# Patient Record
Sex: MALE | Race: WHITE | NOT HISPANIC OR LATINO | Employment: OTHER | URBAN - METROPOLITAN AREA
[De-identification: names, ages, dates, MRNs, and addresses within clinical notes are randomized per-mention and may not be internally consistent; named-entity substitution may affect disease eponyms.]

---

## 2021-07-23 ENCOUNTER — LAB REQUISITION (OUTPATIENT)
Dept: LAB | Facility: CLINIC | Age: 81
End: 2021-07-23

## 2021-07-23 DIAGNOSIS — I50.9 HEART FAILURE, UNSPECIFIED (H): ICD-10-CM

## 2021-07-23 LAB
ANION GAP SERPL CALCULATED.3IONS-SCNC: 5 MMOL/L (ref 3–14)
BUN SERPL-MCNC: 48 MG/DL (ref 7–30)
CALCIUM SERPL-MCNC: 8.3 MG/DL (ref 8.5–10.1)
CHLORIDE BLD-SCNC: 117 MMOL/L (ref 94–109)
CO2 SERPL-SCNC: 21 MMOL/L (ref 20–32)
CREAT SERPL-MCNC: 2 MG/DL (ref 0.66–1.25)
ERYTHROCYTE [DISTWIDTH] IN BLOOD BY AUTOMATED COUNT: 14.2 % (ref 10–15)
GFR SERPL CREATININE-BSD FRML MDRD: 30 ML/MIN/1.73M2
GLUCOSE BLD-MCNC: 88 MG/DL (ref 70–99)
HCT VFR BLD AUTO: 35.9 % (ref 40–53)
HGB BLD-MCNC: 11.5 G/DL (ref 13.3–17.7)
MCH RBC QN AUTO: 32.4 PG (ref 26.5–33)
MCHC RBC AUTO-ENTMCNC: 32 G/DL (ref 31.5–36.5)
MCV RBC AUTO: 101 FL (ref 78–100)
PLATELET # BLD AUTO: 150 10E3/UL (ref 150–450)
POTASSIUM BLD-SCNC: 5.2 MMOL/L (ref 3.4–5.3)
RBC # BLD AUTO: 3.55 10E6/UL (ref 4.4–5.9)
SODIUM SERPL-SCNC: 143 MMOL/L (ref 133–144)
WBC # BLD AUTO: 5.8 10E3/UL (ref 4–11)

## 2021-07-23 PROCEDURE — 36415 COLL VENOUS BLD VENIPUNCTURE: CPT | Performed by: INTERNAL MEDICINE

## 2021-07-23 PROCEDURE — 80048 BASIC METABOLIC PNL TOTAL CA: CPT | Performed by: INTERNAL MEDICINE

## 2021-07-23 PROCEDURE — 85027 COMPLETE CBC AUTOMATED: CPT | Performed by: INTERNAL MEDICINE

## 2021-07-23 PROCEDURE — P9603 ONE-WAY ALLOW PRORATED MILES: HCPCS | Performed by: INTERNAL MEDICINE

## 2022-05-11 ENCOUNTER — LAB REQUISITION (OUTPATIENT)
Dept: LAB | Facility: CLINIC | Age: 82
End: 2022-05-11
Payer: COMMERCIAL

## 2022-05-11 DIAGNOSIS — D64.9 ANEMIA, UNSPECIFIED: ICD-10-CM

## 2022-05-11 DIAGNOSIS — F32.9 MAJOR DEPRESSIVE DISORDER, SINGLE EPISODE, UNSPECIFIED: ICD-10-CM

## 2022-05-11 DIAGNOSIS — E11.9 TYPE 2 DIABETES MELLITUS WITHOUT COMPLICATIONS (H): ICD-10-CM

## 2022-05-11 DIAGNOSIS — N18.32 CHRONIC KIDNEY DISEASE, STAGE 3B (H): ICD-10-CM

## 2022-05-12 LAB
ANION GAP SERPL CALCULATED.3IONS-SCNC: 7 MMOL/L (ref 5–18)
BASOPHILS # BLD AUTO: 0 10E3/UL (ref 0–0.2)
BASOPHILS NFR BLD AUTO: 0 %
BUN SERPL-MCNC: 50 MG/DL (ref 8–28)
CALCIUM SERPL-MCNC: 8.2 MG/DL (ref 8.5–10.5)
CHLORIDE BLD-SCNC: 116 MMOL/L (ref 98–107)
CO2 SERPL-SCNC: 17 MMOL/L (ref 22–31)
CREAT SERPL-MCNC: 2.12 MG/DL (ref 0.7–1.3)
EOSINOPHIL # BLD AUTO: 0.2 10E3/UL (ref 0–0.7)
EOSINOPHIL NFR BLD AUTO: 4 %
ERYTHROCYTE [DISTWIDTH] IN BLOOD BY AUTOMATED COUNT: 15.5 % (ref 10–15)
GFR SERPL CREATININE-BSD FRML MDRD: 30 ML/MIN/1.73M2
GLUCOSE BLD-MCNC: 108 MG/DL (ref 70–125)
HBA1C MFR BLD: 4.7 %
HCT VFR BLD AUTO: 24.1 % (ref 40–53)
HGB BLD-MCNC: 7 G/DL (ref 13.3–17.7)
IMM GRANULOCYTES # BLD: 0 10E3/UL
IMM GRANULOCYTES NFR BLD: 0 %
LYMPHOCYTES # BLD AUTO: 1.5 10E3/UL (ref 0.8–5.3)
LYMPHOCYTES NFR BLD AUTO: 30 %
MCH RBC QN AUTO: 28.1 PG (ref 26.5–33)
MCHC RBC AUTO-ENTMCNC: 29 G/DL (ref 31.5–36.5)
MCV RBC AUTO: 97 FL (ref 78–100)
MONOCYTES # BLD AUTO: 0.3 10E3/UL (ref 0–1.3)
MONOCYTES NFR BLD AUTO: 6 %
NEUTROPHILS # BLD AUTO: 2.9 10E3/UL (ref 1.6–8.3)
NEUTROPHILS NFR BLD AUTO: 60 %
NRBC # BLD AUTO: 0 10E3/UL
NRBC BLD AUTO-RTO: 0 /100
PLATELET # BLD AUTO: 186 10E3/UL (ref 150–450)
POTASSIUM BLD-SCNC: 5.6 MMOL/L (ref 3.5–5)
RBC # BLD AUTO: 2.49 10E6/UL (ref 4.4–5.9)
SODIUM SERPL-SCNC: 140 MMOL/L (ref 136–145)
T4 FREE SERPL-MCNC: 0.85 NG/DL (ref 0.7–1.8)
TSH SERPL DL<=0.005 MIU/L-ACNC: 1.9 UIU/ML (ref 0.3–5)
VIT B12 SERPL-MCNC: 282 PG/ML (ref 213–816)
WBC # BLD AUTO: 5 10E3/UL (ref 4–11)

## 2022-05-12 PROCEDURE — 85025 COMPLETE CBC W/AUTO DIFF WBC: CPT | Mod: ORL | Performed by: FAMILY MEDICINE

## 2022-05-12 PROCEDURE — 82607 VITAMIN B-12: CPT | Mod: ORL | Performed by: FAMILY MEDICINE

## 2022-05-12 PROCEDURE — 83036 HEMOGLOBIN GLYCOSYLATED A1C: CPT | Mod: ORL | Performed by: FAMILY MEDICINE

## 2022-05-12 PROCEDURE — 80048 BASIC METABOLIC PNL TOTAL CA: CPT | Mod: ORL | Performed by: FAMILY MEDICINE

## 2022-05-12 PROCEDURE — 84443 ASSAY THYROID STIM HORMONE: CPT | Mod: ORL | Performed by: FAMILY MEDICINE

## 2022-05-12 PROCEDURE — 84439 ASSAY OF FREE THYROXINE: CPT | Mod: ORL | Performed by: FAMILY MEDICINE

## 2022-05-12 PROCEDURE — 36415 COLL VENOUS BLD VENIPUNCTURE: CPT | Mod: ORL | Performed by: FAMILY MEDICINE

## 2022-05-12 PROCEDURE — P9604 ONE-WAY ALLOW PRORATED TRIP: HCPCS | Mod: ORL | Performed by: FAMILY MEDICINE

## 2022-06-01 ENCOUNTER — LAB REQUISITION (OUTPATIENT)
Dept: LAB | Facility: CLINIC | Age: 82
End: 2022-06-01
Payer: COMMERCIAL

## 2022-06-01 DIAGNOSIS — D64.9 ANEMIA, UNSPECIFIED: ICD-10-CM

## 2022-06-01 DIAGNOSIS — E87.5 HYPERKALEMIA: ICD-10-CM

## 2022-06-02 LAB
ALBUMIN SERPL-MCNC: 3.4 G/DL (ref 3.5–5)
ANION GAP SERPL CALCULATED.3IONS-SCNC: 6 MMOL/L (ref 5–18)
BASOPHILS # BLD AUTO: 0 10E3/UL (ref 0–0.2)
BASOPHILS NFR BLD AUTO: 1 %
BUN SERPL-MCNC: 57 MG/DL (ref 8–28)
CALCIUM SERPL-MCNC: 8.5 MG/DL (ref 8.5–10.5)
CHLORIDE BLD-SCNC: 117 MMOL/L (ref 98–107)
CO2 SERPL-SCNC: 15 MMOL/L (ref 22–31)
CREAT SERPL-MCNC: 1.94 MG/DL (ref 0.7–1.3)
EOSINOPHIL # BLD AUTO: 0.3 10E3/UL (ref 0–0.7)
EOSINOPHIL NFR BLD AUTO: 5 %
ERYTHROCYTE [DISTWIDTH] IN BLOOD BY AUTOMATED COUNT: 16.1 % (ref 10–15)
FERRITIN SERPL-MCNC: 14 NG/ML (ref 27–300)
GFR SERPL CREATININE-BSD FRML MDRD: 34 ML/MIN/1.73M2
GLUCOSE BLD-MCNC: 99 MG/DL (ref 70–125)
HCT VFR BLD AUTO: 27.2 % (ref 40–53)
HGB BLD-MCNC: 7.5 G/DL (ref 13.3–17.7)
IMM GRANULOCYTES # BLD: 0 10E3/UL
IMM GRANULOCYTES NFR BLD: 0 %
IRON SATN MFR SERPL: 6 % (ref 20–50)
IRON SERPL-MCNC: 21 UG/DL (ref 42–175)
LYMPHOCYTES # BLD AUTO: 1.6 10E3/UL (ref 0.8–5.3)
LYMPHOCYTES NFR BLD AUTO: 29 %
MAGNESIUM SERPL-MCNC: 2.1 MG/DL (ref 1.8–2.6)
MCH RBC QN AUTO: 26.6 PG (ref 26.5–33)
MCHC RBC AUTO-ENTMCNC: 27.6 G/DL (ref 31.5–36.5)
MCV RBC AUTO: 97 FL (ref 78–100)
MONOCYTES # BLD AUTO: 0.5 10E3/UL (ref 0–1.3)
MONOCYTES NFR BLD AUTO: 9 %
NEUTROPHILS # BLD AUTO: 3 10E3/UL (ref 1.6–8.3)
NEUTROPHILS NFR BLD AUTO: 56 %
NRBC # BLD AUTO: 0 10E3/UL
NRBC BLD AUTO-RTO: 0 /100
PHOSPHATE SERPL-MCNC: 4.4 MG/DL (ref 2.5–4.5)
PLATELET # BLD AUTO: 179 10E3/UL (ref 150–450)
POTASSIUM BLD-SCNC: 5.5 MMOL/L (ref 3.5–5)
RBC # BLD AUTO: 2.82 10E6/UL (ref 4.4–5.9)
SODIUM SERPL-SCNC: 138 MMOL/L (ref 136–145)
TIBC SERPL-MCNC: 344 UG/DL (ref 313–563)
TRANSFERRIN SERPL-MCNC: 275 MG/DL (ref 212–360)
WBC # BLD AUTO: 5.4 10E3/UL (ref 4–11)

## 2022-06-02 PROCEDURE — 82728 ASSAY OF FERRITIN: CPT | Mod: ORL | Performed by: FAMILY MEDICINE

## 2022-06-02 PROCEDURE — 80069 RENAL FUNCTION PANEL: CPT | Mod: ORL | Performed by: FAMILY MEDICINE

## 2022-06-02 PROCEDURE — 84466 ASSAY OF TRANSFERRIN: CPT | Mod: ORL | Performed by: FAMILY MEDICINE

## 2022-06-02 PROCEDURE — P9603 ONE-WAY ALLOW PRORATED MILES: HCPCS | Mod: ORL | Performed by: FAMILY MEDICINE

## 2022-06-02 PROCEDURE — 85025 COMPLETE CBC W/AUTO DIFF WBC: CPT | Mod: ORL | Performed by: FAMILY MEDICINE

## 2022-06-02 PROCEDURE — 83735 ASSAY OF MAGNESIUM: CPT | Mod: ORL | Performed by: FAMILY MEDICINE

## 2022-06-02 PROCEDURE — 36415 COLL VENOUS BLD VENIPUNCTURE: CPT | Mod: ORL | Performed by: FAMILY MEDICINE

## 2022-06-08 ENCOUNTER — LAB REQUISITION (OUTPATIENT)
Dept: LAB | Facility: CLINIC | Age: 82
End: 2022-06-08
Payer: COMMERCIAL

## 2022-06-08 DIAGNOSIS — G72.41 INCLUSION BODY MYOSITIS (IBM) (H): ICD-10-CM

## 2022-06-09 LAB — CK SERPL-CCNC: 169 U/L (ref 30–190)

## 2022-06-09 PROCEDURE — 82550 ASSAY OF CK (CPK): CPT | Mod: ORL | Performed by: FAMILY MEDICINE

## 2022-06-09 PROCEDURE — 36415 COLL VENOUS BLD VENIPUNCTURE: CPT | Mod: ORL | Performed by: FAMILY MEDICINE

## 2022-06-09 PROCEDURE — P9603 ONE-WAY ALLOW PRORATED MILES: HCPCS | Mod: ORL | Performed by: FAMILY MEDICINE

## 2022-07-06 ENCOUNTER — TRANSFERRED RECORDS (OUTPATIENT)
Dept: HEALTH INFORMATION MANAGEMENT | Facility: CLINIC | Age: 82
End: 2022-07-06

## 2022-07-13 ENCOUNTER — LAB REQUISITION (OUTPATIENT)
Dept: LAB | Facility: CLINIC | Age: 82
End: 2022-07-13
Payer: COMMERCIAL

## 2022-07-13 DIAGNOSIS — D64.9 ANEMIA, UNSPECIFIED: ICD-10-CM

## 2022-07-13 DIAGNOSIS — E87.5 HYPERKALEMIA: ICD-10-CM

## 2022-07-14 LAB
ANION GAP SERPL CALCULATED.3IONS-SCNC: 12 MMOL/L (ref 7–15)
BASOPHILS # BLD AUTO: 0 10E3/UL (ref 0–0.2)
BASOPHILS NFR BLD AUTO: 1 %
BUN SERPL-MCNC: 48.5 MG/DL (ref 8–23)
CALCIUM SERPL-MCNC: 8.2 MG/DL (ref 8.8–10.2)
CHLORIDE SERPL-SCNC: 114 MMOL/L (ref 98–107)
CREAT SERPL-MCNC: 2.34 MG/DL (ref 0.67–1.17)
DEPRECATED HCO3 PLAS-SCNC: 15 MMOL/L (ref 22–29)
EOSINOPHIL # BLD AUTO: 0.3 10E3/UL (ref 0–0.7)
EOSINOPHIL NFR BLD AUTO: 6 %
ERYTHROCYTE [DISTWIDTH] IN BLOOD BY AUTOMATED COUNT: 15.9 % (ref 10–15)
GFR SERPL CREATININE-BSD FRML MDRD: 27 ML/MIN/1.73M2
GLUCOSE SERPL-MCNC: 74 MG/DL (ref 70–99)
HCT VFR BLD AUTO: 24.8 % (ref 40–53)
HGB BLD-MCNC: 6.8 G/DL (ref 13.3–17.7)
IMM GRANULOCYTES # BLD: 0 10E3/UL
IMM GRANULOCYTES NFR BLD: 0 %
LYMPHOCYTES # BLD AUTO: 1.5 10E3/UL (ref 0.8–5.3)
LYMPHOCYTES NFR BLD AUTO: 32 %
MCH RBC QN AUTO: 24.1 PG (ref 26.5–33)
MCHC RBC AUTO-ENTMCNC: 27.4 G/DL (ref 31.5–36.5)
MCV RBC AUTO: 88 FL (ref 78–100)
MONOCYTES # BLD AUTO: 0.5 10E3/UL (ref 0–1.3)
MONOCYTES NFR BLD AUTO: 11 %
NEUTROPHILS # BLD AUTO: 2.4 10E3/UL (ref 1.6–8.3)
NEUTROPHILS NFR BLD AUTO: 50 %
NRBC # BLD AUTO: 0 10E3/UL
NRBC BLD AUTO-RTO: 0 /100
PLAT MORPH BLD: NORMAL
PLATELET # BLD AUTO: 177 10E3/UL (ref 150–450)
POTASSIUM SERPL-SCNC: 5.3 MMOL/L (ref 3.4–5.3)
RBC # BLD AUTO: 2.82 10E6/UL (ref 4.4–5.9)
RBC MORPH BLD: NORMAL
RETICS # AUTO: 0.05 10E6/UL (ref 0.03–0.1)
RETICS/RBC NFR AUTO: 1.7 % (ref 0.5–2)
SODIUM SERPL-SCNC: 141 MMOL/L (ref 136–145)
TOTAL PROTEIN SERUM FOR ELP: 6.1 G/DL (ref 6.4–8.3)
WBC # BLD AUTO: 4.7 10E3/UL (ref 4–11)

## 2022-07-14 PROCEDURE — 85045 AUTOMATED RETICULOCYTE COUNT: CPT | Mod: ORL | Performed by: FAMILY MEDICINE

## 2022-07-14 PROCEDURE — 84165 PROTEIN E-PHORESIS SERUM: CPT | Mod: TC,ORL | Performed by: PATHOLOGY

## 2022-07-14 PROCEDURE — 36415 COLL VENOUS BLD VENIPUNCTURE: CPT | Mod: ORL | Performed by: FAMILY MEDICINE

## 2022-07-14 PROCEDURE — P9604 ONE-WAY ALLOW PRORATED TRIP: HCPCS | Mod: ORL | Performed by: FAMILY MEDICINE

## 2022-07-14 PROCEDURE — 80048 BASIC METABOLIC PNL TOTAL CA: CPT | Mod: ORL | Performed by: FAMILY MEDICINE

## 2022-07-14 PROCEDURE — 84155 ASSAY OF PROTEIN SERUM: CPT | Mod: ORL | Performed by: FAMILY MEDICINE

## 2022-07-14 PROCEDURE — 85025 COMPLETE CBC W/AUTO DIFF WBC: CPT | Mod: ORL | Performed by: FAMILY MEDICINE

## 2022-07-15 LAB
PATH REPORT.COMMENTS IMP SPEC: NORMAL
PATH REPORT.COMMENTS IMP SPEC: NORMAL
PATH REPORT.FINAL DX SPEC: NORMAL
PATH REPORT.MICROSCOPIC SPEC OTHER STN: NORMAL
PATH REPORT.RELEVANT HX SPEC: NORMAL

## 2022-07-15 PROCEDURE — 85060 BLOOD SMEAR INTERPRETATION: CPT | Performed by: PATHOLOGY

## 2022-07-17 LAB
ALBUMIN SERPL ELPH-MCNC: 3.6 G/DL (ref 3.7–5.1)
ALPHA1 GLOB SERPL ELPH-MCNC: 0.3 G/DL (ref 0.2–0.4)
ALPHA2 GLOB SERPL ELPH-MCNC: 0.6 G/DL (ref 0.5–0.9)
B-GLOBULIN SERPL ELPH-MCNC: 0.6 G/DL (ref 0.6–1)
GAMMA GLOB SERPL ELPH-MCNC: 0.9 G/DL (ref 0.7–1.6)
M PROTEIN SERPL ELPH-MCNC: 0.6 G/DL
PROT PATTERN SERPL ELPH-IMP: ABNORMAL

## 2022-07-17 PROCEDURE — 84165 PROTEIN E-PHORESIS SERUM: CPT | Mod: 26 | Performed by: PATHOLOGY

## 2022-07-20 ENCOUNTER — LAB REQUISITION (OUTPATIENT)
Dept: LAB | Facility: CLINIC | Age: 82
End: 2022-07-20
Payer: COMMERCIAL

## 2022-07-20 DIAGNOSIS — D64.9 ANEMIA, UNSPECIFIED: ICD-10-CM

## 2022-07-20 PROCEDURE — 86335 IMMUNFIX E-PHORSIS/URINE/CSF: CPT | Mod: ORL | Performed by: PATHOLOGY

## 2022-07-21 ENCOUNTER — LAB REQUISITION (OUTPATIENT)
Dept: LAB | Facility: CLINIC | Age: 82
End: 2022-07-21
Payer: COMMERCIAL

## 2022-07-21 DIAGNOSIS — D64.9 ANEMIA, UNSPECIFIED: ICD-10-CM

## 2022-07-21 LAB
ERYTHROCYTE [DISTWIDTH] IN BLOOD BY AUTOMATED COUNT: 17.2 % (ref 10–15)
HCT VFR BLD AUTO: 26.6 % (ref 40–53)
HEMOCCULT STL QL: NEGATIVE
HGB BLD-MCNC: 7.1 G/DL (ref 13.3–17.7)
MCH RBC QN AUTO: 24 PG (ref 26.5–33)
MCHC RBC AUTO-ENTMCNC: 26.7 G/DL (ref 31.5–36.5)
MCV RBC AUTO: 90 FL (ref 78–100)
PLATELET # BLD AUTO: 197 10E3/UL (ref 150–450)
RBC # BLD AUTO: 2.96 10E6/UL (ref 4.4–5.9)
WBC # BLD AUTO: 7.4 10E3/UL (ref 4–11)

## 2022-07-21 PROCEDURE — 86334 IMMUNOFIX E-PHORESIS SERUM: CPT | Mod: ORL | Performed by: PATHOLOGY

## 2022-07-21 PROCEDURE — P9604 ONE-WAY ALLOW PRORATED TRIP: HCPCS | Mod: ORL | Performed by: FAMILY MEDICINE

## 2022-07-21 PROCEDURE — 82272 OCCULT BLD FECES 1-3 TESTS: CPT | Mod: ORL | Performed by: FAMILY MEDICINE

## 2022-07-21 PROCEDURE — 85027 COMPLETE CBC AUTOMATED: CPT | Mod: ORL | Performed by: FAMILY MEDICINE

## 2022-07-21 PROCEDURE — 36415 COLL VENOUS BLD VENIPUNCTURE: CPT | Mod: ORL | Performed by: FAMILY MEDICINE

## 2022-07-22 LAB
PROT ELPH PNL UR ELPH: NORMAL
PROT PATTERN SERPL IFE-IMP: NORMAL

## 2022-07-22 PROCEDURE — 86335 IMMUNFIX E-PHORSIS/URINE/CSF: CPT | Mod: 26 | Performed by: PATHOLOGY

## 2022-08-01 ENCOUNTER — APPOINTMENT (OUTPATIENT)
Dept: GENERAL RADIOLOGY | Facility: CLINIC | Age: 82
DRG: 378 | End: 2022-08-01
Attending: EMERGENCY MEDICINE
Payer: COMMERCIAL

## 2022-08-01 ENCOUNTER — HOSPITAL ENCOUNTER (INPATIENT)
Facility: CLINIC | Age: 82
LOS: 4 days | Discharge: HOME HEALTH | DRG: 378 | End: 2022-08-05
Attending: EMERGENCY MEDICINE | Admitting: HOSPITALIST
Payer: COMMERCIAL

## 2022-08-01 ENCOUNTER — APPOINTMENT (OUTPATIENT)
Dept: ULTRASOUND IMAGING | Facility: CLINIC | Age: 82
DRG: 378 | End: 2022-08-01
Attending: HOSPITALIST
Payer: COMMERCIAL

## 2022-08-01 DIAGNOSIS — L03.115 CELLULITIS OF RIGHT LOWER EXTREMITY: Primary | ICD-10-CM

## 2022-08-01 DIAGNOSIS — D64.9 SYMPTOMATIC ANEMIA: ICD-10-CM

## 2022-08-01 DIAGNOSIS — R06.09 DYSPNEA ON EXERTION: ICD-10-CM

## 2022-08-01 DIAGNOSIS — R04.0 EPISTAXIS: ICD-10-CM

## 2022-08-01 DIAGNOSIS — K92.2 GASTROINTESTINAL HEMORRHAGE, UNSPECIFIED GASTROINTESTINAL HEMORRHAGE TYPE: ICD-10-CM

## 2022-08-01 DIAGNOSIS — E55.9 VITAMIN D DEFICIENCY: ICD-10-CM

## 2022-08-01 DIAGNOSIS — E87.5 HYPERKALEMIA: ICD-10-CM

## 2022-08-01 LAB
ABO/RH(D): NORMAL
ALBUMIN SERPL-MCNC: 2.8 G/DL (ref 3.4–5)
ALBUMIN UR-MCNC: 20 MG/DL
ALP SERPL-CCNC: 69 U/L (ref 40–150)
ALT SERPL W P-5'-P-CCNC: 13 U/L (ref 0–70)
ANION GAP SERPL CALCULATED.3IONS-SCNC: 7 MMOL/L (ref 3–14)
ANTIBODY SCREEN: NEGATIVE
APPEARANCE UR: CLEAR
AST SERPL W P-5'-P-CCNC: 17 U/L (ref 0–45)
BILIRUB DIRECT SERPL-MCNC: <0.1 MG/DL (ref 0–0.2)
BILIRUB SERPL-MCNC: 0.2 MG/DL (ref 0.2–1.3)
BILIRUB UR QL STRIP: NEGATIVE
BLD PROD TYP BPU: NORMAL
BLD PROD TYP BPU: NORMAL
BLOOD COMPONENT TYPE: NORMAL
BLOOD COMPONENT TYPE: NORMAL
BUN SERPL-MCNC: 56 MG/DL (ref 7–30)
CALCIUM SERPL-MCNC: 8.4 MG/DL (ref 8.5–10.1)
CHLORIDE BLD-SCNC: 116 MMOL/L (ref 94–109)
CK SERPL-CCNC: 141 U/L (ref 30–300)
CO2 SERPL-SCNC: 16 MMOL/L (ref 20–32)
CODING SYSTEM: NORMAL
CODING SYSTEM: NORMAL
COLOR UR AUTO: ABNORMAL
CREAT SERPL-MCNC: 2.51 MG/DL (ref 0.66–1.25)
CROSSMATCH: NORMAL
CROSSMATCH: NORMAL
ERYTHROCYTE [DISTWIDTH] IN BLOOD BY AUTOMATED COUNT: 26 % (ref 10–15)
GFR SERPL CREATININE-BSD FRML MDRD: 25 ML/MIN/1.73M2
GLUCOSE BLD-MCNC: 94 MG/DL (ref 70–99)
GLUCOSE UR STRIP-MCNC: NEGATIVE MG/DL
HCT VFR BLD AUTO: 18.9 % (ref 40–53)
HEMOCCULT STL QL: POSITIVE
HGB BLD-MCNC: 5.2 G/DL (ref 13.3–17.7)
HGB UR QL STRIP: ABNORMAL
HOLD SPECIMEN: NORMAL
HOLD SPECIMEN: NORMAL
INR PPP: 1.13 (ref 0.85–1.15)
IRON SATN MFR SERPL: 7 % (ref 15–46)
IRON SERPL-MCNC: 20 UG/DL (ref 35–180)
ISSUE DATE AND TIME: NORMAL
ISSUE DATE AND TIME: NORMAL
KETONES UR STRIP-MCNC: NEGATIVE MG/DL
LEUKOCYTE ESTERASE UR QL STRIP: NEGATIVE
MCH RBC QN AUTO: 26.9 PG (ref 26.5–33)
MCHC RBC AUTO-ENTMCNC: 27.5 G/DL (ref 31.5–36.5)
MCV RBC AUTO: 98 FL (ref 78–100)
MUCOUS THREADS #/AREA URNS LPF: PRESENT /LPF
NITRATE UR QL: NEGATIVE
NT-PROBNP SERPL-MCNC: 822 PG/ML (ref 0–1800)
PH UR STRIP: 5.5 [PH] (ref 5–7)
PLATELET # BLD AUTO: 195 10E3/UL (ref 150–450)
POTASSIUM BLD-SCNC: 5.4 MMOL/L (ref 3.4–5.3)
POTASSIUM BLD-SCNC: 5.7 MMOL/L (ref 3.4–5.3)
POTASSIUM BLD-SCNC: 5.8 MMOL/L (ref 3.4–5.3)
PROT SERPL-MCNC: 5.7 G/DL (ref 6.8–8.8)
RBC # BLD AUTO: 1.93 10E6/UL (ref 4.4–5.9)
RBC URINE: 13 /HPF
SARS-COV-2 RNA RESP QL NAA+PROBE: NEGATIVE
SODIUM SERPL-SCNC: 139 MMOL/L (ref 133–144)
SP GR UR STRIP: 1.01 (ref 1–1.03)
SPECIMEN EXPIRATION DATE: NORMAL
TIBC SERPL-MCNC: 306 UG/DL (ref 240–430)
TROPONIN I SERPL HS-MCNC: 8 NG/L
UNIT ABO/RH: NORMAL
UNIT ABO/RH: NORMAL
UNIT NUMBER: NORMAL
UNIT NUMBER: NORMAL
UNIT STATUS: NORMAL
UNIT STATUS: NORMAL
UNIT TYPE ISBT: 6200
UNIT TYPE ISBT: 6200
UROBILINOGEN UR STRIP-MCNC: NORMAL MG/DL
WBC # BLD AUTO: 5.2 10E3/UL (ref 4–11)
WBC URINE: 1 /HPF

## 2022-08-01 PROCEDURE — C9113 INJ PANTOPRAZOLE SODIUM, VIA: HCPCS | Performed by: EMERGENCY MEDICINE

## 2022-08-01 PROCEDURE — 84132 ASSAY OF SERUM POTASSIUM: CPT | Performed by: HOSPITALIST

## 2022-08-01 PROCEDURE — 86901 BLOOD TYPING SEROLOGIC RH(D): CPT | Performed by: EMERGENCY MEDICINE

## 2022-08-01 PROCEDURE — 250N000011 HC RX IP 250 OP 636: Performed by: EMERGENCY MEDICINE

## 2022-08-01 PROCEDURE — 36415 COLL VENOUS BLD VENIPUNCTURE: CPT | Performed by: EMERGENCY MEDICINE

## 2022-08-01 PROCEDURE — 120N000001 HC R&B MED SURG/OB

## 2022-08-01 PROCEDURE — 71046 X-RAY EXAM CHEST 2 VIEWS: CPT

## 2022-08-01 PROCEDURE — 36415 COLL VENOUS BLD VENIPUNCTURE: CPT | Performed by: HOSPITALIST

## 2022-08-01 PROCEDURE — P9016 RBC LEUKOCYTES REDUCED: HCPCS | Performed by: EMERGENCY MEDICINE

## 2022-08-01 PROCEDURE — 250N000011 HC RX IP 250 OP 636: Performed by: HOSPITALIST

## 2022-08-01 PROCEDURE — 76770 US EXAM ABDO BACK WALL COMP: CPT

## 2022-08-01 PROCEDURE — C9803 HOPD COVID-19 SPEC COLLECT: HCPCS

## 2022-08-01 PROCEDURE — C9113 INJ PANTOPRAZOLE SODIUM, VIA: HCPCS | Performed by: HOSPITALIST

## 2022-08-01 PROCEDURE — 96361 HYDRATE IV INFUSION ADD-ON: CPT

## 2022-08-01 PROCEDURE — 86850 RBC ANTIBODY SCREEN: CPT | Performed by: EMERGENCY MEDICINE

## 2022-08-01 PROCEDURE — 86923 COMPATIBILITY TEST ELECTRIC: CPT | Performed by: EMERGENCY MEDICINE

## 2022-08-01 PROCEDURE — 36430 TRANSFUSION BLD/BLD COMPNT: CPT

## 2022-08-01 PROCEDURE — U0003 INFECTIOUS AGENT DETECTION BY NUCLEIC ACID (DNA OR RNA); SEVERE ACUTE RESPIRATORY SYNDROME CORONAVIRUS 2 (SARS-COV-2) (CORONAVIRUS DISEASE [COVID-19]), AMPLIFIED PROBE TECHNIQUE, MAKING USE OF HIGH THROUGHPUT TECHNOLOGIES AS DESCRIBED BY CMS-2020-01-R: HCPCS | Performed by: EMERGENCY MEDICINE

## 2022-08-01 PROCEDURE — 93005 ELECTROCARDIOGRAM TRACING: CPT

## 2022-08-01 PROCEDURE — 96374 THER/PROPH/DIAG INJ IV PUSH: CPT

## 2022-08-01 PROCEDURE — 84484 ASSAY OF TROPONIN QUANT: CPT | Performed by: EMERGENCY MEDICINE

## 2022-08-01 PROCEDURE — 258N000003 HC RX IP 258 OP 636: Performed by: EMERGENCY MEDICINE

## 2022-08-01 PROCEDURE — 82550 ASSAY OF CK (CPK): CPT | Performed by: HOSPITALIST

## 2022-08-01 PROCEDURE — 99285 EMERGENCY DEPT VISIT HI MDM: CPT | Mod: 25

## 2022-08-01 PROCEDURE — 83550 IRON BINDING TEST: CPT | Performed by: HOSPITALIST

## 2022-08-01 PROCEDURE — 84132 ASSAY OF SERUM POTASSIUM: CPT | Performed by: EMERGENCY MEDICINE

## 2022-08-01 PROCEDURE — 83880 ASSAY OF NATRIURETIC PEPTIDE: CPT | Performed by: EMERGENCY MEDICINE

## 2022-08-01 PROCEDURE — 81001 URINALYSIS AUTO W/SCOPE: CPT | Performed by: HOSPITALIST

## 2022-08-01 PROCEDURE — 250N000013 HC RX MED GY IP 250 OP 250 PS 637: Performed by: HOSPITALIST

## 2022-08-01 PROCEDURE — 82248 BILIRUBIN DIRECT: CPT | Performed by: HOSPITALIST

## 2022-08-01 PROCEDURE — 99223 1ST HOSP IP/OBS HIGH 75: CPT | Mod: AI | Performed by: HOSPITALIST

## 2022-08-01 PROCEDURE — 82272 OCCULT BLD FECES 1-3 TESTS: CPT | Performed by: EMERGENCY MEDICINE

## 2022-08-01 PROCEDURE — 85610 PROTHROMBIN TIME: CPT | Performed by: HOSPITALIST

## 2022-08-01 PROCEDURE — 85027 COMPLETE CBC AUTOMATED: CPT | Performed by: EMERGENCY MEDICINE

## 2022-08-01 PROCEDURE — 80053 COMPREHEN METABOLIC PANEL: CPT | Performed by: EMERGENCY MEDICINE

## 2022-08-01 RX ORDER — FUROSEMIDE 10 MG/ML
20 INJECTION INTRAMUSCULAR; INTRAVENOUS ONCE
Status: COMPLETED | OUTPATIENT
Start: 2022-08-01 | End: 2022-08-01

## 2022-08-01 RX ORDER — ACETAMINOPHEN 650 MG/1
650 SUPPOSITORY RECTAL EVERY 6 HOURS PRN
Status: DISCONTINUED | OUTPATIENT
Start: 2022-08-01 | End: 2022-08-05 | Stop reason: HOSPADM

## 2022-08-01 RX ORDER — METOPROLOL TARTRATE 1 MG/ML
2.5 INJECTION, SOLUTION INTRAVENOUS EVERY 4 HOURS PRN
Status: DISCONTINUED | OUTPATIENT
Start: 2022-08-01 | End: 2022-08-05 | Stop reason: HOSPADM

## 2022-08-01 RX ORDER — HYDRALAZINE HYDROCHLORIDE 10 MG/1
20 TABLET, FILM COATED ORAL 2 TIMES DAILY
COMMUNITY
Start: 2022-04-20 | End: 2022-08-05

## 2022-08-01 RX ORDER — ONDANSETRON 2 MG/ML
4 INJECTION INTRAMUSCULAR; INTRAVENOUS EVERY 6 HOURS PRN
Status: DISCONTINUED | OUTPATIENT
Start: 2022-08-01 | End: 2022-08-05 | Stop reason: HOSPADM

## 2022-08-01 RX ORDER — ALLOPURINOL 100 MG/1
100 TABLET ORAL DAILY
Status: DISCONTINUED | OUTPATIENT
Start: 2022-08-01 | End: 2022-08-05 | Stop reason: HOSPADM

## 2022-08-01 RX ORDER — ACETAMINOPHEN 325 MG/1
650 TABLET ORAL EVERY 6 HOURS PRN
Status: DISCONTINUED | OUTPATIENT
Start: 2022-08-01 | End: 2022-08-05 | Stop reason: HOSPADM

## 2022-08-01 RX ORDER — MULTIVIT WITH MINERALS/LUTEIN
1 TABLET ORAL DAILY
COMMUNITY

## 2022-08-01 RX ORDER — CHLORAL HYDRATE 500 MG
1 CAPSULE ORAL DAILY
Status: ON HOLD | COMMUNITY
End: 2023-09-08

## 2022-08-01 RX ORDER — BISACODYL 10 MG
10 SUPPOSITORY, RECTAL RECTAL DAILY PRN
Status: DISCONTINUED | OUTPATIENT
Start: 2022-08-01 | End: 2022-08-05 | Stop reason: HOSPADM

## 2022-08-01 RX ORDER — ONDANSETRON 4 MG/1
4 TABLET, ORALLY DISINTEGRATING ORAL EVERY 6 HOURS PRN
Status: DISCONTINUED | OUTPATIENT
Start: 2022-08-01 | End: 2022-08-05 | Stop reason: HOSPADM

## 2022-08-01 RX ORDER — CALCIUM CARBONATE 500 MG/1
1000 TABLET, CHEWABLE ORAL 4 TIMES DAILY PRN
Status: DISCONTINUED | OUTPATIENT
Start: 2022-08-01 | End: 2022-08-05 | Stop reason: HOSPADM

## 2022-08-01 RX ORDER — CITALOPRAM HYDROBROMIDE 10 MG/1
5 TABLET ORAL DAILY
COMMUNITY

## 2022-08-01 RX ORDER — LIDOCAINE 40 MG/G
CREAM TOPICAL
Status: DISCONTINUED | OUTPATIENT
Start: 2022-08-01 | End: 2022-08-05 | Stop reason: HOSPADM

## 2022-08-01 RX ORDER — HYDRALAZINE HYDROCHLORIDE 20 MG/ML
10 INJECTION INTRAMUSCULAR; INTRAVENOUS EVERY 4 HOURS PRN
Status: DISCONTINUED | OUTPATIENT
Start: 2022-08-01 | End: 2022-08-05 | Stop reason: HOSPADM

## 2022-08-01 RX ADMIN — ALLOPURINOL 100 MG: 100 TABLET ORAL at 18:32

## 2022-08-01 RX ADMIN — SODIUM CHLORIDE 1000 ML: 9 INJECTION, SOLUTION INTRAVENOUS at 11:41

## 2022-08-01 RX ADMIN — PANTOPRAZOLE SODIUM 40 MG: 40 INJECTION, POWDER, FOR SOLUTION INTRAVENOUS at 16:14

## 2022-08-01 RX ADMIN — PANTOPRAZOLE SODIUM 40 MG: 40 INJECTION, POWDER, FOR SOLUTION INTRAVENOUS at 21:56

## 2022-08-01 RX ADMIN — CITALOPRAM HYDROBROMIDE 5 MG: 10 TABLET ORAL at 20:49

## 2022-08-01 RX ADMIN — FUROSEMIDE 20 MG: 10 INJECTION, SOLUTION INTRAVENOUS at 18:32

## 2022-08-01 ASSESSMENT — ACTIVITIES OF DAILY LIVING (ADL)
TRANSFERRING: 1-->ASSISTANCE (EQUIPMENT/PERSON) NEEDED (NOT DEVELOPMENTALLY APPROPRIATE)
TOILETING: 1-->ASSISTANCE (EQUIPMENT/PERSON) NEEDED (NOT DEVELOPMENTALLY APPROPRIATE)
WALKING_OR_CLIMBING_STAIRS_DIFFICULTY: YES
DRESS: 1-->ASSISTANCE (EQUIPMENT/PERSON) NEEDED
EQUIPMENT_CURRENTLY_USED_AT_HOME: WALKER, ROLLING
TOILETING_ISSUES: YES
DRESS: 1-->ASSISTANCE (EQUIPMENT/PERSON) NEEDED (NOT DEVELOPMENTALLY APPROPRIATE)
ADLS_ACUITY_SCORE: 35
BATHING: 1-->ASSISTANCE NEEDED
TOILETING: 1-->ASSISTANCE (EQUIPMENT/PERSON) NEEDED
ADLS_ACUITY_SCORE: 40
WEAR_GLASSES_OR_BLIND: YES
DIFFICULTY_EATING/SWALLOWING: NO
DRESSING/BATHING: BATHING DIFFICULTY, ASSISTANCE 1 PERSON
CHANGE_IN_FUNCTIONAL_STATUS_SINCE_ONSET_OF_CURRENT_ILLNESS/INJURY: YES
DOING_ERRANDS_INDEPENDENTLY_DIFFICULTY: YES
WALKING_OR_CLIMBING_STAIRS: STAIR CLIMBING DIFFICULTY, DEPENDENT
ADLS_ACUITY_SCORE: 35
CONCENTRATING,_REMEMBERING_OR_MAKING_DECISIONS_DIFFICULTY: NO
TOILETING_ASSISTANCE: TOILETING DIFFICULTY, REQUIRES EQUIPMENT
NUMBER_OF_TIMES_PATIENT_HAS_FALLEN_WITHIN_LAST_SIX_MONTHS: 2
VISION_MANAGEMENT: GLASSES
TRANSFERRING: 2-->COMPLETELY DEPENDENT
FALL_HISTORY_WITHIN_LAST_SIX_MONTHS: YES
DRESSING/BATHING_DIFFICULTY: YES

## 2022-08-01 ASSESSMENT — ENCOUNTER SYMPTOMS
WEAKNESS: 1
COUGH: 0
FEVER: 0
SHORTNESS OF BREATH: 1

## 2022-08-01 NOTE — H&P
[facility]    History and Physical  Hospitalist    Mando Freedman MRN# [id number]   Age: 82 year old YOB: 1940     Date of Admission:  8/1/2022    Primary care provider: No primary care provider on file.          Assessment and Plan:     Mando Freedman is a 82 year old  male with CKD stage III, chronic anemia, hypertension, MGUS, von Willebrand's disease presented to the ED with generalized weakness.    Severe symptomatic anemia.  Acute on chronic anemia.  History of MGUS.  Patient reports progressively worsening weakness, shortness of breath, lower extremity edema. Lost to follow-up with hematology oncology  On chart review baseline hemoglobin around 7.  Now presenting with hemoglobin of 5.2.  Admit to inpatient unit.  Plan to transfuse 2 units PRBC.  Monitor hemoglobin level every 4 hours overnight.  Conditional order to transfuse for hemoglobin less than 7.  Follow iron studies, CK levels, LFTs.  [facility] hematology oncology consult requested.    GI bleed.  Patient reports dark-colored stools ongoing for few days now.  Occult blood positive.  IV Protonix ordered.  Clear liquid diet.  N.p.o. from midnight.  [facility] consult requested.   Monitor hemoglobin levels, follow INR.    Acute on chronic kidney injury.  CKD stage III-IV.  On chart review baseline creatinine between 1.9-2.1.  Now presenting with creatinine of 2.51.  Received 1 L fluid bolus in ED.  Transfusing blood as above.  Urine analysis ordered.  Renal ultrasound.  Nephrology consult requested.  Avoid nephrotoxic drugs, monitor renal function in AM.    Hyperkalemia from kidney injury.  Potassium 5.8.  Received 1 L fluid bolus in ED  Recheck potassium tonight.  Telemetry monitoring.     Dyspnea on exertion likely from deconditioning, anemia, atelectasis.    Bilateral lower extremity edema  Reporting shortness of breath with minimal exertion.  In ED afebrile.  Oxygen saturation 95% on room air.   2+ pitting lower extremity edema.  EKG sinus rhythm with PVCs.  Left anterior fascicular block, RBBB.  Chest x-ray mild basilar pulmonary opacities likely atelectasis.    Troponin I 8.  proBNP 822.  COVID-19 PCR negative.  Telemetry monitoring.  20 mg IV Lasix x1.  LFTs.  Strict input output monitoring, daily weights.  Aggressive incentive spirometry, treat anemia as above.    Nosebleeds-intermittent.  History of von Willebrand's   Reports on and off nosebleeds.  Likely from MGUS.  Monitor.     Hypertension.  Hold PTA amlodipine, hydralazine in the setting of blood loss anemia.  As needed IV hydralazine, IV metoprolol ordered.    Gout.  Continue PTA allopurinol.    Physical deconditioning from medical illness, senile frailty.  Resident of assisted living facility.  PT, OT, social work assistance with transition    Goals of care discussion.  At length discussion with patient regarding goals of care.  Reports if he is found to have multiple myeloma or medical condition that will need long-term treatment patient would like to be on comfort care.  At this time he is in agreement with preliminary work-up.    DVT Prophylaxis: SCDs, ambulate  Code Status: DNR, DNI    Disposition: Expected discharge in greater than 2 days pending clinical improvement.    More than 70% of time spent in direct patient care, care coordination, patient counseling, and formalizing plan of care.  Discussed with patient and ED team.     Adalberto Lovelace MD          Chief Complaint:     History is obtained from patient, chart review    Mando Freedman is a 82 year old  male with CKD stage III, chronic anemia, hypertension, MGUS, von Willebrand's disease presented to the ED with generalized weakness.    Patient reports had a fall in February where he went to rehab for 8 weeks, transition to assisted living facility and receives [facility] [employer] assistance.  Reports uses walker at baseline to ambulate.  Reports over the last week having generalized weakness  with minimal ambulation.  Reports shortness of breath with minimal ambulation.  Also reporting worsening swelling over his lower extremity.  Denies any fever.  Denies any cough.  Denies any chest pain or palpitations.    Reports on and off nosebleeds.  Reports chronic back and leg pains.  Denies any urinary symptoms.    Reports history of MGUS, lost to follow-up with hematologist over the last 4 to 5 years as his primary hematologist had retired and patient had moved.      In ED afebrile.  Oxygen saturation 95% on room air.  2+ pitting lower extremity edema.  EKG sinus rhythm with PVCs.  Left anterior fascicular block, RBBB.  Chest x-ray mild basilar pulmonary opacities likely atelectasis.      Hemoglobin 5.2.  Platelets 195.  Troponin I 8.  proBNP 822.  COVID-19 PCR negative.  Sodium 139, potassium 5.8, creatinine 2.51.  Occult blood positive.           Review of Systems:     GENERAL: no fever or chills  EENT: No new vision changes, no sinus problems  PULMONARY:  no wheezing  CARDIAC: no chest pain, no fast heart beats   GI: No abdominal pain, vomiting, diarrhea, constipation  : No burning/pain with urination  NEURO: No significant headaches, no loss of consciousness  ENDOCRINE: No excessive thirst  MUSCULOSKELETAL: Chronic back and leg pain.  SKIN: No skin rashes  PSYCHIATRY no anxiety     Medical History:     History of hypertension.  Gout.  MGUS.  Von Willebrand's disease.  CKD.    Surgical History:   Surgical history reviewed, no pertinent history to HPI       Social History:   Quit smoking many years ago.  Reports occasional alcohol use.  Resident of Senior living facility, lives independently.       Family History:   Family history reviewed, no pertinent family history to HPI         Allergies:   No Known Allergies          Medications:     Medications Prior to Admission   Medication Sig Dispense Refill Last Dose     allopurinol (ZYLOPRIM) 100 MG tablet Take 100 mg by mouth daily   7/31/2022 at am      amLODIPine (NORVASC) 5 MG tablet Take 5 mg by mouth daily   7/31/2022 at am     citalopram (CELEXA) 10 MG tablet Take 5 mg by mouth daily   Past Week at Unknown time     fish oil-omega-3 fatty acids 1000 MG capsule Take 1 g by mouth daily   7/31/2022 at am     hydrALAZINE (APRESOLINE) 10 MG tablet Take 20 mg by mouth 2 times daily 10mg x2 = 20mg   7/31/2022 at Unknown time     multivitamin (CENTRUM SILVER) tablet Take 1 tablet by mouth daily   7/31/2022 at am             Physical Exam      Admission Weight: 81.6 kg (180 lb)    Vital Signs with Ranges  Temp:  [98  F (36.7  C)-98.6  F (37  C)] 98.5  F (36.9  C)  Pulse:  [60-74] 74  Resp:  [14-18] 18  BP: (132-148)/(74-78) 145/75  SpO2:  [90 %-100 %] 100 %    PHYSICAL EXAM  GENERAL: Patient is in no distress. Alert and oriented.  Frail-appearing.  HEENT: Oropharynx pink, moist.  Extraocular muscle movements intact  HEART: Regular rate and rhythm. S1S2. No murmurs  LUNGS: Bilateral slightly decreased breath sounds, no wheezing or crackles  Respirations unlabored  ABDOMEN: Soft, no abdominal tenderness, bowel sounds heard   NEURO: Moving all extremities  EXTREMITIES: 2+ pedal edema. 2+ peripheral pulses.  SKIN: Warm, dry. No rash or bruising.  PSYCHIATRY Cooperative         Data:   All new lab and imaging data was reviewed.

## 2022-08-01 NOTE — ED NOTES
"[facility]  ED Nurse Handoff Report    ED Chief complaint: Generalized Weakness      ED Diagnosis:   Final diagnoses:   Gastrointestinal hemorrhage, unspecified gastrointestinal hemorrhage type       Code Status: DNR / DNI    Allergies: No Known Allergies    Patient Story: Patient lives at an assisted living. Patient has weekly labs. This last week his hgb was 6.7 and was then prescribed iron supplements. Iron made him sick with constipation and diarrhea. Patient is increasingly weak and today was too weak to ambulate.   Focused Assessment:  Patient complains of weakness and lack of desire to participate in life activities. Patient endorsed feeling hopeless about life. RN asked patient to reevaluate feelings once hgb returns to a normal level and he is able to be active in life. Patient also states that he hasn't been eating or drinking as well in the past week.     Treatments and/or interventions provided: Fluids, preparing blood.    Patient's response to treatments and/or interventions: N/A    To be done/followed up on inpatient unit:  See orders.     Does this patient have any cognitive concerns?: None.     Activity level - Baseline/Home:  Independent  Activity Level - Current:   Stand with Assist    Patient's Preferred language: English   Needed?: No    Isolation: None  Infection: Not Applicable  Patient tested for COVID 19 prior to admission: YES  Bariatric?: No    Vital Signs:   Vitals:    08/01/22 1146   BP: (!) 144/78   Pulse: 72   Resp: 14   Temp: 98.6  F (37  C)   TempSrc: Oral   SpO2: 100%   Weight: 81.6 kg (180 lb)   Height: 1.753 m (5' 9\")       Cardiac Rhythm:     Was the PSS-3 completed:   Yes  What interventions are required if any?               Family Comments: Patients family is bedside.   OBS brochure/video discussed/provided to patient/family: N/A              Name of person given brochure if not patient: N/A              Relationship to patient: N/A    For the " majority of the shift this patient's behavior was Green.   Behavioral interventions performed were None needed.    ED NURSE PHONE NUMBER: [phone]

## 2022-08-01 NOTE — PHARMACY-ADMISSION MEDICATION HISTORY
Pharmacy Medication History  Admission medication history interview status for the 8/1/2022  admission is complete. See EPIC admission navigator for prior to admission medications     Location of Interview: Patient room  Medication history sources: Patient and Surescripts    Significant changes made to the medication list:  Ran out of Celexa last Friday per pt    In the past week, patient estimated taking medication this percent of the time: greater than 90%    Additional medication history information:       Medication reconciliation completed by provider prior to medication history? No    Time spent in this activity: 15 min    Prior to Admission medications    Medication Sig Last Dose Taking? Auth Provider Long Term End Date   allopurinol (ZYLOPRIM) 100 MG tablet Take 100 mg by mouth daily 7/31/2022 at am Yes Reported, Patient     amLODIPine (NORVASC) 5 MG tablet Take 5 mg by mouth daily 7/31/2022 at am Yes Reported, Patient Yes    citalopram (CELEXA) 10 MG tablet Take 5 mg by mouth daily Past Week at Unknown time Yes Unknown, Entered By History Yes    fish oil-omega-3 fatty acids 1000 MG capsule Take 1 g by mouth daily 7/31/2022 at am Yes Unknown, Entered By History     hydrALAZINE (APRESOLINE) 10 MG tablet Take 20 mg by mouth 2 times daily 10mg x2 = 20mg 7/31/2022 at Unknown time Yes Unknown, Entered By History Yes    multivitamin (CENTRUM SILVER) tablet Take 1 tablet by mouth daily 7/31/2022 at am Yes Unknown, Entered By History         The information provided in this note is only as accurate as the sources available at the time of update(s)

## 2022-08-01 NOTE — ED NOTES
Bed: ED07  Expected date:   Expected time:   Means of arrival:   [place]  [facility] [phone]  M weakness eta 1129

## 2022-08-01 NOTE — PROGRESS NOTES
RECEIVING UNIT ED HANDOFF REVIEW    ED Nurse Handoff Report was reviewed by: Nikki Almeida RN on August 1, 2022 at 4:03 PM

## 2022-08-01 NOTE — ED TRIAGE NOTES
Patient had labs drawn 1 week ago and had a low Hgb (pt states 6.7)  Prescribed Iron supplement which makes patient go between diarrhea and constipation. Today weakness was too much so agreed to come in. Patient also has not been eating or drinking as much as normal in the past week.     Triage Assessment     Row Name 08/01/22 1147       Triage Assessment (Adult)    Airway WDL WDL       Respiratory WDL    Respiratory WDL WDL       Skin Circulation/Temperature WDL    Skin Circulation/Temperature WDL X       Cardiac WDL    Cardiac WDL WDL       Peripheral/Neurovascular WDL    Peripheral Neurovascular WDL WDL       Cognitive/Neuro/Behavioral WDL    Cognitive/Neuro/Behavioral WDL WDL

## 2022-08-01 NOTE — ED PROVIDER NOTES
History   Chief Complaint:  Generalized Weakness       The history is provided by the patient.      Mando Freedman is a 82 year old male with history of diabetes, CKD, and anemia who presents with generalized weakness. The patient reports that he had a bad fall in February where he went to rehabilitation for 8 weeks. He notes that he healed pretty well all over and that his pain is lot better. When he arrived home from rehabilitation, he noticed that he was having a hard time with activities of daily living, such as making food, getting up, using the bathroom, and showering, and made the decision to transition to assisted living at the [facility]. The patient uses a walker at baseline and reports feeling generalized weakness for the last week that has prevented him from going down to the dining room. He notes feeling shortness of breath when walking down the hallway to the elevator. The patient notes chronic stable edema in his legs, and denies cough, fever, chest pain, and any other pain. Of note, the patient has a history of monoclonal gammopathy of unknown significance and reports seeing a hematologist for a couple years. However, he has not seen a hematologist recently since the previous hematologist retired and the patient has moved. He reports low hemoglobin last week and was recently started on iron supplementation; notes black/dark stools.  He is currently Denies other pertinent medications, including anticoagulants.     Review of Systems   Constitutional: Negative for fever.   Respiratory: Positive for shortness of breath. Negative for cough.    Cardiovascular: Negative for chest pain.   Neurological: Positive for weakness.   All other systems reviewed and are negative.      Allergies:  The patient has no known drug allergies.     Medications:  allopurinol   amlodipine   lisinopril   Norco     Past Medical History:     Gout   Anemia   Chronic Kidney Disease   Closed fracture of  "humerus  Bilateral leg weakness  Foraminal stenosis of lumbar region  MGUS  Episodic cluster headache  Hypertension  Von Willebrand disease  Inclusion body myositis   Skin cancer  Type 2 diabetes mellitus  Creatinine elevation    Past Surgical History:    Knee arthroscopy  Laparoscopic cholecystectomy  Skin cancer resection  Colonoscopy      Family History:    Asthma  Diabetes mellitus  Hypertension  Heart disease     Social History:  The patient presents alone via ambulance.  He resides at [facility] in [place]    Physical Exam     Patient Vitals for the past 24 hrs:   BP Temp Temp src Pulse Resp SpO2 Height Weight   08/01/22 1639 132/74 98.1  F (36.7  C) Oral 65 18 99 % -- --   08/01/22 1630 (!) 145/77 -- -- 73 -- 95 % -- --   08/01/22 1625 -- -- -- -- -- 90 % -- --   08/01/22 1620 -- -- -- -- -- 95 % -- --   08/01/22 1615 (!) 148/78 -- -- 60 -- 92 % -- --   08/01/22 1614 (!) 140/77 98  F (36.7  C) Oral 73 16 93 % -- --   08/01/22 1610 (!) 140/77 -- -- 71 -- 96 % -- --   08/01/22 1605 -- -- -- -- -- 93 % -- --   08/01/22 1146 (!) 144/78 98.6  F (37  C) Oral 72 14 100 % 1.753 m (5' 9\") 81.6 kg (180 lb)       Physical Exam  General: Alert and cooperative with exam. Patient in mild distress. Normal mentation.  Head:  Scalp is NC/AT  Eyes:  No scleral icterus, PERRL  ENT:  The external nose and ears are normal.   Neck:  Normal range of motion without rigidity.  CV:  Regular rate and rhythm    No pathologic murmur   Resp:  Breath sounds are clear bilaterally    Non-labored, no retractions or accessory muscle use  GI:  Abdomen is soft, no distension, no tenderness. No peritoneal signs  MS:  +1 chronic pitting edema to LEs  Skin:  Warm and dry, No rash or lesions noted.  Neuro: Oriented x 3. No gross motor deficits.  Rectal:  No pain on XAVI. Melanotic hemacopostive stool     Emergency Department Course   ECG  ECG taken at 1358, ECG read at 1400  Sinus rhythm with premature supraventricular complexes  Right bundle branch " block  Left anterior fascicular block  *bifascicular block*  Abnormal ECG    No prior ECG for comparison.   Rate 79 bpm. LA interval 182 ms. QRS duration 128 ms. QT/QTc 410/470 ms. P-R-T axes 46 -65 17.     Imaging:  Chest XR,  PA & LAT   Final Result   IMPRESSION: AP and lateral views of the chest were obtained.   Cardiomediastinal silhouette is within normal limits. Mild basilar   pulmonary opacities, likely atelectasis. No significant pleural   effusion or pneumothorax.      RYAN MURPHY MD            SYSTEM ID:  [id number]      Report per radiology    Laboratory:  Labs Ordered and Resulted from Time of ED Arrival to Time of ED Departure   CBC WITH PLATELETS - Abnormal       Result Value    WBC Count 5.2      RBC Count 1.93 (*)     Hemoglobin 5.2 (*)     Hematocrit 18.9 (*)     MCV 98      MCH 26.9      MCHC 27.5 (*)     RDW 26.0 (*)     Platelet Count 195     BASIC METABOLIC PANEL - Abnormal    Sodium 139      Potassium 5.8 (*)     Chloride 116 (*)     Carbon Dioxide (CO2) 16 (*)     Anion Gap 7      Urea Nitrogen 56 (*)     Creatinine 2.51 (*)     Calcium 8.4 (*)     Glucose 94      GFR Estimate 25 (*)    POTASSIUM - Abnormal    Potassium 5.7 (*)    OCCULT BLOOD STOOL - Abnormal    Occult Blood Positive (*)    TROPONIN I - Normal    Troponin I High Sensitivity 8     NT PROBNP INPATIENT - Normal    N terminal Pro BNP Inpatient 822     COVID-19 VIRUS (CORONAVIRUS) BY PCR - Normal    SARS CoV2 PCR Negative     TYPE AND SCREEN, ADULT    ABO/RH(D) A POS      Antibody Screen Negative      SPECIMEN EXPIRATION DATE [id number]     PREPARE RED BLOOD CELLS (UNIT)    CROSSMATCH Compatible      UNIT ABO/RH A Pos      Unit Number [id number]      Unit Status Issued      Blood Component Type Red Blood Cells      Product Code [id number]      CODING SYSTEM [id number]      UNIT TYPE ISBT 6200      ISSUE DATE AND TIME 20220801160400     PREPARE RED BLOOD CELLS (UNIT)    CROSSMATCH Compatible      UNIT ABO/RH A Pos      Unit  Number [id number]      Unit Status Ready      Blood Component Type Red Blood Cells      Product Code X1811R70      CODING SYSTEM [id number]      UNIT TYPE ISBT 6200     PREPARE RED BLOOD CELLS (UNIT)   TRANSFUSE RED BLOOD CELLS (UNIT)   ABO/RH TYPE AND SCREEN        Emergency Department Course:     Reviewed:  I reviewed nursing notes, vitals and Care Everywhere    Assessments:  1218 I obtained history and examined the patient as noted above.   1305 I rechecked and updated the patient.   1456 I rechecked the patient and explained findings. Discussed admission.    Consults:  8495  I consulted with Dr. Lovelace, hospitalist, regarding the patient's history and presentation, who accepted the patient for admission.      Interventions:  1141  NS 1 L IV   1614 Protonix 40 mg IV  1616 Transfuse red blood cells 1 unit IV    Disposition:  The patient was admitted to the hospital under the care of Dr. Lovelace.     Impression & Plan     Medical Decision Making:  Mando Freedman is a 82 year old male who presents with exertional shortness of breath.  Labs consistent with significant anemia (hemoglobin 5.2.  Patient consented to blood transfusion; 2 units ordered.  Rectal exam demonstrates melanotic/Hemoccult positive stool.  I considered a broad differential diagnosis for this patient including upper GIB (ulcer, gastritis, avm, tumor, etc) vs lower GIB (tumor, diverticulosis, avm, meckels, etc), colitis, aortoenteric fistula, hemorrhoids, fissures,  Ischemic colitis, bacterial stool infection (salmonella, shigella, e. Coli, campylobacter).     The workup in the ED is consistent with gastrointestinal bleeding, although it is unclear if it is lower or upper.  Patient denies abdominal pain and abdominal exam is benign.  Denies significant NSAID usage or history of previous GI bleed.  Provided Protonix for potential upper GI bleed.  No indication to start octreotide as my suspicion of variceal bleed is so low.    Did not call GI  consult at this time given non-massive bleed and stable patient.  Patient admitted to the hospital service.  Labs also notable for mild hyperkalemia; no concerning EKG changes; provided 1 L IV fluid bolus, will defer additional management to hospitalist.  Patient remained stable throughout my care.      Diagnosis:    ICD-10-CM    1. Gastrointestinal hemorrhage, unspecified gastrointestinal hemorrhage type  K92.2    2. Dyspnea on exertion  R06.00    3. Symptomatic anemia  D64.9    4. Hyperkalemia  E87.5        Scribe Disclosure:  I, JOSE FRANCISCO JUDGE, am serving as a scribe at 12:08 PM on 8/1/2022 to document services personally performed by Martinez Lang DO based on my observations and the provider's statements to me.   I, Jocelyn Montoya, am serving as a scribe  at 1:55 PM on 8/1/2022.        Martinez Lang DO  08/01/22 1731

## 2022-08-02 ENCOUNTER — ANESTHESIA (OUTPATIENT)
Dept: GASTROENTEROLOGY | Facility: CLINIC | Age: 82
DRG: 378 | End: 2022-08-02
Payer: COMMERCIAL

## 2022-08-02 ENCOUNTER — APPOINTMENT (OUTPATIENT)
Dept: OCCUPATIONAL THERAPY | Facility: CLINIC | Age: 82
DRG: 378 | End: 2022-08-02
Attending: HOSPITALIST
Payer: COMMERCIAL

## 2022-08-02 ENCOUNTER — APPOINTMENT (OUTPATIENT)
Dept: ULTRASOUND IMAGING | Facility: CLINIC | Age: 82
DRG: 378 | End: 2022-08-02
Attending: HOSPITALIST
Payer: COMMERCIAL

## 2022-08-02 ENCOUNTER — ANESTHESIA EVENT (OUTPATIENT)
Dept: GASTROENTEROLOGY | Facility: CLINIC | Age: 82
DRG: 378 | End: 2022-08-02
Payer: COMMERCIAL

## 2022-08-02 ENCOUNTER — APPOINTMENT (OUTPATIENT)
Dept: PHYSICAL THERAPY | Facility: CLINIC | Age: 82
DRG: 378 | End: 2022-08-02
Attending: HOSPITALIST
Payer: COMMERCIAL

## 2022-08-02 LAB
ALBUMIN SERPL-MCNC: 2.7 G/DL (ref 3.4–5)
ALP SERPL-CCNC: 70 U/L (ref 40–150)
ALT SERPL W P-5'-P-CCNC: 13 U/L (ref 0–70)
ANION GAP SERPL CALCULATED.3IONS-SCNC: 8 MMOL/L (ref 3–14)
APTT PPP: 41 SECONDS (ref 22–38)
AST SERPL W P-5'-P-CCNC: 9 U/L (ref 0–45)
BASOPHILS # BLD AUTO: 0 10E3/UL (ref 0–0.2)
BASOPHILS NFR BLD AUTO: 1 %
BILIRUB SERPL-MCNC: 0.6 MG/DL (ref 0.2–1.3)
BUN SERPL-MCNC: 52 MG/DL (ref 7–30)
CALCIUM SERPL-MCNC: 8.1 MG/DL (ref 8.5–10.1)
CHLORIDE BLD-SCNC: 120 MMOL/L (ref 94–109)
CO2 SERPL-SCNC: 16 MMOL/L (ref 20–32)
CREAT SERPL-MCNC: 2.43 MG/DL (ref 0.66–1.25)
EOSINOPHIL # BLD AUTO: 0.2 10E3/UL (ref 0–0.7)
EOSINOPHIL NFR BLD AUTO: 5 %
ERYTHROCYTE [DISTWIDTH] IN BLOOD BY AUTOMATED COUNT: 22.4 % (ref 10–15)
FERRITIN SERPL-MCNC: 27 NG/ML (ref 26–388)
GFR SERPL CREATININE-BSD FRML MDRD: 26 ML/MIN/1.73M2
GLUCOSE BLD-MCNC: 96 MG/DL (ref 70–99)
HCT VFR BLD AUTO: 26.4 % (ref 40–53)
HGB BLD-MCNC: 7.6 G/DL (ref 13.3–17.7)
HGB BLD-MCNC: 7.6 G/DL (ref 13.3–17.7)
HGB BLD-MCNC: 7.7 G/DL (ref 13.3–17.7)
HGB BLD-MCNC: 8.3 G/DL (ref 13.3–17.7)
HGB BLD-MCNC: 8.5 G/DL (ref 13.3–17.7)
IMM GRANULOCYTES # BLD: 0 10E3/UL
IMM GRANULOCYTES NFR BLD: 0 %
LYMPHOCYTES # BLD AUTO: 1.2 10E3/UL (ref 0.8–5.3)
LYMPHOCYTES NFR BLD AUTO: 27 %
MCH RBC QN AUTO: 27.6 PG (ref 26.5–33)
MCHC RBC AUTO-ENTMCNC: 28.8 G/DL (ref 31.5–36.5)
MCV RBC AUTO: 96 FL (ref 78–100)
MONOCYTES # BLD AUTO: 0.4 10E3/UL (ref 0–1.3)
MONOCYTES NFR BLD AUTO: 10 %
NEUTROPHILS # BLD AUTO: 2.6 10E3/UL (ref 1.6–8.3)
NEUTROPHILS NFR BLD AUTO: 57 %
NRBC # BLD AUTO: 0 10E3/UL
NRBC BLD AUTO-RTO: 0 /100
PLATELET # BLD AUTO: 163 10E3/UL (ref 150–450)
POTASSIUM BLD-SCNC: 5 MMOL/L (ref 3.4–5.3)
PROT SERPL-MCNC: 5.5 G/DL (ref 6.8–8.8)
RBC # BLD AUTO: 2.75 10E6/UL (ref 4.4–5.9)
SODIUM SERPL-SCNC: 144 MMOL/L (ref 133–144)
UPPER GI ENDOSCOPY: NORMAL
WBC # BLD AUTO: 4.5 10E3/UL (ref 4–11)

## 2022-08-02 PROCEDURE — 97530 THERAPEUTIC ACTIVITIES: CPT | Mod: GP | Performed by: PHYSICAL THERAPIST

## 2022-08-02 PROCEDURE — 250N000011 HC RX IP 250 OP 636: Performed by: HOSPITALIST

## 2022-08-02 PROCEDURE — 120N000001 HC R&B MED SURG/OB

## 2022-08-02 PROCEDURE — 85018 HEMOGLOBIN: CPT | Performed by: HOSPITALIST

## 2022-08-02 PROCEDURE — 80053 COMPREHEN METABOLIC PANEL: CPT | Performed by: HOSPITALIST

## 2022-08-02 PROCEDURE — 250N000009 HC RX 250: Performed by: NURSE ANESTHETIST, CERTIFIED REGISTERED

## 2022-08-02 PROCEDURE — 97161 PT EVAL LOW COMPLEX 20 MIN: CPT | Mod: GP | Performed by: PHYSICAL THERAPIST

## 2022-08-02 PROCEDURE — 97165 OT EVAL LOW COMPLEX 30 MIN: CPT | Mod: GO | Performed by: OCCUPATIONAL THERAPIST

## 2022-08-02 PROCEDURE — 258N000003 HC RX IP 258 OP 636: Performed by: INTERNAL MEDICINE

## 2022-08-02 PROCEDURE — 99233 SBSQ HOSP IP/OBS HIGH 50: CPT | Performed by: HOSPITALIST

## 2022-08-02 PROCEDURE — 250N000009 HC RX 250: Performed by: INTERNAL MEDICINE

## 2022-08-02 PROCEDURE — 999N000010 HC STATISTIC ANES STAT CODE-CRNA PER MINUTE: Performed by: INTERNAL MEDICINE

## 2022-08-02 PROCEDURE — 250N000011 HC RX IP 250 OP 636: Performed by: INTERNAL MEDICINE

## 2022-08-02 PROCEDURE — 36415 COLL VENOUS BLD VENIPUNCTURE: CPT | Performed by: HOSPITALIST

## 2022-08-02 PROCEDURE — 85245 CLOT FACTOR VIII VW RISTOCTN: CPT | Performed by: INTERNAL MEDICINE

## 2022-08-02 PROCEDURE — 85025 COMPLETE CBC W/AUTO DIFF WBC: CPT | Performed by: HOSPITALIST

## 2022-08-02 PROCEDURE — 43235 EGD DIAGNOSTIC BRUSH WASH: CPT | Performed by: INTERNAL MEDICINE

## 2022-08-02 PROCEDURE — 250N000013 HC RX MED GY IP 250 OP 250 PS 637: Performed by: HOSPITALIST

## 2022-08-02 PROCEDURE — 97535 SELF CARE MNGMENT TRAINING: CPT | Mod: GO | Performed by: OCCUPATIONAL THERAPIST

## 2022-08-02 PROCEDURE — 85730 THROMBOPLASTIN TIME PARTIAL: CPT | Performed by: INTERNAL MEDICINE

## 2022-08-02 PROCEDURE — 85247 CLOT FACTOR VIII MULTIMETRIC: CPT | Performed by: INTERNAL MEDICINE

## 2022-08-02 PROCEDURE — 0DJ08ZZ INSPECTION OF UPPER INTESTINAL TRACT, VIA NATURAL OR ARTIFICIAL OPENING ENDOSCOPIC: ICD-10-PCS | Performed by: INTERNAL MEDICINE

## 2022-08-02 PROCEDURE — 82728 ASSAY OF FERRITIN: CPT | Performed by: INTERNAL MEDICINE

## 2022-08-02 PROCEDURE — 83921 ORGANIC ACID SINGLE QUANT: CPT | Performed by: INTERNAL MEDICINE

## 2022-08-02 PROCEDURE — 83521 IG LIGHT CHAINS FREE EACH: CPT | Performed by: INTERNAL MEDICINE

## 2022-08-02 PROCEDURE — 85390 FIBRINOLYSINS SCREEN I&R: CPT | Mod: 26 | Performed by: PATHOLOGY

## 2022-08-02 PROCEDURE — 85246 CLOT FACTOR VIII VW ANTIGEN: CPT | Performed by: INTERNAL MEDICINE

## 2022-08-02 PROCEDURE — 82040 ASSAY OF SERUM ALBUMIN: CPT | Performed by: HOSPITALIST

## 2022-08-02 PROCEDURE — 250N000011 HC RX IP 250 OP 636: Performed by: NURSE ANESTHETIST, CERTIFIED REGISTERED

## 2022-08-02 PROCEDURE — 85240 CLOT FACTOR VIII AHG 1 STAGE: CPT | Performed by: INTERNAL MEDICINE

## 2022-08-02 PROCEDURE — 370N000017 HC ANESTHESIA TECHNICAL FEE, PER MIN: Performed by: INTERNAL MEDICINE

## 2022-08-02 PROCEDURE — 99223 1ST HOSP IP/OBS HIGH 75: CPT | Performed by: INTERNAL MEDICINE

## 2022-08-02 PROCEDURE — 99222 1ST HOSP IP/OBS MODERATE 55: CPT | Performed by: INTERNAL MEDICINE

## 2022-08-02 PROCEDURE — 93970 EXTREMITY STUDY: CPT

## 2022-08-02 PROCEDURE — 97116 GAIT TRAINING THERAPY: CPT | Mod: GP | Performed by: PHYSICAL THERAPIST

## 2022-08-02 PROCEDURE — 258N000003 HC RX IP 258 OP 636: Performed by: NURSE ANESTHETIST, CERTIFIED REGISTERED

## 2022-08-02 PROCEDURE — C9113 INJ PANTOPRAZOLE SODIUM, VIA: HCPCS | Performed by: HOSPITALIST

## 2022-08-02 PROCEDURE — 36415 COLL VENOUS BLD VENIPUNCTURE: CPT | Performed by: INTERNAL MEDICINE

## 2022-08-02 RX ORDER — PROPOFOL 10 MG/ML
INJECTION, EMULSION INTRAVENOUS PRN
Status: DISCONTINUED | OUTPATIENT
Start: 2022-08-02 | End: 2022-08-02

## 2022-08-02 RX ORDER — HYDROMORPHONE HCL IN WATER/PF 6 MG/30 ML
0.2 PATIENT CONTROLLED ANALGESIA SYRINGE INTRAVENOUS EVERY 5 MIN PRN
Status: CANCELLED | OUTPATIENT
Start: 2022-08-02

## 2022-08-02 RX ORDER — LIDOCAINE 40 MG/G
CREAM TOPICAL
Status: CANCELLED | OUTPATIENT
Start: 2022-08-02

## 2022-08-02 RX ORDER — FENTANYL CITRATE 50 UG/ML
25 INJECTION, SOLUTION INTRAMUSCULAR; INTRAVENOUS EVERY 5 MIN PRN
Status: CANCELLED | OUTPATIENT
Start: 2022-08-02

## 2022-08-02 RX ORDER — ONDANSETRON 2 MG/ML
4 INJECTION INTRAMUSCULAR; INTRAVENOUS EVERY 30 MIN PRN
Status: CANCELLED | OUTPATIENT
Start: 2022-08-02

## 2022-08-02 RX ORDER — ONDANSETRON 4 MG/1
4 TABLET, ORALLY DISINTEGRATING ORAL EVERY 30 MIN PRN
Status: CANCELLED | OUTPATIENT
Start: 2022-08-02

## 2022-08-02 RX ORDER — PROPOFOL 10 MG/ML
INJECTION, EMULSION INTRAVENOUS CONTINUOUS PRN
Status: DISCONTINUED | OUTPATIENT
Start: 2022-08-02 | End: 2022-08-02

## 2022-08-02 RX ORDER — DEXAMETHASONE SODIUM PHOSPHATE 4 MG/ML
INJECTION, SOLUTION INTRA-ARTICULAR; INTRALESIONAL; INTRAMUSCULAR; INTRAVENOUS; SOFT TISSUE PRN
Status: DISCONTINUED | OUTPATIENT
Start: 2022-08-02 | End: 2022-08-02

## 2022-08-02 RX ORDER — LIDOCAINE HYDROCHLORIDE 20 MG/ML
SOLUTION OROPHARYNGEAL PRN
Status: DISCONTINUED | OUTPATIENT
Start: 2022-08-02 | End: 2022-08-02

## 2022-08-02 RX ORDER — OXYCODONE HYDROCHLORIDE 5 MG/1
5 TABLET ORAL EVERY 4 HOURS PRN
Status: CANCELLED | OUTPATIENT
Start: 2022-08-02

## 2022-08-02 RX ORDER — FENTANYL CITRATE 50 UG/ML
25 INJECTION, SOLUTION INTRAMUSCULAR; INTRAVENOUS
Status: CANCELLED | OUTPATIENT
Start: 2022-08-02

## 2022-08-02 RX ORDER — SODIUM CHLORIDE, SODIUM LACTATE, POTASSIUM CHLORIDE, CALCIUM CHLORIDE 600; 310; 30; 20 MG/100ML; MG/100ML; MG/100ML; MG/100ML
INJECTION, SOLUTION INTRAVENOUS CONTINUOUS
Status: CANCELLED | OUTPATIENT
Start: 2022-08-02

## 2022-08-02 RX ORDER — SODIUM CHLORIDE, SODIUM LACTATE, POTASSIUM CHLORIDE, CALCIUM CHLORIDE 600; 310; 30; 20 MG/100ML; MG/100ML; MG/100ML; MG/100ML
INJECTION, SOLUTION INTRAVENOUS CONTINUOUS PRN
Status: DISCONTINUED | OUTPATIENT
Start: 2022-08-02 | End: 2022-08-02

## 2022-08-02 RX ADMIN — PROPOFOL 10 MG: 10 INJECTION, EMULSION INTRAVENOUS at 14:03

## 2022-08-02 RX ADMIN — IRON SUCROSE 300 MG: 20 INJECTION, SOLUTION INTRAVENOUS at 18:13

## 2022-08-02 RX ADMIN — DEXAMETHASONE SODIUM PHOSPHATE 4 MG: 4 INJECTION, SOLUTION INTRA-ARTICULAR; INTRALESIONAL; INTRAMUSCULAR; INTRAVENOUS; SOFT TISSUE at 14:06

## 2022-08-02 RX ADMIN — CITALOPRAM HYDROBROMIDE 5 MG: 10 TABLET ORAL at 08:29

## 2022-08-02 RX ADMIN — SODIUM BICARBONATE: 84 INJECTION, SOLUTION INTRAVENOUS at 16:00

## 2022-08-02 RX ADMIN — ALLOPURINOL 100 MG: 100 TABLET ORAL at 08:29

## 2022-08-02 RX ADMIN — LIDOCAINE HYDROCHLORIDE 5 ML: 20 SOLUTION ORAL; TOPICAL at 13:59

## 2022-08-02 RX ADMIN — PROPOFOL 10 MG: 10 INJECTION, EMULSION INTRAVENOUS at 14:05

## 2022-08-02 RX ADMIN — PROPOFOL 150 MCG/KG/MIN: 10 INJECTION, EMULSION INTRAVENOUS at 14:03

## 2022-08-02 RX ADMIN — PANTOPRAZOLE SODIUM 40 MG: 40 INJECTION, POWDER, FOR SOLUTION INTRAVENOUS at 20:58

## 2022-08-02 RX ADMIN — PANTOPRAZOLE SODIUM 40 MG: 40 INJECTION, POWDER, FOR SOLUTION INTRAVENOUS at 08:29

## 2022-08-02 RX ADMIN — SODIUM CHLORIDE, POTASSIUM CHLORIDE, SODIUM LACTATE AND CALCIUM CHLORIDE: 600; 310; 30; 20 INJECTION, SOLUTION INTRAVENOUS at 13:59

## 2022-08-02 ASSESSMENT — ACTIVITIES OF DAILY LIVING (ADL)
ADLS_ACUITY_SCORE: 47
ADLS_ACUITY_SCORE: 46
ADLS_ACUITY_SCORE: 40
ADLS_ACUITY_SCORE: 47
ADLS_ACUITY_SCORE: 45
ADLS_ACUITY_SCORE: 41
ADLS_ACUITY_SCORE: 41
PREVIOUS_RESPONSIBILITIES: MEAL PREP;LAUNDRY;SHOPPING;MEDICATION MANAGEMENT;FINANCES
ADLS_ACUITY_SCORE: 47
ADLS_ACUITY_SCORE: 47
ADLS_ACUITY_SCORE: 40
ADLS_ACUITY_SCORE: 47
ADLS_ACUITY_SCORE: 47

## 2022-08-02 NOTE — PROGRESS NOTES
Alert and oriented X4. Quiet and pleasant. Cooperative. Ax1 transfer. Walker GB. NPO during shift for EDG. EDG done. Per nurse report, no significant finding. Hgb is 8.5 today. US of lower extremity is negative for DVT. Tele. AFIB. Care team will continue to monitor.

## 2022-08-02 NOTE — CONSULTS
[facility]    Hematology/Oncology Consult Note      Date of Admission:  8/1/2022  Date of Consult:  08/02/22  Reason for Consult: anemia      ASSESSMENT/PLAN:  Mando Freedman is a 82 year old male with CKD Stage III, chronic anemia admitted with worsening fatigue and drop in Hgb      1 Anemia patient came back from an EGD  Normal, anemia work up check iron studies, Vitamin VITAMIN B12. GI work up underway. Give iron for ferritin of 27 IV iron. Bili normal unlikely this is hemolysis .    2 MGUS: Stable   3 Von willebrand disease ? I cannot find where and what type of Von Willebrand disease he has, unable to speak to the patient due to sedation. Check von willebrand antigen, acitivty and coags   4 CKD :nephrology following     Will continue to follow along , thank you for the consult    Shmuel Forde MD         HISTORY OF PRESENT ILLNESS: Mando Freedman is a 82 year old male who presents with history of chronic kidney disease stage III, von Willebrand's disease, MGUS, presented to the emergency room with weakness and severe symptomatic anemia.  His hemoglobin was down to 5.7, after receiving unit of red he was up to 7.6.  He reported melena which could be due to epistaxis.he apparently follows up with hematology in [place].  Per chart he had been on iron 2-3 weeks.  He had been feeling weak. He just came back up from the EGD and is groggy,  Most of the history is obtained from the chart      REVIEW OF SYSTEMS:   14 point ROS was reviewed and is negative other than as noted above in HPI.       MEDICATIONS:  Current Facility-Administered Medications   Medication     acetaminophen (TYLENOL) tablet 650 mg    Or     acetaminophen (TYLENOL) Suppository 650 mg     allopurinol (ZYLOPRIM) tablet 100 mg     bisacodyl (DULCOLAX) suppository 10 mg     calcium carbonate (TUMS) chewable tablet 1,000 mg     citalopram (celeXA) half-tab 5 mg     D5W 1,000 mL with sodium bicarbonate 125 mEq/L infusion      "hydrALAZINE (APRESOLINE) injection 10 mg     lidocaine (LMX4) cream     lidocaine 1 % 0.1-1 mL     magnesium hydroxide (MILK OF MAGNESIA) suspension 30 mL     melatonin tablet 1 mg     metoprolol (LOPRESSOR) injection 2.5 mg     ondansetron (ZOFRAN ODT) ODT tab 4 mg    Or     ondansetron (ZOFRAN) injection 4 mg     pantoprazole (PROTONIX) IV push injection 40 mg     sodium chloride (PF) 0.9% PF flush 3 mL     sodium chloride (PF) 0.9% PF flush 3 mL         ALLERGIES:  No Known Allergies      PAST MEDICAL HISTORY:  Past Medical History:   Diagnosis Date     Basal cell carcinoma          PAST SURGICAL HISTORY:  No past surgical history on file.      SOCIAL HISTORY:  Social History     Socioeconomic History     Marital status: Single     Spouse name: Not on file     Number of children: Not on file     Years of education: Not on file     Highest education level: Not on file   Occupational History     Not on file   Tobacco Use     Smoking status: Light Tobacco Smoker     Types: Cigars     Smokeless tobacco: Never Used   Substance and Sexual Activity     Alcohol use: Not on file     Comment: rare     Drug use: Not on file     Sexual activity: Not on file   Other Topics Concern     Not on file   Social History Narrative     Not on file     Social Determinants of Health     Financial Resource Strain: Not on file   Food Insecurity: Not on file   Transportation Needs: Not on file   Physical Activity: Not on file   Stress: Not on file   Social Connections: Not on file   Intimate Partner Violence: Not on file   Housing Stability: Not on file         FAMILY HISTORY:  Could not obtain    PHYSICAL EXAM:  Vital signs:  Temp: 97.7  F (36.5  C) Temp src: Oral BP: (!) 171/88 Pulse: 99   Resp: 20 SpO2: 98 % O2 Device: None (Room air)   Height: 175.3 cm (5' 9\") Weight: 85.6 kg (188 lb 12.8 oz)  Estimated body mass index is 27.88 kg/m  as calculated from the following:    Height as of this encounter: 1.753 m (5' 9\").    Weight as of this " encounter: 85.6 kg (188 lb 12.8 oz).    ECOG 0  HEENT:  Normocephalic. No gross abnormalities.  Pupils equal.  MMM.    CV: RRR, no murmurs, no clicks, gallops, or rubs, no edema.   RESP: Clear bilaterally with good efforts. No wheezes or crackles.   GI: Abdomen Obese, soft, NT.   MUSCULOSKELETAL: 2+ edema RLE and much less LLE.   SKIN: no suspicious lesions or rashes, dry to touch  NEURO:  Awake, alert and conversing normally. Cognitively intact and competent.   PSYCH: mood good, affect appropriate  LYMPH: No palpable ant/post cervical    LABS:  CBC RESULTS:   Recent Labs   Lab Test 08/02/22  1137 08/02/22  0619 08/02/22  0414   WBC  --   --  4.5   RBC  --   --  2.75*   HGB 8.5*   < > 7.6*   HCT  --   --  26.4*   MCV  --   --  96   MCH  --   --  27.6   MCHC  --   --  28.8*   RDW  --   --  22.4*   PLT  --   --  163    < > = values in this interval not displayed.       Recent Labs   Lab Test 08/02/22  0414 08/01/22  1947 08/01/22  1256 08/01/22  1142     --   --  139   POTASSIUM 5.0 5.4*   < > 5.8*   CHLORIDE 120*  --   --  116*   CO2 16*  --   --  16*   ANIONGAP 8  --   --  7   GLC 96  --   --  94   BUN 52*  --   --  56*   CR 2.43*  --   --  2.51*   KATT 8.1*  --   --  8.4*    < > = values in this interval not displayed.         PATHOLOGY:  Na     IMAGING:  Na       Thank you for the opportunity to participate in this patient's care.  Please call with any questions.    Shmuel Forde MD  Hematology/Oncology  [facility]

## 2022-08-02 NOTE — CONSULTS
[facility]  Gastroenterology Consultation    Mando Freedman  [address]  [place] [address]  82 year old male    Admission Date/Time: 8/1/2022  Primary Care Provider: No primary care provider on file.    We were asked to see the patient in consultation by Dr. Lovelace for evaluation of GI bleed.        HPI:  Mando Freedman is a 82 year old male who has a past medical history of chronic kidney disease stage III, chronic anemia, hypertension, MGUS, von Willebrand's disease who presents with generalized weakness    The patient reports generalized weakness and decreased ambulation over the last week.  He also reports shortness of breath with ambulation.  Increased lower extremity swelling also noted.  He reports intermittent epistaxis.  He reports several days of dark stools.    As above, he has a history of MGUS, but has been lost to follow up with hematology x 5 years.      BUN 52, Cr 2.43.  Albumin 2.7.  Hemoglobin 5.2, now 7.6 after transfusion. Baseline ~7 since 5/2022, 11.5 in 7/2021.  Platelets 163.  MCV 96.  FOB +.  INR 1.13.    CXR shows mild basilar pulmonary opacities, likely atelectasis.      ROS: A comprehensive ten point review of systems was negative aside from those in mentioned in the HPI.      MEDICATIONS: No current facility-administered medications on file prior to encounter.  allopurinol (ZYLOPRIM) 100 MG tablet, Take 100 mg by mouth daily  amLODIPine (NORVASC) 5 MG tablet, Take 5 mg by mouth daily  citalopram (CELEXA) 10 MG tablet, Take 5 mg by mouth daily  fish oil-omega-3 fatty acids 1000 MG capsule, Take 1 g by mouth daily  hydrALAZINE (APRESOLINE) 10 MG tablet, Take 20 mg by mouth 2 times daily 10mg x2 = 20mg  multivitamin (CENTRUM SILVER) tablet, Take 1 tablet by mouth daily        ALLERGIES: No Known Allergies    Past Medical History:   Diagnosis Date     Basal cell carcinoma        No past surgical history on file.      SOCIAL HISTORY:  Social History     Tobacco Use  "    Smoking status: Light Tobacco Smoker     Types: Cigars     Smokeless tobacco: Never Used       FAMILY HISTORY:  No family history on file.    PHYSICAL EXAM:   BP (!) 146/80 (BP Location: Left arm)   Pulse 71   Temp 98.4  F (36.9  C) (Oral)   Resp 16   Ht 1.753 m (5' 9\")   Wt 85.6 kg (188 lb 12.8 oz)   SpO2 99%   BMI 27.88 kg/m      Constitutional: NAD, comfortable  Cardiovascular: RRR, normal S1 and S2, no r/c/g/m  Respiratory: CTAB  Psychiatric: mentation appears normal and affect normal  Head: Normocephalic. Atraumatic.    Neck: Neck supple. No adenopathy. Thyroid symmetric, normal size, trachea midline  Eyes:  PERRL, no icterus  ENT: Hearing adequate, pharynx normal without erythema or exudate  Abdomen:   Auscultation: + BS  Appearance: non-distended  Palpation: non-tender  NEURO: grossly negative  SKIN: no suspicious lesions or rashes  LYMPH:   anterior cervical: no adenopathy  posterior cervical: no adenopathy  supraclavicular: no adenopathy          ADDITIONAL COMMENTS:   I reviewed the patient's new clinical lab test results.   Recent Labs   Lab Test 08/02/22  0619 08/02/22 0414 08/01/22 1947 08/01/22  1142 07/21/22  1340   WBC  --  4.5  --  5.2 7.4   HGB 7.6* 7.6*  --  5.2* 7.1*   MCV  --  96  --  98 90   PLT  --  163  --  195 197   INR  --   --  1.13  --   --      Recent Labs   Lab Test 08/02/22  0414 08/01/22 1947 08/01/22  1256 08/01/22  1142 07/14/22  1228     --   --  139 141   POTASSIUM 5.0 5.4* 5.7* 5.8* 5.3   CHLORIDE 120*  --   --  116* 114*   CO2 16*  --   --  16* 15*   BUN 52*  --   --  56* 48.5*   CR 2.43*  --   --  2.51* 2.34*   ANIONGAP 8  --   --  7 12   KATT 8.1*  --   --  8.4* 8.2*   GLC 96  --   --  94 74     Recent Labs   Lab Test 08/02/22  0414 08/01/22  1845 08/01/22  1256 06/02/22  1133   ALBUMIN 2.7*  --  2.8* 3.4*   BILITOTAL 0.6  --  0.2  --    DBIL  --   --  <0.1  --    ALT 13  --  13  --    AST 9  --  17  --    ALKPHOS 70  --  69  --    PROTEIN  --  20 *  --   -- "              .    CONSULTATION ASSESSMENT AND PLAN:      83 yo male with a past medical history of chronic kidney disease, MGUS, von Willebrand's, chronic anemia who presents with generalized weakness and is found to have acute on chronic anemia with hemoglobin of 5.  Now 7.6 s/p transfusion.  Patient is heme +.  He reports several days of dark stool but also reports intermittent episodes of epistaxis which may be source of bleeding.  Differential diagnosis also includes gastritis/erosive gastropathy, peptic ulcer disease, arteriovenous malformation, polyp/mass, less likely inflammatory process given lack of symptoms.    -  Keep NPO.  -  EGD.  If negative, may consider colonoscopy, small bowel imaging.  -  Monitor H/H; transfuse prn.  -  Protonix twice daily.  -  Recommend ENT treatment for intermittent epistaxis.  -  Agree with hematology consult.    Total Time Spent: 25 minutes        I discussed the patient's findings and plan with Dr. Loo.          SNOW Whaley  [facility]  Office:  [phone] call if needed after 12PM  Cell:  [phone], not available after 12PM at this number      Addendum to Gastroenterology Service Note    I have personally seen and examined the patient.  Discussed with SNOW Whaley and agree with the outlined assessment and recommendations.    Brief HPI   An 83 yo CM with chronic renal insufficiency, MGUS, von Willebrand's disease, and chronic anemia is seen in GI consultation today for evaluation of possible GI bleeding at the request of Adalberto Lovelace MD.  Patient reports gradual onset of dyspnea on exertion and generalized weakness over the past few days.  Subsequently patient developed intermittent epistaxis and dark black-colored stools suggestive of melena.  Denies any ongoing abdominal discomfort, nausea, or emesis.  No worsening heartburn, dysphagia, or odynophagia.  No prior history of overt GI bleeding.  Remote history of chronic anemia  "attributed to MGUS.  No recent hematology follow-up.  On admission patient noted to have stable vital signs.  Blood work noted low hemoglobin at 5.2 g/dL that is improved to 8.5 g/dL after pRBC transfusion x2 units.  Chemistry noted elevated BUN and serum creatinine that are at baseline.  Liver chemistry and INR normal.  Denies any prior EGD evaluation.  Remote history of colonoscopy with reported normal findings.  No known family history of pertinent GI pathology.       Objective   Vitals BP (!) 184/91   Pulse 71   Temp 98.4  F (36.9  C) (Oral)   Resp 16   Ht 1.753 m (5' 9\")   Wt 85.6 kg (188 lb 12.8 oz)   SpO2 99%   BMI 27.88 kg/m          General:   Elderly CM in NAD.   HEENT:   NC/AT. MMM.   Lungs:  No respiratory distress.   Heart:  RRR. +S1, S2.    Abdomen:   Soft. NT/ND. BS active.    Ext/MS:   No cyanosis or erythema.    Neuro:   No focal deficits noted.    Psych:  Appears to have normal affect and mood.   Skin:  No obvious rashes or lesions noted.         Laboratory and Imaging   I have reviewed the current diagnostic and laboratory tests.     Assessment / Recommendations:     Assessment:  1. Concern for GI blood loss with dark-colored stools.  No associated GI symptoms reported.  Suspect dark stools secondary to intermittent episodes of epistaxis.  Other potential etiologies include peptic ulcer disease, angiectasia, Dieulafoy lesion, GAVE, or less likely neoplastic process.  Given acute on chronic anemia and worsening dyspnea on exertion, upper endoscopic evaluation is reasonable for further assessment.  2. Acute on chronic anemia.  Hemoglobin on admission at 5.2 g/dL that has improved to 8.5 g/dL after 2 units of pRBC transfusion.  3. Chronic renal insufficiency.    Recommendations:  1. Tentative plan for EGD this afternoon.  2. Keep patient NPO.  3. Monitor hemoglobin and transfuse as needed.  4. Continue twice daily pantoprazole.  5. If epistaxis recurs, recommend ENT evaluation.  6. May also " benefit from hematology consult for long-term management of MGUS and chronic anemia.  7. Further recommendations to follow endoscopic findings.       Total time spent in chart review, direct medical discussion, examination, and documentation was 30 minutes.    Kimani Loo MD  [facility]  [phone]  I appreciate the opportunity to participate in the care of this patient.   Please feel free to call me with any questions or concerns.

## 2022-08-02 NOTE — ANESTHESIA PREPROCEDURE EVALUATION
Anesthesia Pre-Procedure Evaluation    Patient: Mando Freedman   MRN: [id number] : 1940        Procedure : Procedure(s):  ESOPHAGOGASTRODUODENOSCOPY (EGD)          Past Medical History:   Diagnosis Date     Basal cell carcinoma       No past surgical history on file.   No Known Allergies   Social History     Tobacco Use     Smoking status: Light Tobacco Smoker     Types: Cigars     Smokeless tobacco: Never Used   Substance Use Topics     Alcohol use: Not on file     Comment: rare      Wt Readings from Last 1 Encounters:   22 85.6 kg (188 lb 12.8 oz)        Anesthesia Evaluation            ROS/MED HX  ENT/Pulmonary:  - neg pulmonary ROS  (-) sleep apnea   Neurologic:  - neg neurologic ROS     Cardiovascular:  - neg cardiovascular ROS     METS/Exercise Tolerance:     Hematologic: Comments: Mason Ochoa's     MGUS      Musculoskeletal:  - neg musculoskeletal ROS     GI/Hepatic:  - neg GI/hepatic ROS  (-) GERD   Renal/Genitourinary:     (+) renal disease, type: CRI,     Endo:  - neg endo ROS     Psychiatric/Substance Use:       Infectious Disease:       Malignancy:       Other:            Physical Exam    Airway        Mallampati: II   TM distance: > 3 FB   Neck ROM: full   Mouth opening: > 3 cm    Respiratory Devices and Support         Dental  no notable dental history         Cardiovascular   cardiovascular exam normal          Pulmonary   pulmonary exam normal                OUTSIDE LABS:  CBC:   Lab Results   Component Value Date    WBC 4.5 2022    WBC 5.2 2022    HGB 8.5 (L) 2022    HGB 7.6 (L) 2022    HCT 26.4 (L) 2022    HCT 18.9 (L) 2022     2022     2022     BMP:   Lab Results   Component Value Date     2022     2022    POTASSIUM 5.0 2022    POTASSIUM 5.4 (H) 2022    CHLORIDE 120 (H) 2022    CHLORIDE 116 (H) 2022    CO2 16 (L) 2022    CO2 16 (L) 2022    BUN 52 (H) 2022     BUN 56 (H) 08/01/2022    CR 2.43 (H) 08/02/2022    CR 2.51 (H) 08/01/2022    GLC 96 08/02/2022    GLC 94 08/01/2022     COAGS:   Lab Results   Component Value Date    INR 1.13 08/01/2022     POC: No results found for: BGM, HCG, HCGS  HEPATIC:   Lab Results   Component Value Date    ALBUMIN 2.7 (L) 08/02/2022    PROTTOTAL 5.5 (L) 08/02/2022    ALT 13 08/02/2022    AST 9 08/02/2022    ALKPHOS 70 08/02/2022    BILITOTAL 0.6 08/02/2022     OTHER:   Lab Results   Component Value Date    A1C 4.7 05/12/2022    KATT 8.1 (L) 08/02/2022    PHOS 4.4 06/02/2022    MAG 2.1 06/02/2022    TSH 1.90 05/12/2022    T4 0.85 05/12/2022       Anesthesia Plan    ASA Status:  3   NPO Status:  NPO Appropriate    Anesthesia Type: MAC.     - Reason for MAC: straight local not clinically adequate              Consents    Anesthesia Plan(s) and associated risks, benefits, and realistic alternatives discussed. Questions answered and patient/representative(s) expressed understanding.    - Discussed:     - Discussed with:  Patient         Postoperative Care    Pain management: IV analgesics.        Comments:                Mando Stout, , DO

## 2022-08-02 NOTE — PLAN OF CARE
Problem: Bleeding (Gastrointestinal Bleeding)  Goal: Hemostasis  Outcome: Ongoing, Progressing     Problem: Fall Injury Risk  Goal: Absence of Fall and Fall-Related Injury  Outcome: Ongoing, Progressing  Intervention: Identify and Manage Contributors  Recent Flowsheet Documentation  Taken 8/1/2022 1711 by Nikki Almeida, RN  Medication Review/Management: medications reviewed  Intervention: Promote Injury-Free Environment  Recent Flowsheet Documentation  Taken 8/1/2022 1711 by Nikki Almeida, RN  Safety Promotion/Fall Prevention:   activity supervised   bed alarm on   fall prevention program maintained   nonskid shoes/slippers when out of bed   safety round/check completed   Goal Outcome Evaluation:     Pt is an assist of 2 to stand at the bedside. Uses the urinal this way. Bed alarm in use.  Receiving 2 units of blood, first unit almost finished. Alert x 4, eating clear liquids for supper this evening. No other concerns at this time.

## 2022-08-02 NOTE — H&P (VIEW-ONLY)
RENAL CONSULTATION NOTE    REFERRING MD:  Adalberto Lovelace MD    REASON FOR CONSULTATION:  ACUTE ON CKD, HYPERKALEMIA    HPI:  82 y.o man with CKD IIIB/IV, hypertension, MGUS and Von Villebrand's disease, who was admitted for severe anemia.   Patient says he had dark stool for a couple weeks, even before they were giving him oral iron bid.   He says he has been given oral Fe bid for 2-3 weeks.   This causes intermittent constipation and diarrhea.   He denies abdominal pain, N/V.   He has been feeling worse in the last week.   He has been experience more weakness and low appetite.   He says he fell two weeks ago on his right knee.   His right lower leg has been swollen since.   He denies taking NSAIDs.   He has some JORDAN. Denies orthopnea.     Vitals were stable in the ER.   Hgb was 5.7. Baseline seems to be ~ 7.   He was given one liter of NS and one unit of PRBC in the ER.   GI is planning endoscopy to insolate possible source of bleed.    He lived in [place] than moved [place] a few years ago. He recently moved to the city. He says had nephrologist and was seeing an oncologist 4-5 years ago when he lived in [place].     ROS:  A complete review of systems was performed and is negative except as noted above.    PMH:    Past Medical History:   Diagnosis Date     Basal cell carcinoma        PSH:  No past surgical history on file.    MEDICATIONS:      allopurinol  100 mg Oral Daily     citalopram  5 mg Oral Daily     pantoprazole (PROTONIX) IV  40 mg Intravenous BID     sodium chloride (PF)  3 mL Intracatheter Q8H       ALLERGIES:    Allergies as of 08/01/2022     (No Known Allergies)       FH:  No family history on file.    SH:    Social History     Socioeconomic History     Marital status: Single     Spouse name: Not on file     Number of children: Not on file     Years of education: Not on file     Highest education level: Not on file   Occupational History     Not on file   Tobacco Use     Smoking status: Light  "Tobacco Smoker     Types: Cigars     Smokeless tobacco: Never Used   Substance and Sexual Activity     Alcohol use: Not on file     Drug use: Not on file     Sexual activity: Not on file   Other Topics Concern     Not on file   Social History Narrative     Not on file     Social Determinants of Health     Financial Resource Strain: Not on file   Food Insecurity: Not on file   Transportation Needs: Not on file   Physical Activity: Not on file   Stress: Not on file   Social Connections: Not on file   Intimate Partner Violence: Not on file   Housing Stability: Not on file       PHYSICAL EXAM:    BP (!) 146/80 (BP Location: Left arm)   Pulse 71   Temp 98.4  F (36.9  C) (Oral)   Resp 16   Ht 1.753 m (5' 9\")   Wt 85.6 kg (188 lb 12.8 oz)   SpO2 99%   BMI 27.88 kg/m    GENERAL: NAD. Pleasant.   HEENT:  Normocephalic. No gross abnormalities.  Pupils equal.  MMM.    CV: RRR, no murmurs, no clicks, gallops, or rubs, no edema.   RESP: Clear bilaterally with good efforts. No wheezes or crackles.   GI: Abdomen Obese, soft, NT.   MUSCULOSKELETAL: 2+ edema RLE and much less LLE.   SKIN: no suspicious lesions or rashes, dry to touch  NEURO:  Awake, alert and conversing normally. Cognitively intact and competent.   PSYCH: mood good, affect appropriate  LYMPH: No palpable ant/post cervical     LABS:      CBC RESULTS:     Recent Labs   Lab 08/02/22  0619 08/02/22  0414 08/01/22  1142   WBC  --  4.5 5.2   RBC  --  2.75* 1.93*   HGB 7.6* 7.6* 5.2*   HCT  --  26.4* 18.9*   PLT  --  163 195       BMP RESULTS:  Recent Labs   Lab 08/02/22  0414 08/01/22  1947 08/01/22  1256 08/01/22  1142     --   --  139   POTASSIUM 5.0 5.4* 5.7* 5.8*   CHLORIDE 120*  --   --  116*   CO2 16*  --   --  16*   BUN 52*  --   --  56*   CR 2.43*  --   --  2.51*   GLC 96  --   --  94   KATT 8.1*  --   --  8.4*       INR  Recent Labs   Lab 08/01/22  1947   INR 1.13        DIAGNOSTICS:  Reviewed    Renal ultrasound 8/1  RIGHT KIDNEY: 7.7 x 6.3 x 4.2 cm. " Mildly atrophic kidney with echogenic parenchyma. No hydronephrosis. Multiple simple cysts, the largest of which is an exophytic cyst measuring 10 x 6 x 7.6 cm at the lower pole.      LEFT KIDNEY: 10.4 x 4.3 x 4.7 cm. Mildly atrophic with echogenic parenchyma. No hydronephrosis. Multiple simple cysts in the kidney measuring 1.3-1.5 cm.      BLADDER: Distended without focal abnormalities on survey views.                                                                      IMPRESSION:  1.  No hydronephrosis in either kidney.  2.  Mildly atrophic and echogenic kidneys, consistent with medical renal disease.  3.  Multiple simple cysts in the kidneys requiring no specific follow-up. The largest is a 10 cm exophytic cyst at the lower pole of the right kidney.    LE dopper:  1. Negative for DVT in both lower arteries.  2. Calcification in left small saphenous vein suggestive of chronic  DVT.    A/P:  82 y.o man with CKD III/IV, HTN and MGUS, who is admitted for severe anemia.     # CKD III/IV: baseline Scr ~ 2-2.2 mg/dl.     # Mild acute kidney injury: This is in the setting of severe anemia, poor appetite x 1 week and on lisinopril.     # Severe anemia s/p 1 unit PRBC:  GI is planning endoscopy. Severe Fe defiency    # Hypertension: BP is acceptable. Given frequent falls okay with goal BP of  <150/90.   -was on lisinopril    # Hyperkalemia: improved.     # Acidosis due to advance renal failure:     # MGUS: Has been seeing oncology for ~ 4-5 years he says.     Plan:  # Start D5 + 125 meq bicarb at 100 ml/hr  # Start IV Venofer  # Check UPCR  # Add iPTh and 25-vitamin D levels    Yasmany Bosch MD  [facility]  Office Phone: [phone]  Pager: [phone]

## 2022-08-02 NOTE — CONSULTS
RENAL CONSULTATION NOTE    REFERRING MD:  Adalberto Lovelace MD    REASON FOR CONSULTATION:  ACUTE ON CKD, HYPERKALEMIA    HPI:  82 y.o man with CKD IIIB/IV, hypertension, MGUS and Von Villebrand's disease, who was admitted for severe anemia.   Patient says he had dark stool for a couple weeks, even before they were giving him oral iron bid.   He says he has been given oral Fe bid for 2-3 weeks.   This causes intermittent constipation and diarrhea.   He denies abdominal pain, N/V.   He has been feeling worse in the last week.   He has been experience more weakness and low appetite.   He says he fell two weeks ago on his right knee.   His right lower leg has been swollen since.   He denies taking NSAIDs.   He has some JORDAN. Denies orthopnea.     Vitals were stable in the ER.   Hgb was 5.7. Baseline seems to be ~ 7.   He was given one liter of NS and one unit of PRBC in the ER.   GI is planning endoscopy to insolate possible source of bleed.    He lived in [place] than moved [place] a few years ago. He recently moved to the city. He says had nephrologist and was seeing an oncologist 4-5 years ago when he lived in [place].     ROS:  A complete review of systems was performed and is negative except as noted above.    PMH:    Past Medical History:   Diagnosis Date     Basal cell carcinoma        PSH:  No past surgical history on file.    MEDICATIONS:      allopurinol  100 mg Oral Daily     citalopram  5 mg Oral Daily     pantoprazole (PROTONIX) IV  40 mg Intravenous BID     sodium chloride (PF)  3 mL Intracatheter Q8H       ALLERGIES:    Allergies as of 08/01/2022     (No Known Allergies)       FH:  No family history on file.    SH:    Social History     Socioeconomic History     Marital status: Single     Spouse name: Not on file     Number of children: Not on file     Years of education: Not on file     Highest education level: Not on file   Occupational History     Not on file   Tobacco Use     Smoking status: Light  "Tobacco Smoker     Types: Cigars     Smokeless tobacco: Never Used   Substance and Sexual Activity     Alcohol use: Not on file     Drug use: Not on file     Sexual activity: Not on file   Other Topics Concern     Not on file   Social History Narrative     Not on file     Social Determinants of Health     Financial Resource Strain: Not on file   Food Insecurity: Not on file   Transportation Needs: Not on file   Physical Activity: Not on file   Stress: Not on file   Social Connections: Not on file   Intimate Partner Violence: Not on file   Housing Stability: Not on file       PHYSICAL EXAM:    BP (!) 146/80 (BP Location: Left arm)   Pulse 71   Temp 98.4  F (36.9  C) (Oral)   Resp 16   Ht 1.753 m (5' 9\")   Wt 85.6 kg (188 lb 12.8 oz)   SpO2 99%   BMI 27.88 kg/m    GENERAL: NAD. Pleasant.   HEENT:  Normocephalic. No gross abnormalities.  Pupils equal.  MMM.    CV: RRR, no murmurs, no clicks, gallops, or rubs, no edema.   RESP: Clear bilaterally with good efforts. No wheezes or crackles.   GI: Abdomen Obese, soft, NT.   MUSCULOSKELETAL: 2+ edema RLE and much less LLE.   SKIN: no suspicious lesions or rashes, dry to touch  NEURO:  Awake, alert and conversing normally. Cognitively intact and competent.   PSYCH: mood good, affect appropriate  LYMPH: No palpable ant/post cervical     LABS:      CBC RESULTS:     Recent Labs   Lab 08/02/22  0619 08/02/22  0414 08/01/22  1142   WBC  --  4.5 5.2   RBC  --  2.75* 1.93*   HGB 7.6* 7.6* 5.2*   HCT  --  26.4* 18.9*   PLT  --  163 195       BMP RESULTS:  Recent Labs   Lab 08/02/22  0414 08/01/22  1947 08/01/22  1256 08/01/22  1142     --   --  139   POTASSIUM 5.0 5.4* 5.7* 5.8*   CHLORIDE 120*  --   --  116*   CO2 16*  --   --  16*   BUN 52*  --   --  56*   CR 2.43*  --   --  2.51*   GLC 96  --   --  94   KATT 8.1*  --   --  8.4*       INR  Recent Labs   Lab 08/01/22  1947   INR 1.13        DIAGNOSTICS:  Reviewed    Renal ultrasound 8/1  RIGHT KIDNEY: 7.7 x 6.3 x 4.2 cm. " Mildly atrophic kidney with echogenic parenchyma. No hydronephrosis. Multiple simple cysts, the largest of which is an exophytic cyst measuring 10 x 6 x 7.6 cm at the lower pole.      LEFT KIDNEY: 10.4 x 4.3 x 4.7 cm. Mildly atrophic with echogenic parenchyma. No hydronephrosis. Multiple simple cysts in the kidney measuring 1.3-1.5 cm.      BLADDER: Distended without focal abnormalities on survey views.                                                                      IMPRESSION:  1.  No hydronephrosis in either kidney.  2.  Mildly atrophic and echogenic kidneys, consistent with medical renal disease.  3.  Multiple simple cysts in the kidneys requiring no specific follow-up. The largest is a 10 cm exophytic cyst at the lower pole of the right kidney.    LE dopper:  1. Negative for DVT in both lower arteries.  2. Calcification in left small saphenous vein suggestive of chronic  DVT.    A/P:  82 y.o man with CKD III/IV, HTN and MGUS, who is admitted for severe anemia.     # CKD III/IV: baseline Scr ~ 2-2.2 mg/dl.     # Mild acute kidney injury: This is in the setting of severe anemia, poor appetite x 1 week and on lisinopril.     # Severe anemia s/p 1 unit PRBC:  GI is planning endoscopy. Severe Fe defiency    # Hypertension: BP is acceptable. Given frequent falls okay with goal BP of  <150/90.   -was on lisinopril    # Hyperkalemia: improved.     # Acidosis due to advance renal failure:     # MGUS: Has been seeing oncology for ~ 4-5 years he says.     Plan:  # Start D5 + 125 meq bicarb at 100 ml/hr  # Start IV Venofer  # Check UPCR  # Add iPTh and 25-vitamin D levels    Yasmany Bosch MD  [facility] - Nephrology  Office Phone: [phone]  Pager: [phone]

## 2022-08-02 NOTE — PROGRESS NOTES
"   08/02/22 1100   Quick Adds   Type of Visit Initial PT Evaluation       Present no   Living Environment   People in Home alone   Current Living Arrangements assisted living  ([facility] in [place])   Home Accessibility no concerns   Transportation Anticipated family or friend will provide   Living Environment Comments Pt reports living in an [facility]. Pt reports no concerns regarding stairs. Pt reports his son will pick him up upon discharge. Pt reports he can get assist if needed from nursing at [facility].   Self-Care   Usual Activity Tolerance moderate   Current Activity Tolerance poor   Regular Exercise No   Equipment Currently Used at Home walker, rolling;shower chair;raised toilet seat   Fall history within last six months yes   Number of times patient has fallen within last six months 2   Activity/Exercise/Self-Care Comment Pt reports being IND at baseline with bathing and dressing but requires assist with other ADLs. Pt currently receiving HHPT/OT. Pt reports ambulating at baseline with a FWW. Pt reports being able to ambulate ~50' w/ FWW before needing a rest break.   General Information   Onset of Illness/Injury or Date of Surgery 08/02/22   Referring Physician Adalberto Lovelace MD   Patient/Family Therapy Goals Statement (PT) \"To go home\"   Pertinent History of Current Problem (include personal factors and/or comorbidities that impact the POC) Per Chart: Mando Freedman is a 82 year old  male with CKD stage III, chronic anemia, hypertension, MGUS, von Willebrand's disease presented to the ED with generalized weakness   Existing Precautions/Restrictions fall   Weight-Bearing Status - LLE full weight-bearing   Weight-Bearing Status - RLE full weight-bearing   Cognition   Orientation Status (Cognition) oriented x 4   Pain Assessment   Patient Currently in Pain No   Integumentary/Edema   Integumentary/Edema no deficits were identifed   Posture    Posture Forward head position;Protracted shoulders "   Range of Motion (ROM)   Range of Motion ROM is WFL   Strength (Manual Muscle Testing)   Strength (Manual Muscle Testing) Deficits observed during functional mobility   Strength Comments Bilateral Hip Flexion: 3/5   Bed Mobility   Comment, (Bed Mobility) Supine>sit w/ CGA   Transfers   Comment, (Transfers) Sit>stand w/ FWW and min A x 1   Gait/Stairs (Locomotion)   [place] Level (Gait) contact guard   Assistive Device (Gait) walker, front-wheeled   Distance in Feet (Required for LE Total Joints) 25'   Comment, (Gait/Stairs) Pt ambulated ~10' w/ FWW and CGA for eval.   Balance   Balance Comments Pt able to sit at EOB unsupported without LOB. Pt ambulates using a FWW for added stability and support.   Sensory Examination   Sensory Perception patient reports no sensory changes   Clinical Impression   Criteria for Skilled Therapeutic Intervention Yes, treatment indicated   PT Diagnosis (PT) Impaired gait   Influenced by the following impairments Decreased activity tolerance; decreased balance; decreased strength   Functional limitations due to impairments Impaired functional mobility   Clinical Presentation (PT Evaluation Complexity) Stable/Uncomplicated   Clinical Presentation Rationale Clinical Judgement   Clinical Decision Making (Complexity) low complexity   Planned Therapy Interventions (PT) balance training;bed mobility training;gait training;patient/family education;strengthening;transfer training   Risk & Benefits of therapy have been explained evaluation/treatment results reviewed;care plan/treatment goals reviewed;risks/benefits reviewed;current/potential barriers reviewed;participants voiced agreement with care plan;participants included;patient   PT Discharge Planning   PT Discharge Recommendation (DC Rec) home with home care physical therapy;home with assist   PT Rationale for DC Rec Pt is below baseline. Pt currently requiring assist with all functional mobility. Pt presents with deficits in activity  tolerance, balance, and strength. Pt reports that he lives in an [facility] in [place] and can increase services as needed to aid with assist with ADLs and functional mobility. At this time, pt currently requiring A x 1 with all functional mobility. If pt's [facility] can provide 24/7 A x 1, anticipate pt will be able to safely return to [facility] with HHPT. If [facility] can not provide 24/7 A x 1, pt may require short TCU stay to improve with IND in safety and functional mobility.   PT Brief overview of current status Supine>sit w/ CGA; sit>stand w/ FWW and min A x 1; gait w/ FWW and CGA   Total Evaluation Time   Total Evaluation Time (Minutes) 10   Physical Therapy Goals   PT Frequency 3x/week   PT Predicted Duration/Target Date for Goal Attainment 08/07/22   PT Goals Bed Mobility;Transfers;Gait   PT: Bed Mobility Supervision/stand-by assist;Supine to/from sit   PT: Transfers Supervision/stand-by assist;Sit to/from stand;Assistive device   PT: Gait Supervision/stand-by assist;Assistive device;100 feet

## 2022-08-02 NOTE — PROGRESS NOTES
08/02/22 1029   Quick Adds   Type of Visit Initial Occupational Therapy Evaluation   Living Environment   People in Home alone   Current Living Arrangements apartment   Home Accessibility no concerns   Transportation Anticipated family or friend will provide   Living Environment Comments Pt lives in senior apartment in [facility] have moved there 3 months ago. Accessible shower and bath. 1 bedroom apartment   Self-Care   Usual Activity Tolerance moderate   Current Activity Tolerance poor   Equipment Currently Used at Home walker, rolling;shower chair   Fall history within last six months yes   Number of times patient has fallen within last six months 2   Activity/Exercise/Self-Care Comment Had been able to complete self care ADL routine in apartment up until last week.   Instrumental Activities of Daily Living (IADL)   Previous Responsibilities meal prep;laundry;shopping;medication management;finances   IADL Comments PT with moisharminef I with IADL, completes online grocery shopping, meals provided at [employer]. Pt cooks dinner for self. Son works and has limited abiolity to assist pt. uopn return home.   General Information   Onset of Illness/Injury or Date of Surgery 08/01/22   Referring Physician Adalberto Lovelace MD   Patient/Family Therapy Goal Statement (OT) return home   Additional Occupational Profile Info/Pertinent History of Current Problem Mando Freedman is a 82 year old  male with CKD stage III, chronic anemia, hypertension, MGUS, von Willebrand's disease presented to the ED with generalized weakness   Existing Precautions/Restrictions fall   Cognitive Status Examination   Orientation Status orientation to person, place and time   Pain Assessment   Patient Currently in Pain Yes, see Vital Sign flowsheet  (R LE below knee when pushed)   Range of Motion Comprehensive   General Range of Motion bilateral upper extremity ROM WFL   Strength Comprehensive (MMT)   Comment, General Manual Muscle Testing (MMT)  Assessment Weak grasp. R UE 4/5 at shoulder   Bed Mobility   Bed Mobility supine-sit;sit-supine   Supine-Sit [place] (Bed Mobility) supervision   Sit-Supine [place] (Bed Mobility) supervision   Transfers   Transfers sit-stand transfer   Transfer Comments MIN A with FWW.   Sit-Stand Transfer   Sit-Stand [place] (Transfers) minimum assist (75% patient effort)   Assistive Device (Sit-Stand Transfers) walker, front-wheeled   Sit/Stand Transfer Comments Slow movement. Fatigued   Activities of Daily Living   BADL Assessment/Intervention lower body dressing   Lower Body Dressing Assessment/Training   Position (Lower Body Dressing) unsupported sitting   Comment, (Lower Body Dressing) Poor standing tolerance   [place] Level (Lower Body Dressing) contact guard assist   Clinical Impression   OT Diagnosis weakness and decreaed mobility   OT Problem List-Impairments impacting ADL problems related to;activity tolerance impaired;mobility;strength   Assessment of Occupational Performance 3-5 Performance Deficits   Identified Performance Deficits ADL dressing, toileting, Bathing, IADL   Planned Therapy Interventions (OT) ADL retraining;strengthening;IADL retraining;progressive activity/exercise   Clinical Decision Making Complexity (OT) low complexity   Anticipated Equipment Needs Upon Discharge (OT) shower chair;walker, rolling   Risk & Benefits of therapy have been explained evaluation/treatment results reviewed;care plan/treatment goals reviewed;risks/benefits reviewed;current/potential barriers reviewed;participants voiced agreement with care plan;participants included;patient   OT Discharge Planning   OT Discharge Recommendation (DC Rec) home with assist;home with home care occupational therapy;Transitional Care Facility   OT Rationale for DC Rec Pt. is below baseline level of fuction. He lives in a senior apartment and has meal availability yet would need to complete self care ADL independently for return  home. Son cannot assist as he works two jobs. Pt. does not want TCU care yet open to  OT/PT   OT Brief overview of current status Poor actovoty tolerance. MIN A in room mobility with WW.   Total Evaluation Time (Minutes)   Total Evaluation Time (Minutes) 17   OT Goals   Therapy Frequency (OT) Daily   OT Predicted Duration/Target Date for Goal Attainment 08/09/22   OT Goals Hygiene/Grooming;Lower Body Dressing;Lower Body Bathing;Toilet Transfer/Toileting;Cognition   OT: Hygiene/Grooming supervision/stand-by assist;while standing   OT: Lower Body Dressing Modified independent   OT: Lower Body Bathing Modified independent   OT: Toilet Transfer/Toileting Modified independent   OT: Cognitive Patient/caregiver will verbalize understanding of cognitive assessment results/recommendations as needed for safe discharge planning

## 2022-08-02 NOTE — PROGRESS NOTES
[facility]    Hospitalist Progress Note    Assessment & Plan   Mando Freedman is a 82 year old  male with CKD stage III, chronic anemia, hypertension, MGUS, von Willebrand's disease presented to the ED with generalized weakness.     Severe symptomatic anemia.  Acute on chronic anemia.  History of MGUS.  History of von Willebrand's  Recurrent epistaxis.  Patient reports progressively worsening weakness, shortness of breath, lower extremity edema. Lost to follow-up with hematology oncology  On chart review baseline hemoglobin around 7.  Now presented with hemoglobin of 5.2. transfused with 2 U of Prbc, improved to 7.6--7.6.  Reports melena which could be due to epistaxis vs GI bleed.  - Hemoglobin level every 6 hours and transfuse if <7.  - Follow iron studies, CK levels, LFTs.  - [facility] hematology oncology consult requested.  - GI consulted (EGD planned).  - Consult ENT for epistaxis.        Acute on chronic kidney injury.  CKD stage III-IV.  On chart review baseline creatinine between 1.9-2.1. it was 2.51 on admit--2.43 this am.     Received 1 L fluid bolus in ED.  Transfusion as above.   Renal ultrasound consistent with renal medical disease.   - Monitor BMP.  - Avoid nephrotxins/reneally dose meds.Nephrology consult requested.  - Nephrology consulted on admit.     Hyperkalemia from kidney injury.  Potassium 5.8.  Received 1 L fluid bolus in ED. 5.0 this am.  - Monitor levels.  - Telemetry.     Dyspnea on exertion likely from deconditioning, anemia, atelectasis.    Bilateral lower extremity edema  Reporting shortness of breath with minimal exertion.  In ED afebrile.  Oxygen saturation 95% on room air.  2+ pitting lower extremity edema.  EKG sinus rhythm with PVCs.  Left anterior fascicular block, RBBB.  Chest x-ray mild basilar pulmonary opacities likely atelectasis.    Troponin I 8.  proBNP 822.  COVID-19 PCR negative.  Telemetry monitoring.  20 mg IV Lasix x1 in the ED.  LFTs.  -  Strict input output monitoring, daily weights.  - Aggressive incentive spirometry, treat anemia as above.  - Hold diuretics for now (JUAN R, NPO status, bleeding).        Hypertension.  - Hold PTA amlodipine, hydralazine in the setting of blood loss anemia.  - As needed IV hydralazine, IV metoprolol ordered.    Bilateral LE edema more pronounced on the left. Left calf pain.  Reports it is chronic and worse in the summer.  - Hold diuresis (see above).  - BLE US.  - TTE.     Gout.  - Continue PTA allopurinol.     Physical deconditioning from medical illness, senile frailty.  Resident of assisted living facility.  PT, OT, social work assistance with transition     Goals of care discussion.  At length discussion with patient regarding goals of care.  Reports if he is found to have multiple myeloma or medical condition that will need long-term treatment patient would like to be on comfort care.  At this time he is in agreement with preliminary work-up.     DVT Prophylaxis: SCDs, ambulate  Code Status: DNR, DNI     Disposition: Expected discharge in greater than 2 days pending clinical improvement.    Daria Al MD, MD      Interval History   Improving sob. No chest p[ain. No dizziness.    Physical Exam   Temp: 97.9  F (36.6  C) Temp src: Oral BP: 136/69 Pulse: 69   Resp: 18 SpO2: 98 % O2 Device: None (Room air)    Vitals:    08/01/22 1146 08/02/22 0607   Weight: 81.6 kg (180 lb) 85.6 kg (188 lb 12.8 oz)     Vital Signs with Ranges  Temp:  [97.8  F (36.6  C)-98.6  F (37  C)] 97.9  F (36.6  C)  Pulse:  [60-79] 69  Resp:  [14-20] 18  BP: (109-149)/(66-82) 136/69  SpO2:  [90 %-100 %] 98 %  I/O last 3 completed shifts:  In: 701.67   Out: 1850 [Urine:1850]    Constitutional: Awake, alert, cooperative, no apparent distress  Respiratory: Clear to auscultation bilaterally, no crackles or wheezing  Cardiovascular: Regular rate and rhythm, normal S1 and S2, and no murmur noted  GI: Normal bowel sounds, soft, non-distended,  non-tender  Skin/Integumen: No rashes, no cyanosis, bilateral LE edema, more pronounced on the left. Left knee skin abrasion (no evidence of infection).  Other:     Medications       allopurinol  100 mg Oral Daily     citalopram  5 mg Oral Daily     pantoprazole (PROTONIX) IV  40 mg Intravenous BID     sodium chloride (PF)  3 mL Intracatheter Q8H       Data   Recent Labs   Lab 08/02/22  0619 08/02/22  0414 08/01/22  1947 08/01/22  1256 08/01/22  1142   WBC  --  4.5  --   --  5.2   HGB 7.6* 7.6*  --   --  5.2*   MCV  --  96  --   --  98   PLT  --  163  --   --  195   INR  --   --  1.13  --   --    NA  --  144  --   --  139   POTASSIUM  --  5.0 5.4* 5.7* 5.8*   CHLORIDE  --  120*  --   --  116*   CO2  --  16*  --   --  16*   BUN  --  52*  --   --  56*   CR  --  2.43*  --   --  2.51*   ANIONGAP  --  8  --   --  7   KATT  --  8.1*  --   --  8.4*   GLC  --  96  --   --  94   ALBUMIN  --  2.7*  --  2.8*  --    PROTTOTAL  --  5.5*  --  5.7*  --    BILITOTAL  --  0.6  --  0.2  --    ALKPHOS  --  70  --  69  --    ALT  --  13  --  13  --    AST  --  9  --  17  --        Recent Results (from the past 24 hour(s))   Chest XR,  PA & LAT    Narrative    XR CHEST TWO VIEWS   8/1/2022 1:35 PM     HISTORY: Shortness of breath     COMPARISON: None available      Impression    IMPRESSION: AP and lateral views of the chest were obtained.  Cardiomediastinal silhouette is within normal limits. Mild basilar  pulmonary opacities, likely atelectasis. No significant pleural  effusion or pneumothorax.    RYAN MURPHY MD         SYSTEM ID:  [id number]   US Renal Complete    Narrative    EXAM: US RENAL COMPLETE  LOCATION: [facility]  DATE/TIME: 8/1/2022 5:56 PM    INDICATION: acute on chronic ckd  COMPARISON: None available.  TECHNIQUE: Routine Bilateral Renal and Bladder Ultrasound.    FINDINGS:    RIGHT KIDNEY: 7.7 x 6.3 x 4.2 cm. Mildly atrophic kidney with echogenic parenchyma. No hydronephrosis. Multiple simple  cysts, the largest of which is an exophytic cyst measuring 10 x 6 x 7.6 cm at the lower pole.     LEFT KIDNEY: 10.4 x 4.3 x 4.7 cm. Mildly atrophic with echogenic parenchyma. No hydronephrosis. Multiple simple cysts in the kidney measuring 1.3-1.5 cm.     BLADDER: Distended without focal abnormalities on survey views.      Impression    IMPRESSION:  1.  No hydronephrosis in either kidney.  2.  Mildly atrophic and echogenic kidneys, consistent with medical renal disease.  3.  Multiple simple cysts in the kidneys requiring no specific follow-up. The largest is a 10 cm exophytic cyst at the lower pole of the right kidney.

## 2022-08-02 NOTE — ANESTHESIA CARE TRANSFER NOTE
Patient: Mando Freedman    Procedure: Procedure(s):  ESOPHAGOGASTRODUODENOSCOPY (EGD)       Diagnosis: Melena [K92.1]  Diagnosis Additional Information: No value filed.    Anesthesia Type:   MAC     Note:    Oropharynx: oropharynx clear of all foreign objects and spontaneously breathing  Level of Consciousness: awake  Oxygen Supplementation: nasal cannula  Level of Supplemental Oxygen (L/min / FiO2): 3  Independent Airway: airway patency satisfactory and stable  Dentition: dentition unchanged  Vital Signs Stable: post-procedure vital signs reviewed and stable  Report to RN Given: handoff report given  Patient transferred to: Phase II  Comments:     After procedure in [facility] [place] under monitored anesthesia care (MAC), patient exhibited spontaneous respirations, oxygen via nasal cannula with oxygen saturation maintained greater than 95%, patient brought to [facility] recovery bay for postprocedure recovery, SpO2, NiBP, and EKG monitors and alarms on and functioning, report on patient's clinical status given to recovery-trained endoscopy RN, RN questions answered.        Handoff Report: Identifed the Patient, Identified the Reponsible Provider, Reviewed the pertinent medical history, Discussed the surgical course, Reviewed Intra-OP anesthesia mangement and issues during anesthesia, Set expectations for post-procedure period and Allowed opportunity for questions and acknowledgement of understanding      Vitals:  See Anesthesia Record.      Electronically Signed By: NICKO Heaton CRNA  August 2, 2022  2:16 PM

## 2022-08-02 NOTE — INTERVAL H&P NOTE
"I have reviewed the surgical (or preoperative) H&P that is linked to this encounter, and examined the patient. There are no significant changes    Clinical Conditions Present on Arrival:  Clinically Significant Risk Factors Present on Admission               # Hypoalbuminemia: Albumin = 2.7 g/dL (Ref range: 3.4 - 5.0 g/dL) on admission, will monitor as appropriate     # Overweight: Estimated body mass index is 27.88 kg/m  as calculated from the following:    Height as of this encounter: 1.753 m (5' 9\").    Weight as of this encounter: 85.6 kg (188 lb 12.8 oz).       " Communicable/Infectious (Pediatric)

## 2022-08-02 NOTE — PLAN OF CARE
Pt is A&Ox4. VSS on RA. Tele: NSR. Denies pain. Received 2nd unit of PRBC's, recheck hgb was 7.6. Q4H hgb checks. SOB with activity. Up A1 to BSC, using the urinal at the bedside. Continent of B&B. Voiding adequately, no BM. NPO since midnight. PIV SL. GI, nephrology, heme/onc consulted.

## 2022-08-02 NOTE — ANESTHESIA POSTPROCEDURE EVALUATION
Patient: Mando Freedman    Procedure: Procedure(s):  ESOPHAGOGASTRODUODENOSCOPY (EGD)       Anesthesia Type:  MAC    Note:  Disposition: Outpatient   Postop Pain Control: Uneventful            Sign Out: Well controlled pain   PONV: No   Neuro/Psych: Uneventful            Sign Out: Acceptable/Baseline neuro status   Airway/Respiratory: Uneventful            Sign Out: Acceptable/Baseline resp. status   CV/Hemodynamics: Uneventful            Sign Out: Acceptable CV status; No obvious hypovolemia; No obvious fluid overload   Other NRE: NONE   DID A NON-ROUTINE EVENT OCCUR? No           Last vitals:  Vitals Value Taken Time   /83 08/02/22 1500   Temp     Pulse 64 08/02/22 1501   Resp 17 08/02/22 1501   SpO2 83 % 08/02/22 1416   Vitals shown include unvalidated device data.    Electronically Signed By: Mando Stout DO, DO  August 2, 2022  3:04 PM  
Cardiac septal defect

## 2022-08-02 NOTE — PLAN OF CARE
Shift Note: 08/02/2022 2037-8117    A/O x4, quiet affect, very pleasant. Up x1 w/ GB, walker. Up to chair for meals, pivot to BSC as needed. EGD today, advanced from NPO to Clears, tolerating diet. Tele Afib w/ BBB. D5W w/ Na Bicarb infusing 100/hr, currently connected to Iron Sucrose infusion. Takes meds whole w/ water. NEISHA 2 days.

## 2022-08-02 NOTE — PROGRESS NOTES
No significant findings on EGD.  Clears for now.  Monitor HGB.  Further w/u if hgb continues to drop.

## 2022-08-03 ENCOUNTER — APPOINTMENT (OUTPATIENT)
Dept: CARDIOLOGY | Facility: CLINIC | Age: 82
DRG: 378 | End: 2022-08-03
Attending: HOSPITALIST
Payer: COMMERCIAL

## 2022-08-03 LAB
ANION GAP SERPL CALCULATED.3IONS-SCNC: 5 MMOL/L (ref 3–14)
ATRIAL RATE - MUSE: 79 BPM
BUN SERPL-MCNC: 42 MG/DL (ref 7–30)
CALCIUM SERPL-MCNC: 8.3 MG/DL (ref 8.5–10.1)
CHLORIDE BLD-SCNC: 116 MMOL/L (ref 94–109)
CO2 SERPL-SCNC: 22 MMOL/L (ref 20–32)
CREAT SERPL-MCNC: 2.01 MG/DL (ref 0.66–1.25)
CREAT UR-MCNC: 39 MG/DL
DEPRECATED CALCIDIOL+CALCIFEROL SERPL-MC: 18 UG/L (ref 20–75)
DIASTOLIC BLOOD PRESSURE - MUSE: NORMAL MMHG
ERYTHROCYTE [DISTWIDTH] IN BLOOD BY AUTOMATED COUNT: 22.2 % (ref 10–15)
FACT VIII ACT/NOR PPP: 19 % (ref 55–200)
GFR SERPL CREATININE-BSD FRML MDRD: 32 ML/MIN/1.73M2
GLUCOSE BLD-MCNC: 117 MG/DL (ref 70–99)
HCT VFR BLD AUTO: 24.4 % (ref 40–53)
HGB BLD-MCNC: 7.2 G/DL (ref 13.3–17.7)
HGB BLD-MCNC: 7.2 G/DL (ref 13.3–17.7)
HGB BLD-MCNC: 7.4 G/DL (ref 13.3–17.7)
INTERPRETATION ECG - MUSE: NORMAL
KAPPA LC FREE SER-MCNC: 63.73 MG/DL (ref 0.33–1.94)
KAPPA LC FREE/LAMBDA FREE SER NEPH: 19.61 {RATIO} (ref 0.26–1.65)
LAMBDA LC FREE SERPL-MCNC: 3.25 MG/DL (ref 0.57–2.63)
LVEF ECHO: NORMAL
MCH RBC QN AUTO: 27.4 PG (ref 26.5–33)
MCHC RBC AUTO-ENTMCNC: 29.5 G/DL (ref 31.5–36.5)
MCV RBC AUTO: 93 FL (ref 78–100)
P AXIS - MUSE: 46 DEGREES
PLATELET # BLD AUTO: 179 10E3/UL (ref 150–450)
POTASSIUM BLD-SCNC: 4.7 MMOL/L (ref 3.4–5.3)
PR INTERVAL - MUSE: 182 MS
PROT UR-MCNC: 0.42 G/L
PROT/CREAT 24H UR: 1.08 G/G CR (ref 0–0.2)
PTH-INTACT SERPL-MCNC: 97 PG/ML
QRS DURATION - MUSE: 128 MS
QT - MUSE: 410 MS
QTC - MUSE: 470 MS
R AXIS - MUSE: -65 DEGREES
RBC # BLD AUTO: 2.63 10E6/UL (ref 4.4–5.9)
SODIUM SERPL-SCNC: 143 MMOL/L (ref 133–144)
SYSTOLIC BLOOD PRESSURE - MUSE: NORMAL MMHG
T AXIS - MUSE: 17 DEGREES
VENTRICULAR RATE- MUSE: 79 BPM
VWF AG ACT/NOR PPP IA: 15 % (ref 50–200)
VWF MULTIMERS PPP IB: NORMAL
VWF:AC ACT/NOR PPP IA: <19 % (ref 50–180)
WBC # BLD AUTO: 4.4 10E3/UL (ref 4–11)

## 2022-08-03 PROCEDURE — 255N000002 HC RX 255 OP 636: Performed by: HOSPITALIST

## 2022-08-03 PROCEDURE — 258N000003 HC RX IP 258 OP 636: Performed by: INTERNAL MEDICINE

## 2022-08-03 PROCEDURE — 999N000208 ECHOCARDIOGRAM COMPLETE

## 2022-08-03 PROCEDURE — 82306 VITAMIN D 25 HYDROXY: CPT | Performed by: INTERNAL MEDICINE

## 2022-08-03 PROCEDURE — 85018 HEMOGLOBIN: CPT | Performed by: HOSPITALIST

## 2022-08-03 PROCEDURE — 250N000011 HC RX IP 250 OP 636: Performed by: HOSPITALIST

## 2022-08-03 PROCEDURE — 99232 SBSQ HOSP IP/OBS MODERATE 35: CPT | Performed by: NURSE PRACTITIONER

## 2022-08-03 PROCEDURE — 80048 BASIC METABOLIC PNL TOTAL CA: CPT | Performed by: HOSPITALIST

## 2022-08-03 PROCEDURE — 36415 COLL VENOUS BLD VENIPUNCTURE: CPT | Performed by: INTERNAL MEDICINE

## 2022-08-03 PROCEDURE — 120N000001 HC R&B MED SURG/OB

## 2022-08-03 PROCEDURE — 99232 SBSQ HOSP IP/OBS MODERATE 35: CPT | Performed by: INTERNAL MEDICINE

## 2022-08-03 PROCEDURE — 36415 COLL VENOUS BLD VENIPUNCTURE: CPT | Performed by: HOSPITALIST

## 2022-08-03 PROCEDURE — 250N000011 HC RX IP 250 OP 636: Performed by: INTERNAL MEDICINE

## 2022-08-03 PROCEDURE — 99233 SBSQ HOSP IP/OBS HIGH 50: CPT | Performed by: HOSPITALIST

## 2022-08-03 PROCEDURE — 83970 ASSAY OF PARATHORMONE: CPT | Performed by: INTERNAL MEDICINE

## 2022-08-03 PROCEDURE — 84156 ASSAY OF PROTEIN URINE: CPT | Performed by: INTERNAL MEDICINE

## 2022-08-03 PROCEDURE — 93306 TTE W/DOPPLER COMPLETE: CPT | Mod: 26 | Performed by: INTERNAL MEDICINE

## 2022-08-03 PROCEDURE — 250N000013 HC RX MED GY IP 250 OP 250 PS 637: Performed by: HOSPITALIST

## 2022-08-03 PROCEDURE — 85049 AUTOMATED PLATELET COUNT: CPT | Performed by: HOSPITALIST

## 2022-08-03 PROCEDURE — 250N000009 HC RX 250: Performed by: INTERNAL MEDICINE

## 2022-08-03 PROCEDURE — C9113 INJ PANTOPRAZOLE SODIUM, VIA: HCPCS | Performed by: HOSPITALIST

## 2022-08-03 RX ORDER — AMOXICILLIN AND CLAVULANATE POTASSIUM 500; 125 MG/1; MG/1
1 TABLET, FILM COATED ORAL EVERY 12 HOURS SCHEDULED
Status: DISCONTINUED | OUTPATIENT
Start: 2022-08-03 | End: 2022-08-05 | Stop reason: HOSPADM

## 2022-08-03 RX ADMIN — IRON SUCROSE 300 MG: 20 INJECTION, SOLUTION INTRAVENOUS at 10:23

## 2022-08-03 RX ADMIN — ALLOPURINOL 100 MG: 100 TABLET ORAL at 09:42

## 2022-08-03 RX ADMIN — PANTOPRAZOLE SODIUM 40 MG: 40 INJECTION, POWDER, FOR SOLUTION INTRAVENOUS at 20:06

## 2022-08-03 RX ADMIN — HUMAN ALBUMIN MICROSPHERES AND PERFLUTREN 9 ML: 10; .22 INJECTION, SOLUTION INTRAVENOUS at 09:39

## 2022-08-03 RX ADMIN — AMOXICILLIN AND CLAVULANATE POTASSIUM 1 TABLET: 500; 125 TABLET, FILM COATED ORAL at 09:42

## 2022-08-03 RX ADMIN — CITALOPRAM HYDROBROMIDE 5 MG: 10 TABLET ORAL at 09:42

## 2022-08-03 RX ADMIN — PANTOPRAZOLE SODIUM 40 MG: 40 INJECTION, POWDER, FOR SOLUTION INTRAVENOUS at 09:42

## 2022-08-03 RX ADMIN — SODIUM BICARBONATE: 84 INJECTION, SOLUTION INTRAVENOUS at 07:55

## 2022-08-03 RX ADMIN — AMOXICILLIN AND CLAVULANATE POTASSIUM 1 TABLET: 500; 125 TABLET, FILM COATED ORAL at 20:06

## 2022-08-03 ASSESSMENT — ACTIVITIES OF DAILY LIVING (ADL)
ADLS_ACUITY_SCORE: 45
ADLS_ACUITY_SCORE: 48
ADLS_ACUITY_SCORE: 46
ADLS_ACUITY_SCORE: 46
ADLS_ACUITY_SCORE: 48
ADLS_ACUITY_SCORE: 45
ADLS_ACUITY_SCORE: 48
ADLS_ACUITY_SCORE: 48
ADLS_ACUITY_SCORE: 45
ADLS_ACUITY_SCORE: 46

## 2022-08-03 NOTE — CONSULTS
Care Management Initial Consult    General Information  Assessment completed with: Patient, Micah  Type of CM/SW Visit: Initial Assessment    Primary Care Provider verified and updated as needed: Yes (Primary Provider is at [facility])   Readmission within the last 30 days: no previous admission in last 30 days      Reason for Consult: discharge planning  Advance Care Planning:            Communication Assessment  Patient's communication style: spoken language (English or Bilingual)    Hearing Difficulty or Deaf: no   Wear Glasses or Blind: yes    Cognitive  Cognitive/Neuro/Behavioral: WDL                      Living Environment:   People in home: alone     Current living Arrangements: assisted living  [facility] Facility[facility]   Able to return to prior arrangements: yes       Family/Social Support:  Care provided by: self  Provides care for: no one  Marital Status: Single  Children          Description of Support System: Supportive         Current Resources:   Patient receiving home care services: No     Community Resources: None  Equipment currently used at home: walker, rolling, shower chair, raised toilet seat  Supplies currently used at home: None    Employment/Financial:  Employment Status: retired        Financial Concerns: No concerns identified   Referral to Financial Worker: No       Lifestyle & Psychosocial Needs:  Social Determinants of Health     Tobacco Use: High Risk     Smoking Tobacco Use: Light Tobacco Smoker     Smokeless Tobacco Use: Never Used   Alcohol Use: Not on file   Financial Resource Strain: Not on file   Food Insecurity: Not on file   Transportation Needs: Not on file   Physical Activity: Not on file   Stress: Not on file   Social Connections: Not on file   Intimate Partner Violence: Not on file   Depression: Not on file   Housing Stability: Not on file       Functional Status:  Prior to admission patient needed assistance:              Mental Health Status:          Chemical Dependency  Status:                Values/Beliefs:  Spiritual, Cultural Beliefs, Yarsani Practices, Values that affect care: no               Additional Information:  Per consult for discharge planning, met with pt to discuss plan.  Per pt, he has been living at [facility] for the last 3 months.  Patient shared that he is in the lowest level of care only receiving the following services: Meals(usually goes down for 1 meal per day) and has cleaning services 1-2 times a week.  Patient shared he doesn't drive, so he orders groceries online for delivery or has family  for him.    Patient shared he uses a walker with ambulation.  Discussed PT & OT is recommending home care at discharge. Patient shared that he is undecided about home care at this time but is ok with referrals being sent out to any home care agencies.    Referral sent to [facility] for RN, PT/OT services.   Inpatient Care Coordinator will continue to follow for discharge needs.  YOLI Corcoran RN, BSN, OCN   Inpatient Care Coordination [address]  Office: [phone]

## 2022-08-03 NOTE — PROGRESS NOTES
[facility]    Hospitalist Progress Note    Assessment & Plan   Mando Freedman is a 82 year old  male with CKD stage III, chronic anemia, hypertension, MGUS, von Willebrand's disease presented to the ED with progressively worsening weakness, shortness of breath, lower extremity edema.  Admitted for further management and w/u.     Sever symptomatic anemia  Acute on chronic anemia.  Reports melena which could be due to epistaxis vs GI bleed. On chart review baseline hemoglobin around 7. Presented with hemoglobin of 5.2. transfused with 2 U of Prbc, improved to 7.6--7.6--8.5--7.2 this am.. No significant findings on EGD done on 8/2.  - Monitor HGB and transfuse  - GI on board (appreciate assistance). Might need colonoscopy.  - Hemonc on board (appreciate assistance).     History of MGUS.  History of von Willebrand's.  Lost to follow-up with hematology oncology.   - Plan per hemonc.    Recurrent epistaxis.  Less likely the cause of melena and anemia upon further hx (denies having it for the past 2-3 months).  - F/U with ENT.     Acute on chronic kidney injury.  CKD stage III-IV.  On chart review baseline creatinine between 1.9-2.1. it was 2.51 on admit--2.43--2.01 this am. On IVF. Transfused as above. Seen by nephrology.  Renal ultrasound consistent with renal medical disease.   - Monitor BMP.  - Avoid nephrotxins/reneally dose meds.Nephrology consult requested.  - Follow nephrology recs.     Hyperkalemia from kidney injury.  Potassium 5.8.  Received 1 L fluid bolus in ED. 5.0--4.7 this am.  - Monitor levels.  - Telemetry.       Bilateral LE edema: more pronounced on the left.  Possible RLE cellulitis. .   Reports it is chronic, worse in the summer.  No edema in the lest this morning. RLE is warm to touch, tender and mildly erythematous. Recent fall hitting his knee. Crusted wound on the knee.   BLE US negative for DVT.  - Pain control.  - Augmentin for suspected cellulitis of RLE.  - Leg  elevation.      Dyspnea on exertion likely from deconditioning, anemia, atelectasis.    Reporting shortness of breath with minimal exertion.  In ED afebrile.  Oxygen saturation 95% on room air.  2+ pitting lower extremity edema.  EKG sinus rhythm with PVCs.  Left anterior fascicular block, RBBB.  Chest x-ray mild basilar pulmonary opacities likely atelectasis.    Troponin I 8.  proBNP 822.  COVID-19 PCR negative.  20 mg IV Lasix x1 in the ED.  BLE US neg for DVT.  - Strict input output monitoring, daily weights.  - Aggressive incentive spirometry, treat anemia as above.  - Hold diuretics for now.  - Follow TTE.        Hypertension.  - Hold PTA amlodipine, hydralazine in the setting of blood loss anemia.  - As needed IV hydralazine.       Gout.  - Continue PTA allopurinol.     Physical deconditioning from medical illness, senile frailty.  Resident of assisted living facility.  PT, OT, social work assistance with transition     Goals of care discussion.  At length discussion with patient regarding goals of care.  Reports if he is found to have multiple myeloma or medical condition that will need long-term treatment patient would like to be on comfort care.  At this time he is in agreement with preliminary work-up.     DVT Prophylaxis: SCDs, ambulate  Code Status: DNR, DNI     Disposition: Expected discharge in greater than 2 days pending clinical improvement.    Daria Al MD, MD      Interval History   No significant sob this am. Denies cp.. No chest pain. No dizziness.    Physical Exam   Temp: 98  F (36.7  C) Temp src: Oral BP: (!) 150/78 Pulse: 80   Resp: 18 SpO2: 96 % O2 Device: None (Room air)    Vitals:    08/01/22 1146 08/02/22 0607 08/03/22 0500   Weight: 81.6 kg (180 lb) 85.6 kg (188 lb 12.8 oz) 85.1 kg (187 lb 11.2 oz)     Vital Signs with Ranges  Temp:  [97.7  F (36.5  C)-98.6  F (37  C)] 98  F (36.7  C)  Pulse:  [67-99] 80  Resp:  [13-20] 18  BP: (126-184)/(58-91) 150/78  SpO2:  [95 %-99 %] 96 %  I/O  last 3 completed shifts:  In: 400 [P.O.:200; I.V.:200]  Out: 1350 [Urine:1350]    Constitutional: Awake, alert, cooperative, no apparent distress  Respiratory: Clear to auscultation bilaterally, no crackles or wheezing  Cardiovascular: Regular rate and rhythm, normal S1 and S2, and no murmur noted  GI: Normal bowel sounds, soft, non-distended, non-tender  Skin/Integumen: No rashes, no cyanosis, bilateral LE edema, more pronounced on the left. Left knee skin abrasion (no evidence of infection).  Other:     Medications     IV infusion builder WITH additives 100 mL/hr at 08/03/22 0012       allopurinol  100 mg Oral Daily     citalopram  5 mg Oral Daily     iron sucrose  300 mg Intravenous Daily     pantoprazole (PROTONIX) IV  40 mg Intravenous BID     sodium chloride (PF)  3 mL Intracatheter Q8H       Data   Recent Labs   Lab 08/03/22  0703 08/02/22  2353 08/02/22  1829 08/02/22  0619 08/02/22  0414 08/01/22  1947 08/01/22  1256 08/01/22  1142   WBC 4.4  --   --   --  4.5  --   --  5.2   HGB 7.2*  7.2* 7.7* 8.3*   < > 7.6*  --   --  5.2*   MCV 93  --   --   --  96  --   --  98     --   --   --  163  --   --  195   INR  --   --   --   --   --  1.13  --   --    NA  --   --   --   --  144  --   --  139   POTASSIUM  --   --   --   --  5.0 5.4* 5.7* 5.8*   CHLORIDE  --   --   --   --  120*  --   --  116*   CO2  --   --   --   --  16*  --   --  16*   BUN  --   --   --   --  52*  --   --  56*   CR  --   --   --   --  2.43*  --   --  2.51*   ANIONGAP  --   --   --   --  8  --   --  7   KATT  --   --   --   --  8.1*  --   --  8.4*   GLC  --   --   --   --  96  --   --  94   ALBUMIN  --   --   --   --  2.7*  --  2.8*  --    PROTTOTAL  --   --   --   --  5.5*  --  5.7*  --    BILITOTAL  --   --   --   --  0.6  --  0.2  --    ALKPHOS  --   --   --   --  70  --  69  --    ALT  --   --   --   --  13  --  13  --    AST  --   --   --   --  9  --  17  --     < > = values in this interval not displayed.       Recent Results (from  the past 24 hour(s))   US Lower Extremity Venous Duplex Bilateral    Narrative    VENOUS ULTRASOUND BILATERAL LOWER EXTREMITIES  8/2/2022 9:55 AM     HISTORY: Bilateral lower extremity pain and swelling.    COMPARISON: None.    TECHNIQUE: Color Doppler and spectral waveform analysis performed  throughout the deep veins of both lower extremities.    FINDINGS: Both common femoral, proximal greater saphenous, femoral,  and popliteal veins demonstrate normal blood flow, compression, and  augmentation. Posterior tibial and peroneal veins are compressible.  Calcifications noted within the left small saphenous vein, may be  chronic nonocclusive superficial thrombophlebitis.      Impression    IMPRESSION:  1. Negative for DVT in both lower arteries.  2. Calcification in left small saphenous vein suggestive of chronic  DVT.    MINDY SWANSON MD         SYSTEM ID:  [id number]

## 2022-08-03 NOTE — PLAN OF CARE
Pt is A&Ox4. VSS on RA. Tele: a.fib with CVR. Q6H hgb checks. SOB with activity. Up A1 to BSC, using the urinal at the bedside. Continent of B&B. Voiding adequately, no BM. On clears, tolerating well, denies N/V. PIV infusing D5W with bicarb @100mL/hr. GI, nephrology, heme/onc following. ENT consulted. Discharge pending improvement.

## 2022-08-03 NOTE — PLAN OF CARE
Goal Outcome Evaluation:    Primary Diagnosis: Gastrointestinal bleeding   Orientation: x4  Aggression Stop Light: Green  Mobility: Assist x1  Pain Management: Denies pain this shift, just minor discomfort.   Diet: Clear liquid   Bowel/Bladder: Continent, urinal by bedside   Abnormal Lab/Assessments: Hg 7.4, Factor 8 essay 19.  Drain/Device/Wound: PIV saline locked  Consults:   D/C Day/Goals/Place: Pending clinical improvement.

## 2022-08-03 NOTE — PROGRESS NOTES
" Renal Medicine Progress Note            Assessment/Plan:       82 y.o man with CKD III/IV, HTN and MGUS, who is admitted for severe anemia.      # CKD III/IV: baseline Scr ~ 2-2.2 mg/dl.      # Mild acute kidney injury: This is in the setting of severe anemia, poor appetite x 1 week and on lisinopril. Resolved. Kidney function is back to baseline     # Severe anemia s/p 1 unit PRBC:  GI is planning endoscopy. Severe Fe defiency     # Hypertension: BP is acceptable. Given frequent falls okay with goal BP of  <150/90.               -was on lisinopril     # Hyperkalemia: improved.      # Acidosis due to advance renal failure: Improved.      # MGUS: Has been seeing oncology for ~ 4-5 years he says.      Plan:  # Stop IVF  # Renal panel in AM        Interval History:     Afebrile. VSS.   Patient is on the commode having a bowel movement.   No new complaints from him.  Per report, EGD was normal.             Medications and Allergies:       allopurinol  100 mg Oral Daily     amoxicillin-clavulanate  1 tablet Oral Q12H [facility] (08/20)     citalopram  5 mg Oral Daily     iron sucrose  300 mg Intravenous Daily     pantoprazole (PROTONIX) IV  40 mg Intravenous BID     sodium chloride (PF)  3 mL Intracatheter Q8H      No Known Allergies         Physical Exam:   Vitals were reviewed   , Blood pressure (!) 144/71, pulse 58, temperature 97.6  F (36.4  C), temperature source Oral, resp. rate 16, height 1.753 m (5' 9\"), weight 85.1 kg (187 lb 11.2 oz), SpO2 99 %.    Wt Readings from Last 3 Encounters:   08/03/22 85.1 kg (187 lb 11.2 oz)       Intake/Output Summary (Last 24 hours) at 8/3/2022 1147  Last data filed at 8/3/2022 1021  Gross per 24 hour   Intake 400 ml   Output 1850 ml   Net -1450 ml     GENERAL: NAD. Pleasant. Sitting on the commode.  HEENT:  Normocephalic. No gross abnormalities.  Pupils equal.  MMM.    MUSCULOSKELETAL: 2+ edema RLE and much less LLE.   SKIN: no suspicious lesions or rashes, dry to touch  NEURO:  Awake, " alert and conversing normally. Cognitively intact and competent.          Data:     CBC RESULTS:     Recent Labs   Lab 08/03/22  0703 08/02/22  2353 08/02/22  1829 08/02/22  1137 08/02/22  0619 08/02/22  0414 08/01/22  1142   WBC 4.4  --   --   --   --  4.5 5.2   RBC 2.63*  --   --   --   --  2.75* 1.93*   HGB 7.2*  7.2* 7.7* 8.3* 8.5* 7.6* 7.6* 5.2*   HCT 24.4*  --   --   --   --  26.4* 18.9*     --   --   --   --  163 195       Basic Metabolic Panel:  Recent Labs   Lab 08/03/22  0703 08/02/22  0414 08/01/22  1947 08/01/22  1256 08/01/22  1142    144  --   --  139   POTASSIUM 4.7 5.0 5.4* 5.7* 5.8*   CHLORIDE 116* 120*  --   --  116*   CO2 22 16*  --   --  16*   BUN 42* 52*  --   --  56*   CR 2.01* 2.43*  --   --  2.51*   * 96  --   --  94   KATT 8.3* 8.1*  --   --  8.4*       INR  Recent Labs   Lab 08/01/22 1947   INR 1.13      Attestation:   I have reviewed today's relevant vital signs, notes, medications, labs and imaging.    Yasmany Bosch MD  [facility] - Nephrology  Office phone :[phone]  Pager: [phone]

## 2022-08-03 NOTE — PROGRESS NOTES
"Hematology-Oncology Follow-up Note  [facility]     Today's Date: 08/03/22  Date of Admission:  8/1/2022  Reason for Consult: Anemia       ASSESSMENT/ PLAN :  Mando Freedman is a 82 year old male with     Anemia  -Acute on chronic anemia  Iron deficiency anemia  Seen by GI -EGD negative for source of bleeding.    Continue per GI recommendation  Patient started Venofer IV  Continue to check CBC   Transfuse with hemoglobin less than 7  Continue with hemoglobin checks every 4 hours    von Willebrand disease  -Von Willebrand antigen and activity are low  Factor VIII is low as well  Most likely type I  -Reviewed with  and Dr Edwards ( hem/onc)   We will monitor closely for drop in hemoglobin or active bleeding  For now hemoglobin is overall stable  Continue to check hemoglobin every 4 hours  In the event of drop in hemoglobin or active bleeding we would recommend Humate-P      MGUS with light chain disease  Follows by hematologist Dr. Munguia in [place], [place]  Overall SPEP M protein 0.6 overall stable  Light chains overall stable  -Follow-up with      Chronic kidney disease  Nephrology seeing patient  Continue per nephrology    Recurrent epistaxis   chronic issue for the patient . Patient reports last episode of epistaxis almost 3 months ago  Less likely this is because of acute anemia  Agree with ENT referral        INTERIM HISTORY:  Patient denies any acute bleeding.  He denies blood in stool       MEDICATIONS:  Reviewed         PHYSICAL EXAM:  Vital signs:  Temp: 97.6  F (36.4  C) Temp src: Oral BP: (!) 144/71 Pulse: 58   Resp: 16 SpO2: 99 % O2 Device: None (Room air)   Height: 175.3 cm (5' 9\") Weight: 85.1 kg (187 lb 11.2 oz)  Estimated body mass index is 27.72 kg/m  as calculated from the following:    Height as of this encounter: 1.753 m (5' 9\").    Weight as of this encounter: 85.1 kg (187 lb 11.2 oz).      GENERAL/CONSTITUTIONAL: No acute distress.        LABS:  Recent Labs "   Lab Test 08/03/22  0703      POTASSIUM 4.7   CHLORIDE 116*   CO2 22   ANIONGAP 5   BUN 42*   CR 2.01*   *   KATT 8.3*     Recent Labs   Lab Test 08/03/22  0703 08/02/22  2353 08/02/22  0619 08/02/22  0414 07/21/22  1340 07/14/22  1228   WBC 4.4  --   --  4.5   < > 4.7   HGB 7.2*  7.2* 7.7*   < > 7.6*   < > 6.8*     --   --  163   < > 177   MCV 93  --   --  96   < > 88   NEUTROPHIL  --   --   --  57  --  50    < > = values in this interval not displayed.     Recent Labs   Lab Test 08/02/22  0414 08/01/22  1256   BILITOTAL 0.6 0.2   ALKPHOS 70 69   ALT 13 13   AST 9 17   ALBUMIN 2.7* 2.8*             Michael Quick, Nurse Practitioner   [facility]   [phone]    Chart documentation with Dragon Voice recognition Software. Although reviewed after completion, some words and grammatical errors may remain.

## 2022-08-03 NOTE — PROGRESS NOTES
Shift Summary [id number]    Admitting Diagnosis: Gastrointestinal hemorrhage, unspecified gastrointestinal hemorrhage type [K92.2]   Vitals VSS  Pain 0/10.   A&Ox4  Voiding URINAL AND BSC  Mobility AST 1 GB/W  Tele AFIB  Lung Sounds CLEAR on RA  GI NO BM THIS SHIFT      Orders Placed This Encounter      Clear Liquid Diet       Plan: Hgb 8.3 at 1830, with q 6 checks next at 0000 hours.   EGD completed with no significant findings. Iron given at beginning of shift.   D5W with Na bicarb running at 100 mL/hr.

## 2022-08-03 NOTE — PROGRESS NOTES
Reason for admission: Progressively worsening weakness, shortness of breath, lower extremity edema, and Severe symptomatic anemia.  Mental Status: x4  Activity: Ax1 Gb walker  Diet: CLD  Pain: Denies  Urination: Continent  Tele/Restraints/Iso: SR w/ BBB  LDA: PIV SL  Expected D/C Date:  Other Info:  CKD stage III, chronic anemia, hypertension, MGUS, von Willebrand's disease. Reported melena. Pt received BT with 2 U of Prbc for HGB 5.2 upon admission. Current Hgb 7.4. GI and Hemoinc ff patient. RLE is warm to touch, tender and mildly erythematous s/t fall hitting his knee. BLE US  done to rule out DVT, findings negative.

## 2022-08-04 ENCOUNTER — APPOINTMENT (OUTPATIENT)
Dept: OCCUPATIONAL THERAPY | Facility: CLINIC | Age: 82
DRG: 378 | End: 2022-08-04
Payer: COMMERCIAL

## 2022-08-04 LAB
ANION GAP SERPL CALCULATED.3IONS-SCNC: 4 MMOL/L (ref 3–14)
BUN SERPL-MCNC: 34 MG/DL (ref 7–30)
CALCIUM SERPL-MCNC: 8.2 MG/DL (ref 8.5–10.1)
CHLORIDE BLD-SCNC: 117 MMOL/L (ref 94–109)
CO2 SERPL-SCNC: 22 MMOL/L (ref 20–32)
CREAT SERPL-MCNC: 2.01 MG/DL (ref 0.66–1.25)
ERYTHROCYTE [DISTWIDTH] IN BLOOD BY AUTOMATED COUNT: 22.2 % (ref 10–15)
GFR SERPL CREATININE-BSD FRML MDRD: 32 ML/MIN/1.73M2
GLUCOSE BLD-MCNC: 91 MG/DL (ref 70–99)
HCT VFR BLD AUTO: 24.7 % (ref 40–53)
HGB BLD-MCNC: 7.2 G/DL (ref 13.3–17.7)
HGB BLD-MCNC: 7.2 G/DL (ref 13.3–17.7)
HGB BLD-MCNC: 7.7 G/DL (ref 13.3–17.7)
HGB BLD-MCNC: 7.8 G/DL (ref 13.3–17.7)
HGB BLD-MCNC: 8.1 G/DL (ref 13.3–17.7)
HGB BLD-MCNC: 8.8 G/DL (ref 13.3–17.7)
MCH RBC QN AUTO: 27.3 PG (ref 26.5–33)
MCHC RBC AUTO-ENTMCNC: 29.1 G/DL (ref 31.5–36.5)
MCV RBC AUTO: 94 FL (ref 78–100)
METHYLMALONATE SERPL-SCNC: 0.31 UMOL/L (ref 0–0.4)
PLATELET # BLD AUTO: 166 10E3/UL (ref 150–450)
POTASSIUM BLD-SCNC: 4.5 MMOL/L (ref 3.4–5.3)
RBC # BLD AUTO: 2.64 10E6/UL (ref 4.4–5.9)
SODIUM SERPL-SCNC: 143 MMOL/L (ref 133–144)
WBC # BLD AUTO: 4.2 10E3/UL (ref 4–11)

## 2022-08-04 PROCEDURE — 250N000013 HC RX MED GY IP 250 OP 250 PS 637: Performed by: INTERNAL MEDICINE

## 2022-08-04 PROCEDURE — 85027 COMPLETE CBC AUTOMATED: CPT | Performed by: HOSPITALIST

## 2022-08-04 PROCEDURE — 36415 COLL VENOUS BLD VENIPUNCTURE: CPT | Performed by: INTERNAL MEDICINE

## 2022-08-04 PROCEDURE — 82310 ASSAY OF CALCIUM: CPT | Performed by: HOSPITALIST

## 2022-08-04 PROCEDURE — 97535 SELF CARE MNGMENT TRAINING: CPT | Mod: GO

## 2022-08-04 PROCEDURE — 250N000011 HC RX IP 250 OP 636: Performed by: HOSPITALIST

## 2022-08-04 PROCEDURE — 99232 SBSQ HOSP IP/OBS MODERATE 35: CPT | Performed by: HOSPITALIST

## 2022-08-04 PROCEDURE — 85018 HEMOGLOBIN: CPT | Performed by: INTERNAL MEDICINE

## 2022-08-04 PROCEDURE — 99233 SBSQ HOSP IP/OBS HIGH 50: CPT | Performed by: INTERNAL MEDICINE

## 2022-08-04 PROCEDURE — 250N000013 HC RX MED GY IP 250 OP 250 PS 637: Performed by: HOSPITALIST

## 2022-08-04 PROCEDURE — 36415 COLL VENOUS BLD VENIPUNCTURE: CPT | Performed by: HOSPITALIST

## 2022-08-04 PROCEDURE — 99232 SBSQ HOSP IP/OBS MODERATE 35: CPT | Performed by: INTERNAL MEDICINE

## 2022-08-04 PROCEDURE — 120N000001 HC R&B MED SURG/OB

## 2022-08-04 PROCEDURE — C9113 INJ PANTOPRAZOLE SODIUM, VIA: HCPCS | Performed by: HOSPITALIST

## 2022-08-04 RX ORDER — PANTOPRAZOLE SODIUM 40 MG/1
40 TABLET, DELAYED RELEASE ORAL
Status: DISCONTINUED | OUTPATIENT
Start: 2022-08-05 | End: 2022-08-05 | Stop reason: HOSPADM

## 2022-08-04 RX ORDER — AMLODIPINE BESYLATE 2.5 MG/1
2.5 TABLET ORAL DAILY
Status: DISCONTINUED | OUTPATIENT
Start: 2022-08-04 | End: 2022-08-05 | Stop reason: HOSPADM

## 2022-08-04 RX ORDER — BISACODYL 5 MG
5 TABLET, DELAYED RELEASE (ENTERIC COATED) ORAL ONCE
Status: COMPLETED | OUTPATIENT
Start: 2022-08-04 | End: 2022-08-04

## 2022-08-04 RX ADMIN — Medication 125 MCG: at 14:52

## 2022-08-04 RX ADMIN — ACETAMINOPHEN 650 MG: 325 TABLET ORAL at 17:30

## 2022-08-04 RX ADMIN — PANTOPRAZOLE SODIUM 40 MG: 40 INJECTION, POWDER, FOR SOLUTION INTRAVENOUS at 09:18

## 2022-08-04 RX ADMIN — AMOXICILLIN AND CLAVULANATE POTASSIUM 1 TABLET: 500; 125 TABLET, FILM COATED ORAL at 09:17

## 2022-08-04 RX ADMIN — AMLODIPINE BESYLATE 2.5 MG: 2.5 TABLET ORAL at 13:24

## 2022-08-04 RX ADMIN — BISACODYL 5 MG: 5 TABLET, COATED ORAL at 18:24

## 2022-08-04 RX ADMIN — AMOXICILLIN AND CLAVULANATE POTASSIUM 1 TABLET: 500; 125 TABLET, FILM COATED ORAL at 19:48

## 2022-08-04 RX ADMIN — ACETAMINOPHEN 650 MG: 325 TABLET ORAL at 00:30

## 2022-08-04 RX ADMIN — ALLOPURINOL 100 MG: 100 TABLET ORAL at 09:17

## 2022-08-04 RX ADMIN — POLYETHYLENE GLYCOL 3350, SODIUM SULFATE ANHYDROUS, SODIUM BICARBONATE, SODIUM CHLORIDE, POTASSIUM CHLORIDE 2000 ML: 236; 22.74; 6.74; 5.86; 2.97 POWDER, FOR SOLUTION ORAL at 18:24

## 2022-08-04 RX ADMIN — CITALOPRAM HYDROBROMIDE 5 MG: 10 TABLET ORAL at 09:17

## 2022-08-04 ASSESSMENT — ACTIVITIES OF DAILY LIVING (ADL)
ADLS_ACUITY_SCORE: 48
ADLS_ACUITY_SCORE: 52
ADLS_ACUITY_SCORE: 48
ADLS_ACUITY_SCORE: 52
ADLS_ACUITY_SCORE: 48

## 2022-08-04 NOTE — PROGRESS NOTES
Shift Summary [id number]    Admitting Diagnosis: Gastrointestinal hemorrhage, unspecified gastrointestinal hemorrhage type [K92.2]   Vitals WNL except elevated BP  Pain denies pain or SOB this shift  A&Ox4   Mobility assist of 1 with GB/W  Tele SR w/ BBB  CMS intact  Lung Sounds clear on RA    Orders Placed This Encounter      Clear Liquid Diet    Pt A&Ox4. Edema to BLE continues. Pt requested to take PCDs off this evening; wanted to take a break from them. SL. Hgb 7.4; recheck tomorrow morning.

## 2022-08-04 NOTE — PROGRESS NOTES
[facility]    Hospitalist Progress Note    Assessment & Plan   Mando Freedman is a 82 year old  male with CKD stage III, chronic anemia, hypertension, MGUS, von Willebrand's disease presented to the ED with progressively worsening weakness, shortness of breath, lower extremity edema.  Admitted for further management and w/u.       Severe symptomatic anemia  Acute on chronic anemia.  Reports melena which could be due to epistaxis vs GI bleed (epistaxis felt less likely). Baseline hemoglobin around 7. Presented with hemoglobin of 5.2 s/p 2 U of Prbc.  No significant findings on EGD done on 8/2.  - discussed with [facility] today, plan for colonoscopy tomorrow  - serial hgb stable 7-8 g/L; monitor q4h per Heme    History of MGUS.  History of von Willebrand's.  Lost to follow-up with hematology oncology.   - if signs of bleeding, heme recommends Humate-P  - will need to establish with Hematology in area, will have PCP refer on outpatient basis    Recurrent epistaxis.  Less likely the cause of melena and anemia upon further hx (denies having it for the past 2-3 months).  - F/U with ENT outpatient     Acute on chronic kidney injury.  CKD stage III-IV.  On chart review baseline creatinine between 1.9-2.1. it was 2.51 on admit, improved to baseline with IVF. Renal ultrasound consistent with renal medical disease.   - Cr stable today, continue to monitor  - Avoid nephrotxins/reneally dose meds  - will need to establish with Nephrology, appt to be made at [facility]     Hyperkalemia from kidney injury, resolved  Potassium 5.8.  Normalized with IVF.   - Monitor levels.  - stop tele     Bilateral LE edema: more pronounced on the left.  Possible RLE cellulitis. .   On 8/3 RLE warm to touch, tender and mildly erythematous. Recent fall hitting his knee. Crusted wound on the knee.   BLE US negative for DVT.  - Augmentin for suspected cellulitis of RLE (day 2)  - Leg elevation.      Dyspnea on exertion  likely from deconditioning, anemia, atelectasis.    Moderate aortic stenosis, possible bicuspid aortic valve  Moderate aortic root dilatation  Reporting shortness of breath with minimal exertion.  In ED afebrile.  Oxygen saturation 95% on room air.  2+ pitting lower extremity edema.  EKG sinus rhythm with PVCs.  Left anterior fascicular block, RBBB.  Chest x-ray mild basilar pulmonary opacities likely atelectasis.    Troponin I 8.  proBNP 822.  COVID-19 PCR negative.  20 mg IV Lasix x1 in the ED.  BLE US neg for DVT.  TTE 8/3 with moderate aortic stenosis and moderately dilated aortic root  - Strict input output monitoring, daily weights.  - Aggressive incentive spirometry, treat anemia as above.  - Hold diuretics for now.  - outpatient follow up for routine monitoring        Hypertension.  - Hold PTA amlodipine, hydralazine in the setting of blood loss anemia.  - As needed IV hydralazine.       Gout.  - Continue PTA allopurinol.     Physical deconditioning from medical illness, senile frailty.  Resident of assisted living facility.  - home therapies at discharge     Goals of care  At length discussion with patient regarding goals of care by prior provider.  Reports if he is found to have multiple myeloma or medical condition that will need long-term treatment patient would like to be on comfort care.  At this time he is in agreement with preliminary work-up.     DVT Prophylaxis: SCDs, ambulate  Code Status: DNR, DNI     Disposition: Expected discharge in 1-2 days pending completion of work-up, stable hgb and BMP    Michael Sotelo MD, MD      Interval History   Reports mild dyspnea and lightheadedness but much improved from admission.  Reported 4 black stools yesterday, none today.  Would like to pursue colonoscopy.  No other complaints.     Physical Exam   Temp: 97.8  F (36.6  C) Temp src: Oral BP: (!) 153/86 Pulse: 62   Resp: 16 SpO2: 98 % O2 Device: None (Room air)    Vitals:    08/02/22 0607 08/03/22 0500  08/04/22 0618   Weight: 85.6 kg (188 lb 12.8 oz) 85.1 kg (187 lb 11.2 oz) 84.1 kg (185 lb 4.8 oz)     Vital Signs with Ranges  Temp:  [97.5  F (36.4  C)-99.2  F (37.3  C)] 97.8  F (36.6  C)  Pulse:  [55-72] 62  Resp:  [16] 16  BP: (137-161)/(67-86) 153/86  SpO2:  [96 %-98 %] 98 %  I/O last 3 completed shifts:  In: -   Out: 1100 [Urine:1100]    Constitutional: Awake, alert, cooperative, no apparent distress  Respiratory: Clear to auscultation bilaterally, no crackles or wheezing  Cardiovascular: Regular rate and rhythm, normal S1 and S2, and no murmur noted  GI: Normal bowel sounds, soft, non-distended  Skin/Integumen:   Other: Alert and appropriate, cranial nerves grossly intact     Medications       allopurinol  100 mg Oral Daily     amoxicillin-clavulanate  1 tablet Oral Q12H DARREN (08/20)     citalopram  5 mg Oral Daily     [START ON 8/5/2022] pantoprazole  40 mg Oral QAM AC     sodium chloride (PF)  3 mL Intracatheter Q8H       Data   Recent Labs   Lab 08/04/22  1025 08/04/22  0601 08/03/22  1223 08/03/22  0703 08/02/22  0619 08/02/22  0414 08/01/22  1947 08/01/22  1256   WBC  --  4.2  --  4.4  --  4.5  --   --    HGB 8.1* 7.2*  7.2* 7.4* 7.2*  7.2*   < > 7.6*  --   --    MCV  --  94  --  93  --  96  --   --    PLT  --  166  --  179  --  163  --   --    INR  --   --   --   --   --   --  1.13  --    NA  --  143  --  143  --  144  --   --    POTASSIUM  --  4.5  --  4.7  --  5.0 5.4* 5.7*   CHLORIDE  --  117*  --  116*  --  120*  --   --    CO2  --  22  --  22  --  16*  --   --    BUN  --  34*  --  42*  --  52*  --   --    CR  --  2.01*  --  2.01*  --  2.43*  --   --    ANIONGAP  --  4  --  5  --  8  --   --    KATT  --  8.2*  --  8.3*  --  8.1*  --   --    GLC  --  91  --  117*  --  96  --   --    ALBUMIN  --   --   --   --   --  2.7*  --  2.8*   PROTTOTAL  --   --   --   --   --  5.5*  --  5.7*   BILITOTAL  --   --   --   --   --  0.6  --  0.2   ALKPHOS  --   --   --   --   --  70  --  69   ALT  --   --   --   --    --  13  --  13   AST  --   --   --   --   --  9  --  17    < > = values in this interval not displayed.       No results found for this or any previous visit (from the past 24 hour(s)).

## 2022-08-04 NOTE — PLAN OF CARE
Goal Outcome Evaluation:  Pt a/o ,using urinal,vss,started on norvasc,Hgb 8.1. No c/o pain clear liquid diet. Colonoscopy in am tele af dced.

## 2022-08-04 NOTE — PROGRESS NOTES
"    Gastroenterology Follow Up Note    Mando Freedman MRN#  [id number]   Age:  82 year old YOB: 1940    Date of Admission:  8/1/2022     Subjective   No acute events overnight.  Reports ongoing episodes of dark colored stools with at least 4 episodes in past 24 hours.  Remains asymptomatic without any new episodes of abdominal pain, nausea, or emesis.  Hemodynamically stable.  Hemoglobin remains low but stable without any acute drop.     MEDICATIONS & ALLERGIES   Current medication list reviewed.      No Known Allergies       OBJECTIVE   Vitals BP (!) 160/81 (BP Location: Left arm)   Pulse 62   Temp 97.5  F (36.4  C) (Oral)   Resp 16   Ht 1.753 m (5' 9\")   Wt 84.1 kg (185 lb 4.8 oz)   SpO2 98%   BMI 27.36 kg/m          General:   Well-developed elderly CM in NAD.   HEENT:   NC/AT. MMM.   Lungs:  No respiratory distress.   Heart:  RRR. +S1, S2.    Abdomen:   Soft. NT/ND. BS active.    Ext/MS:   No cyanosis or erythema.    Neuro:   No focal deficits noted.    Psych:  Appears to have normal affect and mood.   Skin:  No obvious rashes or lesions noted.         LABORATORY AND IMAGING    ELECTROLYTE PANEL   Recent Labs   Lab Test 08/04/22  0601 08/03/22  0703 08/02/22  0414   POTASSIUM 4.5 4.7 5.0   BUN 34* 42* 52*      HEMATOLOGY PANEL   Recent Labs   Lab Test 08/04/22  0601 08/03/22  0703 08/02/22  0414 08/01/22  1947   WBC 4.2 4.4 4.5  --    MCV 94 93 96  --    INR  --   --   --  1.13      LIVER AND PANCREAS PANEL   Recent Labs   Lab Test 08/02/22  0414 08/01/22  1256 06/02/22  1133   ALBUMIN 2.7* 2.8* 3.4*      I have reviewed the current diagnostic and laboratory tests.     Assessment / Recommendations:     Assessment:  1. Concern for GI blood loss with dark-colored stools.  No associated GI symptoms reported.  EGD yesterday without any evidence of active or upper GI blood loss.  Suspect dark stools secondary to intermittent episodes of epistaxis, but cannot exclude small bowel or colon source. "  Given ongoing dark stools and persistently low hemoglobin, discussed utility of colonoscopy with patient, who remains highly interested in further work up.  Last colonoscopy more than 10 years ago with reported normal findings.  2. Acute on chronic anemia.  Hemoglobin on admission at 5.2 g/dL that has improved after 2 units of pRBC transfusion.  3. Chronic renal insufficiency.    Recommendations:  1. Tentative plan for colonoscopy tomorrow.  2. Clear liquid diet today.  NPO after midnight.  3. GoLytely bowel prep ordered.  4. Continue to monitor hemoglobin and transfuse as needed.  5. Once daily Pantoprazole.  6. If epistaxis were to reoccur, recommend ENT evaluation.  7. Further recommendations to follow colonoscopy findings tomorrow.       Total time spent in chart review, direct medical discussion, examination, and documentation was 15 minutes.    Kimani Loo MD  [facility]  [phone]  I appreciate the opportunity to participate in the care of this patient.   Please feel free to call me with any questions or concerns.

## 2022-08-04 NOTE — PROGRESS NOTES
"    Gastroenterology Follow Up Note    Mando Freedman MRN#  [id number]   Age:  82 year old YOB: 1940    Date of Admission:  8/1/2022     Subjective   No acute events overnight.  Reports two dark colored stools overnight.  EGD yesterday without any evidence of active or recent upper GI bleeding.  Hemodynamically stable.  Hemoglobin remains low but stable without any acute drop.     MEDICATIONS & ALLERGIES   Current medication list reviewed.      No Known Allergies       OBJECTIVE   Vitals BP (!) 160/81 (BP Location: Left arm)   Pulse 62   Temp 97.5  F (36.4  C) (Oral)   Resp 16   Ht 1.753 m (5' 9\")   Wt 84.1 kg (185 lb 4.8 oz)   SpO2 98%   BMI 27.36 kg/m          General:   Well-developed elderly CM in NAD.   HEENT:   NC/AT. MMM.   Lungs:  No respiratory distress.   Heart:  RRR. +S1, S2.    Abdomen:   Soft. NT/ND. BS active.    Ext/MS:   No cyanosis or erythema.    Neuro:   No focal deficits noted.    Psych:  Appears to have normal affect and mood.   Skin:  No obvious rashes or lesions noted.         LABORATORY AND IMAGING    ELECTROLYTE PANEL   Recent Labs   Lab Test 08/04/22  0601 08/03/22  0703 08/02/22  0414   POTASSIUM 4.5 4.7 5.0   BUN 34* 42* 52*      HEMATOLOGY PANEL   Recent Labs   Lab Test 08/04/22  0601 08/03/22  0703 08/02/22  0414 08/01/22  1947   WBC 4.2 4.4 4.5  --    MCV 94 93 96  --    INR  --   --   --  1.13      LIVER AND PANCREAS PANEL   Recent Labs   Lab Test 08/02/22  0414 08/01/22  1256 06/02/22  1133   ALBUMIN 2.7* 2.8* 3.4*      I have reviewed the current diagnostic and laboratory tests.     Assessment / Recommendations:     Assessment:  1. Concern for GI blood loss with dark-colored stools.  No associated GI symptoms reported.  EGD yesterday without any evidence of active or upper GI blood loss.  Suspect dark stools secondary to intermittent episodes of epistaxis, but cannot exclude small bowel or colon source..  Given clinical stability, would hold off on repeat " colonoscopy at this time.  2. Acute on chronic anemia.  Hemoglobin on admission at 5.2 g/dL that has improved to 8.5 g/dL after 2 units of pRBC transfusion.  3. Chronic renal insufficiency.    Recommendations:  1. Continue to monitor hemoglobin and transfuse as needed.  2. Once daily Pantoprazole.  3. If epistaxis, recommend ENT evaluation.  4. If dark stools were to persist or further drop in hemoglobin, would consider inpatient colonoscopy.  5. Will continue to follow.       Total time spent in chart review, direct medical discussion, examination, and documentation was 15 minutes.    Kimani Loo MD  [facility]  [phone]  I appreciate the opportunity to participate in the care of this patient.   Please feel free to call me with any questions or concerns.

## 2022-08-04 NOTE — PROGRESS NOTES
Care Management Initial Consult    General Information-Name of Facility: [place] [phone]    Assessment completed with: Patient, Micah  Type of CM/SW Visit: Initial Assessment    Primary Care Provider verified and updated as needed: Yes (Primary Provider is at [facility])   Readmission within the last 30 days: no previous admission in last 30 days      Reason for Consult: discharge planning  Advance Care Planning:            Communication Assessment  Patient's communication style: spoken language (English or Bilingual)    Hearing Difficulty or Deaf: no   Wear Glasses or Blind: yes    Cognitive  Cognitive/Neuro/Behavioral: WDL                      Living Environment:   People in home: alone     Current living Arrangements: assisted living  Name of Facility: [place] [phone]  Able to return to prior arrangements: yes       Family/Social Support:  Care provided by: self  Provides care for: no one  Marital Status: Single  Children          Description of Support System: Supportive         Current Resources:   Patient receiving home care services: No     Community Resources: None  Equipment currently used at home: walker, rolling, shower chair, raised toilet seat  Supplies currently used at home: None    Employment/Financial:  Employment Status: retired        Financial Concerns: No concerns identified   Referral to Financial Worker: No       Lifestyle & Psychosocial Needs:  Social Determinants of Health     Tobacco Use: High Risk     Smoking Tobacco Use: Light Tobacco Smoker     Smokeless Tobacco Use: Never Used   Alcohol Use: Not on file   Financial Resource Strain: Not on file   Food Insecurity: Not on file   Transportation Needs: Not on file   Physical Activity: Not on file   Stress: Not on file   Social Connections: Not on file   Intimate Partner Violence: Not on file   Depression: Not on file   Housing Stability: Not on file       Functional Status:  Prior to admission patient needed assistance:               Mental Health Status:          Chemical Dependency Status:                Values/Beliefs:  Spiritual, Cultural Beliefs, Uatsdin Practices, Values that affect care: no               Additional Information:  [facility] unable to accept for home care services. Received call from Mariana,  with [facility]([phone]) that provides Primary Care for pt at his [facility].  Per Mariana, [facility] [phone] is used at pt's [facility] for home care PT/OT.    Per Mariana, able to see notes in epic during pt's hospital stay.  Mariana confirmed that she will arrange hospital follow-up with pt's PCP Dr. Queen.  Called [facility] and spoke to Emy.  Per Emy, [facility] partners with [facility] to provide HC services PT/OT for pt's at [facility].  Emy requested referral be faxed to [phone].  Per Emy, she will review and call back RNCC.  Referral faxed.  Called [employer] [phone] and left a message with [facility]-Director of Health Services requesting a call back.  Inpatient Care Coordinator will continue to follow for discharge planning.  Brittney Rivas, RN  Brittney Rivas RN, BSN, OCN   Inpatient Care Coordination [address]  Office: [phone]

## 2022-08-04 NOTE — PLAN OF CARE
Pt is A&Ox4. VSS on RA. Tele: a.fib with CVR. JORDAN. Up A1 to BSC, using the urinal at the bedside. Continent of B&B. Voiding adequately. X1 BM, soft, black. On clears, tolerating well, denies N/V. PIV SL. Q4H hgb checks. Creat: 2.01. BLE +3 edema. GI, nephrology, heme/onc following. Discharge pending improvement.

## 2022-08-04 NOTE — PROGRESS NOTES
Renal Medicine Progress Note            Assessment/Plan:     82 y.o man with CKD III/IV, HTN and MGUS, who is admitted for severe anemia.      # CKD III/IV: baseline Scr ~ 2-2.2 mg/dl.      # Mild acute kidney injury: This is in the setting of severe anemia, poor appetite x 1 week and on lisinopril. Resolved. Kidney function is back to baseline     # Severe anemia s/p 1 unit PRBC:  GI is planning endoscopy. Severe Fe defiency. Received IV Venofer     # Hypertension:Given frequent falls okay with goal BP of  <150/90.               -was on lisinopril     # Hyperkalemia: improved.      # Acidosis due to advance renal failure: Improved.      # MGUS: Has been seeing oncology for ~ 4-5 years he says.     # Vitamin D def:      Plan:  # Kidney function is at baseline. I made an appointment for him on 8/11 for HFUV, but he says it may be too soon. He asked me to leave our number and he will call to make an appoint for 2-4 weeks HFUV. I discussed the plan with Dr. Roper.   # Start Norvasc 2.5 mg daily. Goal BP is <150/90.  # Start vitamin D3, 5000 units daily      Please call with questions or concerns. I am signing off. Thank you.              Interval History:     Afebrile. VSS.  BP is on the high side.   Good urine output.  Kidney function is stable.   GI is planning colonoscopy Friday.  No new complaints from patient.           Medications and Allergies:       allopurinol  100 mg Oral Daily     amoxicillin-clavulanate  1 tablet Oral Q12H [facility] (08/20)     bisacodyl  5 mg Oral Once     citalopram  5 mg Oral Daily     [START ON 8/5/2022] pantoprazole  40 mg Oral QAM AC     polyethylene glycol  2,000 mL Oral Once    Followed by     [START ON 8/5/2022] polyethylene glycol  2,000 mL Oral Once     sodium chloride (PF)  3 mL Intracatheter Q8H      No Known Allergies         Physical Exam:   Vitals were reviewed   , Blood pressure (!) 153/86, pulse 62, temperature 97.8  F (36.6  C), temperature source Oral, resp. rate 16,  "height 1.753 m (5' 9\"), weight 84.1 kg (185 lb 4.8 oz), SpO2 98 %.    Wt Readings from Last 3 Encounters:   08/04/22 84.1 kg (185 lb 4.8 oz)       Intake/Output Summary (Last 24 hours) at 8/4/2022 1232  Last data filed at 8/4/2022 1047  Gross per 24 hour   Intake --   Output 800 ml   Net -800 ml     GENERAL: NAD. Pleasant. Sitting on the commode.  HEENT:  Normocephalic. No gross abnormalities.  Pupils equal.  MMM.    MUSCULOSKELETAL: 2+ edema RLE and much less LLE.   SKIN: no suspicious lesions or rashes, dry to touch  NEURO:  Awake, alert and conversing normally. Cognitively intact and competent.          Data:     CBC RESULTS:     Recent Labs   Lab 08/04/22  1025 08/04/22  0601 08/03/22  1223 08/03/22  0703 08/02/22  2353 08/02/22  1829 08/02/22  0619 08/02/22  0414 08/01/22  1142   WBC  --  4.2  --  4.4  --   --   --  4.5 5.2   RBC  --  2.64*  --  2.63*  --   --   --  2.75* 1.93*   HGB 8.1* 7.2*  7.2* 7.4* 7.2*  7.2* 7.7* 8.3*   < > 7.6* 5.2*   HCT  --  24.7*  --  24.4*  --   --   --  26.4* 18.9*   PLT  --  166  --  179  --   --   --  163 195    < > = values in this interval not displayed.       Basic Metabolic Panel:  Recent Labs   Lab 08/04/22  0601 08/03/22  0703 08/02/22  0414 08/01/22  1947 08/01/22  1256 08/01/22  1142    143 144  --   --  139   POTASSIUM 4.5 4.7 5.0 5.4* 5.7* 5.8*   CHLORIDE 117* 116* 120*  --   --  116*   CO2 22 22 16*  --   --  16*   BUN 34* 42* 52*  --   --  56*   CR 2.01* 2.01* 2.43*  --   --  2.51*   GLC 91 117* 96  --   --  94   KATT 8.2* 8.3* 8.1*  --   --  8.4*       INR  Recent Labs   Lab 08/01/22 1947   INR 1.13      Attestation:   I have reviewed today's relevant vital signs, notes, medications, labs and imaging.    Yasmany Bosch MD  [facility]  Office phone :[phone]  Pager: [phone]  "

## 2022-08-04 NOTE — PROGRESS NOTES
Service Date: 08/04/2022    SUBJECTIVE:  Mr. Freedman is an 82-year-old gentleman with multiple medical problems including MGUS, chronic kidney disease, and anemia.  The patient also gives a history of von Willebrand disease.  I reviewed his labs from Care Everywhere.  On 08/31/2009:   -Normal factor IX.  -Normal factor XI.  -Normal factor XII.  -Factor 8 of 6  -von Willebrand antigen of 11  -Ristocetin cofactor of less than 6.    The patient does not know why these labs were checked.  The patient says that he has not required any treatment for von Willebrand disease.    The patient presented to the Emergency Room on 08/01/2022 because of worsening weakness and some shortness of breath on exertion.  Multiple investigations were done.  -Hemoglobin of 5.2.  Normal WBC and platelet.  -Creatinine of 2.51.    The patient said that he was having some black stool.  No coughing or vomiting blood.  No bright red blood in the stool.  The patient did mention that he had some nosebleeds a few days ago.  Black stool could have been because of that.    The patient has received multiple units of transfusion.  His hemoglobin is 8.1 today.  The patient denies any active bleeding from any site.  Yesterday, he had some black stool.  No black stool today.    He has been seen by GI.  EGD revealed hiatal hernia. Otherwise normal.  He is going to get colonoscopy.    Denies any headache.  Some dizziness.  No chest pain.  No shortness of breath at rest.  No nausea or vomiting. Denies bleeding from any site.    PHYSICAL EXAMINATION:    GENERAL:  He is alert, oriented x 3.  Not in distress.  VITAL SIGNS:  Reviewed.  Rest of systems not examined.    LABS:  Reviewed.    ASSESSMENT:   1.  An 82-year-old gentleman with severe normocytic anemia.  Anemia is multifactorial from iron deficiency, renal disease and anemia of chronic disease.  The patient is elderly.  He may also have primary bone marrow pathology like MDS.  2.  Von Willebrand disease.  3.   Iron deficiency.  4.  MGUS.  5.  Chronic kidney disease.    PLAN:   1.  Discussed with the patient regarding anemia.  He had some bleeding as he had been having dark stool.  The patient says that he had some nosebleed.  EGD is negative for any bleeding.  Likely that severe nosebleed led to worsening anemia.    The patient is not having any dark stool anymore.  Also, EGD is negative.  His bleeding likely has stopped.    Agree with a colonoscopy, which is scheduled for tomorrow.    2.  The patient has von Willebrand disease.  There is a risk of any bleeding with the procedure.  If during colonoscopy any mass or polyp is found, it might be biopsied.  That can cause bleeding.    Because of that, we will give the patient Humate-P tomorrow morning.    3.  The patient has iron deficiency.  We will give IV Venofer.    4.  The patient has MGUS.  That is stable.  It will be monitored once a year.    5.  The patient had a few questions, they were all answered. Case discussed with Dr. Sotelo.  Hematology/Oncology will continue to follow.    TOTAL VISIT TIME:  40 minutes.  Time was spent in today's visit, review of chart/investigations today, communicating with other providers and documentation.    Power Edwards MD        D: 2022   T: 2022   MT: HERNAN    Name:     IVANIA GOMEZ  MRN:      [id number]        Account:      [id number]   :      1940           Service Date: 2022       Document: [id number]

## 2022-08-05 ENCOUNTER — ANESTHESIA (OUTPATIENT)
Dept: GASTROENTEROLOGY | Facility: CLINIC | Age: 82
DRG: 378 | End: 2022-08-05
Payer: COMMERCIAL

## 2022-08-05 ENCOUNTER — ANESTHESIA EVENT (OUTPATIENT)
Dept: GASTROENTEROLOGY | Facility: CLINIC | Age: 82
DRG: 378 | End: 2022-08-05
Payer: COMMERCIAL

## 2022-08-05 VITALS
TEMPERATURE: 97.9 F | RESPIRATION RATE: 18 BRPM | BODY MASS INDEX: 27.39 KG/M2 | DIASTOLIC BLOOD PRESSURE: 85 MMHG | HEART RATE: 59 BPM | HEIGHT: 69 IN | SYSTOLIC BLOOD PRESSURE: 156 MMHG | OXYGEN SATURATION: 99 % | WEIGHT: 184.9 LBS

## 2022-08-05 LAB
ANION GAP SERPL CALCULATED.3IONS-SCNC: 8 MMOL/L (ref 3–14)
BASOPHILS # BLD AUTO: 0 10E3/UL (ref 0–0.2)
BASOPHILS NFR BLD AUTO: 1 %
BUN SERPL-MCNC: 27 MG/DL (ref 7–30)
CALCIUM SERPL-MCNC: 8.4 MG/DL (ref 8.5–10.1)
CHLORIDE BLD-SCNC: 114 MMOL/L (ref 94–109)
CO2 SERPL-SCNC: 21 MMOL/L (ref 20–32)
COLONOSCOPY: NORMAL
CREAT SERPL-MCNC: 1.96 MG/DL (ref 0.66–1.25)
EOSINOPHIL # BLD AUTO: 0.2 10E3/UL (ref 0–0.7)
EOSINOPHIL NFR BLD AUTO: 6 %
ERYTHROCYTE [DISTWIDTH] IN BLOOD BY AUTOMATED COUNT: 21.4 % (ref 10–15)
GFR SERPL CREATININE-BSD FRML MDRD: 33 ML/MIN/1.73M2
GLUCOSE BLD-MCNC: 85 MG/DL (ref 70–99)
HCT VFR BLD AUTO: 26.3 % (ref 40–53)
HGB BLD-MCNC: 7.7 G/DL (ref 13.3–17.7)
HGB BLD-MCNC: 7.7 G/DL (ref 13.3–17.7)
HGB BLD-MCNC: 7.8 G/DL (ref 13.3–17.7)
IMM GRANULOCYTES # BLD: 0 10E3/UL
IMM GRANULOCYTES NFR BLD: 0 %
LYMPHOCYTES # BLD AUTO: 1.2 10E3/UL (ref 0.8–5.3)
LYMPHOCYTES NFR BLD AUTO: 31 %
MCH RBC QN AUTO: 27.8 PG (ref 26.5–33)
MCHC RBC AUTO-ENTMCNC: 29.3 G/DL (ref 31.5–36.5)
MCV RBC AUTO: 95 FL (ref 78–100)
MONOCYTES # BLD AUTO: 0.4 10E3/UL (ref 0–1.3)
MONOCYTES NFR BLD AUTO: 10 %
NEUTROPHILS # BLD AUTO: 2.2 10E3/UL (ref 1.6–8.3)
NEUTROPHILS NFR BLD AUTO: 52 %
NRBC # BLD AUTO: 0 10E3/UL
NRBC BLD AUTO-RTO: 0 /100
PLATELET # BLD AUTO: 162 10E3/UL (ref 150–450)
POTASSIUM BLD-SCNC: 4.5 MMOL/L (ref 3.4–5.3)
RBC # BLD AUTO: 2.77 10E6/UL (ref 4.4–5.9)
SODIUM SERPL-SCNC: 143 MMOL/L (ref 133–144)
WBC # BLD AUTO: 4 10E3/UL (ref 4–11)

## 2022-08-05 PROCEDURE — 370N000017 HC ANESTHESIA TECHNICAL FEE, PER MIN: Performed by: INTERNAL MEDICINE

## 2022-08-05 PROCEDURE — 99232 SBSQ HOSP IP/OBS MODERATE 35: CPT | Mod: FS | Performed by: INTERNAL MEDICINE

## 2022-08-05 PROCEDURE — 250N000013 HC RX MED GY IP 250 OP 250 PS 637: Performed by: INTERNAL MEDICINE

## 2022-08-05 PROCEDURE — 258N000003 HC RX IP 258 OP 636: Performed by: NURSE ANESTHETIST, CERTIFIED REGISTERED

## 2022-08-05 PROCEDURE — 0DJD8ZZ INSPECTION OF LOWER INTESTINAL TRACT, VIA NATURAL OR ARTIFICIAL OPENING ENDOSCOPIC: ICD-10-PCS | Performed by: INTERNAL MEDICINE

## 2022-08-05 PROCEDURE — 258N000003 HC RX IP 258 OP 636: Performed by: INTERNAL MEDICINE

## 2022-08-05 PROCEDURE — 250N000015 HC RX FACTOR IP MED 636 OP 636: Performed by: INTERNAL MEDICINE

## 2022-08-05 PROCEDURE — 999N000010 HC STATISTIC ANES STAT CODE-CRNA PER MINUTE: Performed by: INTERNAL MEDICINE

## 2022-08-05 PROCEDURE — 99239 HOSP IP/OBS DSCHRG MGMT >30: CPT | Performed by: HOSPITALIST

## 2022-08-05 PROCEDURE — 85018 HEMOGLOBIN: CPT | Performed by: INTERNAL MEDICINE

## 2022-08-05 PROCEDURE — 45378 DIAGNOSTIC COLONOSCOPY: CPT | Performed by: INTERNAL MEDICINE

## 2022-08-05 PROCEDURE — 36415 COLL VENOUS BLD VENIPUNCTURE: CPT | Performed by: INTERNAL MEDICINE

## 2022-08-05 PROCEDURE — 250N000011 HC RX IP 250 OP 636: Performed by: NURSE ANESTHETIST, CERTIFIED REGISTERED

## 2022-08-05 PROCEDURE — 250N000011 HC RX IP 250 OP 636: Performed by: INTERNAL MEDICINE

## 2022-08-05 PROCEDURE — 250N000013 HC RX MED GY IP 250 OP 250 PS 637: Performed by: HOSPITALIST

## 2022-08-05 PROCEDURE — 82310 ASSAY OF CALCIUM: CPT | Performed by: HOSPITALIST

## 2022-08-05 PROCEDURE — 36415 COLL VENOUS BLD VENIPUNCTURE: CPT | Performed by: HOSPITALIST

## 2022-08-05 RX ORDER — ONDANSETRON 2 MG/ML
INJECTION INTRAMUSCULAR; INTRAVENOUS PRN
Status: DISCONTINUED | OUTPATIENT
Start: 2022-08-05 | End: 2022-08-05

## 2022-08-05 RX ORDER — AMOXICILLIN AND CLAVULANATE POTASSIUM 500; 125 MG/1; MG/1
1 TABLET, FILM COATED ORAL EVERY 12 HOURS
Qty: 5 TABLET | Refills: 0 | Status: SHIPPED | OUTPATIENT
Start: 2022-08-05 | End: 2022-08-08

## 2022-08-05 RX ORDER — SODIUM CHLORIDE, SODIUM LACTATE, POTASSIUM CHLORIDE, CALCIUM CHLORIDE 600; 310; 30; 20 MG/100ML; MG/100ML; MG/100ML; MG/100ML
INJECTION, SOLUTION INTRAVENOUS CONTINUOUS PRN
Status: DISCONTINUED | OUTPATIENT
Start: 2022-08-05 | End: 2022-08-05

## 2022-08-05 RX ORDER — PROPOFOL 10 MG/ML
INJECTION, EMULSION INTRAVENOUS CONTINUOUS PRN
Status: DISCONTINUED | OUTPATIENT
Start: 2022-08-05 | End: 2022-08-05

## 2022-08-05 RX ADMIN — Medication 125 MCG: at 12:22

## 2022-08-05 RX ADMIN — CITALOPRAM HYDROBROMIDE 5 MG: 10 TABLET ORAL at 12:22

## 2022-08-05 RX ADMIN — AMOXICILLIN AND CLAVULANATE POTASSIUM 1 TABLET: 500; 125 TABLET, FILM COATED ORAL at 12:25

## 2022-08-05 RX ADMIN — POLYETHYLENE GLYCOL 3350, SODIUM SULFATE ANHYDROUS, SODIUM BICARBONATE, SODIUM CHLORIDE, POTASSIUM CHLORIDE 2000 ML: 236; 22.74; 6.74; 5.86; 2.97 POWDER, FOR SOLUTION ORAL at 05:11

## 2022-08-05 RX ADMIN — PROPOFOL 20 MG: 10 INJECTION, EMULSION INTRAVENOUS at 09:46

## 2022-08-05 RX ADMIN — PROPOFOL 175 MCG/KG/MIN: 10 INJECTION, EMULSION INTRAVENOUS at 09:47

## 2022-08-05 RX ADMIN — IRON SUCROSE 300 MG: 20 INJECTION, SOLUTION INTRAVENOUS at 05:20

## 2022-08-05 RX ADMIN — ANTIHEMOPHILIC FACTOR/VON WILLEBRAND FACTOR COMPLEX (HUMAN) 4007 VWF UNIT: KIT at 08:34

## 2022-08-05 RX ADMIN — AMLODIPINE BESYLATE 2.5 MG: 2.5 TABLET ORAL at 08:33

## 2022-08-05 RX ADMIN — ONDANSETRON 4 MG: 2 INJECTION INTRAMUSCULAR; INTRAVENOUS at 10:12

## 2022-08-05 RX ADMIN — ACETAMINOPHEN 650 MG: 325 TABLET ORAL at 04:29

## 2022-08-05 RX ADMIN — ALLOPURINOL 100 MG: 100 TABLET ORAL at 12:22

## 2022-08-05 RX ADMIN — SODIUM CHLORIDE, POTASSIUM CHLORIDE, SODIUM LACTATE AND CALCIUM CHLORIDE: 600; 310; 30; 20 INJECTION, SOLUTION INTRAVENOUS at 09:45

## 2022-08-05 ASSESSMENT — ACTIVITIES OF DAILY LIVING (ADL)
ADLS_ACUITY_SCORE: 47
ADLS_ACUITY_SCORE: 52
ADLS_ACUITY_SCORE: 47
ADLS_ACUITY_SCORE: 52
ADLS_ACUITY_SCORE: 52
ADLS_ACUITY_SCORE: 47
ADLS_ACUITY_SCORE: 52
ADLS_ACUITY_SCORE: 47

## 2022-08-05 ASSESSMENT — LIFESTYLE VARIABLES: TOBACCO_USE: 1

## 2022-08-05 NOTE — DISCHARGE SUMMARY
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  The following labs/tests are recommended:   hemoglobin.  Follow up to establish care with outpatient Hematologist.  Discuss   referral at your next primary care doctor visit.   Follow up to establish care with outpatient Nephrologist at [facility] (the group seeing you here at [facility]).   Follow up with [facility] as needed for any signs of   recurrent bleeding as they would likely pursue a pill cam study.             Unresulted Labs Ordered in the Past 30 Days of this Admission     Date and Time Order Name Status Description    8/3/2022 11:30 AM von Willebrand Factor Multimers In process     8/2/2022  1:01 PM von Willebrand Interpretation In process       These results will be followed up by: Hematology     Discharge Disposition   Discharged to assisted living  Condition at discharge: Stable    Hospital Course   Mando Freedman is a 82 year old  male with CKD stage III, chronic anemia, hypertension, MGUS, von Willebrand's disease presented to the ED with progressively worsening weakness, shortness of breath, lower extremity edema, found to have severe anemia.     Severe symptomatic  anemia  Acute on chronic anemia, suspected due to blood loss  Reports melena which could be due to epistaxis vs GI bleed (epistaxis felt less likely). Baseline hemoglobin around 7. Presented with hemoglobin of 5.2 s/p 2U PRBC with appropriate response and stable 7-8 g/dL thereafter.  GI consulted, underwent EGD 8/2 followed by colonoscopy 8/5 without significant findings.  GI recommends ongoing outpatient monitoring of hemoglobin - if signs of recurrent bleed would likely pursue capsule study in follow up.     History of MGUS.  History of von Willebrand's.  Lost to follow-up with hematology oncology.  Will request PCP refer to establish care but was seen by [facility] this admission.     Recurrent epistaxis.  Less likely the cause of melena and anemia upon further hx (denies having it for the past 2-3 months).  Follow up with ENT outpatient.      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  On chart review baseline creatinine between 1.9-2.1. It was 2.51 on admit, improved to baseline with IVF. Renal ultrasound consistent with renal medical disease.  [facility] establish new nephrologist.      Hyperkalemia from kidney injury, resolved  Potassium 5.8.  Normalized with IVF.     Bilateral LE edema: more pronounced on the left.  Possible RLE cellulitis. .   On 8/3 RLE warm to touch, tender and mildly erythematous. Recent fall hitting his knee. Crusted wound on the knee.  BLE US negative for DVT.  Initiated on Augmentin with plan to complete 5-day course.      Moderate aortic stenosis, possible bicuspid aortic valve  Moderate aortic root dilatation  Reporting shortness of breath with minimal exertion, likely due to severe anemia above.  EKG without ischemic changes, troponin and BNP wnl.  CXR with mild basilar opacities suspected to be atelectasis.  TTE 8/3 with moderate aortic stenosis and moderately dilated aortic root.  Follow up with PCP for routine outpatient monitoring if desired based on  goals of care.    Vitamin D deficiency  Screening Vit D level low, initiated on supplementation this admission.      Hypertension.  Continue PTA amlodipine.  Pressures overall well controlled so will not continue PTA hydralazine.  Follow up with PCP for BP check.      Gout.  Continue PTA allopurinol.     Physical deconditioning from medical illness, senile frailty.  Resident of assisted living facility.  Will order home therapies.      Goals of care  At length discussion with patient regarding goals of care by prior provider.  Reports if he is found to have multiple myeloma or medical condition that will need long-term treatment patient would like to be on comfort care.  At this time he is in agreement with preliminary work-up.          Consultations This Hospital Stay   CARE MANAGEMENT / SOCIAL WORK IP CONSULT  OCCUPATIONAL THERAPY ADULT IP CONSULT  PHYSICAL THERAPY ADULT IP CONSULT  GASTROENTEROLOGY IP CONSULT  HEMATOLOGY & ONCOLOGY IP CONSULT  NEPHROLOGY IP CONSULT  ENT IP CONSULT    Code Status   No CPR- Do NOT Intubate    Time Spent on this Encounter   I, Michael Sotelo MD, personally saw the patient today and spent greater than 30 minutes discharging this patient.       Michael Sotelo MD  [facility] [id number] [facility]  [address] [place] [address], [address] [address]  Phone: [phone]  ______________________________________________________________________    Physical Exam   Vital Signs: Temp: 97.9  F (36.6  C) Temp src: Oral BP: (!) 156/85 Pulse: 59   Resp: 18 SpO2: 99 % O2 Device: None (Room air)    Weight: 184 lbs 14.4 oz  General Appearance: Well nourished male in NAD  Respiratory: lungs CTAB, no wheezes or crackles, no tachypnea   Cardiovascular: RRR, normal s1/s2 without murmur  GI: abdomen soft, normal bowel sounds  Other: Alert and appropriate, cranial nerves grossly intact        Primary Care Physician   MARICHUY NEWELL    Discharge Orders      Home Care Referral      Reason for your hospital  stay    You were admitted for low hemoglobin for which you received blood transfusion.  The source of your blood loss remains unclear.     Follow-up and recommended labs and tests     Follow up with primary care provider, MARICHUY NEWELL, within 7 days for hospital follow- up.  The following labs/tests are recommended: hemoglobin.  Follow up to establish care with outpatient Hematologist.  Discuss referral at your next primary care doctor visit.   Follow up to establish care with outpatient Nephrologist at [facility] (the group seeing you here at [facility]).   Follow up with [facility] as needed for any signs of recurrent bleeding as they would likely pursue a pill cam study.     Activity    Your activity upon discharge: activity as tolerated     Diet    Follow this diet upon discharge:       High Fiber Diet       Significant Results and Procedures   Most Recent 3 CBC's:Recent Labs   Lab Test 08/05/22  1117 08/05/22  0627 08/05/22  0145 08/04/22  1025 08/04/22  0601 08/03/22  1223 08/03/22  0703   WBC  --  4.0  --   --  4.2  --  4.4   HGB 7.8* 7.7* 7.7*   < > 7.2*  7.2*   < > 7.2*  7.2*   MCV  --  95  --   --  94  --  93   PLT  --  162  --   --  166  --  179    < > = values in this interval not displayed.     Most Recent 3 BMP's:Recent Labs   Lab Test 08/05/22  0627 08/04/22  0601 08/03/22  0703    143 143   POTASSIUM 4.5 4.5 4.7   CHLORIDE 114* 117* 116*   CO2 21 22 22   BUN 27 34* 42*   CR 1.96* 2.01* 2.01*   ANIONGAP 8 4 5   KATT 8.4* 8.2* 8.3*   GLC 85 91 117*     Most Recent 3 Hemoglobins:Recent Labs   Lab Test 08/05/22  1117 08/05/22  0627 08/05/22  0145   HGB 7.8* 7.7* 7.7*   ,   Results for orders placed or performed during the hospital encounter of 08/01/22   Chest XR,  PA & LAT    Narrative    XR CHEST TWO VIEWS   8/1/2022 1:35 PM     HISTORY: Shortness of breath     COMPARISON: None available      Impression    IMPRESSION: AP and lateral views of the chest were  obtained.  Cardiomediastinal silhouette is within normal limits. Mild basilar  pulmonary opacities, likely atelectasis. No significant pleural  effusion or pneumothorax.    RYAN MURPHY MD         SYSTEM ID:  [id number]   US Renal Complete    Narrative    EXAM: US RENAL COMPLETE  LOCATION: [facility]  DATE/TIME: 8/1/2022 5:56 PM    INDICATION: acute on chronic ckd  COMPARISON: None available.  TECHNIQUE: Routine Bilateral Renal and Bladder Ultrasound.    FINDINGS:    RIGHT KIDNEY: 7.7 x 6.3 x 4.2 cm. Mildly atrophic kidney with echogenic parenchyma. No hydronephrosis. Multiple simple cysts, the largest of which is an exophytic cyst measuring 10 x 6 x 7.6 cm at the lower pole.     LEFT KIDNEY: 10.4 x 4.3 x 4.7 cm. Mildly atrophic with echogenic parenchyma. No hydronephrosis. Multiple simple cysts in the kidney measuring 1.3-1.5 cm.     BLADDER: Distended without focal abnormalities on survey views.      Impression    IMPRESSION:  1.  No hydronephrosis in either kidney.  2.  Mildly atrophic and echogenic kidneys, consistent with medical renal disease.  3.  Multiple simple cysts in the kidneys requiring no specific follow-up. The largest is a 10 cm exophytic cyst at the lower pole of the right kidney.   US Lower Extremity Venous Duplex Bilateral    Narrative    VENOUS ULTRASOUND BILATERAL LOWER EXTREMITIES  8/2/2022 9:55 AM     HISTORY: Bilateral lower extremity pain and swelling.    COMPARISON: None.    TECHNIQUE: Color Doppler and spectral waveform analysis performed  throughout the deep veins of both lower extremities.    FINDINGS: Both common femoral, proximal greater saphenous, femoral,  and popliteal veins demonstrate normal blood flow, compression, and  augmentation. Posterior tibial and peroneal veins are compressible.  Calcifications noted within the left small saphenous vein, may be  chronic nonocclusive superficial thrombophlebitis.      Impression    IMPRESSION:  1. Negative  for DVT in both lower arteries.  2. Calcification in left small saphenous vein suggestive of chronic  DVT.    MINDY SWANSON MD         SYSTEM ID:  [id number]   Echocardiogram Complete     Value    LVEF  60-65%    Narrative    [id number]  [id number]  [id number]^CARMEN^MARTHA^BOB.     [facility]  Echocardiography Laboratory  [address] [place] [address]     Name: IVANIA GOMEZ  MRN: [id number]  : 1940  Study Date: 2022 09:07 AM  Age: 82 yrs  Gender: Male  Patient Location: [facility]  Reason For Study: Edema  Ordering Physician: MARTHA MORALES  Performed By: Prema Middleton     BSA: 2.0 m2  Height: 69 in  Weight: 188 lb  HR: 60  BP: 146/80 mmHg  ______________________________________________________________________________  Procedure  Complete Portable Echo Adult. Optison (NDC #[id number]) given intravenously.  ______________________________________________________________________________  Interpretation Summary     Left ventricular systolic function is normal.  The visual ejection fraction is 60-65%.  There is mild concentric left ventricular hypertrophy.  The right ventricle is normal in structure, function and size.  Possible bicuspid aortic valve versus calcific fusion. Moderate aortic  stenosis. Vmax 3.4 m/sec, mean gradient 29 mmHg. DVI 0.27 and SUYAPA by  continuity is 1.2 cm2. This is at a normal stroke volume index.  Moderate aortic root dilatation (4.5 cm).  The ascending aorta is Mildly dilated (3.9 cm).  The inferior vena cava was normal in size with preserved respiratory  variability.  There is no pericardial effusion.     No prior study for comparison.  ______________________________________________________________________________  Left Ventricle  There is mild concentric left ventricular hypertrophy. Left ventricular  systolic function is normal. The visual ejection fraction is 60-65%. Grade I  or early diastolic dysfunction.     Right Ventricle  The right ventricle is  normal in structure, function and size.     Atria  The left atrium is mildly dilated. The right atrium is mildly dilated.     Mitral Valve  The mitral valve is normal in structure and function. There is mild (1+)  mitral regurgitation.     Tricuspid Valve  The tricuspid valve is normal in structure and function. Right ventricular  systolic pressure could not be approximated due to inadequate tricuspid  regurgitation. There is mild (1+) tricuspid regurgitation.     Aortic Valve  Thickened aortic valve leaflets. A bicuspid aortic valve cannot be excluded.  There is mild (1+) aortic regurgitation. Moderate valvular aortic stenosis.  Possible bicuspid aortic valve versus calcific fusion. Moderate aortic  stenosis. Vmax 3.4 m/sec, mean gradient 29 mmHg. DVI 0.27 and SUYAPA by  continuity is 1.2 cm2. This is at a normal stroke volume index.     Pulmonic Valve  The pulmonic valve is normal in structure and function.     Vessels  Moderate aortic root dilatation. The ascending aorta is Mildly dilated. The  inferior vena cava was normal in size with preserved respiratory variability.     Pericardium  There is no pericardial effusion.     ______________________________________________________________________________  MMode/2D Measurements & Calculations  IVSd: 1.1 cm  LVIDd: 4.9 cm  LVIDs: 3.2 cm  LVPWd: 1.1 cm  FS: 34.6 %     LV mass(C)d: 203.1 grams  LV mass(C)dI: 100.9 grams/m2  Ao root diam: 4.4 cm  LA dimension: 4.6 cm  asc Aorta Diam: 3.8 cm  LA/Ao: 1.0  LVOT diam: 2.3 cm  LVOT area: 4.1 cm2  RWT: 0.45     Time Measurements  MM HR: 69.0 BPM     Doppler Measurements & Calculations  MV E max kody: 94.6 cm/sec  MV A max kody: 118.4 cm/sec  MV E/A: 0.80  MV dec slope: 364.9 cm/sec2  MV dec time: 0.26 sec  Ao V2 max: 338.2 cm/sec  Ao max P.0 mmHg  Ao V2 mean: 243.5 cm/sec  Ao mean P.0 mmHg  Ao V2 VTI: 79.2 cm  SUYAPA(I,D): 1.2 cm2  SUYAPA(V,D): 1.1 cm2  LV V1 max PG: 3.3 mmHg  LV V1 max: 91.2 cm/sec  LV V1 VTI: 23.8  cm  SV(LVOT): 96.4 ml  SI(LVOT): 47.9 ml/m2  PA acc time: 0.18 sec     AV Kevin Ratio (DI): 0.27  SUYAPA Index (cm2/m2): 0.60  E/E' av.4  Lateral E/e': 19.7  Medial E/e': 17.0     ______________________________________________________________________________  Report approved by: Florencio Cheatham 2022 10:31 AM               Discharge Medications   Current Discharge Medication List      START taking these medications    Details   amoxicillin-clavulanate (AUGMENTIN) 500-125 MG tablet Take 1 tablet by mouth every 12 hours for 5 doses  Qty: 5 tablet, Refills: 0    Associated Diagnoses: Cellulitis of right lower extremity         CONTINUE these medications which have NOT CHANGED    Details   allopurinol (ZYLOPRIM) 100 MG tablet Take 100 mg by mouth daily    Associated Diagnoses: Basal cell carcinoma of left ala nasi      amLODIPine (NORVASC) 5 MG tablet Take 5 mg by mouth daily    Associated Diagnoses: Basal cell carcinoma of left ala nasi      citalopram (CELEXA) 10 MG tablet Take 5 mg by mouth daily      fish oil-omega-3 fatty acids 1000 MG capsule Take 1 g by mouth daily      multivitamin (CENTRUM SILVER) tablet Take 1 tablet by mouth daily         STOP taking these medications       hydrALAZINE (APRESOLINE) 10 MG tablet Comments:   Reason for Stopping:             Allergies   No Known Allergies

## 2022-08-05 NOTE — CARE PLAN
Physical Therapy Discharge Summary    Reason for therapy discharge:    Discharged to home with home therapy.    Progress towards therapy goal(s). See goals on Care Plan in [facility] electronic health record for goal details.  Goals partially met.  Barriers to achieving goals:   discharge from facility.    Therapy recommendation(s):    Continued therapy is recommended.  Rationale/Recommendations:  Home PT to maximize strength and functional mobility.

## 2022-08-05 NOTE — PLAN OF CARE
Occupational Therapy Discharge Summary    Reason for therapy discharge:    Discharged to home with home therapy.    Progress towards therapy goal(s). See goals on Care Plan in [facility] electronic health record for goal details.  Goals partially met.  Barriers to achieving goals:   discharge from facility.    Therapy recommendation(s):    Continued therapy is recommended.  Rationale/Recommendations:  [facility] OT to increase  independence and safety at home..

## 2022-08-05 NOTE — PLAN OF CARE
8/4/22 1900-0730:    VSS on RA, intermittent headache pain; Tylenol with effect, a/o x 4-pleasant. Hgb 8.8 and 7.7, morning hgb pending. Bowel prep complete. Up to BSC, voids per urinal or on commode. NPO since 0400 for colonoscopy. Up with walker/SBA. Slept  well in between cares.

## 2022-08-05 NOTE — ANESTHESIA POSTPROCEDURE EVALUATION
Patient: Mando Freedman    Procedure: Procedure(s):  COLONOSCOPY       Anesthesia Type:  MAC    Note:  Disposition: Outpatient   Postop Pain Control: Uneventful            Sign Out: Well controlled pain   PONV: No   Neuro/Psych: Uneventful            Sign Out: Acceptable/Baseline neuro status   Airway/Respiratory: Uneventful            Sign Out: Acceptable/Baseline resp. status   CV/Hemodynamics: Uneventful            Sign Out: Acceptable CV status; No obvious hypovolemia; No obvious fluid overload   Other NRE: NONE   DID A NON-ROUTINE EVENT OCCUR? No           Last vitals:  Vitals Value Taken Time   /90 08/05/22 1040   Temp     Pulse 59 08/05/22 1040   Resp 22 08/05/22 1050   SpO2 99 % 08/05/22 1050       Electronically Signed By: Issa Peoples MD  August 5, 2022  11:09 AM

## 2022-08-05 NOTE — ANESTHESIA PREPROCEDURE EVALUATION
Prior to Admission medications    Medication Sig Start Date End Date Taking? Authorizing Provider   allopurinol (ZYLOPRIM) 100 MG tablet Take 100 mg by mouth daily 7/9/16  Yes Reported, Patient   amLODIPine (NORVASC) 5 MG tablet Take 5 mg by mouth daily 5/25/16  Yes Reported, Patient   citalopram (CELEXA) 10 MG tablet Take 5 mg by mouth daily   Yes Unknown, Entered By History   fish oil-omega-3 fatty acids 1000 MG capsule Take 1 g by mouth daily   Yes Unknown, Entered By History   hydrALAZINE (APRESOLINE) 10 MG tablet Take 20 mg by mouth 2 times daily 10mg x2 = 20mg 4/20/22  Yes Unknown, Entered By History   multivitamin (CENTRUM SILVER) tablet Take 1 tablet by mouth daily   Yes Unknown, Entered By History     Current Facility-Administered Medications Ordered in Epic   Medication Dose Route Frequency Last Rate Last Admin     acetaminophen (TYLENOL) tablet 650 mg  650 mg Oral Q6H PRN   650 mg at 08/05/22 0429    Or     acetaminophen (TYLENOL) Suppository 650 mg  650 mg Rectal Q6H PRN         allopurinol (ZYLOPRIM) tablet 100 mg  100 mg Oral Daily   100 mg at 08/04/22 0917     amLODIPine (NORVASC) tablet 2.5 mg  2.5 mg Oral Daily   2.5 mg at 08/05/22 0833     amoxicillin-clavulanate (AUGMENTIN) 500-125 MG per tablet 1 tablet  1 tablet Oral Q12H [facility] ([facility]   1 tablet at 08/04/22 1948     bisacodyl (DULCOLAX) suppository 10 mg  10 mg Rectal Daily PRN         calcium carbonate (TUMS) chewable tablet 1,000 mg  1,000 mg Oral 4x Daily PRN         citalopram (celeXA) half-tab 5 mg  5 mg Oral Daily   5 mg at 08/04/22 0917     hydrALAZINE (APRESOLINE) injection 10 mg  10 mg Intravenous Q4H PRN         lidocaine (LMX4) cream   Topical Q1H PRN         lidocaine 1 % 0.1-1 mL  0.1-1 mL Other Q1H PRN         magnesium hydroxide (MILK OF MAGNESIA) suspension 30 mL  30 mL Oral Daily PRN         melatonin tablet 1 mg  1 mg Oral At Bedtime PRN         metoprolol (LOPRESSOR) injection 2.5 mg  2.5 mg Intravenous Q4H PRN          ondansetron (ZOFRAN ODT) ODT tab 4 mg  4 mg Oral Q6H PRN        Or     ondansetron (ZOFRAN) injection 4 mg  4 mg Intravenous Q6H PRN         pantoprazole (PROTONIX) EC tablet 40 mg  40 mg Oral QAM AC         sodium chloride (PF) 0.9% PF flush 3 mL  3 mL Intracatheter Q8H   3 mL at 08/05/22 0858     sodium chloride (PF) 0.9% PF flush 3 mL  3 mL Intracatheter q1 min prn   3 mL at 08/05/22 0656     Vitamin D3 (CHOLECALCIFEROL) tablet 125 mcg  125 mcg Oral Daily   125 mcg at 08/04/22 1452     No current [facility]-ordered outpatient medications on file.       No results for input(s): ABO, RH in the last 13486 hours.  No results for input(s): HCG in the last 75119 hours.  Recent Results (from the past 744 hour(s))   Chest XR,  PA & LAT    Narrative    XR CHEST TWO VIEWS   8/1/2022 1:35 PM     HISTORY: Shortness of breath     COMPARISON: None available      Impression    IMPRESSION: AP and lateral views of the chest were obtained.  Cardiomediastinal silhouette is within normal limits. Mild basilar  pulmonary opacities, likely atelectasis. No significant pleural  effusion or pneumothorax.    RYAN MURPHY MD         SYSTEM ID:  [id number]   US Renal Complete    Narrative    EXAM: US RENAL COMPLETE  LOCATION: [facility]  DATE/TIME: 8/1/2022 5:56 PM    INDICATION: acute on chronic ckd  COMPARISON: None available.  TECHNIQUE: Routine Bilateral Renal and Bladder Ultrasound.    FINDINGS:    RIGHT KIDNEY: 7.7 x 6.3 x 4.2 cm. Mildly atrophic kidney with echogenic parenchyma. No hydronephrosis. Multiple simple cysts, the largest of which is an exophytic cyst measuring 10 x 6 x 7.6 cm at the lower pole.     LEFT KIDNEY: 10.4 x 4.3 x 4.7 cm. Mildly atrophic with echogenic parenchyma. No hydronephrosis. Multiple simple cysts in the kidney measuring 1.3-1.5 cm.     BLADDER: Distended without focal abnormalities on survey views.      Impression    IMPRESSION:  1.  No hydronephrosis in either kidney.  2.  Mildly  atrophic and echogenic kidneys, consistent with medical renal disease.  3.  Multiple simple cysts in the kidneys requiring no specific follow-up. The largest is a 10 cm exophytic cyst at the lower pole of the right kidney.   US Lower Extremity Venous Duplex Bilateral    Narrative    VENOUS ULTRASOUND BILATERAL LOWER EXTREMITIES  2022 9:55 AM     HISTORY: Bilateral lower extremity pain and swelling.    COMPARISON: None.    TECHNIQUE: Color Doppler and spectral waveform analysis performed  throughout the deep veins of both lower extremities.    FINDINGS: Both common femoral, proximal greater saphenous, femoral,  and popliteal veins demonstrate normal blood flow, compression, and  augmentation. Posterior tibial and peroneal veins are compressible.  Calcifications noted within the left small saphenous vein, may be  chronic nonocclusive superficial thrombophlebitis.      Impression    IMPRESSION:  1. Negative for DVT in both lower arteries.  2. Calcification in left small saphenous vein suggestive of chronic  DVT.    MINDY SWANSON MD         SYSTEM ID:  [id number]   Echocardiogram Complete   Result Value    LVEF  60-65%    Narrative    [id number]  [id number]  [id number]  [id number]^CARMEN^MARTHA^BOB.     [facility]  Echocardiography Laboratory  [address] [place] [address]     Name: IVANIA GOMEZ  MRN: [id number]  : 1940  Study Date: 2022 09:07 AM  Age: 82 yrs  Gender: Male  Patient Location: [facility]  Reason For Study: Edema  Ordering Physician: MARTHA MORALES  Performed By: Prema Middleton     BSA: 2.0 m2  Height: 69 in  Weight: 188 lb  HR: 60  BP: 146/80 mmHg  ______________________________________________________________________________  Procedure  Complete Portable Echo Adult. Optison (NDC #7972-0101) given intravenously.  ______________________________________________________________________________  Interpretation Summary     Left ventricular systolic function is normal.  The visual  ejection fraction is 60-65%.  There is mild concentric left ventricular hypertrophy.  The right ventricle is normal in structure, function and size.  Possible bicuspid aortic valve versus calcific fusion. Moderate aortic  stenosis. Vmax 3.4 m/sec, mean gradient 29 mmHg. DVI 0.27 and SUYAPA by  continuity is 1.2 cm2. This is at a normal stroke volume index.  Moderate aortic root dilatation (4.5 cm).  The ascending aorta is Mildly dilated (3.9 cm).  The inferior vena cava was normal in size with preserved respiratory  variability.  There is no pericardial effusion.     No prior study for comparison.  ______________________________________________________________________________  Left Ventricle  There is mild concentric left ventricular hypertrophy. Left ventricular  systolic function is normal. The visual ejection fraction is 60-65%. Grade I  or early diastolic dysfunction.     Right Ventricle  The right ventricle is normal in structure, function and size.     Atria  The left atrium is mildly dilated. The right atrium is mildly dilated.     Mitral Valve  The mitral valve is normal in structure and function. There is mild (1+)  mitral regurgitation.     Tricuspid Valve  The tricuspid valve is normal in structure and function. Right ventricular  systolic pressure could not be approximated due to inadequate tricuspid  regurgitation. There is mild (1+) tricuspid regurgitation.     Aortic Valve  Thickened aortic valve leaflets. A bicuspid aortic valve cannot be excluded.  There is mild (1+) aortic regurgitation. Moderate valvular aortic stenosis.  Possible bicuspid aortic valve versus calcific fusion. Moderate aortic  stenosis. Vmax 3.4 m/sec, mean gradient 29 mmHg. DVI 0.27 and SUYAPA by  continuity is 1.2 cm2. This is at a normal stroke volume index.     Pulmonic Valve  The pulmonic valve is normal in structure and function.     Vessels  Moderate aortic root dilatation. The ascending aorta is Mildly dilated. The  inferior vena  cava was normal in size with preserved respiratory variability.     Pericardium  There is no pericardial effusion.     ______________________________________________________________________________  MMode/2D Measurements & Calculations  IVSd: 1.1 cm  LVIDd: 4.9 cm  LVIDs: 3.2 cm  LVPWd: 1.1 cm  FS: 34.6 %     LV mass(C)d: 203.1 grams  LV mass(C)dI: 100.9 grams/m2  Ao root diam: 4.4 cm  LA dimension: 4.6 cm  asc Aorta Diam: 3.8 cm  LA/Ao: 1.0  LVOT diam: 2.3 cm  LVOT area: 4.1 cm2  RWT: 0.45     Time Measurements  MM HR: 69.0 BPM     Doppler Measurements & Calculations  MV E max kevin: 94.6 cm/sec  MV A max kevin: 118.4 cm/sec  MV E/A: 0.80  MV dec slope: 364.9 cm/sec2  MV dec time: 0.26 sec  Ao V2 max: 338.2 cm/sec  Ao max P.0 mmHg  Ao V2 mean: 243.5 cm/sec  Ao mean P.0 mmHg  Ao V2 VTI: 79.2 cm  SUYAPA(I,D): 1.2 cm2  SUYAPA(V,D): 1.1 cm2  LV V1 max PG: 3.3 mmHg  LV V1 max: 91.2 cm/sec  LV V1 VTI: 23.8 cm  SV(LVOT): 96.4 ml  SI(LVOT): 47.9 ml/m2  PA acc time: 0.18 sec     AV Kevin Ratio (DI): 0.27  SUYAPA Index (cm2/m2): 0.60  E/E' av.4  Lateral E/e': 19.7  Medial E/e': 17.0     ______________________________________________________________________________  Report approved by: Florencio Cheatham 2022 10:31 AM           Prior to Admission medications    Medication Sig Start Date End Date Taking? Authorizing Provider   allopurinol (ZYLOPRIM) 100 MG tablet Take 100 mg by mouth daily 16  Yes Reported, Patient   amLODIPine (NORVASC) 5 MG tablet Take 5 mg by mouth daily 16  Yes Reported, Patient   citalopram (CELEXA) 10 MG tablet Take 5 mg by mouth daily   Yes Unknown, Entered By History   fish oil-omega-3 fatty acids 1000 MG capsule Take 1 g by mouth daily   Yes Unknown, Entered By History   hydrALAZINE (APRESOLINE) 10 MG tablet Take 20 mg by mouth 2 times daily 10mg x2 = 20mg 22  Yes Unknown, Entered By History   multivitamin (CENTRUM SILVER) tablet Take 1 tablet by mouth daily   Yes Unknown,  Entered By History     Current Facility-Administered Medications Ordered in Epic   Medication Dose Route Frequency Last Rate Last Admin     acetaminophen (TYLENOL) tablet 650 mg  650 mg Oral Q6H PRN   650 mg at 08/05/22 0429    Or     acetaminophen (TYLENOL) Suppository 650 mg  650 mg Rectal Q6H PRN         allopurinol (ZYLOPRIM) tablet 100 mg  100 mg Oral Daily   100 mg at 08/04/22 0917     amLODIPine (NORVASC) tablet 2.5 mg  2.5 mg Oral Daily   2.5 mg at 08/05/22 0833     amoxicillin-clavulanate (AUGMENTIN) 500-125 MG per tablet 1 tablet  1 tablet Oral Q12H [facility] (08/20)   1 tablet at 08/04/22 1948     bisacodyl (DULCOLAX) suppository 10 mg  10 mg Rectal Daily PRN         calcium carbonate (TUMS) chewable tablet 1,000 mg  1,000 mg Oral 4x Daily PRN         citalopram (celeXA) half-tab 5 mg  5 mg Oral Daily   5 mg at 08/04/22 0917     hydrALAZINE (APRESOLINE) injection 10 mg  10 mg Intravenous Q4H PRN         lidocaine (LMX4) cream   Topical Q1H PRN         lidocaine 1 % 0.1-1 mL  0.1-1 mL Other Q1H PRN         magnesium hydroxide (MILK OF MAGNESIA) suspension 30 mL  30 mL Oral Daily PRN         melatonin tablet 1 mg  1 mg Oral At Bedtime PRN         metoprolol (LOPRESSOR) injection 2.5 mg  2.5 mg Intravenous Q4H PRN         ondansetron (ZOFRAN ODT) ODT tab 4 mg  4 mg Oral Q6H PRN        Or     ondansetron (ZOFRAN) injection 4 mg  4 mg Intravenous Q6H PRN         pantoprazole (PROTONIX) EC tablet 40 mg  40 mg Oral QAM AC         sodium chloride (PF) 0.9% PF flush 3 mL  3 mL Intracatheter Q8H   3 mL at 08/05/22 0858     sodium chloride (PF) 0.9% PF flush 3 mL  3 mL Intracatheter q1 min prn   3 mL at 08/05/22 0656     Vitamin D3 (CHOLECALCIFEROL) tablet 125 mcg  125 mcg Oral Daily   125 mcg at 08/04/22 1452     No current [facility]-ordered outpatient medications on file.       No results for input(s): ABO, RH in the last 42909 hours.  No results for input(s): HCG in the last 05545 hours.  Recent Results (from the past 744  hour(s))   Chest XR,  PA & LAT    Narrative    XR CHEST TWO VIEWS   8/1/2022 1:35 PM     HISTORY: Shortness of breath     COMPARISON: None available      Impression    IMPRESSION: AP and lateral views of the chest were obtained.  Cardiomediastinal silhouette is within normal limits. Mild basilar  pulmonary opacities, likely atelectasis. No significant pleural  effusion or pneumothorax.    RYAN MURPHY MD         SYSTEM ID:  [id number]   US Renal Complete    Narrative    EXAM: US RENAL COMPLETE  LOCATION: [facility]  DATE/TIME: 8/1/2022 5:56 PM    INDICATION: acute on chronic ckd  COMPARISON: None available.  TECHNIQUE: Routine Bilateral Renal and Bladder Ultrasound.    FINDINGS:    RIGHT KIDNEY: 7.7 x 6.3 x 4.2 cm. Mildly atrophic kidney with echogenic parenchyma. No hydronephrosis. Multiple simple cysts, the largest of which is an exophytic cyst measuring 10 x 6 x 7.6 cm at the lower pole.     LEFT KIDNEY: 10.4 x 4.3 x 4.7 cm. Mildly atrophic with echogenic parenchyma. No hydronephrosis. Multiple simple cysts in the kidney measuring 1.3-1.5 cm.     BLADDER: Distended without focal abnormalities on survey views.      Impression    IMPRESSION:  1.  No hydronephrosis in either kidney.  2.  Mildly atrophic and echogenic kidneys, consistent with medical renal disease.  3.  Multiple simple cysts in the kidneys requiring no specific follow-up. The largest is a 10 cm exophytic cyst at the lower pole of the right kidney.   US Lower Extremity Venous Duplex Bilateral    Narrative    VENOUS ULTRASOUND BILATERAL LOWER EXTREMITIES  8/2/2022 9:55 AM     HISTORY: Bilateral lower extremity pain and swelling.    COMPARISON: None.    TECHNIQUE: Color Doppler and spectral waveform analysis performed  throughout the deep veins of both lower extremities.    FINDINGS: Both common femoral, proximal greater saphenous, femoral,  and popliteal veins demonstrate normal blood flow, compression, and  augmentation.  Posterior tibial and peroneal veins are compressible.  Calcifications noted within the left small saphenous vein, may be  chronic nonocclusive superficial thrombophlebitis.      Impression    IMPRESSION:  1. Negative for DVT in both lower arteries.  2. Calcification in left small saphenous vein suggestive of chronic  DVT.    MINDY SWANSON MD         SYSTEM ID:  [id number]   Echocardiogram Complete   Result Value    LVEF  60-65%    Narrative    [id number]  [facility]  [id number]  [id number]^CARMEN^MARTHA^BOB.     [facility]  Echocardiography Laboratory  [address] [place] [id number]     Name: IVANIA GOMEZ  MRN: [id number]  : 1940  Study Date: 2022 09:07 AM  Age: 82 yrs  Gender: Male  Patient Location: [facility]  Reason For Study: Edema  Ordering Physician: MARTHA MORALES  Performed By: Prema Middleton     BSA: 2.0 m2  Height: 69 in  Weight: 188 lb  HR: 60  BP: 146/80 mmHg  ______________________________________________________________________________  Procedure  Complete Portable Echo Adult. Optison (NDC #2701-8270) given intravenously.  ______________________________________________________________________________  Interpretation Summary     Left ventricular systolic function is normal.  The visual ejection fraction is 60-65%.  There is mild concentric left ventricular hypertrophy.  The right ventricle is normal in structure, function and size.  Possible bicuspid aortic valve versus calcific fusion. Moderate aortic  stenosis. Vmax 3.4 m/sec, mean gradient 29 mmHg. DVI 0.27 and SUYAPA by  continuity is 1.2 cm2. This is at a normal stroke volume index.  Moderate aortic root dilatation (4.5 cm).  The ascending aorta is Mildly dilated (3.9 cm).  The inferior vena cava was normal in size with preserved respiratory  variability.  There is no pericardial effusion.     No prior study for comparison.  ______________________________________________________________________________  Left Ventricle  There is  mild concentric left ventricular hypertrophy. Left ventricular  systolic function is normal. The visual ejection fraction is 60-65%. Grade I  or early diastolic dysfunction.     Right Ventricle  The right ventricle is normal in structure, function and size.     Atria  The left atrium is mildly dilated. The right atrium is mildly dilated.     Mitral Valve  The mitral valve is normal in structure and function. There is mild (1+)  mitral regurgitation.     Tricuspid Valve  The tricuspid valve is normal in structure and function. Right ventricular  systolic pressure could not be approximated due to inadequate tricuspid  regurgitation. There is mild (1+) tricuspid regurgitation.     Aortic Valve  Thickened aortic valve leaflets. A bicuspid aortic valve cannot be excluded.  There is mild (1+) aortic regurgitation. Moderate valvular aortic stenosis.  Possible bicuspid aortic valve versus calcific fusion. Moderate aortic  stenosis. Vmax 3.4 m/sec, mean gradient 29 mmHg. DVI 0.27 and SUYAPA by  continuity is 1.2 cm2. This is at a normal stroke volume index.     Pulmonic Valve  The pulmonic valve is normal in structure and function.     Vessels  Moderate aortic root dilatation. The ascending aorta is Mildly dilated. The  inferior vena cava was normal in size with preserved respiratory variability.     Pericardium  There is no pericardial effusion.     ______________________________________________________________________________  MMode/2D Measurements & Calculations  IVSd: 1.1 cm  LVIDd: 4.9 cm  LVIDs: 3.2 cm  LVPWd: 1.1 cm  FS: 34.6 %     LV mass(C)d: 203.1 grams  LV mass(C)dI: 100.9 grams/m2  Ao root diam: 4.4 cm  LA dimension: 4.6 cm  asc Aorta Diam: 3.8 cm  LA/Ao: 1.0  LVOT diam: 2.3 cm  LVOT area: 4.1 cm2  RWT: 0.45     Time Measurements  MM HR: 69.0 BPM     Doppler Measurements & Calculations  MV E max kody: 94.6 cm/sec  MV A max kody: 118.4 cm/sec  MV E/A: 0.80  MV dec slope: 364.9 cm/sec2  MV dec time: 0.26 sec  Ao V2 max:  338.2 cm/sec  Ao max P.0 mmHg  Ao V2 mean: 243.5 cm/sec  Ao mean P.0 mmHg  Ao V2 VTI: 79.2 cm  SUYAPA(I,D): 1.2 cm2  SUYAPA(V,D): 1.1 cm2  LV V1 max PG: 3.3 mmHg  LV V1 max: 91.2 cm/sec  LV V1 VTI: 23.8 cm  SV(LVOT): 96.4 ml  SI(LVOT): 47.9 ml/m2  PA acc time: 0.18 sec     AV Kevin Ratio (DI): 0.27  SUYAPA Index (cm2/m2): 0.60  E/E' av.4  Lateral E/e': 19.7  Medial E/e': 17.0     ______________________________________________________________________________  Report approved by: Florencio Cheatham 2022 10:31 AM         Anesthesia Pre-Procedure Evaluation    Patient: Mando Freedman   MRN: [id number] : 1940        Procedure : Procedure(s):  COLONOSCOPY          Past Medical History:   Diagnosis Date     Basal cell carcinoma       Past Surgical History:   Procedure Laterality Date     ESOPHAGOSCOPY, GASTROSCOPY, DUODENOSCOPY (EGD), COMBINED N/A 2022    Procedure: ESOPHAGOGASTRODUODENOSCOPY (EGD);  Surgeon: Kimani Loo MD;  Location:  GI      No Known Allergies   Social History     Tobacco Use     Smoking status: Light Tobacco Smoker     Types: Cigars     Smokeless tobacco: Never Used   Substance Use Topics     Alcohol use: Not on file     Comment: rare      Wt Readings from Last 1 Encounters:   22 83.9 kg (184 lb 14.4 oz)        Anesthesia Evaluation   Pt has had prior anesthetic.         ROS/MED HX  ENT/Pulmonary:     (+) tobacco use, Current use,  (-) sleep apnea   Neurologic:       Cardiovascular: Comment: 50 % ef      METS/Exercise Tolerance:     Hematologic:       Musculoskeletal:       GI/Hepatic:       Renal/Genitourinary:       Endo:       Psychiatric/Substance Use:       Infectious Disease:       Malignancy:       Other:            Physical Exam    Airway        Mallampati: II    Neck ROM: full     Respiratory Devices and Support         Dental       (+) missing and caps      Cardiovascular   cardiovascular exam normal          Pulmonary   pulmonary exam normal                 OUTSIDE LABS:  CBC:   Lab Results   Component Value Date    WBC 4.0 08/05/2022    WBC 4.2 08/04/2022    HGB 7.7 (L) 08/05/2022    HGB 7.7 (L) 08/05/2022    HCT 26.3 (L) 08/05/2022    HCT 24.7 (L) 08/04/2022     08/05/2022     08/04/2022     BMP:   Lab Results   Component Value Date     08/05/2022     08/04/2022    POTASSIUM 4.5 08/05/2022    POTASSIUM 4.5 08/04/2022    CHLORIDE 114 (H) 08/05/2022    CHLORIDE 117 (H) 08/04/2022    CO2 21 08/05/2022    CO2 22 08/04/2022    BUN 27 08/05/2022    BUN 34 (H) 08/04/2022    CR 1.96 (H) 08/05/2022    CR 2.01 (H) 08/04/2022    GLC 85 08/05/2022    GLC 91 08/04/2022     COAGS:   Lab Results   Component Value Date    PTT 41 (H) 08/02/2022    INR 1.13 08/01/2022     POC: No results found for: BGM, HCG, HCGS  HEPATIC:   Lab Results   Component Value Date    ALBUMIN 2.7 (L) 08/02/2022    PROTTOTAL 5.5 (L) 08/02/2022    ALT 13 08/02/2022    AST 9 08/02/2022    ALKPHOS 70 08/02/2022    BILITOTAL 0.6 08/02/2022     OTHER:   Lab Results   Component Value Date    A1C 4.7 05/12/2022    KATT 8.4 (L) 08/05/2022    PHOS 4.4 06/02/2022    MAG 2.1 06/02/2022    TSH 1.90 05/12/2022    T4 0.85 05/12/2022       Anesthesia Plan    ASA Status:  3   NPO Status:  NPO Appropriate    Anesthesia Type: MAC.     - Reason for MAC: straight local not clinically adequate, immobility needed              Consents    Anesthesia Plan(s) and associated risks, benefits, and realistic alternatives discussed. Questions answered and patient/representative(s) expressed understanding.    - Discussed:     - Discussed with:  Patient         Postoperative Care    Pain management: IV analgesics.   PONV prophylaxis: Ondansetron (or other 5HT-3)     Comments:                Issa Peoples MD

## 2022-08-05 NOTE — PROGRESS NOTES
"Hematology-Oncology Follow-up Note  [facility]     Today's Date: 08/05/ 22  Date of Admission:  8/1/2022  Reason for Consult: Anemia       ASSESSMENT/ PLAN :  Mando Freedman is a 82 year old male with     Anemia  -Acute on chronic anemia  Iron deficiency anemia  Seen by GI -EGD negative for source of bleeding.    Continue per GI recommendation  Patient started Venofer IV  Continue to check CBC   No significant drop in hemoglobin  Transfuse with hemoglobin less than 7  Continue with hemoglobin checks      Hx of von Willebrand disease  -Von Willebrand antigen and activity are low  Factor VIII is low as well  Most likely type I  Humate-P given prior to colonoscopy today  Colonoscopy did not reveal-evidence of bleeding  -No polypectomy done therefore no need for additional Humate-P for now      MGUS with light chain disease  Follows by hematologist Dr. Munguia in [place], [place]  Overall SPEP M protein 0.6 overall stable  Light chains overall stable  -Follow-up with      Chronic kidney disease  Nephrology seeing patient  Continue per nephrology    Recurrent epistaxis   chronic issue for the patient . Patient reports last episode of epistaxis almost 3 months ago  Less likely this is because of acute anemia  Agree with ENT referral        INTERIM HISTORY:  Patient denies any acute bleeding.  He denies blood in stool       MEDICATIONS:  Reviewed         PHYSICAL EXAM:  Vital signs:  Temp: 97.9  F (36.6  C) Temp src: Oral BP: (!) 156/85 Pulse: 59   Resp: 18 SpO2: 99 % O2 Device: None (Room air)   Height: 175.3 cm (5' 9\") Weight: 83.9 kg (184 lb 14.4 oz)  Estimated body mass index is 27.3 kg/m  as calculated from the following:    Height as of this encounter: 1.753 m (5' 9\").    Weight as of this encounter: 83.9 kg (184 lb 14.4 oz).      GENERAL/CONSTITUTIONAL: No acute distress.        LABS:  Recent Labs   Lab Test 08/03/22  0703      POTASSIUM 4.7   CHLORIDE 116*   CO2 22   ANIONGAP 5 "   BUN 42*   CR 2.01*   *   KATT 8.3*     Recent Labs   Lab Test 08/03/22  0703 08/02/22  2353 08/02/22  0619 08/02/22  0414 07/21/22  1340 07/14/22  1228   WBC 4.4  --   --  4.5   < > 4.7   HGB 7.2*  7.2* 7.7*   < > 7.6*   < > 6.8*     --   --  163   < > 177   MCV 93  --   --  96   < > 88   NEUTROPHIL  --   --   --  57  --  50    < > = values in this interval not displayed.     Recent Labs   Lab Test 08/02/22  0414 08/01/22  1256   BILITOTAL 0.6 0.2   ALKPHOS 70 69   ALT 13 13   AST 9 17   ALBUMIN 2.7* 2.8*             Michael Quick, Nurse Practitioner   [facility]   [phone]    Chart documentation with Dragon Voice recognition Software. Although reviewed after completion, some words and grammatical errors may remain.

## 2022-08-05 NOTE — PROGRESS NOTES
Care Management Discharge Note    Discharge Date: 08/05/2022       Discharge Disposition: Home, Home Care, Assisted Living    Discharge Services: None    Discharge DME: None    Discharge Transportation: family or friend will provide    Private pay costs discussed: Not applicable    PAS Confirmation Code:  n/a  Patient/family educated on Medicare website which has current facility and service quality ratings: no    Education Provided on the Discharge Plan:  yes  Persons Notified of Discharge Plans: Patient, Discharging MD and bedside RN  Patient/Family in Agreement with the Plan: yes    Handoff Referral Completed: Yes    Additional Information:  Writer met with the patient at the bedside. Patient is cleared for discharge to home. Patient resides at [facility] with meal/cleaning services. Patient feels that he is back at his baseline and his son is going to be picking him up this afternoon.  Patient stated his son has his keys and clothes.   Writer discussed referral for homecare PT/OT and that [facility] has accepted him and will be able to start working with him next week. Writer faxed orders to Attn: Sho Fax#[phone] Phone#[phone].  Patient sees [facility] and appointment to be scheduled for follow per previous note.  Writer left a message with DON at [facility], [place] [phone] left message about discharge today left phone number if there are further questions.  Patient identified not further needs for discharge.    Anaid Gonzalez RN, BSN, ACM   Care Transitions Specialist   [facility]  Care Transitions Specialist   [address] [place]. [address]  [email or web address]  Office:[phone] Fax: [phone]  [facility]

## 2022-08-05 NOTE — ANESTHESIA CARE TRANSFER NOTE
Patient: Mando Freedman    Procedure: Procedure(s):  COLONOSCOPY       Diagnosis: Melena [K92.1]  Diagnosis Additional Information: No value filed.    Anesthesia Type:   MAC     Note:    Oropharynx: oropharynx clear of all foreign objects and spontaneously breathing  Level of Consciousness: drowsy  Oxygen Supplementation: room air    Independent Airway: airway patency satisfactory and stable  Dentition: dentition unchanged  Vital Signs Stable: post-procedure vital signs reviewed and stable  Report to RN Given: handoff report given  Patient transferred to: PACU    Handoff Report: Identifed the Patient, Identified the Reponsible Provider, Reviewed the pertinent medical history, Discussed the surgical course, Reviewed Intra-OP anesthesia mangement and issues during anesthesia, Set expectations for post-procedure period and Allowed opportunity for questions and acknowledgement of understanding      Vitals:  Vitals Value Taken Time   BP     Temp     Pulse     Resp 26 08/05/22 1019   SpO2 98 % 08/05/22 1019   Vitals shown include unvalidated device data.    Electronically Signed By: NICKO Caldwell CRNA  August 5, 2022  10:20 AM

## 2022-08-05 NOTE — PROGRESS NOTES
Pt A&O x4, up x1 with walker and GB. VSS, except BP is slightly high. Denied pain, but c/o headache, PRN Tyl given with effective. Clear liquid diet, Pt has coloscopy tomorrow. NPO after midnight. Hgb Q4h. Will continue to monitor.

## 2022-08-06 NOTE — PLAN OF CARE
Plan of Care Reviewed With: patient   Pt A&Ox4; VSS except for elevated BP. Had colonoscopy done this AM; no active bleeding or other significant finding noted, per report. Hgb stable. Tolerated PO post procedure. Discharge orders received; instructions reviewed with pt; all questions answered. A copy of discharge AVS including post-procedure instructions as well as discharge meds given to pt. All belongings returned. Pt discharged from floor via w/c accompanied by NA. Family providing transportation to home.

## 2022-08-10 ENCOUNTER — LAB REQUISITION (OUTPATIENT)
Dept: LAB | Facility: CLINIC | Age: 82
End: 2022-08-10
Payer: COMMERCIAL

## 2022-08-10 DIAGNOSIS — D64.9 ANEMIA, UNSPECIFIED: ICD-10-CM

## 2022-08-10 LAB
VON WILLEBRAND EVAL PPP-IMP: NORMAL
VWF MULTIMERS PPP QL: NORMAL

## 2022-08-11 LAB — HGB BLD-MCNC: 8.3 G/DL (ref 13.3–17.7)

## 2022-08-11 PROCEDURE — 85018 HEMOGLOBIN: CPT | Mod: ORL | Performed by: FAMILY MEDICINE

## 2022-08-11 PROCEDURE — P9604 ONE-WAY ALLOW PRORATED TRIP: HCPCS | Mod: ORL | Performed by: FAMILY MEDICINE

## 2022-08-11 PROCEDURE — 36415 COLL VENOUS BLD VENIPUNCTURE: CPT | Mod: ORL | Performed by: FAMILY MEDICINE

## 2022-08-17 ENCOUNTER — LAB REQUISITION (OUTPATIENT)
Dept: LAB | Facility: CLINIC | Age: 82
End: 2022-08-17
Payer: COMMERCIAL

## 2022-08-17 DIAGNOSIS — D64.9 ANEMIA, UNSPECIFIED: ICD-10-CM

## 2022-08-18 PROCEDURE — 85018 HEMOGLOBIN: CPT | Mod: ORL | Performed by: FAMILY MEDICINE

## 2022-08-18 PROCEDURE — 36415 COLL VENOUS BLD VENIPUNCTURE: CPT | Mod: ORL | Performed by: FAMILY MEDICINE

## 2022-08-18 PROCEDURE — P9603 ONE-WAY ALLOW PRORATED MILES: HCPCS | Mod: ORL | Performed by: FAMILY MEDICINE

## 2022-08-19 ENCOUNTER — APPOINTMENT (OUTPATIENT)
Dept: GENERAL RADIOLOGY | Facility: CLINIC | Age: 82
DRG: 812 | End: 2022-08-19
Attending: EMERGENCY MEDICINE
Payer: COMMERCIAL

## 2022-08-19 ENCOUNTER — HOSPITAL ENCOUNTER (INPATIENT)
Facility: CLINIC | Age: 82
LOS: 6 days | Discharge: NURSING, REHAB OR LONG-TERM CARE | DRG: 812 | End: 2022-08-25
Attending: EMERGENCY MEDICINE | Admitting: HOSPITALIST
Payer: COMMERCIAL

## 2022-08-19 DIAGNOSIS — K92.2 GASTROINTESTINAL HEMORRHAGE, UNSPECIFIED GASTROINTESTINAL HEMORRHAGE TYPE: ICD-10-CM

## 2022-08-19 DIAGNOSIS — D64.9 ANEMIA, UNSPECIFIED TYPE: ICD-10-CM

## 2022-08-19 LAB
ABO/RH(D): NORMAL
ALBUMIN SERPL-MCNC: 2.7 G/DL (ref 3.4–5)
ALP SERPL-CCNC: 57 U/L (ref 40–150)
ALT SERPL W P-5'-P-CCNC: 14 U/L (ref 0–70)
ANION GAP SERPL CALCULATED.3IONS-SCNC: 6 MMOL/L (ref 3–14)
ANTIBODY SCREEN: NEGATIVE
APTT PPP: 38 SECONDS (ref 22–38)
AST SERPL W P-5'-P-CCNC: 11 U/L (ref 0–45)
ATRIAL RATE - MUSE: 70 BPM
BASOPHILS # BLD AUTO: 0 10E3/UL (ref 0–0.2)
BASOPHILS NFR BLD AUTO: 0 %
BILIRUB SERPL-MCNC: 0.2 MG/DL (ref 0.2–1.3)
BLD PROD TYP BPU: NORMAL
BLD PROD TYP BPU: NORMAL
BLOOD COMPONENT TYPE: NORMAL
BLOOD COMPONENT TYPE: NORMAL
BUN SERPL-MCNC: 81 MG/DL (ref 7–30)
CALCIUM SERPL-MCNC: 8.2 MG/DL (ref 8.5–10.1)
CHLORIDE BLD-SCNC: 118 MMOL/L (ref 94–109)
CO2 SERPL-SCNC: 16 MMOL/L (ref 20–32)
CODING SYSTEM: NORMAL
CODING SYSTEM: NORMAL
CREAT SERPL-MCNC: 2.31 MG/DL (ref 0.66–1.25)
CROSSMATCH: NORMAL
CROSSMATCH: NORMAL
DIASTOLIC BLOOD PRESSURE - MUSE: NORMAL MMHG
EOSINOPHIL # BLD AUTO: 0.1 10E3/UL (ref 0–0.7)
EOSINOPHIL NFR BLD AUTO: 1 %
ERYTHROCYTE [DISTWIDTH] IN BLOOD BY AUTOMATED COUNT: 29.3 % (ref 10–15)
GFR SERPL CREATININE-BSD FRML MDRD: 27 ML/MIN/1.73M2
GLUCOSE BLD-MCNC: 131 MG/DL (ref 70–99)
HCT VFR BLD AUTO: 16.1 % (ref 40–53)
HGB BLD-MCNC: 4.5 G/DL (ref 13.3–17.7)
HGB BLD-MCNC: 4.9 G/DL (ref 13.3–17.7)
HOLD SPECIMEN: NORMAL
IMM GRANULOCYTES # BLD: 0 10E3/UL
IMM GRANULOCYTES NFR BLD: 1 %
INR PPP: 1.13 (ref 0.85–1.15)
INTERPRETATION ECG - MUSE: NORMAL
ISSUE DATE AND TIME: NORMAL
ISSUE DATE AND TIME: NORMAL
LYMPHOCYTES # BLD AUTO: 1.1 10E3/UL (ref 0.8–5.3)
LYMPHOCYTES NFR BLD AUTO: 20 %
MCH RBC QN AUTO: 32.1 PG (ref 26.5–33)
MCHC RBC AUTO-ENTMCNC: 28 G/DL (ref 31.5–36.5)
MCV RBC AUTO: 115 FL (ref 78–100)
MONOCYTES # BLD AUTO: 0.3 10E3/UL (ref 0–1.3)
MONOCYTES NFR BLD AUTO: 5 %
NEUTROPHILS # BLD AUTO: 3.8 10E3/UL (ref 1.6–8.3)
NEUTROPHILS NFR BLD AUTO: 73 %
NRBC # BLD AUTO: 0 10E3/UL
NRBC BLD AUTO-RTO: 1 /100
NT-PROBNP SERPL-MCNC: 942 PG/ML (ref 0–1800)
P AXIS - MUSE: -29 DEGREES
PLATELET # BLD AUTO: 146 10E3/UL (ref 150–450)
POTASSIUM BLD-SCNC: 5.4 MMOL/L (ref 3.4–5.3)
POTASSIUM BLD-SCNC: 6.2 MMOL/L (ref 3.4–5.3)
PR INTERVAL - MUSE: 174 MS
PROT SERPL-MCNC: 5.3 G/DL (ref 6.8–8.8)
QRS DURATION - MUSE: 130 MS
QT - MUSE: 428 MS
QTC - MUSE: 468 MS
R AXIS - MUSE: -63 DEGREES
RBC # BLD AUTO: 1.4 10E6/UL (ref 4.4–5.9)
SARS-COV-2 RNA RESP QL NAA+PROBE: NEGATIVE
SODIUM SERPL-SCNC: 140 MMOL/L (ref 133–144)
SPECIMEN EXPIRATION DATE: NORMAL
SYSTOLIC BLOOD PRESSURE - MUSE: NORMAL MMHG
T AXIS - MUSE: 23 DEGREES
UNIT ABO/RH: NORMAL
UNIT ABO/RH: NORMAL
UNIT NUMBER: NORMAL
UNIT NUMBER: NORMAL
UNIT STATUS: NORMAL
UNIT STATUS: NORMAL
UNIT TYPE ISBT: 6200
UNIT TYPE ISBT: 6200
VENTRICULAR RATE- MUSE: 72 BPM
WBC # BLD AUTO: 5.3 10E3/UL (ref 4–11)

## 2022-08-19 PROCEDURE — 82040 ASSAY OF SERUM ALBUMIN: CPT | Performed by: EMERGENCY MEDICINE

## 2022-08-19 PROCEDURE — 83880 ASSAY OF NATRIURETIC PEPTIDE: CPT | Performed by: EMERGENCY MEDICINE

## 2022-08-19 PROCEDURE — 93005 ELECTROCARDIOGRAM TRACING: CPT

## 2022-08-19 PROCEDURE — 86920 COMPATIBILITY TEST SPIN: CPT | Performed by: HOSPITALIST

## 2022-08-19 PROCEDURE — P9016 RBC LEUKOCYTES REDUCED: HCPCS | Performed by: EMERGENCY MEDICINE

## 2022-08-19 PROCEDURE — 86850 RBC ANTIBODY SCREEN: CPT | Performed by: EMERGENCY MEDICINE

## 2022-08-19 PROCEDURE — 71046 X-RAY EXAM CHEST 2 VIEWS: CPT

## 2022-08-19 PROCEDURE — 80053 COMPREHEN METABOLIC PANEL: CPT | Performed by: EMERGENCY MEDICINE

## 2022-08-19 PROCEDURE — 86901 BLOOD TYPING SEROLOGIC RH(D): CPT | Performed by: EMERGENCY MEDICINE

## 2022-08-19 PROCEDURE — 36415 COLL VENOUS BLD VENIPUNCTURE: CPT | Performed by: EMERGENCY MEDICINE

## 2022-08-19 PROCEDURE — 85730 THROMBOPLASTIN TIME PARTIAL: CPT | Performed by: EMERGENCY MEDICINE

## 2022-08-19 PROCEDURE — 86923 COMPATIBILITY TEST ELECTRIC: CPT | Performed by: EMERGENCY MEDICINE

## 2022-08-19 PROCEDURE — 84132 ASSAY OF SERUM POTASSIUM: CPT | Performed by: EMERGENCY MEDICINE

## 2022-08-19 PROCEDURE — 120N000001 HC R&B MED SURG/OB

## 2022-08-19 PROCEDURE — 86923 COMPATIBILITY TEST ELECTRIC: CPT | Performed by: HOSPITALIST

## 2022-08-19 PROCEDURE — 99285 EMERGENCY DEPT VISIT HI MDM: CPT | Mod: 25

## 2022-08-19 PROCEDURE — 85025 COMPLETE CBC W/AUTO DIFF WBC: CPT | Performed by: EMERGENCY MEDICINE

## 2022-08-19 PROCEDURE — 85610 PROTHROMBIN TIME: CPT | Performed by: EMERGENCY MEDICINE

## 2022-08-19 PROCEDURE — C9803 HOPD COVID-19 SPEC COLLECT: HCPCS

## 2022-08-19 PROCEDURE — 99223 1ST HOSP IP/OBS HIGH 75: CPT | Mod: AI | Performed by: HOSPITALIST

## 2022-08-19 PROCEDURE — 36430 TRANSFUSION BLD/BLD COMPNT: CPT

## 2022-08-19 PROCEDURE — U0005 INFEC AGEN DETEC AMPLI PROBE: HCPCS | Performed by: EMERGENCY MEDICINE

## 2022-08-19 RX ORDER — POLYETHYLENE GLYCOL 3350 17 G/17G
17 POWDER, FOR SOLUTION ORAL DAILY PRN
Status: DISCONTINUED | OUTPATIENT
Start: 2022-08-19 | End: 2022-08-25 | Stop reason: HOSPADM

## 2022-08-19 RX ORDER — LIDOCAINE 40 MG/G
CREAM TOPICAL
Status: DISCONTINUED | OUTPATIENT
Start: 2022-08-19 | End: 2022-08-25 | Stop reason: HOSPADM

## 2022-08-19 RX ORDER — AMOXICILLIN 250 MG
2 CAPSULE ORAL 2 TIMES DAILY PRN
Status: DISCONTINUED | OUTPATIENT
Start: 2022-08-19 | End: 2022-08-25 | Stop reason: HOSPADM

## 2022-08-19 RX ORDER — ACETAMINOPHEN 650 MG/1
650 SUPPOSITORY RECTAL EVERY 6 HOURS PRN
Status: DISCONTINUED | OUTPATIENT
Start: 2022-08-19 | End: 2022-08-25 | Stop reason: HOSPADM

## 2022-08-19 RX ORDER — ONDANSETRON 4 MG/1
4 TABLET, ORALLY DISINTEGRATING ORAL EVERY 6 HOURS PRN
Status: DISCONTINUED | OUTPATIENT
Start: 2022-08-19 | End: 2022-08-25 | Stop reason: HOSPADM

## 2022-08-19 RX ORDER — AMLODIPINE BESYLATE 5 MG/1
5 TABLET ORAL DAILY
Status: DISCONTINUED | OUTPATIENT
Start: 2022-08-20 | End: 2022-08-25

## 2022-08-19 RX ORDER — ONDANSETRON 2 MG/ML
4 INJECTION INTRAMUSCULAR; INTRAVENOUS EVERY 6 HOURS PRN
Status: DISCONTINUED | OUTPATIENT
Start: 2022-08-19 | End: 2022-08-25 | Stop reason: HOSPADM

## 2022-08-19 RX ORDER — AMOXICILLIN 250 MG
1 CAPSULE ORAL 2 TIMES DAILY PRN
Status: DISCONTINUED | OUTPATIENT
Start: 2022-08-19 | End: 2022-08-25 | Stop reason: HOSPADM

## 2022-08-19 RX ORDER — ALLOPURINOL 100 MG/1
100 TABLET ORAL DAILY
Status: DISCONTINUED | OUTPATIENT
Start: 2022-08-20 | End: 2022-08-22

## 2022-08-19 RX ORDER — PROCHLORPERAZINE MALEATE 5 MG
5 TABLET ORAL EVERY 6 HOURS PRN
Status: DISCONTINUED | OUTPATIENT
Start: 2022-08-19 | End: 2022-08-25 | Stop reason: HOSPADM

## 2022-08-19 RX ORDER — PROCHLORPERAZINE 25 MG
12.5 SUPPOSITORY, RECTAL RECTAL EVERY 12 HOURS PRN
Status: DISCONTINUED | OUTPATIENT
Start: 2022-08-19 | End: 2022-08-25 | Stop reason: HOSPADM

## 2022-08-19 RX ORDER — ACETAMINOPHEN 325 MG/1
650 TABLET ORAL EVERY 6 HOURS PRN
Status: DISCONTINUED | OUTPATIENT
Start: 2022-08-19 | End: 2022-08-25 | Stop reason: HOSPADM

## 2022-08-19 ASSESSMENT — ACTIVITIES OF DAILY LIVING (ADL)
ADLS_ACUITY_SCORE: 35
ADLS_ACUITY_SCORE: 37

## 2022-08-19 NOTE — ED NOTES
Bed: ST02  Expected date: 8/19/22  Expected time: 2:58 PM  Means of arrival:   Comments:  HEMS 434 82M HGB 4.5, SOB 1500

## 2022-08-19 NOTE — ED TRIAGE NOTES
Hemoglobin was 8.9 last week and now 4.9 today per ems.      Triage Assessment     Row Name 08/19/22 4965       Triage Assessment (Adult)    Airway WDL WDL       Respiratory WDL    Respiratory WDL X;all    Rhythm/Pattern, Respiratory shortness of breath

## 2022-08-19 NOTE — H&P
[facility]    History and Physical  Hospitalist       Date of Admission:  8/19/2022    Assessment & Plan   Mando Freedman is a 82 year old male who presents with recurrent severe anemia, hgb down to 4.5, also associated with weight gain, increased edema    Severe symptomatic anemia  Acute on chronic anemia, suspected due to blood loss  Admitted 8/1 with hgb 5.2, with melena. Given 2 units PRBCs then hgb stable around 7-8. Baseline hemoglobin around 7. Received IV venofer last admit. GI consulted, underwent EGD 8/2 followed by colonoscopy 8/5 without significant findings.  GI recommended ongoing outpatient monitoring of hemoglobin - if signs of recurrent bleed would likely pursue capsule study in follow up.   8/19 Presented with hemoglobin of 4.5. Ordered for 2 units PRBCs in ED  - admit inpatient  - q8h hgb checks  - may need IV venofer once hgb stable  - conditional orders for PRBC transfusion if hgb<7  - [facility] consult, once hgb stable, suspect he will discharge with plans for outpatient capsule next week     MGUS.  Von Willebrand's.  Lost to follow-up with hematology oncology, seen by [facility] during 8/1 admit  - Follow up with Dr Munguia in [place], [place] or establish care locally with [facility]     Hx Recurrent epistaxis.  Has not been an issue in the last 3mo.  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  Hyperkalemia, mild  On chart review baseline creatinine between 1.9-2.1. Cr 2.31 on admit with K 6.2, repeat level was 5.4  - recheck BMP this evening with next Hgb check  - telemetry, may consider hyperkalemia protocol, but would like to avoid additional fluids on top of fluids with blood.     Bilateral LE edema: more pronounced on the left.  Possible RLE cellulitis.-resolved  On 8/3 RLE warm to touch, tender and mildly erythematous. Recent  "fall hitting his knee. Crusted wound on the knee.  BLE US negative for DVT. Completed augmentin on discharge--cellulitis looks resolving  - may consider IV lasix 20mg between transfusions pending how renal function looks     Moderate aortic stenosis, possible bicuspid aortic valve  Moderate aortic root dilatation  Reporting shortness of breath with minimal exertion, likely due to severe anemia above.  EKG without ischemic changes, troponin and BNP wnl.  CXR with mild basilar opacities suspected to be atelectasis.  TTE 8/3 with moderate aortic stenosis and moderately dilated aortic root.    - Follow up with PCP for routine outpatient monitoring if desired based on goals of care.     Vitamin D deficiency  Screening Vit D level low, initiated on supplementation 8/1 admit  - continue vitamin D     Hypertension.  Pressures overall well controlled last admit, so PTA hydralazine was discontinued on discharge  - Continue PTA amlodipine.     Gout.  Continue PTA allopurinol.     Physical deconditioning from medical illness, senile frailty.  Resident of assisted living facility.  discharged with home therapies 8/1  - reconsult care transitions and PT on admit    Clinically Significant Risk Factors Present on Admission        # Hyperkalemia: K = 5.4 mmol/L (Ref range: 3.4 - 5.3 mmol/L) on admission, will monitor as appropriate      # Hypoalbuminemia: Albumin = 2.7 g/dL (Ref range: 3.4 - 5.0 g/dL) on admission, will monitor as appropriate   # Coagulation Defect: INR = 1.13 (Ref range: 0.85 - 1.15) and/or PTT = 38 Seconds (Ref range: 22 - 38 Seconds) on admission, will monitor for bleeding      # Obesity: Estimated body mass index is 30.08 kg/m  as calculated from the following:    Height as of 8/1/22: 1.753 m (5' 9\").    Weight as of this encounter: 92.4 kg (203 lb 11.3 oz).          DVT Prophylaxis: Pneumatic Compression Devices  Code Status: DNR / DNI     Expected Discharge Date: 08/22/2022               Chelsea Luna, " DO    Primary Care Physician   MARICHUY NEWELL    Chief Complaint   Severe anemia, shortness of breath, fatigue    History is obtained from the patient, prior record review.    History of Present Illness   Mando Freedman is a 82 year old male who presents with abnormal labs. He had labs drawn on 8/18, but was notified today of his low hemoglobin and came to the ED. Since his discharge 2 weeks ago, he has noticed ongoing shortness of breath and some fatigue. Stools have been black with his iron supplementation. No epistaxis. No fevers, chills, headaches, chest pain, abdominal pain. He has gained some weight, but legs seem about the same edema-wise.     Past Medical History    I have reviewed this patient's medical history and updated it with pertinent information if needed.   Past Medical History:   Diagnosis Date     Basal cell carcinoma    Severe anemia  GI bleeding  Gout  Hypertension  Vitamin D deficiency  MGUS   Von willebrands    Past Surgical History   I have reviewed this patient's surgical history and updated it with pertinent information if needed.  Past Surgical History:   Procedure Laterality Date     COLONOSCOPY N/A 8/5/2022    Procedure: COLONOSCOPY;  Surgeon: Kimani Loo MD;  Location:  GI     ESOPHAGOSCOPY, GASTROSCOPY, DUODENOSCOPY (EGD), COMBINED N/A 8/2/2022    Procedure: ESOPHAGOGASTRODUODENOSCOPY (EGD);  Surgeon: Kimani Loo MD;  Location:  GI       Prior to Admission Medications   Prior to Admission Medications   Prescriptions Last Dose Informant Patient Reported? Taking?   Vitamin D3 (CHOLECALCIFEROL) 125 MCG (5000 UT) tablet 8/18/2022 at Unknown time Self No Yes   Sig: Take 1 tablet (125 mcg) by mouth daily   allopurinol (ZYLOPRIM) 100 MG tablet 8/18/2022 at Unknown time Self Yes Yes   Sig: Take 100 mg by mouth daily   amLODIPine (NORVASC) 5 MG tablet 8/18/2022 at Unknown time Self Yes Yes   Sig: Take 5 mg by mouth daily   citalopram (CELEXA) 10 MG tablet 8/18/2022 at Unknown time  Self Yes Yes   Sig: Take 5 mg by mouth daily   ferrous fumarate 65 mg, [place]. FE,-Vitamin C 125 mg (VITRON C)  MG TABS tablet 8/19/2022 at Unknown time Self Yes Yes   Sig: Take 1 tablet by mouth 2 times daily   fish oil-omega-3 fatty acids 1000 MG capsule 8/18/2022 at Unknown time Self Yes Yes   Sig: Take 1 g by mouth daily   multivitamin (CENTRUM SILVER) tablet 8/18/2022 at Unknown time Self Yes Yes   Sig: Take 1 tablet by mouth daily      Facility-Administered Medications: None     Allergies   No Known Allergies    Social History   I have reviewed this patient's social history and updated it with pertinent information if needed. Mando Freedman  reports that he has been smoking cigars. He has never used smokeless tobacco.    Family History   I have reviewed this patient's family history and updated it with pertinent information if needed.   Father: HTN, DM, asthma  Mother: HTN, heart disease    Review of Systems   The 10 point Review of Systems is negative other than noted in the HPI    Physical Exam   Temp: 98.5  F (36.9  C) Temp src: Oral BP: (!) 144/79 Pulse: 75   Resp: 20 SpO2: 100 %      Vital Signs with Ranges  Temp:  [98.3  F (36.8  C)-98.5  F (36.9  C)] 98.5  F (36.9  C)  Pulse:  [67-78] 75  Resp:  [14-23] 20  BP: (119-144)/(60-79) 144/79  SpO2:  [90 %-100 %] 100 %  203 lbs 11.28 oz    Constitutional: Awake, alert, cooperative, no apparent distress, pale  Eyes: Conjunctiva and pupils examined and normal.  HEENT: Moist mucous membranes, normal dentition.  Respiratory: Clear to auscultation bilaterally, no crackles or wheezing.  Cardiovascular: Regular rate and rhythm, normal S1 and S2, and no murmur noted.  GI: Soft, non-distended, non-tender, normal bowel sounds.  Lymph/Hematologic: No anterior cervical or supraclavicular adenopathy.  Skin: No rashes, no cyanosis, +2-3 edema R>L, resolving cellulitis noted on right.  Musculoskeletal: No joint swelling, erythema or tenderness.  Neurologic: Cranial  nerves 2-12 intact, normal strength and sensation.  Psychiatric: Alert, oriented to person, place and time, no obvious anxiety or depression.    Data   Data reviewed today:  I personally reviewed CXR with small hiatal hernia, no infiltrates, no pulmonary vascular congestion. EKG stable from 2 weeks ago, RBBB, LAFB.  Recent Labs   Lab 08/19/22  1623 08/19/22  1517 08/19/22  1513 08/18/22  0835   WBC  --   --  5.3  --    HGB  --   --  4.5* 4.9*   MCV  --   --  115*  --    PLT  --   --  146*  --    INR  --  1.13  --   --    NA  --   --  140  --    POTASSIUM 5.4*  --  6.2*  --    CHLORIDE  --   --  118*  --    CO2  --   --  16*  --    BUN  --   --  81*  --    CR  --   --  2.31*  --    ANIONGAP  --   --  6  --    KATT  --   --  8.2*  --    GLC  --   --  131*  --    ALBUMIN  --   --  2.7*  --    PROTTOTAL  --   --  5.3*  --    BILITOTAL  --   --  0.2  --    ALKPHOS  --   --  57  --    ALT  --   --  14  --    AST  --   --  11  --        Recent Results (from the past 24 hour(s))   XR Chest 2 Views    Narrative    CHEST TWO VIEWS 8/19/2022 3:59 PM     HISTORY: Shortness of breath    COMPARISON: 8/1/2022       Impression    IMPRESSION: Small hiatal hernia. There are no acute infiltrates. The  cardiac silhouette is not enlarged. Pulmonary vasculature is  unremarkable.

## 2022-08-19 NOTE — ED NOTES
Bed: ED05  Expected date: 8/19/22  Expected time: 3:27 PM  Means of arrival:   Comments:  Stab 2

## 2022-08-19 NOTE — PHARMACY-ADMISSION MEDICATION HISTORY
Pharmacy Medication History  Admission medication history interview status for the 8/19/2022  admission is complete. See EPIC admission navigator for prior to admission medications     Location of Interview: Patient room  Medication history sources: Patient    Significant changes made to the medication list:    Added Vitron C     In the past week, patient estimated taking medication this percent of the time: greater than 90%    Additional medication history information:     Patient is a good historian.     Medication reconciliation completed by provider prior to medication history? Yes    Time spent in this activity: 15 minutes     Prior to Admission medications    Medication Sig Last Dose Taking? Auth Provider Long Term End Date   allopurinol (ZYLOPRIM) 100 MG tablet Take 100 mg by mouth daily 8/18/2022 at Unknown time Yes Reported, Patient     amLODIPine (NORVASC) 5 MG tablet Take 5 mg by mouth daily 8/18/2022 at Unknown time Yes Reported, Patient Yes    citalopram (CELEXA) 10 MG tablet Take 5 mg by mouth daily 8/18/2022 at Unknown time Yes Unknown, Entered By History Yes    ferrous fumarate 65 mg, [place]. FE,-Vitamin C 125 mg (VITRON C)  MG TABS tablet Take 1 tablet by mouth 2 times daily 8/19/2022 at Unknown time Yes Unknown, Entered By History     fish oil-omega-3 fatty acids 1000 MG capsule Take 1 g by mouth daily 8/18/2022 at Unknown time Yes Unknown, Entered By History     multivitamin (CENTRUM SILVER) tablet Take 1 tablet by mouth daily 8/18/2022 at Unknown time Yes Unknown, Entered By History     Vitamin D3 (CHOLECALCIFEROL) 125 MCG (5000 UT) tablet Take 1 tablet (125 mcg) by mouth daily 8/18/2022 at Unknown time Yes Michael Sotelo MD     hydrALAZINE (APRESOLINE) 10 MG tablet Take 20 mg by mouth 2 times daily 10mg x2 = 20mg   Unknown, Entered By History Yes 8/5/22       The information provided in this note is only as accurate as the sources available at the time of update(s)   Ana Headley PharmD

## 2022-08-19 NOTE — ED NOTES
[facility]  ED Nurse Handoff Report    ED Chief complaint: Abnormal Labs      ED Diagnosis:   Final diagnoses:   Anemia, unspecified type   Gastrointestinal hemorrhage, unspecified gastrointestinal hemorrhage type       Code Status: Full Code    Allergies: No Known Allergies    Patient Story: Patient comes in with low hemoglobin.  Admitted last week for same.   Focused Assessment:  Patient is alert and oriented.  Denies any pain.  States he has had some dark stools, no other source of bleeding.  Transfusion done in ED. Patient has increased weight gain since discharge and swollen legs.     Treatments and/or interventions provided: IV, labs   Patient's response to treatments and/or interventions: tolerated well     To be done/followed up on inpatient unit:  see in patient orders     Does this patient have any cognitive concerns?: none     Activity level - Baseline/Home:  Independent  Activity Level - Current:   Stand with Assist    Patient's Preferred language: English   Needed?: No    Isolation: None  Infection: Not Applicable  Patient tested for COVID 19 prior to admission: YES  Bariatric?: No    Vital Signs:   Vitals:    08/19/22 1530 08/19/22 1555 08/19/22 1600 08/19/22 1625   BP: 119/65 123/61 129/64 123/60   Pulse: 67  69 78   Resp: 14   20   Temp:    98.5  F (36.9  C)   SpO2: 100%  100%    Weight:           Cardiac Rhythm:     Was the PSS-3 completed:   Yes  What interventions are required if any?               Family Comments: n/a  OBS brochure/video discussed/provided to patient/family: N/A              Name of person given brochure if not patient:               Relationship to patient:     For the majority of the shift this patient's behavior was Green.   Behavioral interventions performed were .    ED NURSE PHONE NUMBER: *[phone]

## 2022-08-19 NOTE — PROGRESS NOTES
RECEIVING UNIT ED HANDOFF REVIEW    ED Nurse Handoff Report was reviewed by: Lurdes Marshall RN on August 19, 2022 at 4:45 PM

## 2022-08-20 ENCOUNTER — APPOINTMENT (OUTPATIENT)
Dept: PHYSICAL THERAPY | Facility: CLINIC | Age: 82
DRG: 812 | End: 2022-08-20
Attending: HOSPITALIST
Payer: COMMERCIAL

## 2022-08-20 LAB
ANION GAP SERPL CALCULATED.3IONS-SCNC: 5 MMOL/L (ref 3–14)
ANION GAP SERPL CALCULATED.3IONS-SCNC: 6 MMOL/L (ref 3–14)
ANION GAP SERPL CALCULATED.3IONS-SCNC: 7 MMOL/L (ref 3–14)
BASOPHILS # BLD AUTO: 0 10E3/UL (ref 0–0.2)
BASOPHILS NFR BLD AUTO: 0 %
BLD PROD TYP BPU: NORMAL
BLD PROD TYP BPU: NORMAL
BLOOD COMPONENT TYPE: NORMAL
BLOOD COMPONENT TYPE: NORMAL
BUN SERPL-MCNC: 82 MG/DL (ref 7–30)
BUN SERPL-MCNC: 83 MG/DL (ref 7–30)
BUN SERPL-MCNC: 89 MG/DL (ref 7–30)
CALCIUM SERPL-MCNC: 7.8 MG/DL (ref 8.5–10.1)
CALCIUM SERPL-MCNC: 7.8 MG/DL (ref 8.5–10.1)
CALCIUM SERPL-MCNC: 7.9 MG/DL (ref 8.5–10.1)
CHLORIDE BLD-SCNC: 118 MMOL/L (ref 94–109)
CHLORIDE BLD-SCNC: 119 MMOL/L (ref 94–109)
CHLORIDE BLD-SCNC: 121 MMOL/L (ref 94–109)
CO2 SERPL-SCNC: 16 MMOL/L (ref 20–32)
CO2 SERPL-SCNC: 18 MMOL/L (ref 20–32)
CO2 SERPL-SCNC: 19 MMOL/L (ref 20–32)
CODING SYSTEM: NORMAL
CODING SYSTEM: NORMAL
CREAT SERPL-MCNC: 2.12 MG/DL (ref 0.66–1.25)
CREAT SERPL-MCNC: 2.12 MG/DL (ref 0.66–1.25)
CREAT SERPL-MCNC: 2.24 MG/DL (ref 0.66–1.25)
CROSSMATCH: NORMAL
CROSSMATCH: NORMAL
EOSINOPHIL # BLD AUTO: 0.2 10E3/UL (ref 0–0.7)
EOSINOPHIL NFR BLD AUTO: 4 %
ERYTHROCYTE [DISTWIDTH] IN BLOOD BY AUTOMATED COUNT: 24.8 % (ref 10–15)
ERYTHROCYTE [DISTWIDTH] IN BLOOD BY AUTOMATED COUNT: 26.5 % (ref 10–15)
GFR SERPL CREATININE-BSD FRML MDRD: 28 ML/MIN/1.73M2
GFR SERPL CREATININE-BSD FRML MDRD: 30 ML/MIN/1.73M2
GFR SERPL CREATININE-BSD FRML MDRD: 30 ML/MIN/1.73M2
GLUCOSE BLD-MCNC: 101 MG/DL (ref 70–99)
GLUCOSE BLD-MCNC: 109 MG/DL (ref 70–99)
GLUCOSE BLD-MCNC: 95 MG/DL (ref 70–99)
HCT VFR BLD AUTO: 23 % (ref 40–53)
HCT VFR BLD AUTO: 24.4 % (ref 40–53)
HGB BLD-MCNC: 6.3 G/DL (ref 13.3–17.7)
HGB BLD-MCNC: 6.9 G/DL (ref 13.3–17.7)
HGB BLD-MCNC: 7.1 G/DL (ref 13.3–17.7)
HGB BLD-MCNC: 7.1 G/DL (ref 13.3–17.7)
HGB BLD-MCNC: 7.4 G/DL (ref 13.3–17.7)
IMM GRANULOCYTES # BLD: 0 10E3/UL
IMM GRANULOCYTES NFR BLD: 1 %
ISSUE DATE AND TIME: NORMAL
ISSUE DATE AND TIME: NORMAL
LYMPHOCYTES # BLD AUTO: 1.6 10E3/UL (ref 0.8–5.3)
LYMPHOCYTES NFR BLD AUTO: 33 %
MAGNESIUM SERPL-MCNC: 2.4 MG/DL (ref 1.6–2.3)
MCH RBC QN AUTO: 29.7 PG (ref 26.5–33)
MCH RBC QN AUTO: 30.1 PG (ref 26.5–33)
MCHC RBC AUTO-ENTMCNC: 30.3 G/DL (ref 31.5–36.5)
MCHC RBC AUTO-ENTMCNC: 30.9 G/DL (ref 31.5–36.5)
MCV RBC AUTO: 98 FL (ref 78–100)
MCV RBC AUTO: 98 FL (ref 78–100)
MONOCYTES # BLD AUTO: 0.3 10E3/UL (ref 0–1.3)
MONOCYTES NFR BLD AUTO: 7 %
NEUTROPHILS # BLD AUTO: 2.7 10E3/UL (ref 1.6–8.3)
NEUTROPHILS NFR BLD AUTO: 55 %
NRBC # BLD AUTO: 0 10E3/UL
NRBC BLD AUTO-RTO: 1 /100
PHOSPHATE SERPL-MCNC: 3.3 MG/DL (ref 2.5–4.5)
PLATELET # BLD AUTO: 113 10E3/UL (ref 150–450)
PLATELET # BLD AUTO: 126 10E3/UL (ref 150–450)
POTASSIUM BLD-SCNC: 4.9 MMOL/L (ref 3.4–5.3)
POTASSIUM BLD-SCNC: 5 MMOL/L (ref 3.4–5.3)
POTASSIUM BLD-SCNC: 5.1 MMOL/L (ref 3.4–5.3)
RBC # BLD AUTO: 2.36 10E6/UL (ref 4.4–5.9)
RBC # BLD AUTO: 2.49 10E6/UL (ref 4.4–5.9)
RETICS # AUTO: 0.22 10E6/UL (ref 0.03–0.1)
RETICS/RBC NFR AUTO: 8.7 % (ref 0.5–2)
SODIUM SERPL-SCNC: 143 MMOL/L (ref 133–144)
UNIT ABO/RH: NORMAL
UNIT ABO/RH: NORMAL
UNIT NUMBER: NORMAL
UNIT NUMBER: NORMAL
UNIT STATUS: NORMAL
UNIT STATUS: NORMAL
UNIT TYPE ISBT: 6200
UNIT TYPE ISBT: 6200
WBC # BLD AUTO: 4 10E3/UL (ref 4–11)
WBC # BLD AUTO: 4.8 10E3/UL (ref 4–11)

## 2022-08-20 PROCEDURE — 83521 IG LIGHT CHAINS FREE EACH: CPT | Performed by: STUDENT IN AN ORGANIZED HEALTH CARE EDUCATION/TRAINING PROGRAM

## 2022-08-20 PROCEDURE — 99356 PR PROLONGED SERV,INPATIENT,1ST HR: CPT | Performed by: INTERNAL MEDICINE

## 2022-08-20 PROCEDURE — 99233 SBSQ HOSP IP/OBS HIGH 50: CPT | Performed by: STUDENT IN AN ORGANIZED HEALTH CARE EDUCATION/TRAINING PROGRAM

## 2022-08-20 PROCEDURE — 85045 AUTOMATED RETICULOCYTE COUNT: CPT | Performed by: STUDENT IN AN ORGANIZED HEALTH CARE EDUCATION/TRAINING PROGRAM

## 2022-08-20 PROCEDURE — 84100 ASSAY OF PHOSPHORUS: CPT | Performed by: HOSPITALIST

## 2022-08-20 PROCEDURE — 85027 COMPLETE CBC AUTOMATED: CPT | Performed by: HOSPITALIST

## 2022-08-20 PROCEDURE — 36415 COLL VENOUS BLD VENIPUNCTURE: CPT | Performed by: HOSPITALIST

## 2022-08-20 PROCEDURE — 85018 HEMOGLOBIN: CPT | Performed by: STUDENT IN AN ORGANIZED HEALTH CARE EDUCATION/TRAINING PROGRAM

## 2022-08-20 PROCEDURE — 120N000001 HC R&B MED SURG/OB

## 2022-08-20 PROCEDURE — 80048 BASIC METABOLIC PNL TOTAL CA: CPT | Performed by: STUDENT IN AN ORGANIZED HEALTH CARE EDUCATION/TRAINING PROGRAM

## 2022-08-20 PROCEDURE — 36415 COLL VENOUS BLD VENIPUNCTURE: CPT | Performed by: STUDENT IN AN ORGANIZED HEALTH CARE EDUCATION/TRAINING PROGRAM

## 2022-08-20 PROCEDURE — 99223 1ST HOSP IP/OBS HIGH 75: CPT | Performed by: INTERNAL MEDICINE

## 2022-08-20 PROCEDURE — 83735 ASSAY OF MAGNESIUM: CPT | Performed by: HOSPITALIST

## 2022-08-20 PROCEDURE — 80048 BASIC METABOLIC PNL TOTAL CA: CPT | Performed by: HOSPITALIST

## 2022-08-20 PROCEDURE — 85014 HEMATOCRIT: CPT | Performed by: HOSPITALIST

## 2022-08-20 PROCEDURE — 97530 THERAPEUTIC ACTIVITIES: CPT | Mod: GP

## 2022-08-20 PROCEDURE — P9016 RBC LEUKOCYTES REDUCED: HCPCS | Performed by: HOSPITALIST

## 2022-08-20 PROCEDURE — 250N000013 HC RX MED GY IP 250 OP 250 PS 637: Performed by: HOSPITALIST

## 2022-08-20 PROCEDURE — 85018 HEMOGLOBIN: CPT | Performed by: HOSPITALIST

## 2022-08-20 PROCEDURE — 82310 ASSAY OF CALCIUM: CPT | Performed by: HOSPITALIST

## 2022-08-20 PROCEDURE — 97161 PT EVAL LOW COMPLEX 20 MIN: CPT | Mod: GP

## 2022-08-20 RX ADMIN — CITALOPRAM HYDROBROMIDE 5 MG: 10 TABLET ORAL at 08:32

## 2022-08-20 RX ADMIN — ALLOPURINOL 100 MG: 100 TABLET ORAL at 08:36

## 2022-08-20 RX ADMIN — Medication 125 MCG: at 08:32

## 2022-08-20 RX ADMIN — AMLODIPINE BESYLATE 5 MG: 5 TABLET ORAL at 08:32

## 2022-08-20 ASSESSMENT — ACTIVITIES OF DAILY LIVING (ADL)
ADLS_ACUITY_SCORE: 37

## 2022-08-20 NOTE — CONSULTS
Gastroenterology Consultation      Mando Freedman MRN# [id number]   YOB: 1940 Age: 82 year old   Date of Admission: 8/19/2022     Reason for consult: I was asked by Dr Luna to evaluate this patient for anemia.               History of Present Illness:   This patient is a 82 year old male who presents with SOB and weakness. He has struggled with unexplained anemia recently. He was at [facility] two weeks ago with anemia and had an unremarkable EGD and colonoscopy. He was transfused and was fine a week ago with a hemoglobin of 8.3. One week later, hemoglobin was 4.9. He notes the only GI symptom is that his swallowing for some foods like peanut butter is not perfect. He has formed, black stools since starting iron, and he had no change in his stools this week. He had no tarry stools and did not notice foul smelling stools. He has had no recent epistaxis. He feels better after receiving three units of blood. He has not had surgery on his intestine. He had a peripheral smear in July with rouleaux formation. He has renal insufficiency and MGUS. Iron studies are consistent with iron deficiency.              Past Medical History:     Past Medical History:   Diagnosis Date     Basal cell carcinoma              Past Surgical History:     Past Surgical History:   Procedure Laterality Date     COLONOSCOPY N/A 8/5/2022    Procedure: COLONOSCOPY;  Surgeon: Kimani Loo MD;  Location: [facility]     ESOPHAGOSCOPY, GASTROSCOPY, DUODENOSCOPY (EGD), COMBINED N/A 8/2/2022    Procedure: ESOPHAGOGASTRODUODENOSCOPY (EGD);  Surgeon: Kimani Loo MD;  Location: [facility]               Social History:     Social History     Socioeconomic History     Marital status: Single     Spouse name: Not on file     Number of children: Not on file     Years of education: Not on file     Highest education level: Not on file   Occupational History     Not on file   Tobacco Use     Smoking status: Light Tobacco Smoker     Types: Cigars      Smokeless tobacco: Never Used   Substance and Sexual Activity     Alcohol use: Not on file     Comment: rare     Drug use: Not on file     Sexual activity: Not on file   Other Topics Concern     Not on file   Social History Narrative     Not on file     Social Determinants of Health     Financial Resource Strain: Not on file   Food Insecurity: Not on file   Transportation Needs: Not on file   Physical Activity: Not on file   Stress: Not on file   Social Connections: Not on file   Intimate Partner Violence: Not on file   Housing Stability: Not on file             Family History:   No family history on file.          Allergies:    No Known Allergies          Medications:     No current outpatient medications on file.             Review of Systems:   10-point review of systems negative except as noted in HPI.            Physical Exam:   Vitals were reviewed  /67 (BP Location: Right arm)   Pulse 67   Temp 98  F (36.7  C) (Oral)   Resp 18   Wt 92.4 kg (203 lb 11.3 oz)   SpO2 99%   BMI 30.08 kg/m    Constitutional:   No distress     Heent:   NC/AT, sclera anicteric     Neck:   No adenopathy, thyroid not palpable   Respiratory:   CTA anteriorly     Cardiovascular:   RRR, harsh systolic murmur     Gastrointestinal:   Active BS, soft, NT/ND     Extremities:   No clubbing, cyanosis or edema   Neuro:   Grossly nonfocal     Skin:   No rashes or ecchymoses   Psychiatry:        No evidence of anxiety or depression         Data:     ROUTINE IP LABS  BMP  Last Comprehensive Metabolic Panel:  Sodium   Date Value Ref Range Status   08/20/2022 143 133 - 144 mmol/L Final     Potassium   Date Value Ref Range Status   08/20/2022 5.0 3.4 - 5.3 mmol/L Final     Chloride   Date Value Ref Range Status   08/20/2022 121 (H) 94 - 109 mmol/L Final     Carbon Dioxide (CO2)   Date Value Ref Range Status   08/20/2022 16 (L) 20 - 32 mmol/L Final     Anion Gap   Date Value Ref Range Status   08/20/2022 6 3 - 14 mmol/L Final     Glucose    Date Value Ref Range Status   08/20/2022 95 70 - 99 mg/dL Final     Urea Nitrogen   Date Value Ref Range Status   08/20/2022 83 (H) 7 - 30 mg/dL Final     Creatinine   Date Value Ref Range Status   08/20/2022 2.12 (H) 0.66 - 1.25 mg/dL Final     GFR Estimate   Date Value Ref Range Status   08/20/2022 30 (L) >60 mL/min/1.73m2 Final     Comment:     Effective December 21, 2021 eGFRcr in adults is calculated using the 2021 CKD-EPI creatinine equation which includes age and gender (Rain et al., NEJ, DOI: 10.1056/OXZBco5904670)     Calcium   Date Value Ref Range Status   08/20/2022 7.8 (L) 8.5 - 10.1 mg/dL Final       CBC  Lab Results   Component Value Date    WBC 4.0 08/20/2022     Lab Results   Component Value Date    RBC 2.36 08/20/2022     Lab Results   Component Value Date    HGB 7.1 08/20/2022    HGB 7.1 08/20/2022     Lab Results   Component Value Date    HCT 23.0 08/20/2022     No components found for: MCT  Lab Results   Component Value Date    MCV 98 08/20/2022     Lab Results   Component Value Date    MCH 30.1 08/20/2022     Lab Results   Component Value Date    MCHC 30.9 08/20/2022     Lab Results   Component Value Date    RDW 24.8 08/20/2022     Lab Results   Component Value Date     08/20/2022       INR  Lab Results   Component Value Date    INR 1.13 08/19/2022       PANCREASNo lab results found in last 7 days.  LFT  Recent Labs   Lab 08/19/22  1513   PROTTOTAL 5.3*   ALBUMIN 2.7*   BILITOTAL 0.2   ALKPHOS 57   AST 11   ALT 14                Assessment and Plan:   Ongoing anemia. He dropped more than three grams of hemoglobin in one week without clear evidence of GI bleeding - no tarry or foul smelling stool. I think it is less likely for stools to be normal with this degree of bleeding. I wonder if he has poor production and/or peripheral destruction. I will arrange an outpatient small bowel pill camera study in case the anemia is from GI bleeding, but would investigate other possibilities.    30  minutes spent in patient care.    Kofi England MD  [facility]  Office:  [phone]

## 2022-08-20 NOTE — UTILIZATION REVIEW
Admission Status; Secondary Review Determination    Under the authority of the Utilization Management Committee, the utilization review process indicated a secondary review on the above patient. The review outcome is based on review of the medical records, discussions with staff, and applying clinical experience noted on the date of the review.    (x) Inpatient Status Appropriate - This patient's medical care is consistent with medical management for inpatient care and reasonable inpatient medical practice.    RATIONALE FOR DETERMINATION: 82-year-old male who was hospitalized the first week in August for severe asymptomatic anemia with a question of melena.  Patient found to have iron deficiency and received a couple units of packed red blood cells plus approximately 1 g of iron sucrose.  EGD and colonoscopy were negative.  At the time of discharge patient's hemoglobin was stable at 8.3.  1 week later it has dropped precipitously to 4.5.  This significant dropped after appropriate IV iron and packed red blood cells 16 days earlier requires further monitoring and evaluation with expected greater than 2 nights in the hospital appropriate for inpatient care.      At the time of admission with the information available to the attending physician more than 2 nights Hospital complex care was anticipated, based on patient risk of adverse outcome if treated as outpatient and complex care required. Inpatient admission is appropriate based on the Medicare guidelines.    This document was produced using voice recognition software    The information on this document is developed by the utilization review team in order for the business office to ensure compliance. This only denotes the appropriateness of proper admission status and does not reflect the quality of care rendered.    The definitions of Inpatient Status and Observation Status used in making the determination above are those provided in the CMS Coverage Manual,  Chapter 1 and Chapter 6, section 70.4.    Sincerely,    Talat Villalba MD  Utilization Review  Physician Advisor  [facility].

## 2022-08-20 NOTE — PLAN OF CARE
Pt VSS, on RA, A/O x4.  Pt denies pain and nausea, but complains of general feeling of fatigue and SOB.  Pt Hgb last at 4.3, pt currently receiving second unit of PRBC, Hgb recheck at 0030.  BLE edema, RLE warm and reddened.  BS active in all quadrants, pt tolerating regular diet.  Pt standing at bedside with walker to use urinal.  Voiding adequately, no Bms this shift.

## 2022-08-20 NOTE — CONSULTS
[facility] Consultation      Mando Freedman MRN# [id number]   YOB: 1940 Age: 82 year old   Date of Admission: 8/19/2022  Requesting physician: Barry Ortega MD  Reason for consult: Severe anemia.           Assessment and Plan:   82 year-old male with von Willebrand's disease, iron deficiency, MGUS, stage III chronic kidney disease presents with recurrent severe normocytic anemia.    1. Recurrent severe normocytic anemia  2. Iron deficiency  3. Stage III chronic kidney disease  --Multifactorial etiology, including iron deficiency, anemia of chronic kidney disease, anemia of chronic disease. Cannot rule out possible concurrent malignant bone marrow disorder such as myelodysplastic syndrome or multiple myeloma.  --Prior 7/14/2022 peripheral blood smear had shown moderate hypochromic to normochromic normocytic anemia, mild anisocytosis and poikilocytosis with ovalocytes and fragments suggestive of iron deficiency, lack of corresponding reticulocytosis, moderate rouleax formation.  -- Given iron deficiency and significant drop in hemoglobin, agree with plan for outpatient capsule endoscopy.  -Hospitalist has already ordered haptoglobin, LDH, D-dimer, peripheral blood smear, serum and urine kappa and lambda free light chains.  We will follow-up on results.  - Discussed bone marrow biopsy if repeated decrease in hemoglobin without a bleeding source.  Micah seems receptive to this.    4.  Von Willebrand disease  --8/2/2022 PTT elevated at 41, factor VIII low at 19, vWF activity < 19, vWF antigen low at 15, vWF multimeric analysis showed absence of high and intermediate molecular weight multimers.   --Humate-P was given to him on prior admission prior to endoscopy.  --If no acute bleeding suspected, will hold off on giving additional Humate-P    5.  MGUS, IgG kappa  --Initially identified in 10/2013.  --Prior records from Dr. Munguia show that he had bone marrow biopsy in 4/2016 (no myeloma  at that time; 5% plasma cells present consistent with MGUS).  --7/14/2022 M-spike 0.6 g/dL. (Prior M-spike 5/2021 was 0.7)  --8/2/2022 kappa FLC elevated at 63.73 (prior from 5/2021 was 33), lambda FLC elevated at 3.25, ratio elevated at 19.61.  -- Hospitalist has ordered repeat serum and urine And lambda free light chains.  We will follow-up on results.  -As noted above, cannot rule out possibility of multiple myeloma as a potential cause for severe anemia.  Discussed bone marrow biopsy if repeated decrease in hemoglobin.    6. Moderate aortic stenosis  --TTE on 8/3 showed moderate aortic stenosis.  -- Presented with severe dyspnea but likely related to severe anemia, now improved after blood transfusion.    Thank you for the consult. Will follow.    Stella Plunkett MD  Hematology/Oncology  [facility]    Total time spent: 110 minutes in extensive chart review, patient evaluation, counseling, documentation.             Chief Complaint:   Abnormal Labs           History of Present Illness:   This patient is a 82 year old male with von Willebrand's disease, MGUS, CKD, iron deficiency anemia, presents with recurrent severe anemia. He had prior recent admission for severe anemia with EGD and colonoscopy revealing no acute bleeding source.    8/18/2022 Hgb 4.9, decreased from 8.3 from 8/11/2022.  8/19/2022 Hgb 4.5 -- transfused 2 units pRBCs  8/20/2022 Hgb 6.3, absolute reticulocyte count elevated at 0.217 -- transfused 1 unit pRBCs -- Hgb improved to 7.1.  Subsequent hemoglobin 7.4, platelets 126,000.    Creatinine is at approximate baseline 2.12.    He has been on iron supplement with dark stools.    Seen by GI and plan for outpatient small bowel pill camera study.    Patient states his hematologist from [place] may have retired so he has not seen him since 5/2021.         Physical Exam:   Vitals were reviewed  Blood pressure 136/70, pulse 64, temperature 98.2  F (36.8  C), temperature source Oral,  resp. rate 18, weight 92.4 kg (203 lb 11.3 oz), SpO2 98 %.  Temperatures:  Current - Temp: 98.2  F (36.8  C); Max - Temp  Av.1  F (36.7  C)  Min: 97.3  F (36.3  C)  Max: 98.5  F (36.9  C)  Respiration range: Resp  Av.6  Min: 14  Max: 23  Pulse range: Pulse  Av  Min: 63  Max: 78  Blood pressure range: Systolic (24hrs), Av , Min:119 , Max:144   ; Diastolic (24hrs), Av, Min:60, Max:82    Pulse oximetry range: SpO2  Av.3 %  Min: 90 %  Max: 100 %    Intake/Output Summary (Last 24 hours) at 2022 1005  Last data filed at 2022 0634  Gross per 24 hour   Intake 1300 ml   Output 1475 ml   Net -175 ml       GENERAL: No acute distress.  EYES: No erythema or scleral icterus.  ENT: Neck supple.  LYMPH: No cervical, supraclavicular, axillary adenopathy.   RESPIRATORY: No audible cough or wheezing.   CARDIOVASCULAR: No PMI displacement.  GASTROINTESTINAL: No hepatosplenomegaly, masses, or tenderness. No guarding.  No distention.  MUSCULOSKELETAL: Warm and well-perfused, no cyanosis, clubbing; 3+ BLE pitting edema.  NEUROLOGIC: No focal motor deficits. Alert, oriented, answers questions appropriately.  INTEGUMENTARY: Few small ecchymoses over arms.            Past Medical History:   I have reviewed this patient's past medical history  Past Medical History:   Diagnosis Date     Basal cell carcinoma    Von Willebrand's disease  MGUS  Chronic kidney disease          Past Surgical History:   I have reviewed this patient's past surgical history  Past Surgical History:   Procedure Laterality Date     COLONOSCOPY N/A 2022    Procedure: COLONOSCOPY;  Surgeon: Kimani Loo MD;  Location:  GI     ESOPHAGOSCOPY, GASTROSCOPY, DUODENOSCOPY (EGD), COMBINED N/A 2022    Procedure: ESOPHAGOGASTRODUODENOSCOPY (EGD);  Surgeon: Kimani Loo MD;  Location:  GI               Social History:   I have reviewed this patient's social history  Social History     Tobacco Use     Smoking status: Light  Tobacco Smoker     Types: Cigars     Smokeless tobacco: Never Used   Substance Use Topics     Alcohol use: Not on file     Comment: rare             Family History:   I have reviewed this patient's family history  No family history on file.          Allergies:   No Known Allergies          Medications:   I have reviewed this patient's current medications  Medications Prior to Admission   Medication Sig Dispense Refill Last Dose     allopurinol (ZYLOPRIM) 100 MG tablet Take 100 mg by mouth daily   8/18/2022 at Unknown time     amLODIPine (NORVASC) 5 MG tablet Take 5 mg by mouth daily   8/18/2022 at Unknown time     citalopram (CELEXA) 10 MG tablet Take 5 mg by mouth daily   8/18/2022 at Unknown time     ferrous fumarate 65 mg, [place]. FE,-Vitamin C 125 mg (VITRON C)  MG TABS tablet Take 1 tablet by mouth 2 times daily   8/19/2022 at Unknown time     fish oil-omega-3 fatty acids 1000 MG capsule Take 1 g by mouth daily   8/18/2022 at Unknown time     multivitamin (CENTRUM SILVER) tablet Take 1 tablet by mouth daily   8/18/2022 at Unknown time     Vitamin D3 (CHOLECALCIFEROL) 125 MCG (5000 UT) tablet Take 1 tablet (125 mcg) by mouth daily 30 tablet 0 8/18/2022 at Unknown time     Current Facility-Administered Medications Ordered in Epic   Medication Dose Route Frequency Last Rate Last Admin     acetaminophen (TYLENOL) tablet 650 mg  650 mg Oral Q6H PRN        Or     acetaminophen (TYLENOL) Suppository 650 mg  650 mg Rectal Q6H PRN         allopurinol (ZYLOPRIM) tablet 100 mg  100 mg Oral Daily   100 mg at 08/20/22 0836     amLODIPine (NORVASC) tablet 5 mg  5 mg Oral Daily   5 mg at 08/20/22 0832     citalopram (celeXA) half-tab 5 mg  5 mg Oral Daily   5 mg at 08/20/22 0832     lidocaine (LMX4) cream   Topical Q1H PRN         lidocaine 1 % 0.1-1 mL  0.1-1 mL Other Q1H PRN         melatonin tablet 1 mg  1 mg Oral At Bedtime PRN         ondansetron (ZOFRAN ODT) ODT tab 4 mg  4 mg Oral Q6H PRN        Or     ondansetron  (ZOFRAN) injection 4 mg  4 mg Intravenous Q6H PRN         polyethylene glycol (MIRALAX) Packet 17 g  17 g Oral Daily PRN         prochlorperazine (COMPAZINE) injection 5 mg  5 mg Intravenous Q6H PRN        Or     prochlorperazine (COMPAZINE) tablet 5 mg  5 mg Oral Q6H PRN        Or     prochlorperazine (COMPAZINE) suppository 12.5 mg  12.5 mg Rectal Q12H PRN         senna-docusate (SENOKOT-S/PERICOLACE) 8.6-50 MG per tablet 1 tablet  1 tablet Oral BID PRN        Or     senna-docusate (SENOKOT-S/PERICOLACE) 8.6-50 MG per tablet 2 tablet  2 tablet Oral BID PRN         sodium chloride (PF) 0.9% PF flush 3 mL  3 mL Intracatheter Q8H   3 mL at 08/20/22 0832     sodium chloride (PF) 0.9% PF flush 3 mL  3 mL Intracatheter q1 min prn         Vitamin D3 (CHOLECALCIFEROL) tablet 125 mcg  125 mcg Oral Daily   125 mcg at 08/20/22 0832     No current [facility]-ordered outpatient medications on file.             Review of Systems:     The 14 point Review of Systems is negative other than noted in the HPI.            Data:   All laboratory data reviewed    CBC RESULTS: Recent Labs   Lab Test 08/20/22  1150   WBC 4.8   RBC 2.49*   HGB 7.4*   HCT 24.4*   MCV 98   MCH 29.7   MCHC 30.3*   RDW 26.5*   *     Last Comprehensive Metabolic Panel:  Sodium   Date Value Ref Range Status   08/20/2022 143 133 - 144 mmol/L Final     Potassium   Date Value Ref Range Status   08/20/2022 5.1 3.4 - 5.3 mmol/L Final     Chloride   Date Value Ref Range Status   08/20/2022 119 (H) 94 - 109 mmol/L Final     Carbon Dioxide (CO2)   Date Value Ref Range Status   08/20/2022 19 (L) 20 - 32 mmol/L Final     Anion Gap   Date Value Ref Range Status   08/20/2022 5 3 - 14 mmol/L Final     Glucose   Date Value Ref Range Status   08/20/2022 101 (H) 70 - 99 mg/dL Final     Urea Nitrogen   Date Value Ref Range Status   08/20/2022 82 (H) 7 - 30 mg/dL Final     Creatinine   Date Value Ref Range Status   08/20/2022 2.12 (H) 0.66 - 1.25 mg/dL Final     GFR Estimate    Date Value Ref Range Status   08/20/2022 30 (L) >60 mL/min/1.73m2 Final     Comment:     Effective December 21, 2021 eGFRcr in adults is calculated using the 2021 CKD-EPI creatinine equation which includes age and gender (Rain et al., NEJ, DOI: 10.1056/XUVQoh1502895)     Calcium   Date Value Ref Range Status   08/20/2022 7.9 (L) 8.5 - 10.1 mg/dL Final     Bilirubin Total   Date Value Ref Range Status   08/19/2022 0.2 0.2 - 1.3 mg/dL Final     Alkaline Phosphatase   Date Value Ref Range Status   08/19/2022 57 40 - 150 U/L Final     ALT   Date Value Ref Range Status   08/19/2022 14 0 - 70 U/L Final     AST   Date Value Ref Range Status   08/19/2022 11 0 - 45 U/L Final     Absolute reticulocyte count 0.217    Peripheral blood smear pending    8/19/2022  Chest x-ray: No acute infiltrates.

## 2022-08-20 NOTE — PROGRESS NOTES
[facility]    Medicine Progress Note - Hospitalist Service    Date of Admission:  8/19/2022    Assessment & Plan         82 year old male who presents with recurrent severe anemia, hgb down to 4.5, also associated with weight gain, increased edema, worsening renal function    Severe symptomatic anemia  Acute on chronic anemia, suspected due to blood loss  Admitted 8/1 with hgb 5.2, with melena. Given 2 units PRBCs then hgb stable around 7-8. Baseline hemoglobin around 7. Received IV venofer last admit. GI consulted, underwent EGD 8/2 followed by colonoscopy 8/5 without significant findings.  GI recommended ongoing outpatient monitoring of hemoglobin - if signs of recurrent bleed would likely pursue capsule study in follow up.   8/19 Presented with hemoglobin of 4.5. Ordered for 2 units PRBCs in ED  - admit inpatient  - q8h hgb checks  -GI to arrange for outpatient capsule study  - may need IV venofer once hgb stable  - conditional orders for PRBC transfusion if hgb<7  - [facility] consult, once hgb stable     MGUS.  Von Willebrand's.  Lost to follow-up with hematology oncology, seen by [facility] during 8/1 admit  -Given no clear evidence of GI bleed causing current presentation, I have a greater concern for progression of his MGUS to possible multiple myeloma which may cause his anemia  -Appreciate hematology oncology consultation  -Follow-up haptoglobin, LDH, D-dimer  -Follow-up peripheral blood smear review  -Follow-up urine and serum kappa and lambda light chains     Hx Recurrent epistaxis.  Has not been an issue in the last 3mo.  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  Hyperkalemia, mild  On chart review baseline creatinine between 1.9-2.1. Cr 2.31 on admit with K 6.2, repeat level was 5.4  - recheck BMP this evening with next Hgb check  -  telemetry, may consider hyperkalemia protocol, but would like to avoid additional fluids on top of fluids with blood.  -His worsening renal function may also be a symptom of transition of MGUS to multiple myeloma we will follow-up On lambda light chains in blood and urine     Bilateral LE edema: more pronounced on the left.  Possible RLE cellulitis.-resolved  On 8/3 RLE warm to touch, tender and mildly erythematous. Recent fall hitting his knee. Crusted wound on the knee.  BLE US negative for DVT. Completed augmentin on discharge--cellulitis looks resolving  - may consider IV lasix 20mg between transfusions pending how renal function looks     Moderate aortic stenosis, possible bicuspid aortic valve  Moderate aortic root dilatation  Reporting shortness of breath with minimal exertion, likely due to severe anemia above.  EKG without ischemic changes, troponin and BNP wnl.  CXR with mild basilar opacities suspected to be atelectasis.  TTE 8/3 with moderate aortic stenosis and moderately dilated aortic root.    - Follow up with PCP for routine outpatient monitoring if desired based on goals of care.     Vitamin D deficiency  Screening Vit D level low, initiated on supplementation 8/1 admit  - continue vitamin D     Hypertension.  Pressures overall well controlled last admit, so PTA hydralazine was discontinued on discharge  - Continue PTA amlodipine.     Gout.  Continue PTA allopurinol.     Physical deconditioning from medical illness, senile frailty.  Resident of assisted living facility.  discharged with home therapies 8/1  - reconsult care transitions and PT on admit            Diet: Combination Diet Regular Diet Adult    DVT Prophylaxis: Pneumatic Compression Devices  Angulo Catheter: Not present  Central Lines: None  Cardiac Monitoring: ACTIVE order. Indication: Electrolyte Imbalance (24 hours)- Magnesium <1.3 mg/ml; Potassium < =2.8 or > 5.5 mg/ml  Code Status: No CPR- Do NOT Intubate      Disposition Plan  "    Expected Discharge Date: 08/22/2022                The patient's care was discussed with the Bedside Nurse and Patient.    Barry Ortega MD  Hospitalist Service  [facility]  Securely message with the Vocera Web Console (learn more here)  Text page via [employer] Paging/Directory         Clinically Significant Risk Factors Present on Admission             # Hypoalbuminemia: Albumin = 2.7 g/dL (Ref range: 3.4 - 5.0 g/dL) on admission, will monitor as appropriate   # Coagulation Defect: INR = 1.13 (Ref range: 0.85 - 1.15) and/or PTT = 38 Seconds (Ref range: 22 - 38 Seconds) on admission, will monitor for bleeding  # Thrombocytopenia: Plts = 126 10e3/uL (Ref range: 150 - 450 10e3/uL) on admission, will monitor for bleeding     # Obesity: Estimated body mass index is 30.08 kg/m  as calculated from the following:    Height as of 8/1/22: 1.753 m (5' 9\").    Weight as of this encounter: 92.4 kg (203 lb 11.3 oz).        ______________________________________________________________________    Interval History   Hemoglobin remained stable without further need for transfusions his renal function remains slightly above baseline no clear evidence of bleeding in urine or any stool per patient and bedside nurse  GI to arrange for outpatient capsule study    Data reviewed today: I reviewed all medications, new labs and imaging results over the last 24 hours. I personally reviewed no images or EKG's today.    Physical Exam   Vital Signs: Temp: 98.2  F (36.8  C) Temp src: Oral BP: 136/70 Pulse: 64   Resp: 18 SpO2: 98 % O2 Device: None (Room air)    Weight: 203 lbs 11.28 oz  Constitutional: awake, alert, cooperative, no apparent distress, and appears stated age  Eyes: sclera clear, conjunctiva normal  Respiratory: No increased work of breathing, good air exchange, no wheezing  Cardiovascular: Normal apical impulse, regular rate and rhythm  GI: soft, non-distended, non-tender, no masses palpated  Skin: no " bruising or bleeding  Neurologic: Awake, alert, oriented to name, place and time.  Cranial nerves II-XII are grossly intact.      Data   Recent Labs   Lab 08/20/22  1150 08/20/22  0556 08/20/22  0108 08/19/22  1623 08/19/22  1517 08/19/22  1513   WBC 4.8 4.0  --   --   --  5.3   HGB 7.4* 7.1*  7.1* 6.3*  --   --  4.5*   MCV 98 98  --   --   --  115*   * 113*  --   --   --  146*   INR  --   --   --   --  1.13  --     143 143  --   --  140   POTASSIUM 5.1 5.0 4.9   < >  --  6.2*   CHLORIDE 119* 121* 118*  --   --  118*   CO2 19* 16* 18*  --   --  16*   BUN 82* 83* 89*  --   --  81*   CR 2.12* 2.12* 2.24*  --   --  2.31*   ANIONGAP 5 6 7  --   --  6   KATT 7.9* 7.8* 7.8*  --   --  8.2*   * 95 109*  --   --  131*   ALBUMIN  --   --   --   --   --  2.7*   PROTTOTAL  --   --   --   --   --  5.3*   BILITOTAL  --   --   --   --   --  0.2   ALKPHOS  --   --   --   --   --  57   ALT  --   --   --   --   --  14   AST  --   --   --   --   --  11    < > = values in this interval not displayed.     Recent Results (from the past 24 hour(s))   XR Chest 2 Views    Narrative    CHEST TWO VIEWS 8/19/2022 3:59 PM     HISTORY: Shortness of breath    COMPARISON: 8/1/2022       Impression    IMPRESSION: Small hiatal hernia. There are no acute infiltrates. The  cardiac silhouette is not enlarged. Pulmonary vasculature is  unremarkable.    VERONICA GARCIA MD         SYSTEM ID:  [id number]

## 2022-08-20 NOTE — PLAN OF CARE
Goal Outcome Evaluation:    [id number]    Assumed patient care at 2300. Here with chronic anemia. A&Ox4, pleasant. VSS on RA. Denies pain. Hgb recheck at 0100 was 6.3, conditional order met for another RBC unit. 0600 AM recheck 7.1. Has SOB upon exertion, complaints of fatigue, but denies other symptoms. BLE edema, kept elevated overnight. RLE remains warm and reddened. Used urinal appropriately, voiding clear, yellow urine. Tele SR. Anticipate discharge when cleared. Will continue plan of care.

## 2022-08-20 NOTE — PROVIDER NOTIFICATION
MD Notification    Notified Person: MD    Notified Person Name: Van    Notification Date/Time: 0157    Notification Interaction: page     Purpose of Notification: 0245. RS. FYI Hgb up to 6.3. Will transfuse 1 RBC per conditional orders. [phone].

## 2022-08-20 NOTE — PROGRESS NOTES
"   08/20/22 1513   Quick Adds   Type of Visit Initial PT Evaluation       Present no   Living Environment   People in Home alone   Current Living Arrangements assisted living  ([facility] in [place])   Home Accessibility no concerns   Transportation Anticipated family or friend will provide   Living Environment Comments Pt reports living in an [facility]PC, Currently receiving 1x/week cleaning; 3x/day meals provided. Higher level of services could be available. Pt reports his son lives close by and provides additional support as needed.   Self-Care   Usual Activity Tolerance moderate   Current Activity Tolerance poor   Equipment Currently Used at Home walker, rolling;shower chair;raised toilet seat   Fall history within last six months yes   Number of times patient has fallen within last six months 2   Activity/Exercise/Self-Care Comment Pt reports being LIZZ at baseline, mobilizing with a FWW. Currently not receiving assistance with bathing and dressing but requires assist with other ADLs. Pt reports being able to ambulate ~50' w/ FWW before needing a rest break.   General Information   Onset of Illness/Injury or Date of Surgery 08/19/22   Referring Physician Chelsea Luna, DO   Patient/Family Therapy Goals Statement (PT) \"To go home\"   Pertinent History of Current Problem (include personal factors and/or comorbidities that impact the POC) 82 year old male who presents with recurrent severe anemia, hgb down to 4.5, also associated with weight gain, increased edema. Presenting with a lot of weakness.   Existing Precautions/Restrictions fall   Weight-Bearing Status - LLE full weight-bearing   Weight-Bearing Status - RLE full weight-bearing   General Observations Greeted patient supine in bed.   Cognition   Affect/Mental Status (Cognition) WFL   Orientation Status (Cognition) oriented x 4   Follows Commands (Cognition) WFL   Pain Assessment   Patient Currently in Pain No   Integumentary/Edema "   Integumentary/Edema Comments B LE edema   Posture    Posture Forward head position;Protracted shoulders   Range of Motion (ROM)   ROM Comment B LE WFL; bilateral DF noted with ambulation   Strength (Manual Muscle Testing)   Strength Comments B LE functionally weak   Bed Mobility   Comment, (Bed Mobility) supine <> EOB at CGA   Transfers   Comment, (Transfers) sit <> stand with FWW and significantly elevated bed height at modAx1 initially   Gait/Stairs (Locomotion)   Comment, (Gait/Stairs) 10ft with FWW at CGA, decreased iza/stride; steppage gait pattern; bilateral drop foot, anterior trunk lean   Balance   Balance Comments Pt able to sit at EOB unsupported without LOB. Pt ambulates using a FWW for added stability and support.   Sensory Examination   Sensory Perception patient reports no sensory changes   Clinical Impression   Criteria for Skilled Therapeutic Intervention Yes, treatment indicated   PT Diagnosis (PT) impaired functional mobility   Influenced by the following impairments LE weakness, impaired balance, decreased activity tolerance   Functional limitations due to impairments bed mobility, transfers, ambulation   Clinical Presentation (PT Evaluation Complexity) Evolving/Changing   Clinical Presentation Rationale PMH, current presentation, clinical judgement   Clinical Decision Making (Complexity) low complexity   Planned Therapy Interventions (PT) balance training;bed mobility training;gait training;patient/family education;strengthening;transfer training   Risk & Benefits of therapy have been explained evaluation/treatment results reviewed;care plan/treatment goals reviewed;patient   PT Discharge Planning   PT Discharge Recommendation (DC Rec) Transitional Care Facility   PT Rationale for DC Rec PT - Patient is far below baseline for functional mobility with recent hospitalization (8/2/22) and has had further decline since then with difficulty managing home environment. Patient would benefit from  continued physical therapy in the setting of a TCU for improving functional mobility, LE strength and tolerance for functional activities in order to return to baseline of functioning.   Total Evaluation Time   Total Evaluation Time (Minutes) 8   Physical Therapy Goals   PT Frequency 3x/week   PT Predicted Duration/Target Date for Goal Attainment 08/07/22   PT Goals Bed Mobility;Transfers;Gait   PT: Bed Mobility Supervision/stand-by assist;Supine to/from sit   PT: Transfers Supervision/stand-by assist;Sit to/from stand;Assistive device   PT: Gait Supervision/stand-by assist;Assistive device;100 feet

## 2022-08-21 LAB
ANION GAP SERPL CALCULATED.3IONS-SCNC: 4 MMOL/L (ref 3–14)
BUN SERPL-MCNC: 69 MG/DL (ref 7–30)
CALCIUM SERPL-MCNC: 8 MG/DL (ref 8.5–10.1)
CHLORIDE BLD-SCNC: 119 MMOL/L (ref 94–109)
CO2 SERPL-SCNC: 19 MMOL/L (ref 20–32)
CREAT SERPL-MCNC: 1.89 MG/DL (ref 0.66–1.25)
D DIMER PPP FEU-MCNC: 1.14 UG/ML FEU (ref 0–0.5)
GFR SERPL CREATININE-BSD FRML MDRD: 35 ML/MIN/1.73M2
GLUCOSE BLD-MCNC: 102 MG/DL (ref 70–99)
HGB BLD-MCNC: 7.7 G/DL (ref 13.3–17.7)
HGB BLD-MCNC: 7.8 G/DL (ref 13.3–17.7)
HGB BLD-MCNC: 7.9 G/DL (ref 13.3–17.7)
HGB BLD-MCNC: 8.2 G/DL (ref 13.3–17.7)
LDH SERPL L TO P-CCNC: 175 U/L (ref 85–227)
POTASSIUM BLD-SCNC: 5.3 MMOL/L (ref 3.4–5.3)
SODIUM SERPL-SCNC: 142 MMOL/L (ref 133–144)
URATE SERPL-MCNC: 7.4 MG/DL (ref 3.5–7.2)

## 2022-08-21 PROCEDURE — 85018 HEMOGLOBIN: CPT | Performed by: STUDENT IN AN ORGANIZED HEALTH CARE EDUCATION/TRAINING PROGRAM

## 2022-08-21 PROCEDURE — 99233 SBSQ HOSP IP/OBS HIGH 50: CPT | Performed by: INTERNAL MEDICINE

## 2022-08-21 PROCEDURE — 85018 HEMOGLOBIN: CPT | Performed by: HOSPITALIST

## 2022-08-21 PROCEDURE — 85246 CLOT FACTOR VIII VW ANTIGEN: CPT | Performed by: INTERNAL MEDICINE

## 2022-08-21 PROCEDURE — 36415 COLL VENOUS BLD VENIPUNCTURE: CPT | Performed by: INTERNAL MEDICINE

## 2022-08-21 PROCEDURE — 36415 COLL VENOUS BLD VENIPUNCTURE: CPT | Performed by: HOSPITALIST

## 2022-08-21 PROCEDURE — 120N000001 HC R&B MED SURG/OB

## 2022-08-21 PROCEDURE — 82310 ASSAY OF CALCIUM: CPT | Performed by: STUDENT IN AN ORGANIZED HEALTH CARE EDUCATION/TRAINING PROGRAM

## 2022-08-21 PROCEDURE — 85245 CLOT FACTOR VIII VW RISTOCTN: CPT | Performed by: INTERNAL MEDICINE

## 2022-08-21 PROCEDURE — 250N000013 HC RX MED GY IP 250 OP 250 PS 637: Performed by: HOSPITALIST

## 2022-08-21 PROCEDURE — 99233 SBSQ HOSP IP/OBS HIGH 50: CPT | Performed by: STUDENT IN AN ORGANIZED HEALTH CARE EDUCATION/TRAINING PROGRAM

## 2022-08-21 PROCEDURE — 85240 CLOT FACTOR VIII AHG 1 STAGE: CPT | Performed by: INTERNAL MEDICINE

## 2022-08-21 PROCEDURE — 83010 ASSAY OF HAPTOGLOBIN QUANT: CPT | Performed by: STUDENT IN AN ORGANIZED HEALTH CARE EDUCATION/TRAINING PROGRAM

## 2022-08-21 PROCEDURE — 85379 FIBRIN DEGRADATION QUANT: CPT | Performed by: STUDENT IN AN ORGANIZED HEALTH CARE EDUCATION/TRAINING PROGRAM

## 2022-08-21 PROCEDURE — 84550 ASSAY OF BLOOD/URIC ACID: CPT | Performed by: STUDENT IN AN ORGANIZED HEALTH CARE EDUCATION/TRAINING PROGRAM

## 2022-08-21 PROCEDURE — 36415 COLL VENOUS BLD VENIPUNCTURE: CPT | Performed by: STUDENT IN AN ORGANIZED HEALTH CARE EDUCATION/TRAINING PROGRAM

## 2022-08-21 PROCEDURE — 83615 LACTATE (LD) (LDH) ENZYME: CPT | Performed by: STUDENT IN AN ORGANIZED HEALTH CARE EDUCATION/TRAINING PROGRAM

## 2022-08-21 RX ORDER — NALOXONE HYDROCHLORIDE 0.4 MG/ML
0.4 INJECTION, SOLUTION INTRAMUSCULAR; INTRAVENOUS; SUBCUTANEOUS
Status: ACTIVE | OUTPATIENT
Start: 2022-08-21 | End: 2022-08-23

## 2022-08-21 RX ORDER — FLUMAZENIL 0.1 MG/ML
0.2 INJECTION, SOLUTION INTRAVENOUS
Status: ACTIVE | OUTPATIENT
Start: 2022-08-21 | End: 2022-08-23

## 2022-08-21 RX ORDER — NALOXONE HYDROCHLORIDE 0.4 MG/ML
0.2 INJECTION, SOLUTION INTRAMUSCULAR; INTRAVENOUS; SUBCUTANEOUS
Status: ACTIVE | OUTPATIENT
Start: 2022-08-21 | End: 2022-08-23

## 2022-08-21 RX ADMIN — ALLOPURINOL 100 MG: 100 TABLET ORAL at 08:08

## 2022-08-21 RX ADMIN — AMLODIPINE BESYLATE 5 MG: 5 TABLET ORAL at 08:08

## 2022-08-21 RX ADMIN — Medication 125 MCG: at 08:08

## 2022-08-21 RX ADMIN — CITALOPRAM HYDROBROMIDE 5 MG: 10 TABLET ORAL at 08:08

## 2022-08-21 ASSESSMENT — ACTIVITIES OF DAILY LIVING (ADL)
ADLS_ACUITY_SCORE: 37
ADLS_ACUITY_SCORE: 38
ADLS_ACUITY_SCORE: 37
ADLS_ACUITY_SCORE: 38
ADLS_ACUITY_SCORE: 37
ADLS_ACUITY_SCORE: 37

## 2022-08-21 NOTE — PROGRESS NOTES
[facility]    Medicine Progress Note - Hospitalist Service    Date of Admission:  8/19/2022    Assessment & Plan         82 year old male who presents with recurrent severe anemia, hgb down to 4.5, also associated with weight gain, increased edema, worsening renal function    Severe symptomatic anemia  Acute on chronic anemia, suspected due to blood loss  Admitted 8/1 with hgb 5.2, with melena. Given 2 units PRBCs then hgb stable around 7-8. Baseline hemoglobin around 7. Received IV venofer last admit. GI consulted, underwent EGD 8/2 followed by colonoscopy 8/5 without significant findings.  GI recommended ongoing outpatient monitoring of hemoglobin - if signs of recurrent bleed would likely pursue capsule study in follow up.   8/19 Presented with hemoglobin of 4.5. Ordered for 2 units PRBCs in ED  - q8h hgb checks  -GI to arrange for outpatient capsule study  - conditional orders for PRBC transfusion if hgb<7     MGUS.  Possible progression to MDS or MM?   Von Willebrand's.  Lost to follow-up with hematology oncology, seen by [facility] during 8/1 admit  -Given no clear evidence of GI bleed causing current presentation, I have a greater concern for progression of his MGUS to possible multiple myeloma which may cause his anemia  -Appreciate hematology oncology consultation  -Follow-up haptoglobin, LDH, D-dimer  -Follow-up peripheral blood smear review  -Follow-up urine and serum kappa and lambda light chains     Hx Recurrent epistaxis.  Has not been an issue in the last 3mo.  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  Hyperkalemia, mild  On chart review baseline creatinine between 1.9-2.1. Cr 2.31 on admit with K 6.2, repeat level was 5.4  - recheck BMP this evening with next Hgb check  - telemetry, may consider hyperkalemia protocol, but would like  to avoid additional fluids on top of fluids with blood.  -His worsening renal function may also be a symptom of transition of MGUS to multiple myeloma we will follow-up On lambda light chains in blood and urine     Bilateral LE edema: more pronounced on the left.  Possible RLE cellulitis.-resolved  On 8/3 RLE warm to touch, tender and mildly erythematous. Recent fall hitting his knee. Crusted wound on the knee.  BLE US negative for DVT. Completed augmentin on discharge--cellulitis looks resolving  - may consider IV lasix 20mg between transfusions pending how renal function looks     Moderate aortic stenosis, possible bicuspid aortic valve  Moderate aortic root dilatation  Reporting shortness of breath with minimal exertion, likely due to severe anemia above.  EKG without ischemic changes, troponin and BNP wnl.  CXR with mild basilar opacities suspected to be atelectasis.  TTE 8/3 with moderate aortic stenosis and moderately dilated aortic root.    - Follow up with PCP for routine outpatient monitoring if desired based on goals of care.     Vitamin D deficiency  Screening Vit D level low, initiated on supplementation 8/1 admit  - continue vitamin D     Hypertension.  Pressures overall well controlled last admit, so PTA hydralazine was discontinued on discharge  - Continue PTA amlodipine.     Gout.  Continue PTA allopurinol.     Physical deconditioning from medical illness, senile frailty.  Resident of assisted living facility.  discharged with home therapies 8/1  - reconsult care transitions and PT on admit may require TCU continue to appreciate PT evaluation               Diet: Combination Diet Regular Diet Adult    DVT Prophylaxis: Pneumatic Compression Devices  Angulo Catheter: Not present  Central Lines: None  Cardiac Monitoring: ACTIVE order. Indication: Electrolyte Imbalance (24 hours)- Magnesium <1.3 mg/ml; Potassium < =2.8 or > 5.5 mg/ml  Code Status: No CPR- Do NOT Intubate      Disposition Plan     Expected  "Discharge Date: 08/22/2022                The patient's care was discussed with the Bedside Nurse and Patient.    Barry Ortega MD  Hospitalist Service  [facility]  Securely message with the Vocera Web Console (learn more here)  Text page via [employer] Paging/Directory         Clinically Significant Risk Factors Present on Admission               # Obesity: Estimated body mass index is 30.08 kg/m  as calculated from the following:    Height as of 8/1/22: 1.753 m (5' 9\").    Weight as of this encounter: 92.4 kg (203 lb 11.3 oz).        ______________________________________________________________________    Interval History    Nursing notes reviewed, required 1 U PRBC overnight with good response, no clear source of bleeding  Pending MM workup lab results orders placed, heme onc also following    Data reviewed today: I reviewed all medications, new labs and imaging results over the last 24 hours.     Physical Exam   Vital Signs: Temp: 98.1  F (36.7  C) Temp src: Oral BP: (!) 141/76 Pulse: 56   Resp: 20 SpO2: 99 % O2 Device: None (Room air)    Weight: 203 lbs 11.28 oz   Constitutional: awake, alert, cooperative, no apparent distress, and appears stated age  Eyes: sclera clear, conjunctiva normal  Respiratory: No increased work of breathing, good air exchange, no wheezing  Cardiovascular: Normal apical impulse, regular rate and rhythm  GI: soft, non-distended, non-tender, no masses palpated  Skin: no bruising or bleeding  Neurologic: Awake, alert, oriented to name, place and time.  Cranial nerves II-XII are grossly intact.      {  Recent Labs   Lab 08/21/22  1154 08/21/22  0836 08/21/22  0300 08/20/22 2007 08/20/22  1150 08/20/22  0556 08/19/22  1623 08/19/22  1517 08/19/22  1513   WBC  --   --   --   --  4.8 4.0  --   --  5.3   HGB 7.8*  --  8.2* 6.9* 7.4* 7.1*  7.1*   < >  --  4.5*   MCV  --   --   --   --  98 98  --   --  115*   PLT  --   --   --   --  126* 113*  --   --  146*   INR  --   --  "  --   --   --   --   --  1.13  --    NA  --  142  --   --  143 143   < >  --  140   POTASSIUM  --  5.3  --   --  5.1 5.0   < >  --  6.2*   CHLORIDE  --  119*  --   --  119* 121*   < >  --  118*   CO2  --  19*  --   --  19* 16*   < >  --  16*   BUN  --  69*  --   --  82* 83*   < >  --  81*   CR  --  1.89*  --   --  2.12* 2.12*   < >  --  2.31*   ANIONGAP  --  4  --   --  5 6   < >  --  6   KATT  --  8.0*  --   --  7.9* 7.8*   < >  --  8.2*   GLC  --  102*  --   --  101* 95   < >  --  131*   ALBUMIN  --   --   --   --   --   --   --   --  2.7*   PROTTOTAL  --   --   --   --   --   --   --   --  5.3*   BILITOTAL  --   --   --   --   --   --   --   --  0.2   ALKPHOS  --   --   --   --   --   --   --   --  57   ALT  --   --   --   --   --   --   --   --  14   AST  --   --   --   --   --   --   --   --  11    < > = values in this interval not displayed.     No results found for this or any previous visit (from the past 24 hour(s)).

## 2022-08-21 NOTE — PLAN OF CARE
AO x4.  VSS.  IV saline locked.  Hemoglobin 7.4 today at 1150.  Hemoglobin will be reassessed at 2030 this evening.  GI not suspecting GI bleed.  Hematology consulted today due to Von Willebrand Disease.  Bilateral lower extremity edema, left worse than right.  Encouraged elevation of legs. Voiding in urinal at bedside.  Ambulated with A1, walker and gaitbelt.  PT recommending TCU at discharge.  Telemetry NSR.  Nursing will continue to monitor.

## 2022-08-21 NOTE — CONSULTS
Care Management Initial Consult    General Information  Assessment completed with: Mando Sepulveda  Type of CM/SW Visit: Offer D/C Planning    Primary Care Provider verified and updated as needed:   [facility]  Readmission within the last 30 days:   Patient admitted to this hospital on 8/1/2022.     Reason for Consult: discharge planning  Advance Care Planning: None on file           Communication Assessment  Patient's communication style: spoken language (English or Bilingual)        Cognitive  Cognitive/Neuro/Behavioral: WDL                      Living Environment:   People in home: alone     Current living Arrangements: assisted living  Name of Facility: [facility]   Able to return to prior arrangements: yes  [facility]    Family/Social Support:  Care provided by: self  Provides care for: no one  Marital Status: Single  Children, Facility resident(s)/Staff          Description of Support System: Supportive, Involved    Support Assessment: Adequate family and caregiver support, Adequate social supports    Current Resources:   Patient receiving home care services:  None, Lives at [facility]     Community Resources:  [facility]  Equipment currently used at home: walker, rolling, shower chair, raised toilet seat  Supplies currently used at home:  Walker    Employment/Financial:  Employment Status: retired        Financial Concerns: No concerns identified   Referral to Financial Worker: No       Lifestyle & Psychosocial Needs:  Social Determinants of Health     Tobacco Use: High Risk     Smoking Tobacco Use: Light Tobacco Smoker     Smokeless Tobacco Use: Never Used   Alcohol Use: Not on file   Financial Resource Strain: Not on file   Food Insecurity: Not on file   Transportation Needs: Not on file   Physical Activity: Not on file   Stress: Not on file   Social Connections: Not on file   Intimate Partner Violence: Not on file   Depression: Not on  file   Housing Stability: Not on file       Functional Status:  Prior to admission patient needed assistance:    Patient reports that he has recently moved into [facility]. He states that he is receiving the lowest level of care, manages his own medications, does his own laundry and is independent with ADLs/IADLs. Patient uses a walker for all mobility. Patient no longer drives. Facility provides meals for patient.           Mental Health Status:      None indicated    Chemical Dependency Status:      None indicated          Values/Beliefs:  Spiritual, Cultural Beliefs, Latter-day Practices, Values that affect care:                 Additional Information:  Patient is an 82 year old male who presents with recurrent severe anemia, hgb down to 4.5, also associated with weight gain, increased edema, worsening renal function.  SW met with patient to discuss recommendations for TCU placement and patient stated that he does not want to go to TCU. He would prefer to return back to his [facility] and increase his services at the facility and add home care than to discharge to a TCU. SW inquired about home care placement and patient is okay with using [facility]. SW sent referral to [facility] for review.     MARTIN Watkins, LGSW  [phone]  [facility]

## 2022-08-21 NOTE — PROGRESS NOTES
[facility] Progress Note          Assessment and Plan:     82 year-old male with von Willebrand's disease, iron deficiency, MGUS, stage III chronic kidney disease presents with recurrent severe normocytic anemia.     1. Recurrent severe normocytic anemia  2. Iron deficiency  3. Stage III chronic kidney disease  --Multifactorial etiology, including iron deficiency, anemia of chronic kidney disease, anemia of chronic disease. Cannot rule out possible concurrent malignant bone marrow disorder such as myelodysplastic syndrome or multiple myeloma.  --Prior 7/14/2022 peripheral blood smear had shown moderate hypochromic to normochromic normocytic anemia, mild anisocytosis and poikilocytosis with ovalocytes and fragments suggestive of iron deficiency, lack of corresponding reticulocytosis, moderate rouleax formation.  --Given iron deficiency and significant drop in hemoglobin, agree with plan for capsule endoscopy.  --LDH is normal; haptoglobin pending -- this will likely be normal post-transfusion.  -Hospitalist has already ordered peripheral blood smear, serum and urine kappa and lambda free light chains -- results pending.  We will follow-up on results.  - Due to recurrent severe anemia, I recommended bone marrow biopsy -- Micah is agreeable. Orders placed for this with conscious sedation for tomorrow.     4.  Von Willebrand disease  --8/2/2022 PTT elevated at 41, factor VIII low at 19, vWF activity < 19, vWF antigen low at 15, vWF multimeric analysis showed absence of high and intermediate molecular weight multimers.   --Humate-P was given to him on prior admission prior to endoscopy.  --If no acute bleeding suspected, will hold off on giving additional Humate-P     5.  MGUS, IgG kappa  --Initially identified in 10/2013.  --Prior records from Dr. Munguia show that he had bone marrow biopsy in 4/2016 (no myeloma at that time; 5% plasma cells present consistent with MGUS).  --7/14/2022 M-spike 0.6  g/dL. (Prior M-spike 2021 was 0.7)  --2022 kappa FLC elevated at 63.73 (prior from 2021 was 33), lambda FLC elevated at 3.25, ratio elevated at 19.61.  -- Hospitalist has ordered repeat serum and urine And lambda free light chains.  We will follow-up on results.  -As noted above, cannot rule out possibility of multiple myeloma as a potential cause for severe anemia.  Bone marrow biopsy ordered.     6. Moderate aortic stenosis  --TTE on 8/3 showed moderate aortic stenosis.  -- Presented with severe dyspnea but likely related to severe anemia, now improved after blood transfusion.    Stella Plunkett MD  Hematology/Oncology  [facility]    Total time spent: 35 minutes in patient evaluation, counseling, documentation, and coordination of care.               Interval History:     Hgb decreased again to 6.9 from 7.4, necessitating transfusion.  Micah reports a dark stool since yesterday. No hematochezia.              Review of Systems:   As per subjective, otherwise 5 systems reviewed and negative.           Physical Exam:   Blood pressure 137/72, pulse 62, temperature 98  F (36.7  C), temperature source Oral, resp. rate 18, weight 92.4 kg (203 lb 11.3 oz), SpO2 97 %.      Vital Sign Ranges  Temperature Temp  Av.8  F (36.6  C)  Min: 97.3  F (36.3  C)  Max: 98.2  F (36.8  C)   Blood pressure Systolic (24hrs), Av , Min:109 , Max:141        Diastolic (24hrs), Av, Min:49, Max:76      Pulse Pulse  Av.1  Min: 56  Max: 68   Respirations Resp  Av.2  Min: 18  Max: 20   Pulse oximetry SpO2  Av %  Min: 97 %  Max: 99 %         Intake/Output Summary (Last 24 hours) at 2022 1606  Last data filed at 2022 1503  Gross per 24 hour   Intake 300 ml   Output 2625 ml   Net -2325 ml     GENERAL: No acute distress.  EYES: No erythema or scleral icterus.  ENT: Neck supple.  RESPIRATORY: No audible cough or wheezing.   GASTROINTESTINAL: No guarding.  NEUROLOGIC: Alert, oriented,  answers questions appropriately.  INTEGUMENTARY: Few small ecchymoses over arms           Medications:     No current outpatient medications on file.                Data:     Results for orders placed or performed during the hospital encounter of 08/19/22 (from the past 24 hour(s))   Hemoglobin   Result Value Ref Range    Hemoglobin 6.9 (LL) 13.3 - 17.7 g/dL   Hemoglobin   Result Value Ref Range    Hemoglobin 8.2 (L) 13.3 - 17.7 g/dL   Basic metabolic panel   Result Value Ref Range    Sodium 142 133 - 144 mmol/L    Potassium 5.3 3.4 - 5.3 mmol/L    Chloride 119 (H) 94 - 109 mmol/L    Carbon Dioxide (CO2) 19 (L) 20 - 32 mmol/L    Anion Gap 4 3 - 14 mmol/L    Urea Nitrogen 69 (H) 7 - 30 mg/dL    Creatinine 1.89 (H) 0.66 - 1.25 mg/dL    Calcium 8.0 (L) 8.5 - 10.1 mg/dL    Glucose 102 (H) 70 - 99 mg/dL    GFR Estimate 35 (L) >60 mL/min/1.73m2   D dimer quantitative   Result Value Ref Range    D-Dimer Quantitative 1.14 (H) 0.00 - 0.50 ug/mL FEU    Narrative    This D-dimer assay is intended for use in conjunction with a clinical pretest probability assessment model to exclude pulmonary embolism (PE) and deep venous thrombosis (DVT) in outpatients suspected of PE or DVT. The cut-off value is 0.50 ug/mL FEU.   Uric acid   Result Value Ref Range    Uric Acid 7.4 (H) 3.5 - 7.2 mg/dL   Lactate Dehydrogenase   Result Value Ref Range    Lactate Dehydrogenase 175 85 - 227 U/L   Hemoglobin   Result Value Ref Range    Hemoglobin 7.8 (L) 13.3 - 17.7 g/dL

## 2022-08-21 NOTE — PROVIDER NOTIFICATION
MD Notification    Notified Person: MD    Notified Person Name: FAUSTINA GILL    Notification Date/Time:  2117    Notification Interaction: page    Purpose of Notification: 2215. RS. FYI Hgb 6.9. Preparing 1 unit RBCs per protocol. Thanks. Tenisha [phone].

## 2022-08-21 NOTE — PLAN OF CARE
AO x4.  VSS.  IV saline locked.  Hemoglobin 7.8 today at noon.  Hemoglobin will be reassessed at 2000 this evening.  Hematology ordered bone marrow biopsy for tomorrow.  Bilateral lower extremity edema.Voiding in urinal at bedside.  Standing at bedside with A1, walker and gaitbelt.  PT recommending TCU at discharge.  Telemetry NSR with BBB.  Nursing will continue to monitor.

## 2022-08-21 NOTE — PROGRESS NOTES
Blood unit #[id number] W verified and transfusion initiated at 2154 with charge RN, Anand Hunter. [facility] protocol followed.

## 2022-08-21 NOTE — PLAN OF CARE
Goal Outcome Evaluation:    [id number]    Micah had a good evening. Here with chronic anemia. A&Ox4, pleasant. VSS on RA. Denies pain. Hgb recheck at 2000 was 6.9, conditional order met for another RBC unit. 0100 AM recheck 8.2. Has SOB at times upon exertion, but denies other symptoms. BLE edema, kept elevated overnight. Used urinal appropriately, voiding clear, yellow urine. Tele SR. Anticipate discharge when cleared. Will continue plan of care.

## 2022-08-21 NOTE — PROGRESS NOTES
GASTROENTEROLOGY PROGRESS NOTE       SUBJECTIVE:  Tired. No stools last two days. Transfused overnight.     OBJECTIVE:  /72 (BP Location: Right arm)   Pulse 62   Temp 98  F (36.7  C) (Oral)   Resp 18   Wt 92.4 kg (203 lb 11.3 oz)   SpO2 97%   BMI 30.08 kg/m    Temp (24hrs), Av.9  F (36.6  C), Min:97.3  F (36.3  C), Max:98.2  F (36.8  C)    Patient Vitals for the past 72 hrs:   Weight   22 1514 92.4 kg (203 lb 11.3 oz)       Intake/Output Summary (Last 24 hours) at 2022 1543  Last data filed at 2022 1503  Gross per 24 hour   Intake 300 ml   Output 2625 ml   Net -2325 ml        PHYSICAL EXAM     Cardiovascular: Normal  Respiratory: Normal  Gastrointestinal: Active BS, soft, NT/ND      Recent Labs   Lab Test 22  1154 22  0300 22  1150 22  0556 22  0108 22  1517 22  1513 22  0414 22  1947   WBC  --   --   --  4.8 4.0  --   --  5.3   < >  --    HGB 7.8* 8.2* 6.9* 7.4* 7.1*  7.1*   < >  --  4.5*   < >  --    MCV  --   --   --  98 98  --   --  115*   < >  --    PLT  --   --   --  126* 113*  --   --  146*   < >  --    INR  --   --   --   --   --   --  1.13  --   --  1.13    < > = values in this interval not displayed.     Recent Labs   Lab Test 22  0836 22  1150 22  0556   POTASSIUM 5.3 5.1 5.0   CHLORIDE 119* 119* 121*   CO2 19* 19* 16*   BUN 69* 82* 83*   ANIONGAP 4 5 6     Recent Labs   Lab Test 22  1513 22  0414 22  1845 22  1256   ALBUMIN 2.7* 2.7*  --  2.8*   BILITOTAL 0.2 0.6  --  0.2   ALT 14 13  --  13   AST 11 9  --  17   PROTEIN  --   --  20 *  --            Active Problems:   Anemia, unspecified type    Assessment: Hemoglobin continues to trend down without signs of GI bleeding. Still suspect main cause for anemia is not GI bleeding.    Plan: Will have team discuss pill camera study tomorrow.          Ridge England MD  [facility]  Office:  [phone]

## 2022-08-22 ENCOUNTER — LAB REQUISITION (OUTPATIENT)
Dept: LAB | Facility: CLINIC | Age: 82
End: 2022-08-22
Payer: COMMERCIAL

## 2022-08-22 DIAGNOSIS — D64.9 ANEMIA, UNSPECIFIED: ICD-10-CM

## 2022-08-22 LAB
ANION GAP SERPL CALCULATED.3IONS-SCNC: 5 MMOL/L (ref 3–14)
BASOPHILS # BLD AUTO: 0 10E3/UL (ref 0–0.2)
BASOPHILS NFR BLD AUTO: 1 %
BUN SERPL-MCNC: 63 MG/DL (ref 7–30)
CALCIUM SERPL-MCNC: 7.7 MG/DL (ref 8.5–10.1)
CHLORIDE BLD-SCNC: 122 MMOL/L (ref 94–109)
CO2 SERPL-SCNC: 17 MMOL/L (ref 20–32)
CREAT SERPL-MCNC: 1.89 MG/DL (ref 0.66–1.25)
D DIMER PPP FEU-MCNC: 1.15 UG/ML FEU (ref 0–0.5)
EOSINOPHIL # BLD AUTO: 0.2 10E3/UL (ref 0–0.7)
EOSINOPHIL NFR BLD AUTO: 5 %
ERYTHROCYTE [DISTWIDTH] IN BLOOD BY AUTOMATED COUNT: 26.4 % (ref 10–15)
GFR SERPL CREATININE-BSD FRML MDRD: 35 ML/MIN/1.73M2
GLUCOSE BLD-MCNC: 95 MG/DL (ref 70–99)
HAPTOGLOB SERPL-MCNC: 43 MG/DL (ref 32–197)
HCT VFR BLD AUTO: 26.4 % (ref 40–53)
HGB BLD-MCNC: 7.8 G/DL (ref 13.3–17.7)
HGB BLD-MCNC: 8.1 G/DL (ref 13.3–17.7)
HGB BLD-MCNC: 8.1 G/DL (ref 13.3–17.7)
IMM GRANULOCYTES # BLD: 0 10E3/UL
IMM GRANULOCYTES NFR BLD: 0 %
KAPPA LC FREE SER-MCNC: 49.73 MG/DL (ref 0.33–1.94)
KAPPA LC FREE/LAMBDA FREE SER NEPH: 15.35 {RATIO} (ref 0.26–1.65)
LAMBDA LC FREE SERPL-MCNC: 3.24 MG/DL (ref 0.57–2.63)
LYMPHOCYTES # BLD AUTO: 1.4 10E3/UL (ref 0.8–5.3)
LYMPHOCYTES NFR BLD AUTO: 34 %
MCH RBC QN AUTO: 30.8 PG (ref 26.5–33)
MCHC RBC AUTO-ENTMCNC: 30.7 G/DL (ref 31.5–36.5)
MCV RBC AUTO: 100 FL (ref 78–100)
MONOCYTES # BLD AUTO: 0.3 10E3/UL (ref 0–1.3)
MONOCYTES NFR BLD AUTO: 7 %
NEUTROPHILS # BLD AUTO: 2.1 10E3/UL (ref 1.6–8.3)
NEUTROPHILS NFR BLD AUTO: 53 %
NRBC # BLD AUTO: 0 10E3/UL
NRBC BLD AUTO-RTO: 0 /100
PATH REPORT.COMMENTS IMP SPEC: NORMAL
PATH REPORT.FINAL DX SPEC: NORMAL
PATH REPORT.MICROSCOPIC SPEC OTHER STN: NORMAL
PATH REPORT.MICROSCOPIC SPEC OTHER STN: NORMAL
PLATELET # BLD AUTO: 131 10E3/UL (ref 150–450)
PLATELET # BLD AUTO: 142 10E3/UL (ref 150–450)
POTASSIUM BLD-SCNC: 5.1 MMOL/L (ref 3.4–5.3)
RBC # BLD AUTO: 2.63 10E6/UL (ref 4.4–5.9)
RETICS # AUTO: 0.23 10E6/UL (ref 0.03–0.1)
RETICS # AUTO: 0.26 10E6/UL (ref 0.03–0.1)
RETICS/RBC NFR AUTO: 9.2 % (ref 0.5–2)
RETICS/RBC NFR AUTO: 9.9 % (ref 0.5–2)
SODIUM SERPL-SCNC: 144 MMOL/L (ref 133–144)
WBC # BLD AUTO: 4 10E3/UL (ref 4–11)

## 2022-08-22 PROCEDURE — 85049 AUTOMATED PLATELET COUNT: CPT | Performed by: INTERNAL MEDICINE

## 2022-08-22 PROCEDURE — 36415 COLL VENOUS BLD VENIPUNCTURE: CPT | Performed by: STUDENT IN AN ORGANIZED HEALTH CARE EDUCATION/TRAINING PROGRAM

## 2022-08-22 PROCEDURE — 99221 1ST HOSP IP/OBS SF/LOW 40: CPT | Performed by: INTERNAL MEDICINE

## 2022-08-22 PROCEDURE — 85379 FIBRIN DEGRADATION QUANT: CPT | Performed by: STUDENT IN AN ORGANIZED HEALTH CARE EDUCATION/TRAINING PROGRAM

## 2022-08-22 PROCEDURE — 85045 AUTOMATED RETICULOCYTE COUNT: CPT | Performed by: INTERNAL MEDICINE

## 2022-08-22 PROCEDURE — 99232 SBSQ HOSP IP/OBS MODERATE 35: CPT | Performed by: HOSPITALIST

## 2022-08-22 PROCEDURE — 80048 BASIC METABOLIC PNL TOTAL CA: CPT | Performed by: STUDENT IN AN ORGANIZED HEALTH CARE EDUCATION/TRAINING PROGRAM

## 2022-08-22 PROCEDURE — 85018 HEMOGLOBIN: CPT | Performed by: INTERNAL MEDICINE

## 2022-08-22 PROCEDURE — 36415 COLL VENOUS BLD VENIPUNCTURE: CPT | Performed by: INTERNAL MEDICINE

## 2022-08-22 PROCEDURE — 120N000001 HC R&B MED SURG/OB

## 2022-08-22 PROCEDURE — 99233 SBSQ HOSP IP/OBS HIGH 50: CPT | Performed by: INTERNAL MEDICINE

## 2022-08-22 PROCEDURE — 85018 HEMOGLOBIN: CPT | Performed by: STUDENT IN AN ORGANIZED HEALTH CARE EDUCATION/TRAINING PROGRAM

## 2022-08-22 PROCEDURE — 250N000013 HC RX MED GY IP 250 OP 250 PS 637: Performed by: HOSPITALIST

## 2022-08-22 PROCEDURE — 250N000013 HC RX MED GY IP 250 OP 250 PS 637: Performed by: INTERNAL MEDICINE

## 2022-08-22 RX ORDER — SODIUM BICARBONATE 650 MG/1
1300 TABLET ORAL 2 TIMES DAILY
Status: DISCONTINUED | OUTPATIENT
Start: 2022-08-22 | End: 2022-08-25 | Stop reason: HOSPADM

## 2022-08-22 RX ORDER — CALCIUM CARBONATE 500 MG/1
500 TABLET, CHEWABLE ORAL
Status: DISCONTINUED | OUTPATIENT
Start: 2022-08-22 | End: 2022-08-25 | Stop reason: HOSPADM

## 2022-08-22 RX ORDER — ALLOPURINOL 100 MG/1
200 TABLET ORAL DAILY
Status: DISCONTINUED | OUTPATIENT
Start: 2022-08-23 | End: 2022-08-25 | Stop reason: HOSPADM

## 2022-08-22 RX ADMIN — CALCIUM CARBONATE (ANTACID) CHEW TAB 500 MG 500 MG: 500 CHEW TAB at 17:42

## 2022-08-22 RX ADMIN — Medication 125 MCG: at 10:15

## 2022-08-22 RX ADMIN — SODIUM BICARBONATE 650 MG TABLET 1300 MG: at 20:34

## 2022-08-22 RX ADMIN — CALCIUM CARBONATE (ANTACID) CHEW TAB 500 MG 500 MG: 500 CHEW TAB at 12:01

## 2022-08-22 RX ADMIN — SODIUM BICARBONATE 650 MG TABLET 1300 MG: at 12:01

## 2022-08-22 RX ADMIN — ALLOPURINOL 100 MG: 100 TABLET ORAL at 10:15

## 2022-08-22 RX ADMIN — CITALOPRAM HYDROBROMIDE 5 MG: 10 TABLET ORAL at 10:15

## 2022-08-22 RX ADMIN — AMLODIPINE BESYLATE 5 MG: 5 TABLET ORAL at 10:14

## 2022-08-22 ASSESSMENT — ACTIVITIES OF DAILY LIVING (ADL)
ADLS_ACUITY_SCORE: 38

## 2022-08-22 NOTE — PROGRESS NOTES
GASTROENTEROLOGY PROGRESS NOTE     IMPRESSION:    #1 Acute on chronic anemia, No Overt bleeding  #2 MGUS  #3 CKD    Negative recent EGD/Colon. Absence of overt bleeding may point to other etiologies (BM, CKD).     RECOMMENDATIONS:  1. Pending bone marrow biopsy   2. Outpatient Pillcam will be arranged. Ordered. This can be cancelled if bone marrow biopsy provides sufficient explanation. Discussed with pt who agrees.     GI team will sign off. Pt will be contacted for pillcam (pt lives in [place], pillcam can be arranged at close [facility] site at [place]).    Please call back if change in status or overt bleeding.      Rachid Mitchell MD  [facility]  [phone]      ________________________________________________________________________      SUBJECTIVE:  He is disappointed that bone marrow bx cannot be done today. Confirms absence of any bleeding or melena.           OBJECTIVE:  BP (!) 149/76 (BP Location: Right arm)   Pulse 58   Temp 98  F (36.7  C) (Oral)   Resp 18   Wt 92.4 kg (203 lb 11.3 oz)   SpO2 99%   BMI 30.08 kg/m    Temp (24hrs), Av.1  F (36.7  C), Min:98  F (36.7  C), Max:98.3  F (36.8  C)    Patient Vitals for the past 72 hrs:   Weight   22 1514 92.4 kg (203 lb 11.3 oz)        PHYSICAL EXAM  GEN: No acute distress.        Additional Data:  I have reviewed the patient's new clinical lab results:     Recent Labs   Lab Test 22  0558 22  2243 22  1150 22  0556 22  0108 22  1517 22  1513 22  0414 22  1947   WBC  --   --   --   --  4.8 4.0  --   --  5.3   < >  --    HGB 7.8* 7.7* 7.9*   < > 7.4* 7.1*  7.1*   < >  --  4.5*   < >  --    MCV  --   --   --   --  98 98  --   --  115*   < >  --    *  --   --   --  126* 113*  --   --  146*   < >  --    INR  --   --   --   --   --   --   --  1.13  --   --  1.13    < > = values in this interval not displayed.     Recent Labs   Lab Test  08/22/22  0558 08/21/22  0836 08/20/22  1150    142 143   POTASSIUM 5.1 5.3 5.1   CHLORIDE 122* 119* 119*   CO2 17* 19* 19*   BUN 63* 69* 82*   CR 1.89* 1.89* 2.12*   ANIONGAP 5 4 5   KATT 7.7* 8.0* 7.9*   GLC 95 102* 101*     Recent Labs   Lab Test 08/19/22  1513 08/02/22  0414 08/01/22  1845 08/01/22  1256   ALBUMIN 2.7* 2.7*  --  2.8*   BILITOTAL 0.2 0.6  --  0.2   ALT 14 13  --  13   AST 11 9  --  17   PROTEIN  --   --  20 *  --        Total time: 30 minutes,  at least 50% time spent in coordination of care, counseling, and discussions with pt/family/team members.

## 2022-08-22 NOTE — PROGRESS NOTES
[facility]    Medicine Progress Note - Hospitalist Service    Date of Admission:  8/19/2022    Assessment & Plan          This is a 82-year-old male with medical history which includes hypertension, gout, vitamin D deficiency, moderate aortic stenosis with moderate aortic root dilatation, recent right lower extremity cellulitis, 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, history of recurrent epistaxis, von Willebrand disease recently lost to follow-up, chronic anemia who was recently admitted to the hospital on 8/1 with hemoglobin of 5.2 and was given 2 units of blood with IV Venofer and underwent EGD on 8/2 followed by colonoscopy on 8/5 without significant finding who had labs drawn on 8/18 and was found to have hemoglobin of 4.5 and was admitted to the hospital      1) recurrent acute on chronic anemia  Iron deficiency  -Baseline hemoglobin around 5.2  -Patient was recently admitted to the hospital on 8/1 with hemoglobin of 5.2 with melena and received 2 units of blood during that hospital stay and he had work-up done including EGD and colonoscopy which were unremarkable  -He has been given 2 units of blood and hemoglobin this morning is stable at 7.8 and he is being followed by GI team  -The GI team does agree with bone marrow biopsy to find other causes of anemia and also outpatient PillCam will be arranged as outpatient and they have signed off  -Of note hematology does not suspect any ongoing hemolysis  -His iron labs will be ordered for tomorrow and if they are on the low side then he will also be given IV iron      2) von Willebrand disease  MGUS  -He is followed by hematology team and appreciate input and they did mention that he is at increased risk of bleeding and they will give him Humate-P before the bone marrow biopsy  -He has known MGUS and bone marrow biopsy will be helpful in further treatment    3) hypertension  -We will continue the patient with amlodipine    4) gout  -We will  continue with allopurinol    5) Moderate aortic stenosis, possible bicuspid aortic valve  Moderate aortic root dilatation  Reporting shortness of breath with minimal exertion, likely due to severe anemia above.  EKG without ischemic changes, troponin and BNP wnl.  CXR with mild basilar opacities suspected to be atelectasis.  TTE 8/3 with moderate aortic stenosis and moderately dilated aortic root.    - Follow up with PCP for routine outpatient monitoring if desired based on goals of care    6) physical deconditioning  -Continue with physical therapy and Occupational Therapy    7) vitamin D deficiency  -Continue with vitamin replacement as his vitamin D levels were low recently    8) history of recurrent epistaxis  -He has been stable for the past 3 months and will need to follow his ENT as outpatient    9) acute kidney injury resolved on chronic kidney disease stage III/IV  Chronic hyperchloremia  Metabolic acidosis due to kidney disease  -His baseline creatinine is around 1.9  -Creatinine on admission was 2.31 and this was most likely due to blood loss and creatinine has improved to 1.89  -He does have chronic hyperchloremia and chronic metabolic acidosis and was supposed to follow-up with nephrology and I have consulted them as his hyperchloremia seems to getting worse along with acidosis               Diet: Regular Diet Adult  NPO per Anesthesia Guidelines for Procedure/Surgery Except for: Meds    DVT Prophylaxis: Pneumatic Compression Devices  Angulo Catheter: Not present  Central Lines: None  Cardiac Monitoring: ACTIVE order. Indication: Electrolyte Imbalance (24 hours)- Magnesium <1.3 mg/ml; Potassium < =2.8 or > 5.5 mg/ml  Code Status: No CPR- Do NOT Intubate      Disposition Plan      Expected Discharge Date: 08/23/2022      Destination: assisted living          The patient's care was discussed with the Bedside Nurse and Patient.    Silva Roca MD  Hospitalist Service  [facility]  "Hospital  Securely message with the Vocera Web Console (learn more here)  Text page via [employer] Paging/Directory         Clinically Significant Risk Factors Present on Admission               # Overweight: Estimated body mass index is 28.53 kg/m  as calculated from the following:    Height as of 8/1/22: 1.753 m (5' 9\").    Weight as of this encounter: 87.6 kg (193 lb 3 oz).        ______________________________________________________________________    Interval History     Patient today and he mentioned to me that he is feeling good and he walks with the help of a walker.  He denies any fever, chills, nausea and vomiting.  Patient understand that he will undergo bone marrow biopsy tomorrow.    Data reviewed today: I reviewed all medications, new labs and imaging results over the last 24 hours. I personally reviewed no images or EKG's today.    Physical Exam   Vital Signs: Temp: 97.6  F (36.4  C) Temp src: Oral BP: 133/86 Pulse: 57   Resp: 16 SpO2: 98 % O2 Device: None (Room air)    Weight: 193 lbs 3 oz        General: Patient appears comfortable and in no acute distress.  HEENT: Head is atraumatic, normocephalic.  Pupils are equal, round and reactive to light.  No scleral icterus. Oral mucosa is moist   Neck: Neck is supple and No Lymphadenopathy   Respiratory: Lungs are clear to auscultation bilaterally with no wheeze or crackles   Cardiovascular: Regular rate , S1 and S2 normal with no murmer or rubs or gallops  Abdomen:   soft , non tender , non distended and bowel sound present   Skin: No skin rashes or lesions to inspection or palpation.  Neurologic: Higher functions are within normal limits. No obvious defects in speech, language and memory. No facial droop  Musculoskeletal: Normal Range of motion over upper and lower extremities bilaterally   Psychiatric: cooperative     Data   Recent Labs   Lab 08/22/22  1513 08/22/22  0558 08/21/22  2243 08/21/22  1154 08/21/22  0836 08/20/22 2007 08/20/22  1150 " 08/20/22  0556 08/19/22  1623 08/19/22  1517 08/19/22  1513   WBC  --   --   --   --   --   --  4.8 4.0  --   --  5.3   HGB 8.1* 7.8* 7.7*   < >  --    < > 7.4* 7.1*  7.1*   < >  --  4.5*   MCV  --   --   --   --   --   --  98 98  --   --  115*   PLT  --  131*  --   --   --   --  126* 113*  --   --  146*   INR  --   --   --   --   --   --   --   --   --  1.13  --    NA  --  144  --   --  142  --  143 143   < >  --  140   POTASSIUM  --  5.1  --   --  5.3  --  5.1 5.0   < >  --  6.2*   CHLORIDE  --  122*  --   --  119*  --  119* 121*   < >  --  118*   CO2  --  17*  --   --  19*  --  19* 16*   < >  --  16*   BUN  --  63*  --   --  69*  --  82* 83*   < >  --  81*   CR  --  1.89*  --   --  1.89*  --  2.12* 2.12*   < >  --  2.31*   ANIONGAP  --  5  --   --  4  --  5 6   < >  --  6   KATT  --  7.7*  --   --  8.0*  --  7.9* 7.8*   < >  --  8.2*   GLC  --  95  --   --  102*  --  101* 95   < >  --  131*   ALBUMIN  --   --   --   --   --   --   --   --   --   --  2.7*   PROTTOTAL  --   --   --   --   --   --   --   --   --   --  5.3*   BILITOTAL  --   --   --   --   --   --   --   --   --   --  0.2   ALKPHOS  --   --   --   --   --   --   --   --   --   --  57   ALT  --   --   --   --   --   --   --   --   --   --  14   AST  --   --   --   --   --   --   --   --   --   --  11    < > = values in this interval not displayed.     No results found for this or any previous visit (from the past 24 hour(s)).  Medications       [START ON 8/23/2022] allopurinol  200 mg Oral Daily     amLODIPine  5 mg Oral Daily     calcium carbonate  500 mg Oral TID w/meals     citalopram  5 mg Oral Daily     [START ON 8/23/2022] factor VIII-VWF  60 Units/kg (Ideal) Intravenous Once     midazolam  1 mg Intravenous Once within 24 hrs     sodium bicarbonate  1,300 mg Oral BID     sodium chloride (PF)  3 mL Intracatheter Q8H     Vitamin D3  125 mcg Oral Daily

## 2022-08-22 NOTE — PROGRESS NOTES
Service Date: 08/22/2022    SUBJECTIVE:  Mr. Freedman is an 82-year-old gentleman with multiple medical problems including MGUS, anemia, von Willebrand disease and chronic kidney disease.  The patient admitted with severe anemia.  His hemoglobin was 4.5.  He has received transfusion.  His hemoglobin is 7.8.    During last admission, multiple other investigations were done.  Labs revealed iron deficiency.  EGD and colonoscopy was negative for bleeding.  He does have hiatal hernia.  PillCam is planned as outpatient.    Because of recurrent anemia, Dr. Plunkett ordered a bone marrow biopsy, which is going to be done tomorrow.    The patient has von Willebrand disease.  On 08/02/2022, low factor VIII, low von Willebrand factor activity and low von Willebrand factor antigen.  Multimer analysis revealed absence of high intermediate molecular weight multimers.  The patient has von Willebrand disease, type II variant.    The patient denies bleeding from any site.    PHYSICAL EXAMINATION:    GENERAL:  He is alert.  He looked weak.  Not in respiratory distress.  Rest of systems not examined.    LABS:  Reviewed.    ASSESSMENT:    1.  An 82-year-old gentleman with severe anemia.  2.  MGUS.  3.  Type 2 von Willebrand disease.  4.  Iron deficiency.  5.  Renal insufficiency.    PLAN:     1.  The patient has recurrent severe anemia.  There is nothing to suggest any bleeding.  No suspicion of hemolysis.    I agree with bone marrow biopsy to rule out primary bone marrow pathology.    2.  The patient has von Willebrand's disease.  There is a risk of bleeding.  We will give him Humate-P before the bone marrow biopsy.    3.  The patient has MGUS.  Bone marrow biopsy will be helpful in evaluation.    4.  We will get some labs tomorrow including CBC and repeat iron studies.  If iron deficient, we will give him IV iron.    5.  The patient had a few questions, which were all answered.  Oncology will continue to follow.    Total visit time of 35  minutes.  Time spent in today's visit, review of chart/investigations today and documentation today.    Power Edwards MD        D: 2022   T: 2022   MT: AURELIA    Name:     IVANIA GOMEZ  MRN:      [id number]        Account:      [id number]   :      1940           Service Date: 2022       Document: [id number]

## 2022-08-22 NOTE — PLAN OF CARE
Pt Aox4. VSS on RA. PIV saline locked, flushing well. Hgb q8 hrs, last value 7.8. Bone marrow biopsy ordered for today. BLE edema, pt reports this is chronic issue. A1 with walker and GB for use of urinal. Voiding regularly. Denies BM this shift. Tele Sinus bradycardia at times. Per SW note, pt would prefer to go back to [facility] with homecare rather than TCU.

## 2022-08-22 NOTE — PLAN OF CARE
Oriented x4.  VSS but bradycardic at times.  IV saline locked.  Bilateral lower extremity edema.  Standing at bedside to void via urinal.  Up with A1, walker and gaitbelt.  Good appetite.  Sleeping between cares.  Nephrology consulted for chronic kidney disease, labs ordered.  Serial hemoglobin checks with most recent at 1515 reveals hemoglobin of 8.1.  Plan for Bone Marrow Biopsy tomorrow at 1245.  Will be NPO at 0400.  Nursing will continue to monitor.

## 2022-08-22 NOTE — PLAN OF CARE
Goal Outcome Evaluation:    Plan of Care Reviewed With: patient     Overall Patient Progress: no change     Pt Aox4. VSS on RA. PIV saline locked, flushing well. Hgb q8 hrs, last value 7.8. Bone marrow biopsy ordered for today. NPO since midnight in case biopsy requires anesthesia. BLE edema, pt reports this is chronic issue. A1 with walker and GB for use of urinal. Voiding regularly. Denies BM this shift. Tele Sinus bradycardia at times. Per SW note, pt would prefer to go back to [facility] with homecare rather than TCU.

## 2022-08-22 NOTE — CONSULTS
Consult Date: 08/22/2022    REQUESTING PHYSICIAN:  Dr. Roca.    CONSULTANT:  Adriel Li MD.    REASON FOR CONSULTATION:  Chronic kidney disease.    HISTORY OF PRESENT ILLNESS:  This is an 82-year-old who was admitted for evaluation of severe anemia.  He has had blood transfusions and has been seen by Hematology/Oncology.  He has MGUS-kappa and is getting a bone marrow biopsy today.  He also has von Willebrand disease.  Other comorbidities include aortic stenosis, hypertension and gout.  He has chronic kidney disease stage III based on historical review of his laboratories.  He was previously seen by Dr. Bosch, Nephrology, on 08/02/2022.  He has not had any urological surgery or kidney stones.  He has not seen a nephrologist in the past.    Today, he feels generally comfortable.  He has no chest or abdominal pain.  He has no nausea or vomiting.  He is passing his urine without difficulty.    REVIEW OF SYSTEMS:  Otherwise negative.    CURRENT MEDICATIONS:  Include amlodipine, vitamin D, and allopurinol.    SOCIAL HISTORY:  He lives in assisted living in [place], [place], alone.  He is a retired teacher and business owner.    FAMILY HISTORY:  Multiple family members with diabetes and hypertension.    PHYSICAL EXAMINATION:    GENERAL:  He is alert, resting comfortably in bed.  His weight on 08/19/2022 was 92.4 kilograms.  Today, his blood pressure 149/76, respiratory rate 18, pulse 58.  He is afebrile.  SKIN:  Negative.  HEENT:  Shows no trauma.  The eyes show pupils, round and react to light.  Mouth shows good dentition.  NECK:  Supple.  LUNGS:  Clear anterior breath sounds, no wheezes or rales.  HEART:  Moderate jugular venous distention.  Normal S1, S2.  It appears to be regular rhythm.  There is a 2/6 systolic murmur.  ABDOMEN:  There is no bruit over the right upper quadrant and left upper quadrant.  Normal bowel sounds, soft and nontender.  EXTREMITIES:  Normal nails, joints and pulses in the upper  extremities.  Lower extremities show 2+ edema.    LABORATORY DATA:  On 2022, PTH was elevated at 97.  On 2022, his magnesium was 2.4, phosphorus 3.3, creatinine 1.89, estimated GFR 35, potassium 5.1, bicarbonate 17, calcium 7.7, hemoglobin 7.8.      A chest x-ray on 2022 was normal.      On 2022, a renal ultrasound showed small atrophic and echogenic right kidney at 7.7 cm with no hydronephrosis.  The left kidney was 10.4 cm with some atrophic and echogenic changes, but no hydronephrosis.    IMPRESSION:     1.  Chronic kidney disease, stage IIIB, with an atrophic right kidney.  He has vascular bruits over both upper quadrants and may have renal vascular disease.  He does not have obstruction.  His creatinine is stable to improved over the last month.  He does have a low bicarbonate and a borderline high potassium.    We will start oral bicarbonate replacement given his hyperparathyroidism.  We will continue his vitamin D and add calcium carbonate 1 tablet with each meal.     2.  He has hypertension and is on amlodipine and this can continue for now.  We may need to increase the dose to 10 mg a day, pending review of his blood pressures.    3.  Anemia.  I am sure a large part of this is his chronic kidney disease.  Anyone with chronic kidney disease, stage IIIB would be expected to have anemia, although perhaps not quite as severe as this patient.  We will check iron, B12 and folate levels.  Subsequently, he will need Aranesp injections and I would start him on 100 mcg every 2 weeks until his blood counts come up.  This can be managed by the [facility], or by Hematology/Oncology, who has seen the patient.    4.  Gout.  We will increase his allopurinol to 200 mg per day for a goal uric acid to be less than 6.0.    Adriel Li MD        D: 2022   T: 2022   MT: [id number]    Name:     IVANIA GOMEZ  MRN:      [id number]        Account:      [id number]   :       1940           Consult Date: 08/22/2022     Document: [id number]

## 2022-08-23 ENCOUNTER — ANESTHESIA (OUTPATIENT)
Dept: GASTROENTEROLOGY | Facility: CLINIC | Age: 82
DRG: 812 | End: 2022-08-23
Payer: COMMERCIAL

## 2022-08-23 ENCOUNTER — ANESTHESIA EVENT (OUTPATIENT)
Dept: GASTROENTEROLOGY | Facility: CLINIC | Age: 82
DRG: 812 | End: 2022-08-23
Payer: COMMERCIAL

## 2022-08-23 LAB
ALBUMIN SERPL-MCNC: 2.6 G/DL (ref 3.4–5)
ALP SERPL-CCNC: 66 U/L (ref 40–150)
ALT SERPL W P-5'-P-CCNC: 16 U/L (ref 0–70)
ANION GAP SERPL CALCULATED.3IONS-SCNC: 2 MMOL/L (ref 3–14)
AST SERPL W P-5'-P-CCNC: 13 U/L (ref 0–45)
BASOPHILS # BLD AUTO: 0 10E3/UL (ref 0–0.2)
BASOPHILS NFR BLD AUTO: 1 %
BILIRUB SERPL-MCNC: 0.9 MG/DL (ref 0.2–1.3)
BUN SERPL-MCNC: 50 MG/DL (ref 7–30)
CALCIUM SERPL-MCNC: 7.9 MG/DL (ref 8.5–10.1)
CHLORIDE BLD-SCNC: 123 MMOL/L (ref 94–109)
CO2 SERPL-SCNC: 20 MMOL/L (ref 20–32)
CREAT SERPL-MCNC: 1.65 MG/DL (ref 0.66–1.25)
D DIMER PPP FEU-MCNC: 1.26 UG/ML FEU (ref 0–0.5)
EOSINOPHIL # BLD AUTO: 0.2 10E3/UL (ref 0–0.7)
EOSINOPHIL NFR BLD AUTO: 5 %
ERYTHROCYTE [DISTWIDTH] IN BLOOD BY AUTOMATED COUNT: 26.6 % (ref 10–15)
FACT VIII ACT/NOR PPP: 11 % (ref 55–200)
FERRITIN SERPL-MCNC: 56 NG/ML (ref 26–388)
GFR SERPL CREATININE-BSD FRML MDRD: 41 ML/MIN/1.73M2
GLUCOSE BLD-MCNC: 100 MG/DL (ref 70–99)
HCT VFR BLD AUTO: 27.9 % (ref 40–53)
HGB BLD-MCNC: 8.1 G/DL (ref 13.3–17.7)
HGB BLD-MCNC: 8.4 G/DL (ref 13.3–17.7)
HGB BLD-MCNC: 8.4 G/DL (ref 13.3–17.7)
HGB BLD-MCNC: 8.9 G/DL (ref 13.3–17.7)
IMM GRANULOCYTES # BLD: 0 10E3/UL
IMM GRANULOCYTES NFR BLD: 1 %
IRON SERPL-MCNC: 37 UG/DL (ref 35–180)
LYMPHOCYTES # BLD AUTO: 1.2 10E3/UL (ref 0.8–5.3)
LYMPHOCYTES NFR BLD AUTO: 29 %
MAGNESIUM SERPL-MCNC: 2.4 MG/DL (ref 1.6–2.3)
MCH RBC QN AUTO: 31.1 PG (ref 26.5–33)
MCHC RBC AUTO-ENTMCNC: 29.6 G/DL (ref 31.5–36.5)
MCV RBC AUTO: 102 FL (ref 78–100)
MONOCYTES # BLD AUTO: 0.3 10E3/UL (ref 0–1.3)
MONOCYTES NFR BLD AUTO: 7 %
NEUTROPHILS # BLD AUTO: 2.4 10E3/UL (ref 1.6–8.3)
NEUTROPHILS NFR BLD AUTO: 57 %
NRBC # BLD AUTO: 0 10E3/UL
NRBC BLD AUTO-RTO: 0 /100
PLATELET # BLD AUTO: 135 10E3/UL (ref 150–450)
POTASSIUM BLD-SCNC: 5.8 MMOL/L (ref 3.4–5.3)
PROT SERPL-MCNC: 5.1 G/DL (ref 6.8–8.8)
RBC # BLD AUTO: 2.73 10E6/UL (ref 4.4–5.9)
RETICS # AUTO: 0.25 10E6/UL (ref 0.03–0.1)
RETICS/RBC NFR AUTO: 9.3 % (ref 0.5–2)
SODIUM SERPL-SCNC: 145 MMOL/L (ref 133–144)
VIT B12 SERPL-MCNC: 360 PG/ML (ref 232–1245)
VWF AG ACT/NOR PPP IA: 11 % (ref 50–200)
WBC # BLD AUTO: 4.1 10E3/UL (ref 4–11)

## 2022-08-23 PROCEDURE — 88368 INSITU HYBRIDIZATION MANUAL: CPT | Mod: 26 | Performed by: MEDICAL GENETICS

## 2022-08-23 PROCEDURE — 82728 ASSAY OF FERRITIN: CPT | Performed by: INTERNAL MEDICINE

## 2022-08-23 PROCEDURE — 82607 VITAMIN B-12: CPT | Performed by: INTERNAL MEDICINE

## 2022-08-23 PROCEDURE — 83735 ASSAY OF MAGNESIUM: CPT | Performed by: INTERNAL MEDICINE

## 2022-08-23 PROCEDURE — 38222 DX BONE MARROW BX & ASPIR: CPT | Performed by: PATHOLOGY

## 2022-08-23 PROCEDURE — 250N000011 HC RX IP 250 OP 636: Performed by: ANESTHESIOLOGY

## 2022-08-23 PROCEDURE — 120N000001 HC R&B MED SURG/OB

## 2022-08-23 PROCEDURE — 85045 AUTOMATED RETICULOCYTE COUNT: CPT | Performed by: PATHOLOGY

## 2022-08-23 PROCEDURE — 88369 M/PHMTRC ALYSISHQUANT/SEMIQ: CPT | Mod: 26 | Performed by: MEDICAL GENETICS

## 2022-08-23 PROCEDURE — 250N000015 HC RX FACTOR IP MED 636 OP 636: Performed by: INTERNAL MEDICINE

## 2022-08-23 PROCEDURE — 36415 COLL VENOUS BLD VENIPUNCTURE: CPT | Performed by: INTERNAL MEDICINE

## 2022-08-23 PROCEDURE — 88185 FLOWCYTOMETRY/TC ADD-ON: CPT | Performed by: PATHOLOGY

## 2022-08-23 PROCEDURE — 250N000009 HC RX 250: Performed by: ANESTHESIOLOGY

## 2022-08-23 PROCEDURE — 99232 SBSQ HOSP IP/OBS MODERATE 35: CPT | Performed by: INTERNAL MEDICINE

## 2022-08-23 PROCEDURE — 80053 COMPREHEN METABOLIC PANEL: CPT | Performed by: INTERNAL MEDICINE

## 2022-08-23 PROCEDURE — 88275 CYTOGENETICS 100-300: CPT | Performed by: PATHOLOGY

## 2022-08-23 PROCEDURE — 258N000003 HC RX IP 258 OP 636: Performed by: ANESTHESIOLOGY

## 2022-08-23 PROCEDURE — 07DR3ZX EXTRACTION OF ILIAC BONE MARROW, PERCUTANEOUS APPROACH, DIAGNOSTIC: ICD-10-PCS | Performed by: PATHOLOGY

## 2022-08-23 PROCEDURE — 250N000009 HC RX 250: Performed by: PATHOLOGY

## 2022-08-23 PROCEDURE — 36415 COLL VENOUS BLD VENIPUNCTURE: CPT | Performed by: STUDENT IN AN ORGANIZED HEALTH CARE EDUCATION/TRAINING PROGRAM

## 2022-08-23 PROCEDURE — 88237 TISSUE CULTURE BONE MARROW: CPT | Performed by: PATHOLOGY

## 2022-08-23 PROCEDURE — 88189 FLOWCYTOMETRY/READ 16 & >: CPT | Mod: GC | Performed by: STUDENT IN AN ORGANIZED HEALTH CARE EDUCATION/TRAINING PROGRAM

## 2022-08-23 PROCEDURE — 88305 TISSUE EXAM BY PATHOLOGIST: CPT | Mod: TC | Performed by: PATHOLOGY

## 2022-08-23 PROCEDURE — 88291 CYTO/MOLECULAR REPORT: CPT | Performed by: MEDICAL GENETICS

## 2022-08-23 PROCEDURE — 999N000010 HC STATISTIC ANES STAT CODE-CRNA PER MINUTE: Performed by: PATHOLOGY

## 2022-08-23 PROCEDURE — 88311 DECALCIFY TISSUE: CPT | Mod: TC | Performed by: PATHOLOGY

## 2022-08-23 PROCEDURE — 079T3ZX DRAINAGE OF BONE MARROW, PERCUTANEOUS APPROACH, DIAGNOSTIC: ICD-10-PCS | Performed by: PATHOLOGY

## 2022-08-23 PROCEDURE — 99232 SBSQ HOSP IP/OBS MODERATE 35: CPT | Performed by: STUDENT IN AN ORGANIZED HEALTH CARE EDUCATION/TRAINING PROGRAM

## 2022-08-23 PROCEDURE — 38221 DX BONE MARROW BIOPSIES: CPT | Performed by: PATHOLOGY

## 2022-08-23 PROCEDURE — 250N000013 HC RX MED GY IP 250 OP 250 PS 637: Performed by: INTERNAL MEDICINE

## 2022-08-23 PROCEDURE — 370N000017 HC ANESTHESIA TECHNICAL FEE, PER MIN: Performed by: PATHOLOGY

## 2022-08-23 PROCEDURE — 250N000013 HC RX MED GY IP 250 OP 250 PS 637: Performed by: HOSPITALIST

## 2022-08-23 PROCEDURE — 88184 FLOWCYTOMETRY/ TC 1 MARKER: CPT | Performed by: STUDENT IN AN ORGANIZED HEALTH CARE EDUCATION/TRAINING PROGRAM

## 2022-08-23 PROCEDURE — 85379 FIBRIN DEGRADATION QUANT: CPT | Performed by: STUDENT IN AN ORGANIZED HEALTH CARE EDUCATION/TRAINING PROGRAM

## 2022-08-23 PROCEDURE — 83540 ASSAY OF IRON: CPT | Performed by: INTERNAL MEDICINE

## 2022-08-23 PROCEDURE — 85018 HEMOGLOBIN: CPT | Performed by: STUDENT IN AN ORGANIZED HEALTH CARE EDUCATION/TRAINING PROGRAM

## 2022-08-23 PROCEDURE — 85025 COMPLETE CBC W/AUTO DIFF WBC: CPT | Performed by: PATHOLOGY

## 2022-08-23 RX ORDER — LIDOCAINE HYDROCHLORIDE 20 MG/ML
INJECTION, SOLUTION INFILTRATION; PERINEURAL PRN
Status: DISCONTINUED | OUTPATIENT
Start: 2022-08-23 | End: 2022-08-23

## 2022-08-23 RX ORDER — LIDOCAINE HYDROCHLORIDE 10 MG/ML
8-10 INJECTION, SOLUTION EPIDURAL; INFILTRATION; INTRACAUDAL; PERINEURAL
Status: DISCONTINUED | OUTPATIENT
Start: 2022-08-23 | End: 2022-08-23 | Stop reason: HOSPADM

## 2022-08-23 RX ORDER — LIDOCAINE HYDROCHLORIDE AND EPINEPHRINE 10; 10 MG/ML; UG/ML
INJECTION, SOLUTION INFILTRATION; PERINEURAL PRN
Status: COMPLETED | OUTPATIENT
Start: 2022-08-23 | End: 2022-08-23

## 2022-08-23 RX ORDER — SODIUM CHLORIDE, SODIUM LACTATE, POTASSIUM CHLORIDE, CALCIUM CHLORIDE 600; 310; 30; 20 MG/100ML; MG/100ML; MG/100ML; MG/100ML
INJECTION, SOLUTION INTRAVENOUS CONTINUOUS PRN
Status: DISCONTINUED | OUTPATIENT
Start: 2022-08-23 | End: 2022-08-23

## 2022-08-23 RX ORDER — PROPOFOL 10 MG/ML
INJECTION, EMULSION INTRAVENOUS CONTINUOUS PRN
Status: DISCONTINUED | OUTPATIENT
Start: 2022-08-23 | End: 2022-08-23

## 2022-08-23 RX ORDER — DEXMEDETOMIDINE HYDROCHLORIDE 4 UG/ML
INJECTION, SOLUTION INTRAVENOUS PRN
Status: DISCONTINUED | OUTPATIENT
Start: 2022-08-23 | End: 2022-08-23

## 2022-08-23 RX ORDER — LIDOCAINE 40 MG/G
CREAM TOPICAL
Status: DISCONTINUED | OUTPATIENT
Start: 2022-08-23 | End: 2022-08-23 | Stop reason: HOSPADM

## 2022-08-23 RX ADMIN — Medication 12 MCG: at 13:59

## 2022-08-23 RX ADMIN — SODIUM CHLORIDE, POTASSIUM CHLORIDE, SODIUM LACTATE AND CALCIUM CHLORIDE: 600; 310; 30; 20 INJECTION, SOLUTION INTRAVENOUS at 13:57

## 2022-08-23 RX ADMIN — Medication 8 MCG: at 14:09

## 2022-08-23 RX ADMIN — AMLODIPINE BESYLATE 5 MG: 5 TABLET ORAL at 08:25

## 2022-08-23 RX ADMIN — SODIUM BICARBONATE 650 MG TABLET 1300 MG: at 22:27

## 2022-08-23 RX ADMIN — Medication 125 MCG: at 08:25

## 2022-08-23 RX ADMIN — LIDOCAINE HYDROCHLORIDE 40 MG: 20 INJECTION, SOLUTION INFILTRATION; PERINEURAL at 13:58

## 2022-08-23 RX ADMIN — PROPOFOL 200 MCG/KG/MIN: 10 INJECTION, EMULSION INTRAVENOUS at 13:58

## 2022-08-23 RX ADMIN — ALLOPURINOL 200 MG: 100 TABLET ORAL at 08:25

## 2022-08-23 RX ADMIN — ANTIHEMOPHILIC FACTOR/VON WILLEBRAND FACTOR COMPLEX (HUMAN) 4052 VWF UNIT: KIT at 08:29

## 2022-08-23 RX ADMIN — SODIUM BICARBONATE 650 MG TABLET 1300 MG: at 08:24

## 2022-08-23 RX ADMIN — LIDOCAINE HYDROCHLORIDE AND EPINEPHRINE 6 ML: 10; 10 INJECTION, SOLUTION INFILTRATION; PERINEURAL at 14:08

## 2022-08-23 RX ADMIN — CITALOPRAM HYDROBROMIDE 5 MG: 10 TABLET ORAL at 08:25

## 2022-08-23 RX ADMIN — CALCIUM CARBONATE (ANTACID) CHEW TAB 500 MG 500 MG: 500 CHEW TAB at 19:37

## 2022-08-23 ASSESSMENT — ACTIVITIES OF DAILY LIVING (ADL)
ADLS_ACUITY_SCORE: 42
ADLS_ACUITY_SCORE: 38
ADLS_ACUITY_SCORE: 38
ADLS_ACUITY_SCORE: 42
ADLS_ACUITY_SCORE: 38
ADLS_ACUITY_SCORE: 42
ADLS_ACUITY_SCORE: 38

## 2022-08-23 ASSESSMENT — LIFESTYLE VARIABLES: TOBACCO_USE: 1

## 2022-08-23 NOTE — PROGRESS NOTES
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, history of recurrent epistaxis, von Willebrand disease recently lost to follow-up, chronic anemia who was recently admitted to the hospital on 8/1 with hemoglobin of 5.2 and was given 2 units of blood with IV Venofer and underwent EGD on 8/2 followed by colonoscopy on 8/5 without significant finding who had labs drawn on 8/18 and was found to have hemoglobin of 4.5 and was admitted to the hospital      1) recurrent acute on chronic anemia  Iron deficiency  -Baseline hemoglobin around 5.2  -Patient was recently admitted to the hospital on 8/1 with hemoglobin of 5.2 with melena and received 2 units of blood during that hospital stay and he had work-up done including EGD and colonoscopy which were unremarkable  -He has been given 2 units of blood and hemoglobin this morning is stable at 7.8 and he is being followed by GI team  -The GI team does agree with bone marrow biopsy to find other causes of anemia and also outpatient [facility] will be arranged as outpatient and they have signed off  -Of note hematology does not suspect any ongoing hemolysis -> plan for bone marrow biopsy to rule out primary bone marrow pathology causing decreased production of RBC today  -His iron labs stable today.       2) von Willebrand disease  MGUS  -He is followed by hematology team and appreciate input and they did mention that he is at increased risk of bleeding and they will give him Humate-P before the bone marrow biopsy  -He has known MGUS and bone marrow biopsy will be helpful in further treatment    3) hypertension  -We will continue the patient with  amlodipine    4) gout  -We will continue with allopurinol    5) Moderate aortic stenosis, possible bicuspid aortic valve  Moderate aortic root dilatation  Reporting shortness of breath with minimal exertion, likely due to severe anemia above.  EKG without ischemic changes, troponin and BNP wnl.  CXR with mild basilar opacities suspected to be atelectasis.  TTE 8/3 with moderate aortic stenosis and moderately dilated aortic root.    - Follow up with PCP for routine outpatient monitoring if desired based on goals of care    6) physical deconditioning  -Continue with physical therapy and Occupational Therapy    7) vitamin D deficiency  -Continue with vitamin replacement as his vitamin D levels were low recently    8) history of recurrent epistaxis  -He has been stable for the past 3 months and will need to follow his ENT as outpatient    9) acute kidney injury resolved on chronic kidney disease stage III/IV  Chronic hyperchloremia  Metabolic acidosis due to kidney disease  -His baseline creatinine is around 1.9  -Creatinine on admission was 2.31 and this was most likely due to blood loss and creatinine has improved to 1.89  -He does have chronic hyperchloremia and chronic metabolic acidosis and was supposed to follow-up with nephrology and I have consulted them as his hyperchloremia seems to getting worse along with acidosis         Diet: NPO per Anesthesia Guidelines for Procedure/Surgery Except for: Meds    DVT Prophylaxis: Pneumatic Compression Devices  Angulo Catheter: Not present  Central Lines: None  Cardiac Monitoring: ACTIVE order. Indication: Electrolyte Imbalance (24 hours)- Magnesium <1.3 mg/ml; Potassium < =2.8 or > 5.5 mg/ml  Code Status: No CPR- Do NOT Intubate      Disposition Plan      Expected Discharge Date: 08/24/2022      Destination: assisted living          The patient's care was discussed with the Bedside Nurse and Patient.    Jude Osullivan MD  Hospitalist Service  [facility]  "Hospital  Securely message with the Vocera Web Console (learn more here)  Text page via [employer] Paging/Directory         Clinically Significant Risk Factors Present on Admission               # Overweight: Estimated body mass index is 26.99 kg/m  as calculated from the following:    Height as of 8/1/22: 1.753 m (5' 9\").    Weight as of this encounter: 82.9 kg (182 lb 12.2 oz).        ______________________________________________________________________    Interval History     Patient today with no acute complaints.  He denies any fever, chills, nausea and vomiting.  Patient understand that he will undergo bone marrow biopsy today.  No CP/SOB.     Data reviewed today: I reviewed all medications, new labs and imaging results over the last 24 hours. I personally reviewed no images or EKG's today.    Physical Exam   Vital Signs: Temp: 97.4  F (36.3  C) Temp src: Oral BP: (!) 156/86 Pulse: 67   Resp: 18 SpO2: 98 % O2 Device: None (Room air)    Weight: 182 lbs 12.18 oz        General: Patient appears comfortable and in no acute distress.  Respiratory: Lungs are clear to auscultation bilaterally with no wheeze or crackles   Cardiovascular: Regular rate , S1 and S2 normal with no murmer or rubs or gallops  Abdomen:   soft , non tender , non distended and bowel sound present   Skin: No skin rashes or lesions to inspection or palpation.  Neurologic: Higher functions are within normal limits. No obvious defects in speech, language and memory. No facial droop  Musculoskeletal: Normal Range of motion over upper and lower extremities bilaterally   Psychiatric: cooperative     Data   Recent Labs   Lab 08/23/22  0902 08/22/22  2232 08/22/22  1513 08/22/22  0558 08/21/22  1154 08/21/22  0836 08/20/22 2007 08/20/22  1150 08/20/22  0556 08/19/22  1623 08/19/22  1517 08/19/22  1513   WBC  --  4.0  --   --   --   --   --  4.8 4.0  --   --  5.3   HGB 8.4* 8.1* 8.1* 7.8*   < >  --    < > 7.4* 7.1*  7.1*   < >  --  4.5*   MCV  --  100  -- "   --   --   --   --  98 98  --   --  115*   PLT  --  142*  --  131*  --   --   --  126* 113*  --   --  146*   INR  --   --   --   --   --   --   --   --   --   --  1.13  --    *  --   --  144  --  142  --  143 143   < >  --  140   POTASSIUM 5.8*  --   --  5.1  --  5.3  --  5.1 5.0   < >  --  6.2*   CHLORIDE 123*  --   --  122*  --  119*  --  119* 121*   < >  --  118*   CO2 20  --   --  17*  --  19*  --  19* 16*   < >  --  16*   BUN 50*  --   --  63*  --  69*  --  82* 83*   < >  --  81*   CR 1.65*  --   --  1.89*  --  1.89*  --  2.12* 2.12*   < >  --  2.31*   ANIONGAP 2*  --   --  5  --  4  --  5 6   < >  --  6   KATT 7.9*  --   --  7.7*  --  8.0*  --  7.9* 7.8*   < >  --  8.2*   *  --   --  95  --  102*  --  101* 95   < >  --  131*   ALBUMIN 2.6*  --   --   --   --   --   --   --   --   --   --  2.7*   PROTTOTAL 5.1*  --   --   --   --   --   --   --   --   --   --  5.3*   BILITOTAL 0.9  --   --   --   --   --   --   --   --   --   --  0.2   ALKPHOS 66  --   --   --   --   --   --   --   --   --   --  57   ALT 16  --   --   --   --   --   --   --   --   --   --  14   AST 13  --   --   --   --   --   --   --   --   --   --  11    < > = values in this interval not displayed.     No results found for this or any previous visit (from the past 24 hour(s)).  Medications       allopurinol  200 mg Oral Daily     amLODIPine  5 mg Oral Daily     calcium carbonate  500 mg Oral TID w/meals     citalopram  5 mg Oral Daily     sodium bicarbonate  1,300 mg Oral BID     sodium chloride (PF)  3 mL Intracatheter Q8H     Vitamin D3  125 mcg Oral Daily

## 2022-08-23 NOTE — ANESTHESIA POSTPROCEDURE EVALUATION
Patient: Mando Freedman    Procedure: Procedure(s):  BIOPSY, BONE MARROW       Anesthesia Type:  MAC    Note:     Postop Pain Control: Uneventful            Sign Out: Well controlled pain   PONV: No   Neuro/Psych: Uneventful            Sign Out: Acceptable/Baseline neuro status   Airway/Respiratory: Uneventful            Sign Out: Acceptable/Baseline resp. status   CV/Hemodynamics: Uneventful            Sign Out: Acceptable CV status; No obvious hypovolemia; No obvious fluid overload   Other NRE: NONE   DID A NON-ROUTINE EVENT OCCUR? No           Last vitals:  Vitals Value Taken Time   /78 08/23/22 1605   Temp 36.7  C (98.1  F) 08/23/22 1545   Pulse 60 08/23/22 1545   Resp 16 08/23/22 1545   SpO2 99 % 08/23/22 1605       Electronically Signed By: Cherelle Craft MD, MD  August 23, 2022  5:55 PM

## 2022-08-23 NOTE — PROVIDER NOTIFICATION
"MD Notification    Notified Person: MD    Notified Person Name: Jude Bryant    Notification Date/Time: 08/23/22 11:38 AM    Notification Interaction: Radha    Purpose of Notification: \"FYI I just received a critical lab value for his von willebrand factor antigen of <19%\"    Orders Received: \"Okay nothing to do:\"    Comments:      "

## 2022-08-23 NOTE — ANESTHESIA PREPROCEDURE EVALUATION
Prior to Admission medications    Medication Sig Start Date End Date Taking? Authorizing Provider   allopurinol (ZYLOPRIM) 100 MG tablet Take 100 mg by mouth daily 7/9/16  Yes Reported, Patient   amLODIPine (NORVASC) 5 MG tablet Take 5 mg by mouth daily 5/25/16  Yes Reported, Patient   citalopram (CELEXA) 10 MG tablet Take 5 mg by mouth daily   Yes Unknown, Entered By History   ferrous fumarate 65 mg, [place]. FE,-Vitamin C 125 mg (VITRON C)  MG TABS tablet Take 1 tablet by mouth 2 times daily   Yes Unknown, Entered By History   fish oil-omega-3 fatty acids 1000 MG capsule Take 1 g by mouth daily   Yes Unknown, Entered By History   multivitamin (CENTRUM SILVER) tablet Take 1 tablet by mouth daily   Yes Unknown, Entered By History   Vitamin D3 (CHOLECALCIFEROL) 125 MCG (5000 UT) tablet Take 1 tablet (125 mcg) by mouth daily 8/6/22  Yes Michael Sotelo MD   hydrALAZINE (APRESOLINE) 10 MG tablet Take 20 mg by mouth 2 times daily 10mg x2 = 20mg 4/20/22 8/5/22  Unknown, Entered By History     Current Facility-Administered Medications Ordered in Epic   Medication Dose Route Frequency Last Rate Last Admin     acetaminophen (TYLENOL) tablet 650 mg  650 mg Oral Q6H PRN        Or     acetaminophen (TYLENOL) Suppository 650 mg  650 mg Rectal Q6H PRN         allopurinol (ZYLOPRIM) tablet 200 mg  200 mg Oral Daily   200 mg at 08/23/22 0825     amLODIPine (NORVASC) tablet 5 mg  5 mg Oral Daily   5 mg at 08/23/22 0825     calcium carbonate (TUMS) chewable tablet 500 mg  500 mg Oral TID w/meals   500 mg at 08/22/22 1742     citalopram (celeXA) half-tab 5 mg  5 mg Oral Daily   5 mg at 08/23/22 0825     flumazenil (ROMAZICON) injection 0.2 mg  0.2 mg Intravenous q1 min prn         lidocaine (LMX4) cream   Topical Q1H PRN         lidocaine 1 % 0.1-1 mL  0.1-1 mL Other Q1H PRN         melatonin tablet 1 mg  1 mg Oral At Bedtime PRN         naloxone (NARCAN) injection 0.2 mg  0.2 mg Intravenous Q2 Min PRN         naloxone (NARCAN)  injection 0.2 mg  0.2 mg Intramuscular Q2 Min PRN         naloxone (NARCAN) injection 0.4 mg  0.4 mg Intravenous Q2 Min PRN         naloxone (NARCAN) injection 0.4 mg  0.4 mg Intramuscular Q2 Min PRN         ondansetron (ZOFRAN ODT) ODT tab 4 mg  4 mg Oral Q6H PRN        Or     ondansetron (ZOFRAN) injection 4 mg  4 mg Intravenous Q6H PRN         polyethylene glycol (MIRALAX) Packet 17 g  17 g Oral Daily PRN         prochlorperazine (COMPAZINE) injection 5 mg  5 mg Intravenous Q6H PRN        Or     prochlorperazine (COMPAZINE) tablet 5 mg  5 mg Oral Q6H PRN        Or     prochlorperazine (COMPAZINE) suppository 12.5 mg  12.5 mg Rectal Q12H PRN         senna-docusate (SENOKOT-S/PERICOLACE) 8.6-50 MG per tablet 1 tablet  1 tablet Oral BID PRN        Or     senna-docusate (SENOKOT-S/PERICOLACE) 8.6-50 MG per tablet 2 tablet  2 tablet Oral BID PRN         sodium bicarbonate tablet 1,300 mg  1,300 mg Oral BID   1,300 mg at 08/23/22 0824     sodium chloride (PF) 0.9% PF flush 3 mL  3 mL Intracatheter Q8H   3 mL at 08/23/22 1127     sodium chloride (PF) 0.9% PF flush 3 mL  3 mL Intracatheter q1 min prn         Vitamin D3 (CHOLECALCIFEROL) tablet 125 mcg  125 mcg Oral Daily   125 mcg at 08/23/22 0825     No current [facility]-ordered outpatient medications on file.       No results for input(s): ABO, RH in the last 75447 hours.  No results for input(s): HCG in the last 87067 hours.  Recent Results (from the past 744 hour(s))   Chest XR,  PA & LAT    Narrative    XR CHEST TWO VIEWS   8/1/2022 1:35 PM     HISTORY: Shortness of breath     COMPARISON: None available      Impression    IMPRESSION: AP and lateral views of the chest were obtained.  Cardiomediastinal silhouette is within normal limits. Mild basilar  pulmonary opacities, likely atelectasis. No significant pleural  effusion or pneumothorax.    RYAN MURPHY MD         SYSTEM ID:  [id number]   US Renal Complete    Narrative    EXAM: US RENAL COMPLETE  LOCATION: [facility]  [facility]  DATE/TIME: 8/1/2022 5:56 PM    INDICATION: acute on chronic ckd  COMPARISON: None available.  TECHNIQUE: Routine Bilateral Renal and Bladder Ultrasound.    FINDINGS:    RIGHT KIDNEY: 7.7 x 6.3 x 4.2 cm. Mildly atrophic kidney with echogenic parenchyma. No hydronephrosis. Multiple simple cysts, the largest of which is an exophytic cyst measuring 10 x 6 x 7.6 cm at the lower pole.     LEFT KIDNEY: 10.4 x 4.3 x 4.7 cm. Mildly atrophic with echogenic parenchyma. No hydronephrosis. Multiple simple cysts in the kidney measuring 1.3-1.5 cm.     BLADDER: Distended without focal abnormalities on survey views.      Impression    IMPRESSION:  1.  No hydronephrosis in either kidney.  2.  Mildly atrophic and echogenic kidneys, consistent with medical renal disease.  3.  Multiple simple cysts in the kidneys requiring no specific follow-up. The largest is a 10 cm exophytic cyst at the lower pole of the right kidney.   US Lower Extremity Venous Duplex Bilateral    Narrative    VENOUS ULTRASOUND BILATERAL LOWER EXTREMITIES  8/2/2022 9:55 AM     HISTORY: Bilateral lower extremity pain and swelling.    COMPARISON: None.    TECHNIQUE: Color Doppler and spectral waveform analysis performed  throughout the deep veins of both lower extremities.    FINDINGS: Both common femoral, proximal greater saphenous, femoral,  and popliteal veins demonstrate normal blood flow, compression, and  augmentation. Posterior tibial and peroneal veins are compressible.  Calcifications noted within the left small saphenous vein, may be  chronic nonocclusive superficial thrombophlebitis.      Impression    IMPRESSION:  1. Negative for DVT in both lower arteries.  2. Calcification in left small saphenous vein suggestive of chronic  DVT.    MINDY SWANSON MD         SYSTEM ID:  [id number]   Echocardiogram Complete   Result Value    LVEF  60-65%    Narrative    [id number]  [id number]  [id number]  [id number]^CARMEN^AWIL^W.     [facility]  [facility]  Echocardiography Laboratory  [address] [place] [address], [place] [address]     Name: IVANIA GOMEZ  MRN: [id number]  : 1940  Study Date: 2022 09:07 AM  Age: 82 yrs  Gender: Male  Patient [facility]: [facility]  Reason For Study: Edema  Ordering Physician: MARTHA MORALES  Performed By: Prema Middleton     BSA: 2.0 m2  Height: 69 in  Weight: 188 lb  HR: 60  BP: 146/80 mmHg  ______________________________________________________________________________  Procedure  Complete Portable Echo Adult. Optison (NDC #[id number]) given intravenously.  ______________________________________________________________________________  Interpretation Summary     Left ventricular systolic function is normal.  The visual ejection fraction is 60-65%.  There is mild concentric left ventricular hypertrophy.  The right ventricle is normal in structure, function and size.  Possible bicuspid aortic valve versus calcific fusion. Moderate aortic  stenosis. Vmax 3.4 m/sec, mean gradient 29 mmHg. DVI 0.27 and SUYAPA by  continuity is 1.2 cm2. This is at a normal stroke volume index.  Moderate aortic root dilatation (4.5 cm).  The ascending aorta is Mildly dilated (3.9 cm).  The inferior vena cava was normal in size with preserved respiratory  variability.  There is no pericardial effusion.     No prior study for comparison.  ______________________________________________________________________________  Left Ventricle  There is mild concentric left ventricular hypertrophy. Left ventricular  systolic function is normal. The visual ejection fraction is 60-65%. Grade I  or early diastolic dysfunction.     Right Ventricle  The right ventricle is normal in structure, function and size.     Atria  The left atrium is mildly dilated. The right atrium is mildly dilated.     Mitral Valve  The mitral valve is normal in structure and function. There is mild (1+)  mitral regurgitation.     Tricuspid Valve  The tricuspid valve is normal in structure and  function. Right ventricular  systolic pressure could not be approximated due to inadequate tricuspid  regurgitation. There is mild (1+) tricuspid regurgitation.     Aortic Valve  Thickened aortic valve leaflets. A bicuspid aortic valve cannot be excluded.  There is mild (1+) aortic regurgitation. Moderate valvular aortic stenosis.  Possible bicuspid aortic valve versus calcific fusion. Moderate aortic  stenosis. Vmax 3.4 m/sec, mean gradient 29 mmHg. DVI 0.27 and SUYAPA by  continuity is 1.2 cm2. This is at a normal stroke volume index.     Pulmonic Valve  The pulmonic valve is normal in structure and function.     Vessels  Moderate aortic root dilatation. The ascending aorta is Mildly dilated. The  inferior vena cava was normal in size with preserved respiratory variability.     Pericardium  There is no pericardial effusion.     ______________________________________________________________________________  MMode/2D Measurements & Calculations  IVSd: 1.1 cm  LVIDd: 4.9 cm  LVIDs: 3.2 cm  LVPWd: 1.1 cm  FS: 34.6 %     LV mass(C)d: 203.1 grams  LV mass(C)dI: 100.9 grams/m2  Ao root diam: 4.4 cm  LA dimension: 4.6 cm  asc Aorta Diam: 3.8 cm  LA/Ao: 1.0  LVOT diam: 2.3 cm  LVOT area: 4.1 cm2  RWT: 0.45     Time Measurements  MM HR: 69.0 BPM     Doppler Measurements & Calculations  MV E max kevin: 94.6 cm/sec  MV A max kevin: 118.4 cm/sec  MV E/A: 0.80  MV dec slope: 364.9 cm/sec2  MV dec time: 0.26 sec  Ao V2 max: 338.2 cm/sec  Ao max P.0 mmHg  Ao V2 mean: 243.5 cm/sec  Ao mean P.0 mmHg  Ao V2 VTI: 79.2 cm  SUYAPA(I,D): 1.2 cm2  SUYAPA(V,D): 1.1 cm2  LV V1 max PG: 3.3 mmHg  LV V1 max: 91.2 cm/sec  LV V1 VTI: 23.8 cm  SV(LVOT): 96.4 ml  SI(LVOT): 47.9 ml/m2  PA acc time: 0.18 sec     AV Kevin Ratio (DI): 0.27  SUYAPA Index (cm2/m2): 0.60  E/E' av.4  Lateral E/e': 19.7  Medial E/e': 17.0     ______________________________________________________________________________  Report approved by: Florencio Cheatham 2022  10:31 AM         XR Chest 2 Views    Narrative    CHEST TWO VIEWS 2022 3:59 PM     HISTORY: Shortness of breath    COMPARISON: 2022       Impression    IMPRESSION: Small hiatal hernia. There are no acute infiltrates. The  cardiac silhouette is not enlarged. Pulmonary vasculature is  unremarkable.    VERONICA GARCIA MD         SYSTEM ID:  [id number]   Anesthesia Pre-Procedure Evaluation    Patient: Mando Freedman   MRN: [id number] : 1940        Procedure : Procedure(s):  BIOPSY, BONE MARROW          Past Medical History:   Diagnosis Date     Basal cell carcinoma       Past Surgical History:   Procedure Laterality Date     COLONOSCOPY N/A 2022    Procedure: COLONOSCOPY;  Surgeon: Kimani Loo MD;  Location:  GI     ESOPHAGOSCOPY, GASTROSCOPY, DUODENOSCOPY (EGD), COMBINED N/A 2022    Procedure: ESOPHAGOGASTRODUODENOSCOPY (EGD);  Surgeon: Kimani Loo MD;  Location:  GI      No Known Allergies   Social History     Tobacco Use     Smoking status: Former Smoker     Types: Cigars     Smokeless tobacco: Never Used   Substance Use Topics     Alcohol use: Not on file     Comment: rare      Wt Readings from Last 1 Encounters:   22 82.9 kg (182 lb 12.2 oz)        Anesthesia Evaluation   Pt has had prior anesthetic.     No history of anesthetic complications       ROS/MED HX  ENT/Pulmonary:     (+) tobacco use, Past use,     Neurologic:       Cardiovascular:       METS/Exercise Tolerance:     Hematologic: Comments: MGUS      Musculoskeletal:       GI/Hepatic:       Renal/Genitourinary:       Endo:       Psychiatric/Substance Use:       Infectious Disease:       Malignancy:       Other:            Physical Exam    Airway        Mallampati: I    Neck ROM: full     Respiratory Devices and Support         Dental       (+) caps      Cardiovascular   cardiovascular exam normal          Pulmonary   pulmonary exam normal                OUTSIDE LABS:  CBC:   Lab Results   Component Value Date     WBC 4.1 08/23/2022    WBC 4.0 08/22/2022    HGB 8.4 (L) 08/23/2022    HGB 8.4 (L) 08/23/2022    HCT 27.9 (L) 08/23/2022    HCT 26.4 (L) 08/22/2022     (L) 08/23/2022     (L) 08/22/2022     BMP:   Lab Results   Component Value Date     (H) 08/23/2022     08/22/2022    POTASSIUM 5.8 (H) 08/23/2022    POTASSIUM 5.1 08/22/2022    CHLORIDE 123 (H) 08/23/2022    CHLORIDE 122 (H) 08/22/2022    CO2 20 08/23/2022    CO2 17 (L) 08/22/2022    BUN 50 (H) 08/23/2022    BUN 63 (H) 08/22/2022    CR 1.65 (H) 08/23/2022    CR 1.89 (H) 08/22/2022     (H) 08/23/2022    GLC 95 08/22/2022     COAGS:   Lab Results   Component Value Date    PTT 38 08/19/2022    INR 1.13 08/19/2022     POC: No results found for: BGM, HCG, HCGS  HEPATIC:   Lab Results   Component Value Date    ALBUMIN 2.6 (L) 08/23/2022    PROTTOTAL 5.1 (L) 08/23/2022    ALT 16 08/23/2022    AST 13 08/23/2022    ALKPHOS 66 08/23/2022    BILITOTAL 0.9 08/23/2022     OTHER:   Lab Results   Component Value Date    A1C 4.7 05/12/2022    KATT 7.9 (L) 08/23/2022    PHOS 3.3 08/20/2022    MAG 2.4 (H) 08/23/2022    TSH 1.90 05/12/2022    T4 0.85 05/12/2022       Anesthesia Plan    ASA Status:  3   NPO Status:  NPO Appropriate    Anesthesia Type: MAC.     - Reason for MAC: immobility needed, straight local not clinically adequate              Consents    Anesthesia Plan(s) and associated risks, benefits, and realistic alternatives discussed. Questions answered and patient/representative(s) expressed understanding.    - Discussed:     - Discussed with:  Patient         Postoperative Care    Pain management: IV analgesics.   PONV prophylaxis: Ondansetron (or other 5HT-3)     Comments:                Issa Peoples MD

## 2022-08-23 NOTE — PROGRESS NOTES
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   Labs show Na 145, K 5.8, HCO3 20, Cr improvin.12 > 1.89 > 1.65.   UO yest 2275 ml. Wt is down:     Date/Time Weight Weight Method   22 0607 82.9 kg (182 lb 12.2 oz) Bed scale   22 1433 87.6 kg (193 lb 3 oz) Standing scale   22 1514 92.4 kg (203 lb 11.3 oz)      Cont tums, Vit D, sodium bicarbonate.   Lokelma for 2 doses  Monitor labs.                 Interval History:   Anemia: He has had blood transfusions and has been seen by Hematology/Oncology.  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  He also has von Willebrand disease.  Labs show Ferr 56, Fe 37. C/W Fe def. Will give IV venofer.      Aortic stenosis    Hypertension:on amlodipine.     Gout                Review of Systems:    No complaints of pain. Ambulating in room. NPO for bone marrow.           Medications:       allopurinol  200 mg Oral Daily     amLODIPine  5 mg Oral Daily     calcium carbonate  500 mg Oral TID w/meals     citalopram  5 mg Oral Daily     sodium bicarbonate  1,300 mg Oral BID     sodium chloride (PF)  3 mL Intracatheter Q8H     Vitamin D3  125 mcg Oral Daily         Current active medications and PTA medications reviewed, see medication list for details.            Physical Exam:   Vitals were reviewed  Patient Vitals for the past 24 hrs:   BP Temp Temp src Pulse Resp SpO2 Weight   22 1253 (!) 153/83 -- -- -- 18 99 % --   22 0739 (!) 156/86 97.4  F (36.3  C) Oral 67 18 98 % --   22 0607 -- -- -- -- -- -- 82.9 kg (182 lb 12.2 oz)   22 0123 121/66 98  F (36.7  C) Oral 58 16 98 % --   22 2033 134/63 97.9  F (36.6  C) Oral 59 16 98 % --   22 1538 133/86 97.6  F (36.4  C) Oral 57 16 98 % --   22 1433 -- -- -- -- -- -- 87.6 kg (193 lb 3 oz)       Temp:  [97.4  F (36.3  C)-98  F (36.7  C)] 97.4  F (36.3  C)  Pulse:  [57-67] 67  Resp:  [16-18] 18  BP: (121-156)/(63-86) 153/83  SpO2:  [98 %-99 %] 99  %    Temperatures:  Current - Temp: 97.4  F (36.3  C); Max - Temp  Av.7  F (36.5  C)  Min: 97.4  F (36.3  C)  Max: 98  F (36.7  C)  Respiration range: Resp  Av.8  Min: 16  Max: 18  Pulse range: Pulse  Av.3  Min: 57  Max: 67  Blood pressure range: Systolic (24hrs), Av , Min:121 , Max:156   ; Diastolic (24hrs), Av, Min:63, Max:86    Pulse oximetry range: SpO2  Av.2 %  Min: 98 %  Max: 99 %    I/O last 3 completed shifts:  In: 600 [P.O.:600]  Out:  [Urine:]      Intake/Output Summary (Last 24 hours) at 2022 1322  Last data filed at 2022 0740  Gross per 24 hour   Intake 240 ml   Output 1775 ml   Net -1535 ml            Wt Readings from Last 4 Encounters:   22 82.9 kg (182 lb 12.2 oz)   22 83.9 kg (184 lb 14.4 oz)          Data:          Lab Results   Component Value Date     2022     2022     2022    Lab Results   Component Value Date    CHLORIDE 123 2022    CHLORIDE 122 2022    CHLORIDE 119 2022      Lab Results   Component Value Date    BUN 50 2022    BUN 63 2022    BUN 69 2022      Lab Results   Component Value Date    POTASSIUM 5.8 2022    POTASSIUM 5.1 2022    POTASSIUM 5.3 2022    Lab Results   Component Value Date    CO2 20 2022    CO2 17 2022    CO2 19 2022    Lab Results   Component Value Date    CR 1.65 2022    CR 1.89 2022    CR 1.89 2022        Recent Labs   Lab Test 22  0902 222 22  1513 22  0558 22  1150   WBC 4.1 4.0  --   --   --  4.8   HGB 8.4*  8.4* 8.1* 8.1* 7.8*   < > 7.4*   HCT 27.9* 26.4*  --   --   --  24.4*   * 100  --   --   --  98   * 142*  --  131*  --  126*    < > = values in this interval not displayed.     Recent Labs   Lab Test 22  0902 22  1513 22  0414   AST 13 11 9   ALT 16 14 13   ALKPHOS 66 57 70   BILITOTAL 0.9 0.2 0.6        Recent Labs   Lab Test 08/23/22  0902 08/20/22  0556 06/02/22  1133   MAG 2.4* 2.4* 2.1     Recent Labs   Lab Test 08/20/22  0556 06/02/22  1133   PHOS 3.3 4.4     Recent Labs   Lab Test 08/23/22  0902 08/22/22  0558 08/21/22  0836   KATT 7.9* 7.7* 8.0*     Lab Results   Component Value Date    KATT 7.9 (L) 08/23/2022     Lab Results   Component Value Date    WBC 4.1 08/23/2022    HGB 8.4 (L) 08/23/2022    HGB 8.4 (L) 08/23/2022    HCT 27.9 (L) 08/23/2022     (H) 08/23/2022     (L) 08/23/2022     Lab Results   Component Value Date     (H) 08/23/2022    POTASSIUM 5.8 (H) 08/23/2022    CHLORIDE 123 (H) 08/23/2022    CO2 20 08/23/2022     (H) 08/23/2022     Lab Results   Component Value Date    BUN 50 (H) 08/23/2022    CR 1.65 (H) 08/23/2022     Lab Results   Component Value Date    MAG 2.4 (H) 08/23/2022     Lab Results   Component Value Date    PHOS 3.3 08/20/2022       Creatinine   Date Value Ref Range Status   08/23/2022 1.65 (H) 0.66 - 1.25 mg/dL Final   08/22/2022 1.89 (H) 0.66 - 1.25 mg/dL Final   08/21/2022 1.89 (H) 0.66 - 1.25 mg/dL Final   08/20/2022 2.12 (H) 0.66 - 1.25 mg/dL Final   08/20/2022 2.12 (H) 0.66 - 1.25 mg/dL Final   08/20/2022 2.24 (H) 0.66 - 1.25 mg/dL Final       Attestation:  I have reviewed today's vital signs, notes, medications, labs and imaging.     Adriel Li MD

## 2022-08-23 NOTE — PROCEDURES
This procedure was performed for the indication of: MGUS.  Please refer to the H&P from the primary provider.    The patient was positively identified and informed consent was obtained (see the completed Affirmation of Consent for Bone Marrow Aspiration and/or Biopsy Procedure(s) form in the patient's chart). The patient was placed in the prone position and the bony landmarks of the pelvis were identified. Medical staff reconfirmed the patient's name, date of birth and procedure. The skin over the posterior iliac crest was scrubbed and draped in a sterile fashion. The local area of the procedure was anesthetized with a total of 6 mL of 1% Lidocaine and a small incision was made.  The patient did receive MAC sedation.    Trephine bone marrow core(s) was/were obtained from the left posterior iliac crest. Bone marrow aspirate was obtained from the left posterior iliac crest for: morphology with possible immunophenotyping and/or cytogenetics and molecular diagnostics.    Direct pressure was applied to the biopsy site with sterile gauze. The biopsy site was cleaned with alcohol and a sterile dressing was placed over the biopsy incision using a pressure bandage. The patient was then placed in the supine position to maintain pressure on the biopsy site. Post-procedure wound care instructions, including routine dressing instructions and analgesia, were given to the patient. The procedure was completed without complication.

## 2022-08-23 NOTE — PROGRESS NOTES
Service Date: 2022    SUBJECTIVE:  Mr. Gomez is an 82-year-old gentleman with type 2 von Willebrand disease.  The patient also has other problems including MGUS, chronic kidney disease, and anemia.  The patient recently has been becoming severely anemic.  GI workup has been negative for bleeding.  For further workup of anemia, bone marrow biopsy is going to be done today.  The patient did get Humate-P today in preparation for bone marrow.    He is overall stable.  Denies bleeding from any site. No chest pain.  No shortness of breath.  No abdominal pain, nausea or vomiting.  His fatigue has improved with transfusion.    PHYSICAL EXAMINATION:    GENERAL:  He is alert, oriented x 3.  Not in distress.  VITAL SIGNS:  Reviewed.   Rest of systems not examined.    LABS:  Reviewed.    ASSESSMENT:    1.  An 82-year-old gentleman with severe anemia.  2.  Type II variant von Willebrand disease.  3.  Chronic kidney disease.  4.  Monoclonal gammopathy of undetermined significance.    PLAN:    1.  The patient is clinically stable.  CBC was reviewed with him.  Hemoglobin is remaining stable around 8.  He will be transfused for hemoglobin below 7.  2.  For workup of anemia, he will be getting a bone marrow biopsy today.  3.  The patient has von Willebrand disease.  He did get Hemate-P today.  If needed, we will give him Hemate-P tomorrow.  4.  The patient has MGUS.  We will wait for bone marrow biopsy.  5.  He had a few questions, which were all answered.      TOTAL TIME SPENT:  Twenty five minutes.  Time spent in today's visit, review of chart/investigations today, and documentation today.    Power Edwards MD        D: 2022   T: 2022   MT: MICHAEL    Name:     IVANIA GOMEZ  MRN:      [id number]        Account:      [id number]   :      1940           Service Date: 2022       Document: [id number]

## 2022-08-23 NOTE — PLAN OF CARE
[id number]    A&Ox4. VSS on RA, Ax1 GBW. Denies pain. No N/V. NPO for bone biopsy. Continent B/B. BM during shift. HGB checks q8hr. Last HGB 8.1. Abn Lab value: Vonwillebrand Factor Activity <19%. MD notified. PIV SL. Scattered bruising. BLE edema +1. Just arrived back to floor at shift change. Updated PM nurse. Will continue to monitor.

## 2022-08-23 NOTE — ANESTHESIA CARE TRANSFER NOTE
Patient: Mando Freedman    Procedure: Procedure(s):  BIOPSY, BONE MARROW       Diagnosis: MGUS (monoclonal gammopathy of unknown significance) [D47.2]  Diagnosis Additional Information: No value filed.    Anesthesia Type:   MAC     Note:    Oropharynx: oropharynx clear of all foreign objects and spontaneously breathing  Level of Consciousness: awake  Oxygen Supplementation: room air    Independent Airway: airway patency satisfactory and stable  Dentition: dentition unchanged  Vital Signs Stable: post-procedure vital signs reviewed and stable  Report to RN Given: handoff report given  Patient transferred to: Phase II  Comments: At end of procedure, spontaneous respirations, patient alert to voice, able to follow commands. Patient breathing room air at room air to PACU. SpO2, NiBP, and EKG monitors and alarms on and functioning, Skye Hugger warmer connected to patient gown, report on patient's clinical status given to PACU RN, RN questions answered.  Handoff Report: Identifed the Patient, Identified the Reponsible Provider, Reviewed the pertinent medical history, Discussed the surgical course, Reviewed Intra-OP anesthesia mangement and issues during anesthesia, Set expectations for post-procedure period and Allowed opportunity for questions and acknowledgement of understanding      Vitals:  Vitals Value Taken Time   BP 92/47 08/23/22 1428   Temp     Pulse 60 08/23/22 1428   Resp     SpO2 97 % 08/23/22 1429   Vitals shown include unvalidated device data.    Electronically Signed By: Jerri Mccain  August 23, 2022  2:30 PM

## 2022-08-23 NOTE — PROGRESS NOTES
MD Notification    Notified Person: MD    Notified Person Name: Chelsea Luna    Notification Date/Time: 8/23/22    Notification Interaction: Paged    Purpose of Notification: Pt requesting to advance diet    Orders Received:    Comments:

## 2022-08-23 NOTE — PLAN OF CARE
Goal Outcome Evaluation:  DATE & TIME: 8/23 1900-0730  Hx:Acute vs chronic Anemia + iron deficiency, von Willebrand disease, MGUS, recently admitted 8/1 hgb 5.1 EGD/Colonoscopy unremarkable.   ORIENTATION/NEURO: a/ox4  ABNL VS/O2: VSS on RA, Tele SB  MOBILITY: Ax1 gb/w  PAIN MANAGMENT: Denies pain  DIET: NPO  BOWEL/BLADDER: Continent B/B  ABNL LAB/BG: Hgb 8.1, absolute Retic 0.260  IV FLUIDS/DRAIN/TUBES/DEVICES: PIV SL  SKIN:  Pale, scattered bruises, +1 BLE  TESTS/PROCEDURES: Bone Marrow Biopsy @ 12:45pm today  OTHER:  D/C DATE: Per  note, pt [facility] than [facility].

## 2022-08-23 NOTE — PROGRESS NOTES
Care Management Follow Up    Length of Stay (days): 4    Expected Discharge Date: 08/24/2022     Concerns to be Addressed: discharge planning     Patient plan of care discussed at interdisciplinary rounds: Yes    Anticipated Discharge Disposition:  TBD, [facility] w/ HHC and increased services vs TCU   Anticipated Discharge Services:    Anticipated Discharge DME:      Patient/family educated on Medicare website which has current facility and service quality ratings:    Education Provided on the Discharge Plan:  yes  Patient/Family in Agreement with the Plan:  yes    Referrals Placed by CM/SW:    Private pay costs discussed: Not applicable    Additional Information:  Met with patient to discuss discharge planning. Patient is waiting for his bone marrow biopsy. Encouraged patient to participate with therapies today which is currently scheduled for later afternoon. Current recommendations are for TCU. Patient wonders if he should return to [facility] with HHC and increased services.    Writer called [facility] and spoke to YOLI Garnica -phone [phone] whom reports patient was independent with no services except meals. Patient does have the option to increase services. Name and number left for further discussions.     Case management will follow along for finalization of discharge plan pending medical stability and therapies.       Keri Kelly RN   [facility]   Phone [phone]

## 2022-08-24 LAB
ALBUMIN SERPL-MCNC: 2.6 G/DL (ref 3.4–5)
ALP SERPL-CCNC: 75 U/L (ref 40–150)
ALT SERPL W P-5'-P-CCNC: 16 U/L (ref 0–70)
ANION GAP SERPL CALCULATED.3IONS-SCNC: 5 MMOL/L (ref 3–14)
AST SERPL W P-5'-P-CCNC: 12 U/L (ref 0–45)
BILIRUB SERPL-MCNC: 0.4 MG/DL (ref 0.2–1.3)
BUN SERPL-MCNC: 46 MG/DL (ref 7–30)
CALCIUM SERPL-MCNC: 8 MG/DL (ref 8.5–10.1)
CHLORIDE BLD-SCNC: 123 MMOL/L (ref 94–109)
CO2 SERPL-SCNC: 19 MMOL/L (ref 20–32)
CREAT SERPL-MCNC: 1.64 MG/DL (ref 0.66–1.25)
D DIMER PPP FEU-MCNC: 1.61 UG/ML FEU (ref 0–0.5)
ERYTHROCYTE [DISTWIDTH] IN BLOOD BY AUTOMATED COUNT: 26.2 % (ref 10–15)
GFR SERPL CREATININE-BSD FRML MDRD: 41 ML/MIN/1.73M2
GLUCOSE BLD-MCNC: 96 MG/DL (ref 70–99)
HCT VFR BLD AUTO: 28.8 % (ref 40–53)
HGB BLD-MCNC: 8.3 G/DL (ref 13.3–17.7)
HGB BLD-MCNC: 8.6 G/DL (ref 13.3–17.7)
HGB BLD-MCNC: 8.6 G/DL (ref 13.3–17.7)
HGB BLD-MCNC: 8.8 G/DL (ref 13.3–17.7)
MCH RBC QN AUTO: 30.4 PG (ref 26.5–33)
MCHC RBC AUTO-ENTMCNC: 29.9 G/DL (ref 31.5–36.5)
MCV RBC AUTO: 102 FL (ref 78–100)
PATH REPORT.COMMENTS IMP SPEC: ABNORMAL
PATH REPORT.COMMENTS IMP SPEC: YES
PATH REPORT.FINAL DX SPEC: ABNORMAL
PATH REPORT.MICROSCOPIC SPEC OTHER STN: ABNORMAL
PATH REPORT.RELEVANT HX SPEC: ABNORMAL
PLATELET # BLD AUTO: 141 10E3/UL (ref 150–450)
POTASSIUM BLD-SCNC: 5.3 MMOL/L (ref 3.4–5.3)
PROT SERPL-MCNC: 5.2 G/DL (ref 6.8–8.8)
RBC # BLD AUTO: 2.83 10E6/UL (ref 4.4–5.9)
SODIUM SERPL-SCNC: 147 MMOL/L (ref 133–144)
WBC # BLD AUTO: 3.7 10E3/UL (ref 4–11)

## 2022-08-24 PROCEDURE — 99232 SBSQ HOSP IP/OBS MODERATE 35: CPT | Performed by: INTERNAL MEDICINE

## 2022-08-24 PROCEDURE — 80053 COMPREHEN METABOLIC PANEL: CPT | Performed by: STUDENT IN AN ORGANIZED HEALTH CARE EDUCATION/TRAINING PROGRAM

## 2022-08-24 PROCEDURE — 85018 HEMOGLOBIN: CPT | Performed by: STUDENT IN AN ORGANIZED HEALTH CARE EDUCATION/TRAINING PROGRAM

## 2022-08-24 PROCEDURE — 99232 SBSQ HOSP IP/OBS MODERATE 35: CPT | Performed by: STUDENT IN AN ORGANIZED HEALTH CARE EDUCATION/TRAINING PROGRAM

## 2022-08-24 PROCEDURE — 36415 COLL VENOUS BLD VENIPUNCTURE: CPT | Performed by: STUDENT IN AN ORGANIZED HEALTH CARE EDUCATION/TRAINING PROGRAM

## 2022-08-24 PROCEDURE — 250N000013 HC RX MED GY IP 250 OP 250 PS 637: Performed by: HOSPITALIST

## 2022-08-24 PROCEDURE — 250N000013 HC RX MED GY IP 250 OP 250 PS 637: Performed by: INTERNAL MEDICINE

## 2022-08-24 PROCEDURE — 85027 COMPLETE CBC AUTOMATED: CPT | Performed by: STUDENT IN AN ORGANIZED HEALTH CARE EDUCATION/TRAINING PROGRAM

## 2022-08-24 PROCEDURE — 85379 FIBRIN DEGRADATION QUANT: CPT | Performed by: STUDENT IN AN ORGANIZED HEALTH CARE EDUCATION/TRAINING PROGRAM

## 2022-08-24 PROCEDURE — 250N000011 HC RX IP 250 OP 636: Performed by: INTERNAL MEDICINE

## 2022-08-24 PROCEDURE — 250N000009 HC RX 250: Performed by: INTERNAL MEDICINE

## 2022-08-24 PROCEDURE — 120N000001 HC R&B MED SURG/OB

## 2022-08-24 PROCEDURE — 258N000003 HC RX IP 258 OP 636: Performed by: INTERNAL MEDICINE

## 2022-08-24 RX ORDER — CYANOCOBALAMIN 1000 UG/ML
1000 INJECTION, SOLUTION INTRAMUSCULAR; SUBCUTANEOUS
Status: DISCONTINUED | OUTPATIENT
Start: 2022-08-24 | End: 2022-08-25 | Stop reason: HOSPADM

## 2022-08-24 RX ADMIN — ALLOPURINOL 200 MG: 100 TABLET ORAL at 08:24

## 2022-08-24 RX ADMIN — SODIUM BICARBONATE 650 MG TABLET 1300 MG: at 21:14

## 2022-08-24 RX ADMIN — AMLODIPINE BESYLATE 5 MG: 5 TABLET ORAL at 08:24

## 2022-08-24 RX ADMIN — CITALOPRAM HYDROBROMIDE 5 MG: 10 TABLET ORAL at 08:24

## 2022-08-24 RX ADMIN — Medication 125 MCG: at 08:24

## 2022-08-24 RX ADMIN — CALCIUM CARBONATE (ANTACID) CHEW TAB 500 MG 500 MG: 500 CHEW TAB at 12:08

## 2022-08-24 RX ADMIN — IRON SUCROSE 200 MG: 20 INJECTION, SOLUTION INTRAVENOUS at 08:24

## 2022-08-24 RX ADMIN — SODIUM BICARBONATE 650 MG TABLET 1300 MG: at 08:24

## 2022-08-24 RX ADMIN — CALCIUM CARBONATE (ANTACID) CHEW TAB 500 MG 500 MG: 500 CHEW TAB at 08:24

## 2022-08-24 RX ADMIN — CYANOCOBALAMIN 1000 MCG: 1000 INJECTION, SOLUTION INTRAMUSCULAR at 17:28

## 2022-08-24 RX ADMIN — CALCIUM CARBONATE (ANTACID) CHEW TAB 500 MG 500 MG: 500 CHEW TAB at 17:27

## 2022-08-24 RX ADMIN — SODIUM BICARBONATE: 84 INJECTION, SOLUTION INTRAVENOUS at 15:50

## 2022-08-24 RX ADMIN — SODIUM ZIRCONIUM CYCLOSILICATE 10 G: 5 POWDER, FOR SUSPENSION ORAL at 10:47

## 2022-08-24 ASSESSMENT — ACTIVITIES OF DAILY LIVING (ADL)
ADLS_ACUITY_SCORE: 42
ADLS_ACUITY_SCORE: 42
ADLS_ACUITY_SCORE: 43
ADLS_ACUITY_SCORE: 42

## 2022-08-24 NOTE — PLAN OF CARE
Arrived to unit at 1500. AOx4. BP elevated other VSS on RA. No c/o pain. LS: clear, JORDAN. Edema to BLE +2, elevated on pillows. Regular diet. L PIV d5 bicarb@75 Up SBA/A1 GB+W. Hgb: 8.8, creat: 1.64. GI: signed off, Onc, nephrology consulted.   Plan to discharge back to living facility tomorrow.

## 2022-08-24 NOTE — PLAN OF CARE
A&Ox4, VSS on RA except HTN. PIV SL. Tolerating regular diet. Assist of 1 with walker and gait belt. Continent of B/B. HGB checks q8h, last HGB 8.1, recheck AM. Scattered bruising noted. BLE edema. Denies pain and N/V. Awaiting results from bone biopsy. Continue plan of care.

## 2022-08-24 NOTE — PROGRESS NOTES
Assessment and Plan:   CKD-3b: Cr stable to  Improved, now 1.64. . Na rising , now 147. K 5.3. HCO3 19.   Weight is down: UO 1000 ml yest.     Date/Time Weight Weight Method   08/24/22 0638 82 kg (180 lb 12.4 oz) Bed scale   08/23/22 0607 82.9 kg (182 lb 12.2 oz) Bed scale   08/22/22 1433 87.6 kg (193 lb 3 oz) Standing scale   08/19/22 1514 92.4 kg (203 lb 11.3 oz)      He has been getting lokelma, 10 gm per day. Monitor K.   He is on CaCO3, vit D. On NaHCO3 po.   Will give hypotonic fluids with bicarb to bring down Na and raise HCO3.             Interval History:   Hypertension: on amlodipine.        Anemia: on IV venofer. Will give B12 IM.             Review of Systems:   Taking po well. No pain.           Medications:       allopurinol  200 mg Oral Daily     amLODIPine  5 mg Oral Daily     calcium carbonate  500 mg Oral TID w/meals     citalopram  5 mg Oral Daily     iron sucrose  200 mg Intravenous Daily     sodium bicarbonate  1,300 mg Oral BID     sodium chloride (PF)  3 mL Intracatheter Q8H     sodium zirconium cyclosilicate  10 g Oral Daily     Vitamin D3  125 mcg Oral Daily         Current active medications and PTA medications reviewed, see medication list for details.            Physical Exam:   Vitals were reviewed  Patient Vitals for the past 24 hrs:   BP Temp Temp src Pulse Resp SpO2 Weight   08/24/22 0856 (!) 153/79 98  F (36.7  C) Oral 57 16 93 % --   08/24/22 0755 (!) 156/80 97.6  F (36.4  C) Oral 58 16 100 % --   08/24/22 0638 -- -- -- -- -- -- 82 kg (180 lb 12.4 oz)   08/23/22 2300 (!) 140/70 97.7  F (36.5  C) Oral 67 16 98 % --   08/23/22 1605 136/78 -- -- -- -- 99 % --   08/23/22 1545 (!) 146/76 98.1  F (36.7  C) Oral 60 16 100 % --   08/23/22 1450 112/72 -- -- 62 -- 98 % --   08/23/22 1440 101/53 -- -- 59 -- 97 % --   08/23/22 1431 -- -- -- -- -- 96 % --   08/23/22 1430 (!) 88/50 -- -- 60 -- -- --       Temp:  [97.6  F (36.4  C)-98.1  F (36.7  C)] 98  F (36.7  C)  Pulse:  [57-67]  57  Resp:  [16] 16  BP: ()/(50-80) 153/79  SpO2:  [93 %-100 %] 93 %    Temperatures:  Current - Temp: 98  F (36.7  C); Max - Temp  Av.9  F (36.6  C)  Min: 97.6  F (36.4  C)  Max: 98.1  F (36.7  C)  Respiration range: Resp  Av  Min: 16  Max: 16  Pulse range: Pulse  Av.4  Min: 57  Max: 67  Blood pressure range: Systolic (24hrs), Av , Min:88 , Max:156   ; Diastolic (24hrs), Av, Min:50, Max:80    Pulse oximetry range: SpO2  Av.6 %  Min: 93 %  Max: 100 %    I/O last 3 completed shifts:  In: 400 [I.V.:400]  Out: 1350 [Urine:1350]      Intake/Output Summary (Last 24 hours) at 2022 1325  Last data filed at 2022 0900  Gross per 24 hour   Intake 640 ml   Output 950 ml   Net -310 ml       Alert and responsive  Lungs with clear BS  Cor RRR nl S1 S2 2/ sys M no JVD  LE no edema       Wt Readings from Last 4 Encounters:   22 82 kg (180 lb 12.4 oz)   22 83.9 kg (184 lb 14.4 oz)          Data:          Lab Results   Component Value Date     2022     2022     2022    Lab Results   Component Value Date    CHLORIDE 123 2022    CHLORIDE 123 2022    CHLORIDE 122 2022    Lab Results   Component Value Date    BUN 46 2022    BUN 50 2022    BUN 63 2022      Lab Results   Component Value Date    POTASSIUM 5.3 2022    POTASSIUM 5.8 2022    POTASSIUM 5.1 2022    Lab Results   Component Value Date    CO2 19 2022    CO2 20 2022    CO2 17 2022    Lab Results   Component Value Date    CR 1.64 2022    CR 1.65 2022    CR 1.89 2022        Recent Labs   Lab Test 22  0750 22  1707 22  0902 22   WBC 3.7*  --   --  4.1 4.0   HGB 8.6*  8.6* 8.1* 8.9* 8.4*  8.4* 8.1*   HCT 28.8*  --   --  27.9* 26.4*   *  --   --  102* 100   *  --   --  135* 142*     Recent Labs   Lab Test 22  08/19/22  1513   AST 12 13 11   ALT 16 16 14   ALKPHOS 75 66 57   BILITOTAL 0.4 0.9 0.2       Recent Labs   Lab Test 08/23/22  0902 08/20/22  0556 06/02/22  1133   MAG 2.4* 2.4* 2.1     Recent Labs   Lab Test 08/20/22  0556 06/02/22  1133   PHOS 3.3 4.4     Recent Labs   Lab Test 08/24/22  0750 08/23/22  0902 08/22/22  0558   KATT 8.0* 7.9* 7.7*       Lab Results   Component Value Date    KATT 8.0 (L) 08/24/2022     Lab Results   Component Value Date    WBC 3.7 (L) 08/24/2022    HGB 8.6 (L) 08/24/2022    HGB 8.6 (L) 08/24/2022    HCT 28.8 (L) 08/24/2022     (H) 08/24/2022     (L) 08/24/2022     Lab Results   Component Value Date     (H) 08/24/2022    POTASSIUM 5.3 08/24/2022    CHLORIDE 123 (H) 08/24/2022    CO2 19 (L) 08/24/2022    GLC 96 08/24/2022     Lab Results   Component Value Date    BUN 46 (H) 08/24/2022    CR 1.64 (H) 08/24/2022     Lab Results   Component Value Date    MAG 2.4 (H) 08/23/2022     Lab Results   Component Value Date    PHOS 3.3 08/20/2022       Creatinine   Date Value Ref Range Status   08/24/2022 1.64 (H) 0.66 - 1.25 mg/dL Final   08/23/2022 1.65 (H) 0.66 - 1.25 mg/dL Final   08/22/2022 1.89 (H) 0.66 - 1.25 mg/dL Final   08/21/2022 1.89 (H) 0.66 - 1.25 mg/dL Final   08/20/2022 2.12 (H) 0.66 - 1.25 mg/dL Final   08/20/2022 2.12 (H) 0.66 - 1.25 mg/dL Final       Attestation:  I have reviewed today's vital signs, notes, medications, labs and imaging.     Adriel Li MD

## 2022-08-24 NOTE — PLAN OF CARE
Pt A&OX4. VSS on RA. Bone biopsy done today. Pt advanced to regular diet, didn't settle well. Pt had diarrhea once and after an hour his stomach settled. Pt is AX1 w/walker and gait belt. TELE was discontinued this afternoon. Pt on Hgb checks every 8 hours. Last result 8.1. Will continue to monitor.

## 2022-08-24 NOTE — PROGRESS NOTES
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, history of recurrent epistaxis, von Willebrand disease recently lost to follow-up, chronic anemia who was recently admitted to the hospital on 8/1 with hemoglobin of 5.2 and was given 2 units of blood with IV Venofer and underwent EGD on 8/2 followed by colonoscopy on 8/5 without significant finding who had labs drawn on 8/18 and was found to have hemoglobin of 4.5 and was admitted to the hospital      1) recurrent acute on chronic anemia  Iron deficiency  -Baseline hemoglobin around 5.2  -Patient was recently admitted to the hospital on 8/1 with hemoglobin of 5.2 with melena and received 2 units of blood during that hospital stay and he had work-up done including EGD and colonoscopy which were unremarkable  -He has been given 2 units of blood and hemoglobin this morning is stable at 7.8 and he is being followed by GI team  -The GI team does agree with bone marrow biopsy to find other causes of anemia and also outpatient [facility] will be arranged as outpatient and they have signed off  -Of note hematology does not suspect any ongoing hemolysis -> s/p or bone marrow biopsy to rule out primary bone marrow pathology  -His iron labs stable .       2) von Willebrand disease  MGUS  -He is followed by hematology team and appreciate input and they did mention that he is at increased risk of bleeding and they will give him Humate-P before the bone marrow biopsy  -He has known MGUS and bone marrow biopsy will be helpful in further treatment    3) hypertension  -We will continue the patient with amlodipine    4) gout  -We will continue with  allopurinol    5) Moderate aortic stenosis, possible bicuspid aortic valve  Moderate aortic root dilatation  Reporting shortness of breath with minimal exertion, likely due to severe anemia above.  EKG without ischemic changes, troponin and BNP wnl.  CXR with mild basilar opacities suspected to be atelectasis.  TTE 8/3 with moderate aortic stenosis and moderately dilated aortic root.    - Follow up with PCP for routine outpatient monitoring if desired based on goals of care    6) physical deconditioning  -Continue with physical therapy and Occupational Therapy    7) vitamin D deficiency  -Continue with vitamin replacement as his vitamin D levels were low recently    8) history of recurrent epistaxis  -He has been stable for the past 3 months and will need to follow his ENT as outpatient    9) acute kidney injury resolved on chronic kidney disease stage III/IV  Chronic hyperchloremia  Metabolic acidosis due to kidney disease  -His baseline creatinine is around 1.9  -Creatinine on admission was 2.31 and this was most likely due to blood loss and creatinine has improved to 1.89  -He does have chronic hyperchloremia and chronic metabolic acidosis and was supposed to follow-up with nephrology and I have consulted them as his hyperchloremia seems to getting worse along with acidosis         Diet: Regular Diet Adult    DVT Prophylaxis: Pneumatic Compression Devices  Angulo Catheter: Not present  Central Lines: None  Cardiac Monitoring: None  Code Status: No CPR- Do NOT Intubate      Disposition Plan      Expected Discharge Date: 08/26/2022      Destination: assisted living          The patient's care was discussed with the Bedside Nurse and Patient.    Jude Osullivan MD  Hospitalist Service  [facility]  Securely message with the Vocera Web Console (learn more here)  Text page via [employer] Paging/Directory         Clinically Significant Risk Factors Present on Admission                       ______________________________________________________________________    Interval History     Patient today with no acute complaints.  He denies any fever, chills, nausea and vomiting.   No CP/SOB.     Data reviewed today: I reviewed all medications, new labs and imaging results over the last 24 hours. I personally reviewed no images or EKG's today.    Physical Exam   Vital Signs: Temp: 98  F (36.7  C) Temp src: Oral BP: (!) 164/79 Pulse: 53   Resp: 16 SpO2: 100 % O2 Device: None (Room air)    Weight: 180 lbs 12.44 oz    General: Patient appears comfortable and in no acute distress.  Respiratory: Lungs are clear to auscultation bilaterally with no wheeze or crackles   Cardiovascular: Regular rate , S1 and S2 normal with no murmer or rubs or gallops  Abdomen:   soft , non tender , non distended and bowel sound present   Skin: No skin rashes or lesions to inspection or palpation.  Neurologic: Higher functions are within normal limits. No obvious defects in speech, language and memory. No facial droop  Musculoskeletal: Normal Range of motion over upper and lower extremities bilaterally   Psychiatric: cooperative     Data   Recent Labs   Lab 08/24/22  1558 08/24/22  0750 08/23/22  2200 08/23/22  1707 08/23/22  0902 08/22/22  2232 08/22/22  1513 08/22/22  0558 08/19/22  1623 08/19/22  1517   WBC  --  3.7*  --   --  4.1 4.0  --   --    < >  --    HGB 8.8* 8.6*  8.6* 8.1*   < > 8.4*  8.4* 8.1*   < > 7.8*   < >  --    MCV  --  102*  --   --  102* 100  --   --    < >  --    PLT  --  141*  --   --  135* 142*  --  131*   < >  --    INR  --   --   --   --   --   --   --   --   --  1.13   NA  --  147*  --   --  145*  --   --  144   < >  --    POTASSIUM  --  5.3  --   --  5.8*  --   --  5.1   < >  --    CHLORIDE  --  123*  --   --  123*  --   --  122*   < >  --    CO2  --  19*  --   --  20  --   --  17*   < >  --    BUN  --  46*  --   --  50*  --   --  63*   < >  --    CR  --  1.64*  --   --  1.65*  --   --  1.89*   < >   --    ANIONGAP  --  5  --   --  2*  --   --  5   < >  --    KATT  --  8.0*  --   --  7.9*  --   --  7.7*   < >  --    GLC  --  96  --   --  100*  --   --  95   < >  --    ALBUMIN  --  2.6*  --   --  2.6*  --   --   --   --   --    PROTTOTAL  --  5.2*  --   --  5.1*  --   --   --   --   --    BILITOTAL  --  0.4  --   --  0.9  --   --   --   --   --    ALKPHOS  --  75  --   --  66  --   --   --   --   --    ALT  --  16  --   --  16  --   --   --   --   --    AST  --  12  --   --  13  --   --   --   --   --     < > = values in this interval not displayed.     No results found for this or any previous visit (from the past 24 hour(s)).  Medications     IV infusion builder WITH additives 100 mL/hr at 08/24/22 1550       allopurinol  200 mg Oral Daily     amLODIPine  5 mg Oral Daily     calcium carbonate  500 mg Oral TID w/meals     citalopram  5 mg Oral Daily     cyanocobalamin  1,000 mcg Intramuscular Q30 Days     iron sucrose  200 mg Intravenous Daily     sodium bicarbonate  1,300 mg Oral BID     sodium chloride (PF)  3 mL Intracatheter Q8H     sodium zirconium cyclosilicate  10 g Oral Daily     Vitamin D3  125 mcg Oral Daily

## 2022-08-24 NOTE — PLAN OF CARE
Problem: Plan of Care - These are the overarching goals to be used throughout the patient stay.    Goal: Plan of Care Review/Shift Note  Description: The Plan of Care Review/Shift note should be completed every shift.  The Outcome Evaluation is a brief statement about your assessment that the patient is improving, declining, or no change.  This information will be displayed automatically on your shift note.  Outcome: Ongoing, Progressing   Goal Outcome Evaluation:      Pt is transferring to unit 88 at this time.  Belongings sent with patient. Fluids to be started per nephrology.  Up with 1 assist walker and a belt, alert x 4.  Still having melena.  Black, formed/loose stool this am.

## 2022-08-25 ENCOUNTER — APPOINTMENT (OUTPATIENT)
Dept: PHYSICAL THERAPY | Facility: CLINIC | Age: 82
DRG: 812 | End: 2022-08-25
Payer: COMMERCIAL

## 2022-08-25 VITALS
BODY MASS INDEX: 27.85 KG/M2 | TEMPERATURE: 97.8 F | SYSTOLIC BLOOD PRESSURE: 148 MMHG | WEIGHT: 188.6 LBS | OXYGEN SATURATION: 97 % | DIASTOLIC BLOOD PRESSURE: 96 MMHG | HEART RATE: 60 BPM | RESPIRATION RATE: 18 BRPM

## 2022-08-25 LAB
ANION GAP SERPL CALCULATED.3IONS-SCNC: 2 MMOL/L (ref 3–14)
BUN SERPL-MCNC: 34 MG/DL (ref 7–30)
CALCIUM SERPL-MCNC: 7.7 MG/DL (ref 8.5–10.1)
CHLORIDE BLD-SCNC: 118 MMOL/L (ref 94–109)
CO2 SERPL-SCNC: 24 MMOL/L (ref 20–32)
CREAT SERPL-MCNC: 1.6 MG/DL (ref 0.66–1.25)
D DIMER PPP FEU-MCNC: 1.1 UG/ML FEU (ref 0–0.5)
ERYTHROCYTE [DISTWIDTH] IN BLOOD BY AUTOMATED COUNT: 25 % (ref 10–15)
GFR SERPL CREATININE-BSD FRML MDRD: 42 ML/MIN/1.73M2
GLUCOSE BLD-MCNC: 92 MG/DL (ref 70–99)
HCT VFR BLD AUTO: 26 % (ref 40–53)
HGB BLD-MCNC: 8 G/DL (ref 13.3–17.7)
HGB BLD-MCNC: 8 G/DL (ref 13.3–17.7)
MCH RBC QN AUTO: 30.7 PG (ref 26.5–33)
MCHC RBC AUTO-ENTMCNC: 30.8 G/DL (ref 31.5–36.5)
MCV RBC AUTO: 100 FL (ref 78–100)
PLATELET # BLD AUTO: 123 10E3/UL (ref 150–450)
POTASSIUM BLD-SCNC: 4.8 MMOL/L (ref 3.4–5.3)
RBC # BLD AUTO: 2.61 10E6/UL (ref 4.4–5.9)
SODIUM SERPL-SCNC: 144 MMOL/L (ref 133–144)
WBC # BLD AUTO: 3.4 10E3/UL (ref 4–11)

## 2022-08-25 PROCEDURE — 97530 THERAPEUTIC ACTIVITIES: CPT | Mod: GP

## 2022-08-25 PROCEDURE — 250N000013 HC RX MED GY IP 250 OP 250 PS 637: Performed by: INTERNAL MEDICINE

## 2022-08-25 PROCEDURE — 85379 FIBRIN DEGRADATION QUANT: CPT | Performed by: STUDENT IN AN ORGANIZED HEALTH CARE EDUCATION/TRAINING PROGRAM

## 2022-08-25 PROCEDURE — 80048 BASIC METABOLIC PNL TOTAL CA: CPT | Performed by: STUDENT IN AN ORGANIZED HEALTH CARE EDUCATION/TRAINING PROGRAM

## 2022-08-25 PROCEDURE — 250N000011 HC RX IP 250 OP 636: Performed by: INTERNAL MEDICINE

## 2022-08-25 PROCEDURE — 99239 HOSP IP/OBS DSCHRG MGMT >30: CPT | Performed by: STUDENT IN AN ORGANIZED HEALTH CARE EDUCATION/TRAINING PROGRAM

## 2022-08-25 PROCEDURE — 250N000013 HC RX MED GY IP 250 OP 250 PS 637: Performed by: HOSPITALIST

## 2022-08-25 PROCEDURE — 36415 COLL VENOUS BLD VENIPUNCTURE: CPT | Performed by: STUDENT IN AN ORGANIZED HEALTH CARE EDUCATION/TRAINING PROGRAM

## 2022-08-25 PROCEDURE — 99232 SBSQ HOSP IP/OBS MODERATE 35: CPT | Performed by: INTERNAL MEDICINE

## 2022-08-25 PROCEDURE — 85027 COMPLETE CBC AUTOMATED: CPT | Performed by: STUDENT IN AN ORGANIZED HEALTH CARE EDUCATION/TRAINING PROGRAM

## 2022-08-25 PROCEDURE — 258N000003 HC RX IP 258 OP 636: Performed by: INTERNAL MEDICINE

## 2022-08-25 PROCEDURE — 97116 GAIT TRAINING THERAPY: CPT | Mod: GP

## 2022-08-25 RX ORDER — AMLODIPINE BESYLATE 10 MG/1
10 TABLET ORAL DAILY
Status: DISCONTINUED | OUTPATIENT
Start: 2022-08-25 | End: 2022-08-25 | Stop reason: HOSPADM

## 2022-08-25 RX ORDER — AMLODIPINE BESYLATE 10 MG/1
10 TABLET ORAL DAILY
Qty: 30 TABLET | Refills: 0 | Status: SHIPPED | OUTPATIENT
Start: 2022-08-26 | End: 2022-09-25

## 2022-08-25 RX ORDER — CALCIUM CARBONATE 500 MG/1
1 TABLET, CHEWABLE ORAL 2 TIMES DAILY PRN
Qty: 30 TABLET | Refills: 0 | Status: SHIPPED | OUTPATIENT
Start: 2022-08-25

## 2022-08-25 RX ADMIN — ALLOPURINOL 200 MG: 100 TABLET ORAL at 09:04

## 2022-08-25 RX ADMIN — CALCIUM CARBONATE (ANTACID) CHEW TAB 500 MG 500 MG: 500 CHEW TAB at 12:42

## 2022-08-25 RX ADMIN — CALCIUM CARBONATE (ANTACID) CHEW TAB 500 MG 500 MG: 500 CHEW TAB at 09:04

## 2022-08-25 RX ADMIN — SODIUM BICARBONATE 650 MG TABLET 1300 MG: at 09:04

## 2022-08-25 RX ADMIN — AMLODIPINE BESYLATE 10 MG: 10 TABLET ORAL at 09:36

## 2022-08-25 RX ADMIN — Medication 125 MCG: at 09:05

## 2022-08-25 RX ADMIN — CITALOPRAM HYDROBROMIDE 5 MG: 10 TABLET ORAL at 09:05

## 2022-08-25 ASSESSMENT — ACTIVITIES OF DAILY LIVING (ADL)
ADLS_ACUITY_SCORE: 43
ADLS_ACUITY_SCORE: 42

## 2022-08-25 NOTE — PLAN OF CARE
Physical Therapy Discharge Summary    Reason for therapy discharge:    Discharged to Home (assisted living facility)     Progress towards therapy goal(s). See goals on Care Plan in Epic electronic health record for goal details.  Goals met    Therapy recommendation(s):    No further therapy is recommended.  Continue home exercise program. All IP PT goals met, no further needs at this time.

## 2022-08-25 NOTE — PROGRESS NOTES
Service Date: 2022    SUBJECTIVE:  Mr. Gomez is an 82-year-old gentleman with type 2 von Willebrand disease.  The patient also has severe anemia.  Some of the anemia is from anemia of chronic disease and renal disease.  He may also have primary bone marrow pathology.  He had bone marrow biopsy done.  He received Humate-P before that.  The patient did not have any excessive bleeding.    The patient's overall condition is stable.  He has fatigue.  No chest pain or shortness of breath.  Denies bleeding from any site.  Hemoglobin today is stable at 8.8.    PHYSICAL EXAMINATION:    GENERAL:  He is alert and oriented x 3.  Not in distress.  Rest of systems not examined.    LABS:  Reviewed.    ASSESSMENT:   1.  An 82-year-old gentleman with type 2 von Willebrand disease.  2.  Normocytic anemia.  3.  MGUS.  4.  Chronic kidney disease.    PLAN:   1.  The patient is doing well.  No bleeding.  He does not need any further Humate-P.   2.  He had bone marrow biopsy done.  Result will be available in the next few days.  Hematology/Oncology will see him after bone marrow biopsy is back.  If discharged, he should follow up in the clinic in the next 1-2 weeks.  3.  For anemia, he should be transfused for hemoglobin below 7 or if he is symptomatic.  4.  The patient has MGUS.  We will wait for bone marrow biopsy to see plasma cell percentage.  5.  He had a few questions, which were all answered.     Case discussed with Dr. Osullivan.    Power Edwards MD        D: 2022   T: 2022   MT: [id number]    Name:     IVANIA GOMEZ  MRN:      [id number]        Account:      [id number]   :      [id number]           Service Date: 2022       Document: [id number]

## 2022-08-25 NOTE — DISCHARGE SUMMARY
[facility]  Hospitalist Discharge Summary      Date of Admission:  8/19/2022  Date of Discharge:  8/25/2022  Discharging Provider: Jude Osullivan MD  Discharge Service: Hospitalist Service    Discharge Diagnoses     Acute on chronic anemia  Iron deficiency Anemia    Follow-ups Needed After Discharge   Follow-up Appointments     Follow-up and recommended labs and tests       Follow up with primary care provider, MARICHUY NEWELL, within 7 days for   hospital follow- up.  The following labs/tests are recommended: CBC / BMP.               Unresulted Labs Ordered in the Past 30 Days of this Admission     Date and Time Order Name Status Description    8/23/2022  2:43 PM FISH With Professional Interpretation In process     8/23/2022  2:43 PM CHROMOSOME ANALYSIS, BONE MARROW, DIAGNOSIS/RELAPSE With Professional Interpretation In process     8/23/2022  1:32 PM Ristocetin Cofactor Activity In process     8/21/2022  7:34 PM VWF Activity with reflex to Ristocetin Cofactor Activity Preliminary     8/20/2022  1:55 AM CONDITIONAL Prepare red blood cells (unit) Preliminary     8/19/2022  3:43 PM Prepare red blood cells (unit) Preliminary     8/19/2022  3:43 PM Prepare red blood cells (unit) Preliminary         Discharge Disposition     Discharged to home  Condition at discharge: Stable    Hospital Course          This is a 82-year-old male with medical history which includes hypertension, gout, vitamin D deficiency, moderate aortic stenosis with moderate aortic root dilatation, recent right lower extremity cellulitis, 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, history of recurrent epistaxis, von Willebrand disease recently lost to follow-up, chronic anemia who was recently admitted to the hospital on 8/1 with hemoglobin of 5.2 and was given 2 units of blood with IV Venofer and underwent EGD on 8/2 followed by colonoscopy on 8/5 without significant finding who had labs drawn on 8/18 and was found to have hemoglobin of  4.5 and was admitted to the hospital      1) Acute on chronic anemia      Iron deficiency  -Baseline hemoglobin around 5.2  -Patient was recently admitted to the hospital on 8/1 with hemoglobin of 5.2 with melena and received 2 units of blood during that hospital stay and he had work-up done including EGD and colonoscopy which were unremarkable  -He has been given 2 units of blood and hemoglobin this morning is stable at 7.8 and he is being followed by GI team  -The GI team does agree with bone marrow biopsy to find other causes of anemia and also outpatient PillCam will be arranged as outpatient and they have signed off  -Of note hematology does not suspect any ongoing hemolysis -> s/p or bone marrow biopsy to rule out primary bone marrow pathology  -His iron labs stable .     2) Von Willebrand disease       MGUS  -He is followed by hematology team and appreciate input and they did mention that he is at increased risk of bleeding and they will give him Humate-P before the bone marrow biopsy  -He has known MGUS and bone marrow biopsy will be helpful in further treatment    3) hypertension  -We will continue the patient with amlodipine, dose increased to 10 mg daily    4) GOUT. We will continue with allopurinol    5) Moderate aortic stenosis, possible bicuspid aortic valve       Moderate aortic root dilatation  Plan:  Reporting shortness of breath with minimal exertion, likely due to severe anemia above.  -  EKG without ischemic changes, troponin and BNP wnl.  CXR with mild basilar opacities suspected to be atelectasis.  TTE 8/3 with moderate aortic stenosis and moderately dilated aortic root.    -  Follow up with PCP for routine outpatient monitoring if desired based on goals of care    6) physical deconditioning  - physical therapy and Occupational Therapy consulted  -> okay for back to [place]    7) vitamin D deficiency  -Continue with vitamin replacement as his vitamin D levels were low recently    8) history of  recurrent epistaxis  -He has been stable for the past 3 months and will need to follow his ENT as outpatient    9) acute kidney injury resolved on chronic kidney disease stage III/IV  Chronic hyperchloremia  Metabolic acidosis due to kidney disease  -His baseline creatinine is around 1.9  -Creatinine on admission was 2.31 and this was most likely due to blood loss and creatinine has improved to 1.89 -> 1.68  -He does have chronic hyperchloremia and chronic metabolic acidosis and was supposed to follow-up with nephrolog      Consultations This Hospital Stay   GASTROENTEROLOGY IP CONSULT  CARE MANAGEMENT / SOCIAL WORK IP CONSULT  PHYSICAL THERAPY ADULT IP CONSULT  HEMATOLOGY & ONCOLOGY IP CONSULT  NEPHROLOGY IP CONSULT    Code Status   No CPR- Do NOT Intubate    Time Spent on this Encounter   I, Jude Osullivan MD, personally saw the patient today and spent greater than 30 minutes discharging this patient.       Jdue Osullivan MD  [place] [address] [facility]  [address] [place] [address], [address] [address]  Phone: [phone]  ______________________________________________________________________    Physical Exam   Vital Signs: Temp: 97.8  F (36.6  C) Temp src: Oral BP: (!) 148/96 Pulse: 60   Resp: 18 SpO2: 97 % O2 Device: None (Room air)    Weight: 188 lbs 9.6 oz       Primary Care Physician   MARICHUY NEWELL    Discharge Orders      Reticulocyte count    STAT X 1     Home Care Referral      Reason for your hospital stay    You had low hemoglobin levels and had a bone marrow biopsy.     Follow-up and recommended labs and tests     Follow up with primary care provider, MARICHUY NEWELL, within 7 days for hospital follow- up.  The following labs/tests are recommended: CBC / BMP.     Activity    Your activity upon discharge: activity as tolerated     Diet    Follow this diet upon discharge: Orders Placed This Encounter      Regular Diet Adult     CBC with platelets differential       Significant Results and  Procedures   Most Recent 3 CBC's:  Recent Labs   Lab Test 08/25/22  0743 08/24/22  2319 08/24/22  1558 08/24/22  0750 08/23/22  1707 08/23/22  0902   WBC 3.4*  --   --  3.7*  --  4.1   HGB 8.0*  8.0* 8.3* 8.8* 8.6*  8.6*   < > 8.4*  8.4*     --   --  102*  --  102*   *  --   --  141*  --  135*    < > = values in this interval not displayed.     Most Recent 3 BMP's:  Recent Labs   Lab Test 08/25/22  0743 08/24/22  0750 08/23/22  0902    147* 145*   POTASSIUM 4.8 5.3 5.8*   CHLORIDE 118* 123* 123*   CO2 24 19* 20   BUN 34* 46* 50*   CR 1.60* 1.64* 1.65*   ANIONGAP 2* 5 2*   KATT 7.7* 8.0* 7.9*   GLC 92 96 100*     Most Recent 2 LFT's:  Recent Labs   Lab Test 08/24/22  0750 08/23/22  0902   AST 12 13   ALT 16 16   ALKPHOS 75 66   BILITOTAL 0.4 0.9     Most Recent 3 INR's:  Recent Labs   Lab Test 08/19/22  1517 08/01/22  1947   INR 1.13 1.13   ,   Results for orders placed or performed during the hospital encounter of 08/19/22   XR Chest 2 Views    Narrative    CHEST TWO VIEWS 8/19/2022 3:59 PM     HISTORY: Shortness of breath    COMPARISON: 8/1/2022       Impression    IMPRESSION: Small hiatal hernia. There are no acute infiltrates. The  cardiac silhouette is not enlarged. Pulmonary vasculature is  unremarkable.    VERONICA GARCIA MD         SYSTEM ID:  [id number]       Discharge Medications   Discharge Medication List as of 8/25/2022  1:48 PM      START taking these medications    Details   calcium carbonate (TUMS) 500 MG chewable tablet Take 1 tablet (500 mg) by mouth 2 times daily as needed for heartburn, Disp-30 tablet, R-0, E-Prescribe         CONTINUE these medications which have CHANGED    Details   amLODIPine (NORVASC) 10 MG tablet Take 1 tablet (10 mg) by mouth daily for 30 days, Disp-30 tablet, R-0, E-Prescribe         CONTINUE these medications which have NOT CHANGED    Details   allopurinol (ZYLOPRIM) 100 MG tablet Take 100 mg by mouth daily, Historical      citalopram (CELEXA) 10 MG  tablet Take 5 mg by mouth daily, Historical      ferrous fumarate 65 mg, [place]. FE,-Vitamin C 125 mg (VITRON C)  MG TABS tablet Take 1 tablet by mouth 2 times daily, Historical      fish oil-omega-3 fatty acids 1000 MG capsule Take 1 g by mouth daily, Historical      multivitamin (CENTRUM SILVER) tablet Take 1 tablet by mouth daily, Historical      Vitamin D3 (CHOLECALCIFEROL) 125 MCG (5000 UT) tablet Take 1 tablet (125 mcg) by mouth daily, Disp-30 tablet, R-0, E-Prescribe           Allergies   No Known Allergies

## 2022-08-25 NOTE — PLAN OF CARE
A&Ox4. VSS on RA ex hypertensive at times, JORDAN. Denies pain, N/V. Regular diet. Up A1 GBW. L PIV SL. Hgb 8.3. +2 bilat LE edema. Nephrology and Hem/Onc following. Discharge today back to living facility.

## 2022-08-25 NOTE — PLAN OF CARE
Discharge Note    Patient discharged to Assisted Living via private vehicle  accompanied by son.  IV: Discontinued  Prescriptions filled and given to patient/family.   Belongings reviewed and sent with patient.   Home medications returned to patient: NA  Equipment sent with: N/A.   patient verbalizes understanding of discharge instructions. AVS given to patient.    Follow up with nephrology scheduled.

## 2022-08-25 NOTE — PROGRESS NOTES
Care Management Discharge Note    Discharge Date: 08/25/2022     Discharge Disposition:  Return to [facility]    Handoff Referral Completed: Yes    Additional Information:  Spoke with Pan KENT [phone] from [facility] who confirms pt can return to their facility today. Discharge orders to be faxed to [phone].    [facility] confirms they can accept for RN/PT/OT home care services w/SOC 8/30.    Following appt arranged:  Nephrology Follow-up w/ Dr. Adriel Li.  Friday, Sept 23rd at 2:00pm  [facility]  [address], [address]  [place] [address]  [phone]        Betty Boyd, RN, BSN, PHN, CMSRN  Care Coordination  [facility]

## 2022-08-25 NOTE — PROGRESS NOTES
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 Lytes now good with Na 144, K 4.8, HCO3 24.   CaCO3 1 tid with meals. Oral vit D.  Oral bicarb.   S/P 1000 ml of D5W with 75 HCO3 last night.     Labs are good. JUAN R resolving.     We will sign off.   Follow up our office 1 month after discharge. (MD Sandy or PA [facility] F/U, [phone])            Interval History:   Anemia:   S/P bone marrow bx.  S/P B12 injection.  Getting IV venofer.    Hypertension: amlodipine 5 mg per day. Will increase to 10 mg per day.   Gout: on allopurinol.            Review of Systems:   No pain. Taking po well.           Medications:       allopurinol  200 mg Oral Daily     amLODIPine  5 mg Oral Daily     calcium carbonate  500 mg Oral TID w/meals     citalopram  5 mg Oral Daily     cyanocobalamin  1,000 mcg Intramuscular Q30 Days     iron sucrose  200 mg Intravenous Daily     sodium bicarbonate  1,300 mg Oral BID     sodium chloride (PF)  3 mL Intracatheter Q8H     Vitamin D3  125 mcg Oral Daily         Current active medications and PTA medications reviewed, see medication list for details.            Physical Exam:   Vitals were reviewed  Patient Vitals for the past 24 hrs:   BP Temp Temp src Pulse Resp SpO2 Weight   22 0800 (!) 146/74 97.8  F (36.6  C) Oral 55 18 96 % --   22 0546 -- -- -- -- -- -- 85.5 kg (188 lb 9.6 oz)   22 2321 130/65 97.8  F (36.6  C) Oral 55 16 97 % --   22 1937 (!) 143/73 98.1  F (36.7  C) Oral 61 16 99 % --   22 1810 (!) 168/88 -- -- -- -- -- --   22 1452 (!) 164/79 98  F (36.7  C) Oral 53 16 100 % --   22 0856 (!) 153/79 98  F (36.7  C) Oral 57 16 93 % --       Temp:  [97.8  F (36.6  C)-98.1  F (36.7  C)] 97.8  F (36.6  C)  Pulse:  [53-61] 55  Resp:  [16-18] 18  BP: (130-168)/(65-88) 146/74  SpO2:  [93 %-100 %] 96 %    Temperatures:  Current - Temp: 97.8  F (36.6  C); Max - Temp  Av.9  F (36.6  C)  Min: 97.8  F (36.6  C)  Max: 98.1  F (36.7   C)  Respiration range: Resp  Av.4  Min: 16  Max: 18  Pulse range: Pulse  Av.2  Min: 53  Max: 61  Blood pressure range: Systolic (24hrs), Av , Min:130 , Max:168   ; Diastolic (24hrs), Av, Min:65, Max:88    Pulse oximetry range: SpO2  Av %  Min: 93 %  Max: 100 %    I/O last 3 completed shifts:  In: 599 [P.O.:480; I.V.:119]  Out: 1350 [Urine:1350]      Intake/Output Summary (Last 24 hours) at 2022 0841  Last data filed at 2022 0546  Gross per 24 hour   Intake 599 ml   Output 1350 ml   Net -751 ml       Alert and responsive  Resting comfortably in bed       Wt Readings from Last 4 Encounters:   22 85.5 kg (188 lb 9.6 oz)   22 83.9 kg (184 lb 14.4 oz)          Data:          Lab Results   Component Value Date     2022     2022     2022    Lab Results   Component Value Date    CHLORIDE 118 2022    CHLORIDE 123 2022    CHLORIDE 123 2022      Lab Results   Component Value Date    BUN 34 2022    BUN 46 2022    BUN 50 2022      Lab Results   Component Value Date    POTASSIUM 4.8 2022    POTASSIUM 5.3 2022    POTASSIUM 5.8 2022    Lab Results   Component Value Date    CO2 24 2022    CO2 19 2022    CO2 20 2022    Lab Results   Component Value Date    CR 1.60 2022    CR 1.64 2022    CR 1.65 2022        Recent Labs   Lab Test 22  0743 22  2319 22  1558 22  0750 22  1707 22  0902   WBC 3.4*  --   --  3.7*  --  4.1   HGB 8.0*  8.0* 8.3* 8.8* 8.6*  8.6*   < > 8.4*  8.4*   HCT 26.0*  --   --  28.8*  --  27.9*     --   --  102*  --  102*   *  --   --  141*  --  135*    < > = values in this interval not displayed.     Recent Labs   Lab Test 22  0750 22  0902 22  1513   AST 12 13 11   ALT 16 16 14   ALKPHOS 75 66 57   BILITOTAL 0.4 0.9 0.2       Recent Labs   Lab Test 22  0902 22  0556  06/02/22  1133   MAG 2.4* 2.4* 2.1     Recent Labs   Lab Test 08/20/22  0556 06/02/22  1133   PHOS 3.3 4.4     Recent Labs   Lab Test 08/25/22  0743 08/24/22  0750 08/23/22  0902   KATT 7.7* 8.0* 7.9*     Lab Results   Component Value Date    KATT 7.7 (L) 08/25/2022     Lab Results   Component Value Date    WBC 3.4 (L) 08/25/2022    HGB 8.0 (L) 08/25/2022    HGB 8.0 (L) 08/25/2022    HCT 26.0 (L) 08/25/2022     08/25/2022     (L) 08/25/2022     Lab Results   Component Value Date     08/25/2022    POTASSIUM 4.8 08/25/2022    CHLORIDE 118 (H) 08/25/2022    CO2 24 08/25/2022    GLC 92 08/25/2022     Lab Results   Component Value Date    BUN 34 (H) 08/25/2022    CR 1.60 (H) 08/25/2022     Lab Results   Component Value Date    MAG 2.4 (H) 08/23/2022     Lab Results   Component Value Date    PHOS 3.3 08/20/2022       Creatinine   Date Value Ref Range Status   08/25/2022 1.60 (H) 0.66 - 1.25 mg/dL Final   08/24/2022 1.64 (H) 0.66 - 1.25 mg/dL Final   08/23/2022 1.65 (H) 0.66 - 1.25 mg/dL Final   08/22/2022 1.89 (H) 0.66 - 1.25 mg/dL Final   08/21/2022 1.89 (H) 0.66 - 1.25 mg/dL Final   08/20/2022 2.12 (H) 0.66 - 1.25 mg/dL Final       Attestation:  I have reviewed today's vital signs, notes, medications, labs and imaging.     Adriel Li MD

## 2022-08-25 NOTE — DISCHARGE INSTRUCTIONS
Nephrology Follow-up w/ Dr. Adriel Li.  Friday, Sept 23rd at 2:00pm  [facility]  [address], [address]  [place], [place] [address] [phone]

## 2022-08-26 NOTE — PROGRESS NOTES
After discharge note:    8/26/22 14:41   Voicemail on department phone from [facility] they are asking for H&P, Meds, Discharge RX and resumption orders for RN/PT/OT to be sent the following:    [employer] Phone#[phone]           Fax#[phone]        Anaid Gonzalez RN, BSN, ACM   Care Transitions Specialist   [facility]  Care Transitions Specialist   [address] [place]. [address]  [email or web address]  Office:[phone] Fax: [phone]  [facility]

## 2022-08-27 ENCOUNTER — PATIENT OUTREACH (OUTPATIENT)
Dept: CARE COORDINATION | Facility: CLINIC | Age: 82
End: 2022-08-27

## 2022-08-27 NOTE — PROGRESS NOTES
[facility] Contact  [facility]/Voicemail     Clinical Data: Transitional Care Management Outreach     Outreach attempted x 2.  Left message on patient's voicemail, providing [facility]'s 24/7 scheduling and nurse triage phone number [phone] ([phone]) for questions/concerns and/or to schedule an appt with an [facility] provider, if they do not have a PCP.      Plan:  [facility] will do no further outreaches at this time.       Luz Nina  Community Health Worker  [facility], [facility]  [phone]      *Connected Care Resource Team does NOT follow patient ongoing. Referrals are identified based on internal discharge reports and the outreach is to ensure patient has an understanding of their discharge instructions.

## 2022-08-28 ENCOUNTER — LAB REQUISITION (OUTPATIENT)
Dept: LAB | Facility: CLINIC | Age: 82
End: 2022-08-28
Payer: COMMERCIAL

## 2022-08-28 DIAGNOSIS — D64.9 ANEMIA, UNSPECIFIED: ICD-10-CM

## 2022-08-28 LAB — VWF:RCO ACT/NOR PPP PL AGG: <10 %

## 2022-08-29 LAB — VWF:AC ACT/NOR PPP IA: <19 % (ref 50–180)

## 2022-08-31 ENCOUNTER — LAB REQUISITION (OUTPATIENT)
Dept: LAB | Facility: CLINIC | Age: 82
End: 2022-08-31
Payer: COMMERCIAL

## 2022-08-31 DIAGNOSIS — D64.9 ANEMIA, UNSPECIFIED: ICD-10-CM

## 2022-08-31 DIAGNOSIS — N17.9 ACUTE KIDNEY FAILURE, UNSPECIFIED (H): ICD-10-CM

## 2022-08-31 LAB — INTERPRETATION: NORMAL

## 2022-09-01 LAB
ANION GAP SERPL CALCULATED.3IONS-SCNC: 12 MMOL/L (ref 7–15)
BASOPHILS # BLD AUTO: 0 10E3/UL (ref 0–0.2)
BASOPHILS NFR BLD AUTO: 1 %
BUN SERPL-MCNC: 37.6 MG/DL (ref 8–23)
CALCIUM SERPL-MCNC: 8.3 MG/DL (ref 8.8–10.2)
CHLORIDE SERPL-SCNC: 114 MMOL/L (ref 98–107)
CREAT SERPL-MCNC: 1.89 MG/DL (ref 0.67–1.17)
CULTURE HARVEST COMPLETE DATE: NORMAL
DEPRECATED HCO3 PLAS-SCNC: 16 MMOL/L (ref 22–29)
EOSINOPHIL # BLD AUTO: 0.3 10E3/UL (ref 0–0.7)
EOSINOPHIL NFR BLD AUTO: 6 %
ERYTHROCYTE [DISTWIDTH] IN BLOOD BY AUTOMATED COUNT: 23.1 % (ref 10–15)
GFR SERPL CREATININE-BSD FRML MDRD: 35 ML/MIN/1.73M2
GLUCOSE SERPL-MCNC: 59 MG/DL (ref 70–99)
HCT VFR BLD AUTO: 30.4 % (ref 40–53)
HGB BLD-MCNC: 8.7 G/DL (ref 13.3–17.7)
HGB BLD-MCNC: 8.7 G/DL (ref 13.3–17.7)
IMM GRANULOCYTES # BLD: 0 10E3/UL
IMM GRANULOCYTES NFR BLD: 0 %
LYMPHOCYTES # BLD AUTO: 2.2 10E3/UL (ref 0.8–5.3)
LYMPHOCYTES NFR BLD AUTO: 47 %
MCH RBC QN AUTO: 30.9 PG (ref 26.5–33)
MCHC RBC AUTO-ENTMCNC: 28.6 G/DL (ref 31.5–36.5)
MCV RBC AUTO: 108 FL (ref 78–100)
MONOCYTES # BLD AUTO: 0.3 10E3/UL (ref 0–1.3)
MONOCYTES NFR BLD AUTO: 7 %
NEUTROPHILS # BLD AUTO: 1.8 10E3/UL (ref 1.6–8.3)
NEUTROPHILS NFR BLD AUTO: 39 %
NRBC # BLD AUTO: 0 10E3/UL
NRBC BLD AUTO-RTO: 0 /100
PLATELET # BLD AUTO: 149 10E3/UL (ref 150–450)
POTASSIUM SERPL-SCNC: 5.2 MMOL/L (ref 3.4–5.3)
RBC # BLD AUTO: 2.82 10E6/UL (ref 4.4–5.9)
SODIUM SERPL-SCNC: 142 MMOL/L (ref 136–145)
WBC # BLD AUTO: 4.6 10E3/UL (ref 4–11)

## 2022-09-01 PROCEDURE — 85025 COMPLETE CBC W/AUTO DIFF WBC: CPT | Mod: ORL | Performed by: FAMILY MEDICINE

## 2022-09-01 PROCEDURE — 36415 COLL VENOUS BLD VENIPUNCTURE: CPT | Mod: ORL | Performed by: FAMILY MEDICINE

## 2022-09-01 PROCEDURE — 85018 HEMOGLOBIN: CPT | Mod: ORL | Performed by: FAMILY MEDICINE

## 2022-09-01 PROCEDURE — P9604 ONE-WAY ALLOW PRORATED TRIP: HCPCS | Mod: ORL | Performed by: FAMILY MEDICINE

## 2022-09-01 PROCEDURE — 80048 BASIC METABOLIC PNL TOTAL CA: CPT | Mod: ORL | Performed by: FAMILY MEDICINE

## 2022-09-02 ENCOUNTER — DOCUMENTATION ONLY (OUTPATIENT)
Dept: OTHER | Facility: CLINIC | Age: 82
End: 2022-09-02

## 2022-09-02 LAB — INTERPRETATION: NORMAL

## 2022-09-06 ENCOUNTER — LAB REQUISITION (OUTPATIENT)
Dept: LAB | Facility: CLINIC | Age: 82
End: 2022-09-06
Payer: COMMERCIAL

## 2022-09-06 DIAGNOSIS — D68.9 COAGULATION DEFECT, UNSPECIFIED (H): ICD-10-CM

## 2022-09-06 DIAGNOSIS — D68.00 VON WILLEBRAND'S DISEASE (H): ICD-10-CM

## 2022-09-06 LAB
ANION GAP SERPL CALCULATED.3IONS-SCNC: 3 MMOL/L (ref 3–14)
BASOPHILS # BLD AUTO: 0 10E3/UL (ref 0–0.2)
BASOPHILS NFR BLD AUTO: 1 %
BUN SERPL-MCNC: 58 MG/DL (ref 7–30)
CALCIUM SERPL-MCNC: 8.1 MG/DL (ref 8.5–10.1)
CHLORIDE BLD-SCNC: 118 MMOL/L (ref 94–109)
CO2 SERPL-SCNC: 22 MMOL/L (ref 20–32)
CREAT SERPL-MCNC: 2.05 MG/DL (ref 0.66–1.25)
EOSINOPHIL # BLD AUTO: 0.3 10E3/UL (ref 0–0.7)
EOSINOPHIL NFR BLD AUTO: 6 %
ERYTHROCYTE [DISTWIDTH] IN BLOOD BY AUTOMATED COUNT: 22.1 % (ref 10–15)
GFR SERPL CREATININE-BSD FRML MDRD: 31 ML/MIN/1.73M2
GLUCOSE BLD-MCNC: 86 MG/DL (ref 70–99)
HCT VFR BLD AUTO: 28.6 % (ref 40–53)
HGB BLD-MCNC: 8.5 G/DL (ref 13.3–17.7)
IMM GRANULOCYTES # BLD: 0 10E3/UL
IMM GRANULOCYTES NFR BLD: 0 %
LYMPHOCYTES # BLD AUTO: 1.9 10E3/UL (ref 0.8–5.3)
LYMPHOCYTES NFR BLD AUTO: 40 %
MCH RBC QN AUTO: 31.8 PG (ref 26.5–33)
MCHC RBC AUTO-ENTMCNC: 29.7 G/DL (ref 31.5–36.5)
MCV RBC AUTO: 107 FL (ref 78–100)
MONOCYTES # BLD AUTO: 0.4 10E3/UL (ref 0–1.3)
MONOCYTES NFR BLD AUTO: 9 %
NEUTROPHILS # BLD AUTO: 2.1 10E3/UL (ref 1.6–8.3)
NEUTROPHILS NFR BLD AUTO: 44 %
NRBC # BLD AUTO: 0 10E3/UL
NRBC BLD AUTO-RTO: 0 /100
PLATELET # BLD AUTO: 150 10E3/UL (ref 150–450)
POTASSIUM BLD-SCNC: 5.9 MMOL/L (ref 3.4–5.3)
RBC # BLD AUTO: 2.67 10E6/UL (ref 4.4–5.9)
SODIUM SERPL-SCNC: 143 MMOL/L (ref 133–144)
WBC # BLD AUTO: 4.7 10E3/UL (ref 4–11)

## 2022-09-06 PROCEDURE — 80048 BASIC METABOLIC PNL TOTAL CA: CPT | Mod: ORL | Performed by: FAMILY MEDICINE

## 2022-09-06 PROCEDURE — 85025 COMPLETE CBC W/AUTO DIFF WBC: CPT | Mod: ORL | Performed by: FAMILY MEDICINE

## 2022-09-07 ENCOUNTER — LAB REQUISITION (OUTPATIENT)
Dept: LAB | Facility: CLINIC | Age: 82
End: 2022-09-07
Payer: COMMERCIAL

## 2022-09-07 DIAGNOSIS — D64.9 ANEMIA, UNSPECIFIED: ICD-10-CM

## 2022-09-08 LAB
HGB BLD-MCNC: 8.2 G/DL (ref 13.3–17.7)
INTERPRETATION: NORMAL

## 2022-09-08 PROCEDURE — 85018 HEMOGLOBIN: CPT | Mod: ORL | Performed by: FAMILY MEDICINE

## 2022-09-08 PROCEDURE — 36415 COLL VENOUS BLD VENIPUNCTURE: CPT | Mod: ORL | Performed by: FAMILY MEDICINE

## 2022-09-08 PROCEDURE — P9604 ONE-WAY ALLOW PRORATED TRIP: HCPCS | Mod: ORL | Performed by: FAMILY MEDICINE

## 2022-09-13 ENCOUNTER — LAB REQUISITION (OUTPATIENT)
Dept: LAB | Facility: CLINIC | Age: 82
End: 2022-09-13
Payer: COMMERCIAL

## 2022-09-13 DIAGNOSIS — D68.9 COAGULATION DEFECT, UNSPECIFIED (H): ICD-10-CM

## 2022-09-13 DIAGNOSIS — D68.00 VON WILLEBRAND'S DISEASE (H): ICD-10-CM

## 2022-09-13 LAB
ANION GAP SERPL CALCULATED.3IONS-SCNC: 5 MMOL/L (ref 3–14)
BASOPHILS # BLD AUTO: 0 10E3/UL (ref 0–0.2)
BASOPHILS NFR BLD AUTO: 1 %
BUN SERPL-MCNC: 50 MG/DL (ref 7–30)
CALCIUM SERPL-MCNC: 8.4 MG/DL (ref 8.5–10.1)
CHLORIDE BLD-SCNC: 116 MMOL/L (ref 94–109)
CO2 SERPL-SCNC: 18 MMOL/L (ref 20–32)
CREAT SERPL-MCNC: 2.04 MG/DL (ref 0.66–1.25)
EOSINOPHIL # BLD AUTO: 0.2 10E3/UL (ref 0–0.7)
EOSINOPHIL NFR BLD AUTO: 5 %
ERYTHROCYTE [DISTWIDTH] IN BLOOD BY AUTOMATED COUNT: 20.2 % (ref 10–15)
GFR SERPL CREATININE-BSD FRML MDRD: 32 ML/MIN/1.73M2
GLUCOSE BLD-MCNC: 92 MG/DL (ref 70–99)
HCT VFR BLD AUTO: 31.8 % (ref 40–53)
HGB BLD-MCNC: 9.7 G/DL (ref 13.3–17.7)
IMM GRANULOCYTES # BLD: 0 10E3/UL
IMM GRANULOCYTES NFR BLD: 0 %
LYMPHOCYTES # BLD AUTO: 1.9 10E3/UL (ref 0.8–5.3)
LYMPHOCYTES NFR BLD AUTO: 41 %
MCH RBC QN AUTO: 32.3 PG (ref 26.5–33)
MCHC RBC AUTO-ENTMCNC: 30.5 G/DL (ref 31.5–36.5)
MCV RBC AUTO: 106 FL (ref 78–100)
MONOCYTES # BLD AUTO: 0.4 10E3/UL (ref 0–1.3)
MONOCYTES NFR BLD AUTO: 7 %
NEUTROPHILS # BLD AUTO: 2.2 10E3/UL (ref 1.6–8.3)
NEUTROPHILS NFR BLD AUTO: 46 %
NRBC # BLD AUTO: 0 10E3/UL
NRBC BLD AUTO-RTO: 0 /100
PLATELET # BLD AUTO: 157 10E3/UL (ref 150–450)
POTASSIUM BLD-SCNC: 5.5 MMOL/L (ref 3.4–5.3)
RBC # BLD AUTO: 3 10E6/UL (ref 4.4–5.9)
SODIUM SERPL-SCNC: 139 MMOL/L (ref 133–144)
WBC # BLD AUTO: 4.8 10E3/UL (ref 4–11)

## 2022-09-13 PROCEDURE — 85025 COMPLETE CBC W/AUTO DIFF WBC: CPT | Mod: ORL | Performed by: FAMILY MEDICINE

## 2022-09-13 PROCEDURE — 80048 BASIC METABOLIC PNL TOTAL CA: CPT | Mod: ORL | Performed by: FAMILY MEDICINE

## 2022-09-14 ENCOUNTER — LAB REQUISITION (OUTPATIENT)
Dept: LAB | Facility: CLINIC | Age: 82
End: 2022-09-14
Payer: COMMERCIAL

## 2022-09-14 DIAGNOSIS — D64.9 ANEMIA, UNSPECIFIED: ICD-10-CM

## 2022-09-14 LAB
PATH REPORT.ADDENDUM SPEC: NORMAL
PATH REPORT.COMMENTS IMP SPEC: NORMAL
PATH REPORT.FINAL DX SPEC: NORMAL
PATH REPORT.MICROSCOPIC SPEC OTHER STN: NORMAL
PATH REPORT.MICROSCOPIC SPEC OTHER STN: NORMAL

## 2022-09-14 PROCEDURE — 85060 BLOOD SMEAR INTERPRETATION: CPT | Performed by: PATHOLOGY

## 2022-09-14 PROCEDURE — 88342 IMHCHEM/IMCYTCHM 1ST ANTB: CPT | Mod: 26 | Performed by: PATHOLOGY

## 2022-09-14 PROCEDURE — 88305 TISSUE EXAM BY PATHOLOGIST: CPT | Mod: 26 | Performed by: PATHOLOGY

## 2022-09-14 PROCEDURE — 88161 CYTOPATH SMEAR OTHER SOURCE: CPT | Mod: 26 | Performed by: PATHOLOGY

## 2022-09-14 PROCEDURE — 85097 BONE MARROW INTERPRETATION: CPT | Performed by: PATHOLOGY

## 2022-09-14 PROCEDURE — 88313 SPECIAL STAINS GROUP 2: CPT | Mod: 26 | Performed by: PATHOLOGY

## 2022-09-14 PROCEDURE — 88341 IMHCHEM/IMCYTCHM EA ADD ANTB: CPT | Mod: 26 | Performed by: PATHOLOGY

## 2022-09-14 PROCEDURE — 88311 DECALCIFY TISSUE: CPT | Mod: 26 | Performed by: PATHOLOGY

## 2022-09-15 LAB
BASOPHILS # BLD AUTO: 0 10E3/UL (ref 0–0.2)
BASOPHILS NFR BLD AUTO: 0 %
EOSINOPHIL # BLD AUTO: 0.3 10E3/UL (ref 0–0.7)
EOSINOPHIL NFR BLD AUTO: 6 %
ERYTHROCYTE [DISTWIDTH] IN BLOOD BY AUTOMATED COUNT: 20.4 % (ref 10–15)
HCT VFR BLD AUTO: 30.7 % (ref 40–53)
HGB BLD-MCNC: 9 G/DL (ref 13.3–17.7)
IMM GRANULOCYTES # BLD: 0 10E3/UL
IMM GRANULOCYTES NFR BLD: 0 %
LYMPHOCYTES # BLD AUTO: 1.6 10E3/UL (ref 0.8–5.3)
LYMPHOCYTES NFR BLD AUTO: 35 %
MCH RBC QN AUTO: 31.9 PG (ref 26.5–33)
MCHC RBC AUTO-ENTMCNC: 29.3 G/DL (ref 31.5–36.5)
MCV RBC AUTO: 109 FL (ref 78–100)
MONOCYTES # BLD AUTO: 0.3 10E3/UL (ref 0–1.3)
MONOCYTES NFR BLD AUTO: 7 %
NEUTROPHILS # BLD AUTO: 2.4 10E3/UL (ref 1.6–8.3)
NEUTROPHILS NFR BLD AUTO: 52 %
NRBC # BLD AUTO: 0 10E3/UL
NRBC BLD AUTO-RTO: 0 /100
PLATELET # BLD AUTO: 138 10E3/UL (ref 150–450)
RBC # BLD AUTO: 2.82 10E6/UL (ref 4.4–5.9)
WBC # BLD AUTO: 4.6 10E3/UL (ref 4–11)

## 2022-09-15 PROCEDURE — P9604 ONE-WAY ALLOW PRORATED TRIP: HCPCS | Mod: ORL | Performed by: FAMILY MEDICINE

## 2022-09-15 PROCEDURE — 36415 COLL VENOUS BLD VENIPUNCTURE: CPT | Mod: ORL | Performed by: FAMILY MEDICINE

## 2022-09-15 PROCEDURE — 85025 COMPLETE CBC W/AUTO DIFF WBC: CPT | Mod: ORL | Performed by: FAMILY MEDICINE

## 2022-09-20 ENCOUNTER — LAB REQUISITION (OUTPATIENT)
Dept: LAB | Facility: CLINIC | Age: 82
End: 2022-09-20
Payer: COMMERCIAL

## 2022-09-20 DIAGNOSIS — D68.9 COAGULATION DEFECT, UNSPECIFIED (H): ICD-10-CM

## 2022-09-20 DIAGNOSIS — D64.9 ANEMIA, UNSPECIFIED: ICD-10-CM

## 2022-09-20 DIAGNOSIS — D68.00 VON WILLEBRAND'S DISEASE (H): ICD-10-CM

## 2022-09-20 LAB
ANION GAP SERPL CALCULATED.3IONS-SCNC: 7 MMOL/L (ref 3–14)
BASOPHILS # BLD AUTO: 0 10E3/UL (ref 0–0.2)
BASOPHILS NFR BLD AUTO: 1 %
BUN SERPL-MCNC: 46 MG/DL (ref 7–30)
CALCIUM SERPL-MCNC: 8.2 MG/DL (ref 8.5–10.1)
CHLORIDE BLD-SCNC: 116 MMOL/L (ref 94–109)
CO2 SERPL-SCNC: 17 MMOL/L (ref 20–32)
CREAT SERPL-MCNC: 2.16 MG/DL (ref 0.66–1.25)
CULTURE HARVEST COMPLETE DATE: NORMAL
CULTURE HARVEST COMPLETE DATE: NORMAL
EOSINOPHIL # BLD AUTO: 0.3 10E3/UL (ref 0–0.7)
EOSINOPHIL NFR BLD AUTO: 7 %
ERYTHROCYTE [DISTWIDTH] IN BLOOD BY AUTOMATED COUNT: 19.5 % (ref 10–15)
GFR SERPL CREATININE-BSD FRML MDRD: 29 ML/MIN/1.73M2
GLUCOSE BLD-MCNC: 94 MG/DL (ref 70–99)
HCT VFR BLD AUTO: 28.5 % (ref 40–53)
HGB BLD-MCNC: 8.6 G/DL (ref 13.3–17.7)
IMM GRANULOCYTES # BLD: 0 10E3/UL
IMM GRANULOCYTES NFR BLD: 0 %
LYMPHOCYTES # BLD AUTO: 1.6 10E3/UL (ref 0.8–5.3)
LYMPHOCYTES NFR BLD AUTO: 38 %
MCH RBC QN AUTO: 32.7 PG (ref 26.5–33)
MCHC RBC AUTO-ENTMCNC: 30.2 G/DL (ref 31.5–36.5)
MCV RBC AUTO: 108 FL (ref 78–100)
MONOCYTES # BLD AUTO: 0.3 10E3/UL (ref 0–1.3)
MONOCYTES NFR BLD AUTO: 8 %
NEUTROPHILS # BLD AUTO: 1.9 10E3/UL (ref 1.6–8.3)
NEUTROPHILS NFR BLD AUTO: 46 %
NRBC # BLD AUTO: 0 10E3/UL
NRBC BLD AUTO-RTO: 0 /100
PLATELET # BLD AUTO: 128 10E3/UL (ref 150–450)
POTASSIUM BLD-SCNC: 5.4 MMOL/L (ref 3.4–5.3)
RBC # BLD AUTO: 2.63 10E6/UL (ref 4.4–5.9)
SODIUM SERPL-SCNC: 140 MMOL/L (ref 133–144)
WBC # BLD AUTO: 4.2 10E3/UL (ref 4–11)

## 2022-09-20 PROCEDURE — 80048 BASIC METABOLIC PNL TOTAL CA: CPT | Mod: ORL | Performed by: FAMILY MEDICINE

## 2022-09-20 PROCEDURE — 85025 COMPLETE CBC W/AUTO DIFF WBC: CPT | Mod: ORL | Performed by: FAMILY MEDICINE

## 2022-09-22 LAB
BASOPHILS # BLD AUTO: 0 10E3/UL (ref 0–0.2)
BASOPHILS NFR BLD AUTO: 1 %
EOSINOPHIL # BLD AUTO: 0.3 10E3/UL (ref 0–0.7)
EOSINOPHIL NFR BLD AUTO: 9 %
ERYTHROCYTE [DISTWIDTH] IN BLOOD BY AUTOMATED COUNT: 19.4 % (ref 10–15)
HCT VFR BLD AUTO: 29.1 % (ref 40–53)
HGB BLD-MCNC: 8.5 G/DL (ref 13.3–17.7)
IMM GRANULOCYTES # BLD: 0 10E3/UL
IMM GRANULOCYTES NFR BLD: 0 %
INTERPRETATION: NORMAL
ISCN: NORMAL
LYMPHOCYTES # BLD AUTO: 1.5 10E3/UL (ref 0.8–5.3)
LYMPHOCYTES NFR BLD AUTO: 37 %
MCH RBC QN AUTO: 32.1 PG (ref 26.5–33)
MCHC RBC AUTO-ENTMCNC: 29.2 G/DL (ref 31.5–36.5)
MCV RBC AUTO: 110 FL (ref 78–100)
METHODS: NORMAL
MONOCYTES # BLD AUTO: 0.3 10E3/UL (ref 0–1.3)
MONOCYTES NFR BLD AUTO: 8 %
NEUTROPHILS # BLD AUTO: 1.8 10E3/UL (ref 1.6–8.3)
NEUTROPHILS NFR BLD AUTO: 45 %
NRBC # BLD AUTO: 0 10E3/UL
NRBC BLD AUTO-RTO: 0 /100
PLATELET # BLD AUTO: 123 10E3/UL (ref 150–450)
RBC # BLD AUTO: 2.65 10E6/UL (ref 4.4–5.9)
WBC # BLD AUTO: 4 10E3/UL (ref 4–11)

## 2022-09-22 PROCEDURE — 36415 COLL VENOUS BLD VENIPUNCTURE: CPT | Mod: ORL | Performed by: FAMILY MEDICINE

## 2022-09-22 PROCEDURE — 85025 COMPLETE CBC W/AUTO DIFF WBC: CPT | Mod: ORL | Performed by: FAMILY MEDICINE

## 2022-09-22 PROCEDURE — P9604 ONE-WAY ALLOW PRORATED TRIP: HCPCS | Mod: ORL | Performed by: FAMILY MEDICINE

## 2022-09-27 ENCOUNTER — LAB REQUISITION (OUTPATIENT)
Dept: LAB | Facility: CLINIC | Age: 82
End: 2022-09-27
Payer: COMMERCIAL

## 2022-09-27 DIAGNOSIS — D64.9 ANEMIA, UNSPECIFIED: ICD-10-CM

## 2022-09-28 ENCOUNTER — LAB REQUISITION (OUTPATIENT)
Dept: LAB | Facility: CLINIC | Age: 82
End: 2022-09-28
Payer: COMMERCIAL

## 2022-09-28 DIAGNOSIS — D68.9 COAGULATION DEFECT, UNSPECIFIED (H): ICD-10-CM

## 2022-09-28 DIAGNOSIS — D68.00 VON WILLEBRAND'S DISEASE (H): ICD-10-CM

## 2022-09-28 LAB
ANION GAP SERPL CALCULATED.3IONS-SCNC: 6 MMOL/L (ref 3–14)
BASOPHILS # BLD AUTO: 0 10E3/UL (ref 0–0.2)
BASOPHILS NFR BLD AUTO: 1 %
BUN SERPL-MCNC: 36 MG/DL (ref 7–30)
CALCIUM SERPL-MCNC: 8.4 MG/DL (ref 8.5–10.1)
CHLORIDE BLD-SCNC: 118 MMOL/L (ref 94–109)
CO2 SERPL-SCNC: 19 MMOL/L (ref 20–32)
CREAT SERPL-MCNC: 2.02 MG/DL (ref 0.66–1.25)
EOSINOPHIL # BLD AUTO: 0.4 10E3/UL (ref 0–0.7)
EOSINOPHIL NFR BLD AUTO: 9 %
ERYTHROCYTE [DISTWIDTH] IN BLOOD BY AUTOMATED COUNT: 17.5 % (ref 10–15)
GFR SERPL CREATININE-BSD FRML MDRD: 32 ML/MIN/1.73M2
GLUCOSE BLD-MCNC: 91 MG/DL (ref 70–99)
HCT VFR BLD AUTO: 31.3 % (ref 40–53)
HGB BLD-MCNC: 9.4 G/DL (ref 13.3–17.7)
IMM GRANULOCYTES # BLD: 0 10E3/UL
IMM GRANULOCYTES NFR BLD: 0 %
LYMPHOCYTES # BLD AUTO: 1.5 10E3/UL (ref 0.8–5.3)
LYMPHOCYTES NFR BLD AUTO: 34 %
MCH RBC QN AUTO: 32 PG (ref 26.5–33)
MCHC RBC AUTO-ENTMCNC: 30 G/DL (ref 31.5–36.5)
MCV RBC AUTO: 107 FL (ref 78–100)
MONOCYTES # BLD AUTO: 0.3 10E3/UL (ref 0–1.3)
MONOCYTES NFR BLD AUTO: 8 %
NEUTROPHILS # BLD AUTO: 2.1 10E3/UL (ref 1.6–8.3)
NEUTROPHILS NFR BLD AUTO: 48 %
NRBC # BLD AUTO: 0 10E3/UL
NRBC BLD AUTO-RTO: 0 /100
PLATELET # BLD AUTO: 122 10E3/UL (ref 150–450)
POTASSIUM BLD-SCNC: 5.7 MMOL/L (ref 3.4–5.3)
RBC # BLD AUTO: 2.94 10E6/UL (ref 4.4–5.9)
SODIUM SERPL-SCNC: 143 MMOL/L (ref 133–144)
WBC # BLD AUTO: 4.4 10E3/UL (ref 4–11)

## 2022-09-28 PROCEDURE — 80048 BASIC METABOLIC PNL TOTAL CA: CPT | Mod: ORL | Performed by: FAMILY MEDICINE

## 2022-09-28 PROCEDURE — 85025 COMPLETE CBC W/AUTO DIFF WBC: CPT | Mod: ORL | Performed by: FAMILY MEDICINE

## 2022-09-29 LAB
BASOPHILS # BLD AUTO: 0 10E3/UL (ref 0–0.2)
BASOPHILS NFR BLD AUTO: 1 %
EOSINOPHIL # BLD AUTO: 0.4 10E3/UL (ref 0–0.7)
EOSINOPHIL NFR BLD AUTO: 9 %
ERYTHROCYTE [DISTWIDTH] IN BLOOD BY AUTOMATED COUNT: 17.4 % (ref 10–15)
HCT VFR BLD AUTO: 30.4 % (ref 40–53)
HGB BLD-MCNC: 9 G/DL (ref 13.3–17.7)
IMM GRANULOCYTES # BLD: 0 10E3/UL
IMM GRANULOCYTES NFR BLD: 0 %
LYMPHOCYTES # BLD AUTO: 1.6 10E3/UL (ref 0.8–5.3)
LYMPHOCYTES NFR BLD AUTO: 35 %
MCH RBC QN AUTO: 31.7 PG (ref 26.5–33)
MCHC RBC AUTO-ENTMCNC: 29.6 G/DL (ref 31.5–36.5)
MCV RBC AUTO: 107 FL (ref 78–100)
MONOCYTES # BLD AUTO: 0.4 10E3/UL (ref 0–1.3)
MONOCYTES NFR BLD AUTO: 9 %
NEUTROPHILS # BLD AUTO: 2.1 10E3/UL (ref 1.6–8.3)
NEUTROPHILS NFR BLD AUTO: 46 %
NRBC # BLD AUTO: 0 10E3/UL
NRBC BLD AUTO-RTO: 0 /100
PLATELET # BLD AUTO: 123 10E3/UL (ref 150–450)
RBC # BLD AUTO: 2.84 10E6/UL (ref 4.4–5.9)
WBC # BLD AUTO: 4.6 10E3/UL (ref 4–11)

## 2022-09-29 PROCEDURE — 36415 COLL VENOUS BLD VENIPUNCTURE: CPT | Mod: ORL | Performed by: FAMILY MEDICINE

## 2022-09-29 PROCEDURE — 85025 COMPLETE CBC W/AUTO DIFF WBC: CPT | Mod: ORL | Performed by: FAMILY MEDICINE

## 2022-09-29 PROCEDURE — P9603 ONE-WAY ALLOW PRORATED MILES: HCPCS | Mod: ORL | Performed by: FAMILY MEDICINE

## 2022-09-30 ENCOUNTER — LAB REQUISITION (OUTPATIENT)
Dept: LAB | Facility: CLINIC | Age: 82
End: 2022-09-30
Payer: COMMERCIAL

## 2022-09-30 DIAGNOSIS — D68.9 COAGULATION DEFECT, UNSPECIFIED (H): ICD-10-CM

## 2022-09-30 DIAGNOSIS — D68.00 VON WILLEBRAND'S DISEASE (H): ICD-10-CM

## 2022-09-30 LAB
ANION GAP SERPL CALCULATED.3IONS-SCNC: 11 MMOL/L (ref 3–14)
BASOPHILS # BLD AUTO: 0 10E3/UL (ref 0–0.2)
BASOPHILS NFR BLD AUTO: 0 %
BUN SERPL-MCNC: 38 MG/DL (ref 7–30)
CALCIUM SERPL-MCNC: 8.2 MG/DL (ref 8.5–10.1)
CHLORIDE BLD-SCNC: 116 MMOL/L (ref 94–109)
CO2 SERPL-SCNC: 17 MMOL/L (ref 20–32)
CREAT SERPL-MCNC: 2.14 MG/DL (ref 0.66–1.25)
EOSINOPHIL # BLD AUTO: 0.4 10E3/UL (ref 0–0.7)
EOSINOPHIL NFR BLD AUTO: 9 %
ERYTHROCYTE [DISTWIDTH] IN BLOOD BY AUTOMATED COUNT: 17.2 % (ref 10–15)
GFR SERPL CREATININE-BSD FRML MDRD: 30 ML/MIN/1.73M2
GLUCOSE BLD-MCNC: 108 MG/DL (ref 70–99)
HCT VFR BLD AUTO: 31.2 % (ref 40–53)
HGB BLD-MCNC: 9.4 G/DL (ref 13.3–17.7)
IMM GRANULOCYTES # BLD: 0 10E3/UL
IMM GRANULOCYTES NFR BLD: 0 %
LYMPHOCYTES # BLD AUTO: 1.7 10E3/UL (ref 0.8–5.3)
LYMPHOCYTES NFR BLD AUTO: 37 %
MCH RBC QN AUTO: 32.4 PG (ref 26.5–33)
MCHC RBC AUTO-ENTMCNC: 30.1 G/DL (ref 31.5–36.5)
MCV RBC AUTO: 108 FL (ref 78–100)
MONOCYTES # BLD AUTO: 0.4 10E3/UL (ref 0–1.3)
MONOCYTES NFR BLD AUTO: 8 %
NEUTROPHILS # BLD AUTO: 2 10E3/UL (ref 1.6–8.3)
NEUTROPHILS NFR BLD AUTO: 46 %
NRBC # BLD AUTO: 0 10E3/UL
NRBC BLD AUTO-RTO: 0 /100
PLATELET # BLD AUTO: 118 10E3/UL (ref 150–450)
POTASSIUM BLD-SCNC: 5.2 MMOL/L (ref 3.4–5.3)
RBC # BLD AUTO: 2.9 10E6/UL (ref 4.4–5.9)
SODIUM SERPL-SCNC: 144 MMOL/L (ref 133–144)
WBC # BLD AUTO: 4.5 10E3/UL (ref 4–11)

## 2022-09-30 PROCEDURE — 85025 COMPLETE CBC W/AUTO DIFF WBC: CPT | Mod: ORL | Performed by: FAMILY MEDICINE

## 2022-09-30 PROCEDURE — 80048 BASIC METABOLIC PNL TOTAL CA: CPT | Mod: ORL | Performed by: FAMILY MEDICINE

## 2022-10-02 ENCOUNTER — LAB REQUISITION (OUTPATIENT)
Dept: LAB | Facility: CLINIC | Age: 82
End: 2022-10-02
Payer: COMMERCIAL

## 2022-10-02 DIAGNOSIS — D64.9 ANEMIA, UNSPECIFIED: ICD-10-CM

## 2022-10-04 ENCOUNTER — LAB REQUISITION (OUTPATIENT)
Dept: LAB | Facility: CLINIC | Age: 82
End: 2022-10-04
Payer: COMMERCIAL

## 2022-10-04 DIAGNOSIS — D68.00 VON WILLEBRAND DISEASE, UNSPECIFIED (H): ICD-10-CM

## 2022-10-04 DIAGNOSIS — D68.9 COAGULATION DEFECT, UNSPECIFIED (H): ICD-10-CM

## 2022-10-04 LAB
ANION GAP SERPL CALCULATED.3IONS-SCNC: 9 MMOL/L (ref 3–14)
BASOPHILS # BLD AUTO: 0 10E3/UL (ref 0–0.2)
BASOPHILS NFR BLD AUTO: 0 %
BUN SERPL-MCNC: 40 MG/DL (ref 7–30)
CALCIUM SERPL-MCNC: 8.4 MG/DL (ref 8.5–10.1)
CHLORIDE BLD-SCNC: 115 MMOL/L (ref 94–109)
CO2 SERPL-SCNC: 17 MMOL/L (ref 20–32)
CREAT SERPL-MCNC: 2.09 MG/DL (ref 0.66–1.25)
EOSINOPHIL # BLD AUTO: 0.4 10E3/UL (ref 0–0.7)
EOSINOPHIL NFR BLD AUTO: 9 %
ERYTHROCYTE [DISTWIDTH] IN BLOOD BY AUTOMATED COUNT: 16.2 % (ref 10–15)
GFR SERPL CREATININE-BSD FRML MDRD: 31 ML/MIN/1.73M2
GLUCOSE BLD-MCNC: 96 MG/DL (ref 70–99)
HCT VFR BLD AUTO: 30.9 % (ref 40–53)
HGB BLD-MCNC: 9.4 G/DL (ref 13.3–17.7)
IMM GRANULOCYTES # BLD: 0 10E3/UL
IMM GRANULOCYTES NFR BLD: 0 %
LYMPHOCYTES # BLD AUTO: 1.9 10E3/UL (ref 0.8–5.3)
LYMPHOCYTES NFR BLD AUTO: 42 %
MCH RBC QN AUTO: 32.1 PG (ref 26.5–33)
MCHC RBC AUTO-ENTMCNC: 30.4 G/DL (ref 31.5–36.5)
MCV RBC AUTO: 106 FL (ref 78–100)
MONOCYTES # BLD AUTO: 0.4 10E3/UL (ref 0–1.3)
MONOCYTES NFR BLD AUTO: 8 %
NEUTROPHILS # BLD AUTO: 1.9 10E3/UL (ref 1.6–8.3)
NEUTROPHILS NFR BLD AUTO: 41 %
NRBC # BLD AUTO: 0 10E3/UL
NRBC BLD AUTO-RTO: 0 /100
PLATELET # BLD AUTO: 125 10E3/UL (ref 150–450)
POTASSIUM BLD-SCNC: 5.4 MMOL/L (ref 3.4–5.3)
RBC # BLD AUTO: 2.93 10E6/UL (ref 4.4–5.9)
SODIUM SERPL-SCNC: 141 MMOL/L (ref 133–144)
WBC # BLD AUTO: 4.6 10E3/UL (ref 4–11)

## 2022-10-04 PROCEDURE — 80048 BASIC METABOLIC PNL TOTAL CA: CPT | Mod: ORL | Performed by: FAMILY MEDICINE

## 2022-10-04 PROCEDURE — 85025 COMPLETE CBC W/AUTO DIFF WBC: CPT | Mod: ORL | Performed by: FAMILY MEDICINE

## 2022-10-06 LAB
BASOPHILS # BLD AUTO: 0 10E3/UL (ref 0–0.2)
BASOPHILS NFR BLD AUTO: 1 %
EOSINOPHIL # BLD AUTO: 0.3 10E3/UL (ref 0–0.7)
EOSINOPHIL NFR BLD AUTO: 8 %
ERYTHROCYTE [DISTWIDTH] IN BLOOD BY AUTOMATED COUNT: 16.1 % (ref 10–15)
HCT VFR BLD AUTO: 31.1 % (ref 40–53)
HGB BLD-MCNC: 9.6 G/DL (ref 13.3–17.7)
IMM GRANULOCYTES # BLD: 0 10E3/UL
IMM GRANULOCYTES NFR BLD: 0 %
LYMPHOCYTES # BLD AUTO: 1.6 10E3/UL (ref 0.8–5.3)
LYMPHOCYTES NFR BLD AUTO: 37 %
MCH RBC QN AUTO: 32 PG (ref 26.5–33)
MCHC RBC AUTO-ENTMCNC: 30.9 G/DL (ref 31.5–36.5)
MCV RBC AUTO: 104 FL (ref 78–100)
MONOCYTES # BLD AUTO: 0.4 10E3/UL (ref 0–1.3)
MONOCYTES NFR BLD AUTO: 9 %
NEUTROPHILS # BLD AUTO: 1.9 10E3/UL (ref 1.6–8.3)
NEUTROPHILS NFR BLD AUTO: 45 %
NRBC # BLD AUTO: 0 10E3/UL
NRBC BLD AUTO-RTO: 0 /100
PLATELET # BLD AUTO: 129 10E3/UL (ref 150–450)
RBC # BLD AUTO: 3 10E6/UL (ref 4.4–5.9)
WBC # BLD AUTO: 4.2 10E3/UL (ref 4–11)

## 2022-10-06 PROCEDURE — 85025 COMPLETE CBC W/AUTO DIFF WBC: CPT | Mod: ORL | Performed by: FAMILY MEDICINE

## 2022-10-06 PROCEDURE — 36415 COLL VENOUS BLD VENIPUNCTURE: CPT | Mod: ORL | Performed by: FAMILY MEDICINE

## 2022-10-06 PROCEDURE — P9604 ONE-WAY ALLOW PRORATED TRIP: HCPCS | Mod: ORL | Performed by: FAMILY MEDICINE

## 2022-10-09 ENCOUNTER — LAB REQUISITION (OUTPATIENT)
Dept: LAB | Facility: CLINIC | Age: 82
End: 2022-10-09
Payer: COMMERCIAL

## 2022-10-09 DIAGNOSIS — D64.9 ANEMIA, UNSPECIFIED: ICD-10-CM

## 2022-10-11 ENCOUNTER — LAB REQUISITION (OUTPATIENT)
Dept: LAB | Facility: CLINIC | Age: 82
End: 2022-10-11
Payer: COMMERCIAL

## 2022-10-11 DIAGNOSIS — D68.00 VON WILLEBRAND DISEASE, UNSPECIFIED (H): ICD-10-CM

## 2022-10-11 DIAGNOSIS — D68.9 COAGULATION DEFECT, UNSPECIFIED (H): ICD-10-CM

## 2022-10-11 LAB
ANION GAP SERPL CALCULATED.3IONS-SCNC: 4 MMOL/L (ref 3–14)
BASOPHILS # BLD AUTO: 0 10E3/UL (ref 0–0.2)
BASOPHILS NFR BLD AUTO: 1 %
BUN SERPL-MCNC: 37 MG/DL (ref 7–30)
CALCIUM SERPL-MCNC: 8.3 MG/DL (ref 8.5–10.1)
CHLORIDE BLD-SCNC: 116 MMOL/L (ref 94–109)
CO2 SERPL-SCNC: 20 MMOL/L (ref 20–32)
CREAT SERPL-MCNC: 2.14 MG/DL (ref 0.66–1.25)
EOSINOPHIL # BLD AUTO: 0.4 10E3/UL (ref 0–0.7)
EOSINOPHIL NFR BLD AUTO: 10 %
ERYTHROCYTE [DISTWIDTH] IN BLOOD BY AUTOMATED COUNT: 15.8 % (ref 10–15)
GFR SERPL CREATININE-BSD FRML MDRD: 30 ML/MIN/1.73M2
GLUCOSE BLD-MCNC: 88 MG/DL (ref 70–99)
HCT VFR BLD AUTO: 32.9 % (ref 40–53)
HGB BLD-MCNC: 9.9 G/DL (ref 13.3–17.7)
IMM GRANULOCYTES # BLD: 0 10E3/UL
IMM GRANULOCYTES NFR BLD: 0 %
LYMPHOCYTES # BLD AUTO: 1.6 10E3/UL (ref 0.8–5.3)
LYMPHOCYTES NFR BLD AUTO: 36 %
MCH RBC QN AUTO: 31.8 PG (ref 26.5–33)
MCHC RBC AUTO-ENTMCNC: 30.1 G/DL (ref 31.5–36.5)
MCV RBC AUTO: 106 FL (ref 78–100)
MONOCYTES # BLD AUTO: 0.4 10E3/UL (ref 0–1.3)
MONOCYTES NFR BLD AUTO: 9 %
NEUTROPHILS # BLD AUTO: 2 10E3/UL (ref 1.6–8.3)
NEUTROPHILS NFR BLD AUTO: 44 %
NRBC # BLD AUTO: 0 10E3/UL
NRBC BLD AUTO-RTO: 0 /100
PLATELET # BLD AUTO: 147 10E3/UL (ref 150–450)
POTASSIUM BLD-SCNC: 5.4 MMOL/L (ref 3.4–5.3)
RBC # BLD AUTO: 3.11 10E6/UL (ref 4.4–5.9)
SODIUM SERPL-SCNC: 140 MMOL/L (ref 133–144)
WBC # BLD AUTO: 4.4 10E3/UL (ref 4–11)

## 2022-10-11 PROCEDURE — 85025 COMPLETE CBC W/AUTO DIFF WBC: CPT | Mod: ORL | Performed by: FAMILY MEDICINE

## 2022-10-11 PROCEDURE — 80048 BASIC METABOLIC PNL TOTAL CA: CPT | Mod: ORL | Performed by: FAMILY MEDICINE

## 2022-10-17 ENCOUNTER — LAB REQUISITION (OUTPATIENT)
Dept: LAB | Facility: CLINIC | Age: 82
End: 2022-10-17
Payer: COMMERCIAL

## 2022-10-17 DIAGNOSIS — D64.9 ANEMIA, UNSPECIFIED: ICD-10-CM

## 2022-10-20 LAB
BASOPHILS # BLD AUTO: 0 10E3/UL (ref 0–0.2)
BASOPHILS NFR BLD AUTO: 1 %
EOSINOPHIL # BLD AUTO: 0.3 10E3/UL (ref 0–0.7)
EOSINOPHIL NFR BLD AUTO: 7 %
ERYTHROCYTE [DISTWIDTH] IN BLOOD BY AUTOMATED COUNT: 15.8 % (ref 10–15)
HCT VFR BLD AUTO: 31.8 % (ref 40–53)
HGB BLD-MCNC: 9.5 G/DL (ref 13.3–17.7)
IMM GRANULOCYTES # BLD: 0.1 10E3/UL
IMM GRANULOCYTES NFR BLD: 1 %
LYMPHOCYTES # BLD AUTO: 2 10E3/UL (ref 0.8–5.3)
LYMPHOCYTES NFR BLD AUTO: 39 %
MCH RBC QN AUTO: 31.6 PG (ref 26.5–33)
MCHC RBC AUTO-ENTMCNC: 29.9 G/DL (ref 31.5–36.5)
MCV RBC AUTO: 106 FL (ref 78–100)
MONOCYTES # BLD AUTO: 0.4 10E3/UL (ref 0–1.3)
MONOCYTES NFR BLD AUTO: 8 %
NEUTROPHILS # BLD AUTO: 2.3 10E3/UL (ref 1.6–8.3)
NEUTROPHILS NFR BLD AUTO: 44 %
NRBC # BLD AUTO: 0 10E3/UL
NRBC BLD AUTO-RTO: 0 /100
PLATELET # BLD AUTO: 150 10E3/UL (ref 150–450)
RBC # BLD AUTO: 3.01 10E6/UL (ref 4.4–5.9)
WBC # BLD AUTO: 5.1 10E3/UL (ref 4–11)

## 2022-10-20 PROCEDURE — P9603 ONE-WAY ALLOW PRORATED MILES: HCPCS | Mod: ORL | Performed by: FAMILY MEDICINE

## 2022-10-20 PROCEDURE — 85025 COMPLETE CBC W/AUTO DIFF WBC: CPT | Mod: ORL | Performed by: FAMILY MEDICINE

## 2022-10-20 PROCEDURE — 36415 COLL VENOUS BLD VENIPUNCTURE: CPT | Mod: ORL | Performed by: FAMILY MEDICINE

## 2022-10-24 ENCOUNTER — LAB REQUISITION (OUTPATIENT)
Dept: LAB | Facility: CLINIC | Age: 82
End: 2022-10-24
Payer: COMMERCIAL

## 2022-10-24 DIAGNOSIS — D64.9 ANEMIA, UNSPECIFIED: ICD-10-CM

## 2022-10-25 ENCOUNTER — LAB REQUISITION (OUTPATIENT)
Dept: LAB | Facility: CLINIC | Age: 82
End: 2022-10-25
Payer: COMMERCIAL

## 2022-10-25 DIAGNOSIS — D64.9 ANEMIA, UNSPECIFIED: ICD-10-CM

## 2022-10-27 LAB
BASOPHILS # BLD AUTO: 0 10E3/UL (ref 0–0.2)
BASOPHILS NFR BLD AUTO: 1 %
DEPRECATED CALCIDIOL+CALCIFEROL SERPL-MC: 36 UG/L (ref 20–75)
EOSINOPHIL # BLD AUTO: 0.3 10E3/UL (ref 0–0.7)
EOSINOPHIL NFR BLD AUTO: 8 %
ERYTHROCYTE [DISTWIDTH] IN BLOOD BY AUTOMATED COUNT: 15.3 % (ref 10–15)
FERRITIN SERPL-MCNC: 39 NG/ML (ref 31–409)
HCT VFR BLD AUTO: 32 % (ref 40–53)
HGB BLD-MCNC: 9.5 G/DL (ref 13.3–17.7)
IMM GRANULOCYTES # BLD: 0 10E3/UL
IMM GRANULOCYTES NFR BLD: 1 %
LYMPHOCYTES # BLD AUTO: 1.1 10E3/UL (ref 0.8–5.3)
LYMPHOCYTES NFR BLD AUTO: 29 %
MCH RBC QN AUTO: 30.8 PG (ref 26.5–33)
MCHC RBC AUTO-ENTMCNC: 29.7 G/DL (ref 31.5–36.5)
MCV RBC AUTO: 104 FL (ref 78–100)
MONOCYTES # BLD AUTO: 0.4 10E3/UL (ref 0–1.3)
MONOCYTES NFR BLD AUTO: 11 %
NEUTROPHILS # BLD AUTO: 2 10E3/UL (ref 1.6–8.3)
NEUTROPHILS NFR BLD AUTO: 50 %
NRBC # BLD AUTO: 0 10E3/UL
NRBC BLD AUTO-RTO: 0 /100
PLATELET # BLD AUTO: 141 10E3/UL (ref 150–450)
RBC # BLD AUTO: 3.08 10E6/UL (ref 4.4–5.9)
WBC # BLD AUTO: 3.9 10E3/UL (ref 4–11)

## 2022-10-27 PROCEDURE — 82306 VITAMIN D 25 HYDROXY: CPT | Mod: ORL | Performed by: FAMILY MEDICINE

## 2022-10-27 PROCEDURE — P9604 ONE-WAY ALLOW PRORATED TRIP: HCPCS | Mod: ORL | Performed by: FAMILY MEDICINE

## 2022-10-27 PROCEDURE — 82728 ASSAY OF FERRITIN: CPT | Mod: ORL | Performed by: FAMILY MEDICINE

## 2022-10-27 PROCEDURE — 36415 COLL VENOUS BLD VENIPUNCTURE: CPT | Mod: ORL | Performed by: FAMILY MEDICINE

## 2022-10-27 PROCEDURE — 85025 COMPLETE CBC W/AUTO DIFF WBC: CPT | Mod: ORL | Performed by: FAMILY MEDICINE

## 2022-11-02 ENCOUNTER — LAB REQUISITION (OUTPATIENT)
Dept: LAB | Facility: CLINIC | Age: 82
End: 2022-11-02
Payer: COMMERCIAL

## 2022-11-02 DIAGNOSIS — D64.9 ANEMIA, UNSPECIFIED: ICD-10-CM

## 2022-11-03 LAB
BASOPHILS # BLD AUTO: 0 10E3/UL (ref 0–0.2)
BASOPHILS NFR BLD AUTO: 1 %
EOSINOPHIL # BLD AUTO: 0.2 10E3/UL (ref 0–0.7)
EOSINOPHIL NFR BLD AUTO: 5 %
ERYTHROCYTE [DISTWIDTH] IN BLOOD BY AUTOMATED COUNT: 16 % (ref 10–15)
HCT VFR BLD AUTO: 28.2 % (ref 40–53)
HGB BLD-MCNC: 8.3 G/DL (ref 13.3–17.7)
IMM GRANULOCYTES # BLD: 0 10E3/UL
IMM GRANULOCYTES NFR BLD: 0 %
LYMPHOCYTES # BLD AUTO: 1.4 10E3/UL (ref 0.8–5.3)
LYMPHOCYTES NFR BLD AUTO: 48 %
MCH RBC QN AUTO: 31.3 PG (ref 26.5–33)
MCHC RBC AUTO-ENTMCNC: 29.4 G/DL (ref 31.5–36.5)
MCV RBC AUTO: 106 FL (ref 78–100)
MONOCYTES # BLD AUTO: 0.5 10E3/UL (ref 0–1.3)
MONOCYTES NFR BLD AUTO: 17 %
NEUTROPHILS # BLD AUTO: 0.8 10E3/UL (ref 1.6–8.3)
NEUTROPHILS NFR BLD AUTO: 29 %
NRBC # BLD AUTO: 0 10E3/UL
NRBC BLD AUTO-RTO: 0 /100
PLATELET # BLD AUTO: 128 10E3/UL (ref 150–450)
RBC # BLD AUTO: 2.65 10E6/UL (ref 4.4–5.9)
WBC # BLD AUTO: 2.9 10E3/UL (ref 4–11)

## 2022-11-03 PROCEDURE — P9604 ONE-WAY ALLOW PRORATED TRIP: HCPCS | Mod: ORL | Performed by: FAMILY MEDICINE

## 2022-11-03 PROCEDURE — 85025 COMPLETE CBC W/AUTO DIFF WBC: CPT | Mod: ORL | Performed by: FAMILY MEDICINE

## 2022-11-03 PROCEDURE — 36415 COLL VENOUS BLD VENIPUNCTURE: CPT | Mod: ORL | Performed by: FAMILY MEDICINE

## 2022-11-08 ENCOUNTER — LAB REQUISITION (OUTPATIENT)
Dept: LAB | Facility: CLINIC | Age: 82
End: 2022-11-08
Payer: COMMERCIAL

## 2022-11-08 DIAGNOSIS — D64.9 ANEMIA, UNSPECIFIED: ICD-10-CM

## 2022-11-10 LAB
BASOPHILS # BLD AUTO: 0 10E3/UL (ref 0–0.2)
BASOPHILS NFR BLD AUTO: 1 %
EOSINOPHIL # BLD AUTO: 0.3 10E3/UL (ref 0–0.7)
EOSINOPHIL NFR BLD AUTO: 8 %
ERYTHROCYTE [DISTWIDTH] IN BLOOD BY AUTOMATED COUNT: 16 % (ref 10–15)
HCT VFR BLD AUTO: 29.8 % (ref 40–53)
HGB BLD-MCNC: 8.8 G/DL (ref 13.3–17.7)
IMM GRANULOCYTES # BLD: 0 10E3/UL
IMM GRANULOCYTES NFR BLD: 0 %
LYMPHOCYTES # BLD AUTO: 1.5 10E3/UL (ref 0.8–5.3)
LYMPHOCYTES NFR BLD AUTO: 40 %
MCH RBC QN AUTO: 30.7 PG (ref 26.5–33)
MCHC RBC AUTO-ENTMCNC: 29.5 G/DL (ref 31.5–36.5)
MCV RBC AUTO: 104 FL (ref 78–100)
MONOCYTES # BLD AUTO: 0.4 10E3/UL (ref 0–1.3)
MONOCYTES NFR BLD AUTO: 10 %
NEUTROPHILS # BLD AUTO: 1.5 10E3/UL (ref 1.6–8.3)
NEUTROPHILS NFR BLD AUTO: 41 %
NRBC # BLD AUTO: 0 10E3/UL
NRBC BLD AUTO-RTO: 0 /100
PLATELET # BLD AUTO: 145 10E3/UL (ref 150–450)
RBC # BLD AUTO: 2.87 10E6/UL (ref 4.4–5.9)
WBC # BLD AUTO: 3.7 10E3/UL (ref 4–11)

## 2022-11-10 PROCEDURE — 36415 COLL VENOUS BLD VENIPUNCTURE: CPT | Mod: ORL | Performed by: FAMILY MEDICINE

## 2022-11-10 PROCEDURE — 85025 COMPLETE CBC W/AUTO DIFF WBC: CPT | Mod: ORL | Performed by: FAMILY MEDICINE

## 2022-11-10 PROCEDURE — P9604 ONE-WAY ALLOW PRORATED TRIP: HCPCS | Mod: ORL | Performed by: FAMILY MEDICINE

## 2022-11-14 ENCOUNTER — LAB REQUISITION (OUTPATIENT)
Dept: LAB | Facility: CLINIC | Age: 82
End: 2022-11-14
Payer: COMMERCIAL

## 2022-11-14 DIAGNOSIS — D64.9 ANEMIA, UNSPECIFIED: ICD-10-CM

## 2022-11-18 LAB
BASOPHILS # BLD AUTO: 0 10E3/UL (ref 0–0.2)
BASOPHILS NFR BLD AUTO: 1 %
EOSINOPHIL # BLD AUTO: 0.3 10E3/UL (ref 0–0.7)
EOSINOPHIL NFR BLD AUTO: 6 %
ERYTHROCYTE [DISTWIDTH] IN BLOOD BY AUTOMATED COUNT: 16.6 % (ref 10–15)
HCT VFR BLD AUTO: 32.9 % (ref 40–53)
HGB BLD-MCNC: 9.5 G/DL (ref 13.3–17.7)
IMM GRANULOCYTES # BLD: 0 10E3/UL
IMM GRANULOCYTES NFR BLD: 0 %
LYMPHOCYTES # BLD AUTO: 1.7 10E3/UL (ref 0.8–5.3)
LYMPHOCYTES NFR BLD AUTO: 36 %
MCH RBC QN AUTO: 30.4 PG (ref 26.5–33)
MCHC RBC AUTO-ENTMCNC: 28.9 G/DL (ref 31.5–36.5)
MCV RBC AUTO: 105 FL (ref 78–100)
MONOCYTES # BLD AUTO: 0.4 10E3/UL (ref 0–1.3)
MONOCYTES NFR BLD AUTO: 8 %
NEUTROPHILS # BLD AUTO: 2.3 10E3/UL (ref 1.6–8.3)
NEUTROPHILS NFR BLD AUTO: 49 %
NRBC # BLD AUTO: 0 10E3/UL
NRBC BLD AUTO-RTO: 0 /100
PLATELET # BLD AUTO: 147 10E3/UL (ref 150–450)
RBC # BLD AUTO: 3.12 10E6/UL (ref 4.4–5.9)
WBC # BLD AUTO: 4.7 10E3/UL (ref 4–11)

## 2022-11-18 PROCEDURE — P9604 ONE-WAY ALLOW PRORATED TRIP: HCPCS | Mod: ORL | Performed by: FAMILY MEDICINE

## 2022-11-18 PROCEDURE — 36415 COLL VENOUS BLD VENIPUNCTURE: CPT | Mod: ORL | Performed by: FAMILY MEDICINE

## 2022-11-18 PROCEDURE — 85025 COMPLETE CBC W/AUTO DIFF WBC: CPT | Mod: ORL | Performed by: FAMILY MEDICINE

## 2022-11-23 ENCOUNTER — LAB REQUISITION (OUTPATIENT)
Dept: LAB | Facility: CLINIC | Age: 82
End: 2022-11-23
Payer: COMMERCIAL

## 2022-11-23 DIAGNOSIS — D64.9 ANEMIA, UNSPECIFIED: ICD-10-CM

## 2022-11-25 LAB
BASOPHILS # BLD AUTO: 0 10E3/UL (ref 0–0.2)
BASOPHILS NFR BLD AUTO: 1 %
EOSINOPHIL # BLD AUTO: 0.3 10E3/UL (ref 0–0.7)
EOSINOPHIL NFR BLD AUTO: 6 %
ERYTHROCYTE [DISTWIDTH] IN BLOOD BY AUTOMATED COUNT: 16.5 % (ref 10–15)
HCT VFR BLD AUTO: 34.6 % (ref 40–53)
HGB BLD-MCNC: 10 G/DL (ref 13.3–17.7)
IMM GRANULOCYTES # BLD: 0 10E3/UL
IMM GRANULOCYTES NFR BLD: 0 %
LYMPHOCYTES # BLD AUTO: 1.7 10E3/UL (ref 0.8–5.3)
LYMPHOCYTES NFR BLD AUTO: 39 %
MCH RBC QN AUTO: 30 PG (ref 26.5–33)
MCHC RBC AUTO-ENTMCNC: 28.9 G/DL (ref 31.5–36.5)
MCV RBC AUTO: 104 FL (ref 78–100)
MONOCYTES # BLD AUTO: 0.4 10E3/UL (ref 0–1.3)
MONOCYTES NFR BLD AUTO: 9 %
NEUTROPHILS # BLD AUTO: 1.9 10E3/UL (ref 1.6–8.3)
NEUTROPHILS NFR BLD AUTO: 45 %
NRBC # BLD AUTO: 0 10E3/UL
NRBC BLD AUTO-RTO: 0 /100
PLATELET # BLD AUTO: 132 10E3/UL (ref 150–450)
RBC # BLD AUTO: 3.33 10E6/UL (ref 4.4–5.9)
WBC # BLD AUTO: 4.3 10E3/UL (ref 4–11)

## 2022-11-25 PROCEDURE — 36415 COLL VENOUS BLD VENIPUNCTURE: CPT | Mod: ORL | Performed by: FAMILY MEDICINE

## 2022-11-25 PROCEDURE — P9604 ONE-WAY ALLOW PRORATED TRIP: HCPCS | Mod: ORL | Performed by: FAMILY MEDICINE

## 2022-11-25 PROCEDURE — 85025 COMPLETE CBC W/AUTO DIFF WBC: CPT | Mod: ORL | Performed by: FAMILY MEDICINE

## 2022-11-30 ENCOUNTER — LAB REQUISITION (OUTPATIENT)
Dept: LAB | Facility: CLINIC | Age: 82
End: 2022-11-30
Payer: COMMERCIAL

## 2022-11-30 DIAGNOSIS — E87.5 HYPERKALEMIA: ICD-10-CM

## 2022-11-30 DIAGNOSIS — D64.9 ANEMIA, UNSPECIFIED: ICD-10-CM

## 2022-12-01 LAB
BASOPHILS # BLD AUTO: 0 10E3/UL (ref 0–0.2)
BASOPHILS NFR BLD AUTO: 1 %
EOSINOPHIL # BLD AUTO: 0.3 10E3/UL (ref 0–0.7)
EOSINOPHIL NFR BLD AUTO: 7 %
ERYTHROCYTE [DISTWIDTH] IN BLOOD BY AUTOMATED COUNT: 16.3 % (ref 10–15)
HCT VFR BLD AUTO: 32.7 % (ref 40–53)
HGB BLD-MCNC: 9.7 G/DL (ref 13.3–17.7)
IMM GRANULOCYTES # BLD: 0 10E3/UL
IMM GRANULOCYTES NFR BLD: 0 %
LYMPHOCYTES # BLD AUTO: 1.7 10E3/UL (ref 0.8–5.3)
LYMPHOCYTES NFR BLD AUTO: 42 %
MCH RBC QN AUTO: 30.6 PG (ref 26.5–33)
MCHC RBC AUTO-ENTMCNC: 29.7 G/DL (ref 31.5–36.5)
MCV RBC AUTO: 103 FL (ref 78–100)
MONOCYTES # BLD AUTO: 0.5 10E3/UL (ref 0–1.3)
MONOCYTES NFR BLD AUTO: 13 %
NEUTROPHILS # BLD AUTO: 1.4 10E3/UL (ref 1.6–8.3)
NEUTROPHILS NFR BLD AUTO: 37 %
NRBC # BLD AUTO: 0 10E3/UL
NRBC BLD AUTO-RTO: 0 /100
PLATELET # BLD AUTO: 120 10E3/UL (ref 150–450)
RBC # BLD AUTO: 3.17 10E6/UL (ref 4.4–5.9)
WBC # BLD AUTO: 3.9 10E3/UL (ref 4–11)

## 2022-12-01 PROCEDURE — P9604 ONE-WAY ALLOW PRORATED TRIP: HCPCS | Mod: ORL | Performed by: FAMILY MEDICINE

## 2022-12-01 PROCEDURE — 36415 COLL VENOUS BLD VENIPUNCTURE: CPT | Mod: ORL | Performed by: FAMILY MEDICINE

## 2022-12-01 PROCEDURE — 80069 RENAL FUNCTION PANEL: CPT | Mod: ORL | Performed by: FAMILY MEDICINE

## 2022-12-01 PROCEDURE — 85025 COMPLETE CBC W/AUTO DIFF WBC: CPT | Mod: ORL | Performed by: FAMILY MEDICINE

## 2022-12-02 LAB
ALBUMIN SERPL BCG-MCNC: 3.5 G/DL (ref 3.5–5.2)
ANION GAP SERPL CALCULATED.3IONS-SCNC: 14 MMOL/L (ref 7–15)
BUN SERPL-MCNC: 45.5 MG/DL (ref 8–23)
CALCIUM SERPL-MCNC: 8.1 MG/DL (ref 8.8–10.2)
CHLORIDE SERPL-SCNC: 113 MMOL/L (ref 98–107)
CREAT SERPL-MCNC: 2.2 MG/DL (ref 0.67–1.17)
DEPRECATED HCO3 PLAS-SCNC: 17 MMOL/L (ref 22–29)
GFR SERPL CREATININE-BSD FRML MDRD: 29 ML/MIN/1.73M2
GLUCOSE SERPL-MCNC: 115 MG/DL (ref 70–99)
PHOSPHATE SERPL-MCNC: 4.4 MG/DL (ref 2.5–4.5)
POTASSIUM SERPL-SCNC: 6 MMOL/L (ref 3.4–5.3)
SODIUM SERPL-SCNC: 144 MMOL/L (ref 136–145)

## 2022-12-07 ENCOUNTER — LAB REQUISITION (OUTPATIENT)
Dept: LAB | Facility: CLINIC | Age: 82
End: 2022-12-07
Payer: COMMERCIAL

## 2022-12-08 PROCEDURE — 84132 ASSAY OF SERUM POTASSIUM: CPT | Mod: ORL | Performed by: FAMILY MEDICINE

## 2022-12-08 PROCEDURE — P9604 ONE-WAY ALLOW PRORATED TRIP: HCPCS | Mod: ORL | Performed by: FAMILY MEDICINE

## 2022-12-08 PROCEDURE — 36415 COLL VENOUS BLD VENIPUNCTURE: CPT | Mod: ORL | Performed by: FAMILY MEDICINE

## 2022-12-09 LAB — POTASSIUM SERPL-SCNC: 6.7 MMOL/L (ref 3.4–5.3)

## 2022-12-14 ENCOUNTER — LAB REQUISITION (OUTPATIENT)
Dept: LAB | Facility: CLINIC | Age: 82
End: 2022-12-14
Payer: COMMERCIAL

## 2022-12-14 DIAGNOSIS — E87.5 HYPERKALEMIA: ICD-10-CM

## 2022-12-14 DIAGNOSIS — D64.9 ANEMIA, UNSPECIFIED: ICD-10-CM

## 2022-12-15 LAB
ANION GAP SERPL CALCULATED.3IONS-SCNC: 12 MMOL/L (ref 7–15)
BASOPHILS # BLD AUTO: 0 10E3/UL (ref 0–0.2)
BASOPHILS NFR BLD AUTO: 1 %
BUN SERPL-MCNC: 44.9 MG/DL (ref 8–23)
CALCIUM SERPL-MCNC: 8.5 MG/DL (ref 8.8–10.2)
CHLORIDE SERPL-SCNC: 111 MMOL/L (ref 98–107)
CREAT SERPL-MCNC: 2.49 MG/DL (ref 0.67–1.17)
DEPRECATED HCO3 PLAS-SCNC: 19 MMOL/L (ref 22–29)
EOSINOPHIL # BLD AUTO: 0.3 10E3/UL (ref 0–0.7)
EOSINOPHIL NFR BLD AUTO: 6 %
ERYTHROCYTE [DISTWIDTH] IN BLOOD BY AUTOMATED COUNT: 15.6 % (ref 10–15)
GFR SERPL CREATININE-BSD FRML MDRD: 25 ML/MIN/1.73M2
GLUCOSE SERPL-MCNC: 93 MG/DL (ref 70–99)
HCT VFR BLD AUTO: 38.3 % (ref 40–53)
HGB BLD-MCNC: 11.3 G/DL (ref 13.3–17.7)
IMM GRANULOCYTES # BLD: 0.1 10E3/UL
IMM GRANULOCYTES NFR BLD: 2 %
LYMPHOCYTES # BLD AUTO: 1.4 10E3/UL (ref 0.8–5.3)
LYMPHOCYTES NFR BLD AUTO: 29 %
MCH RBC QN AUTO: 30.1 PG (ref 26.5–33)
MCHC RBC AUTO-ENTMCNC: 29.5 G/DL (ref 31.5–36.5)
MCV RBC AUTO: 102 FL (ref 78–100)
MONOCYTES # BLD AUTO: 0.4 10E3/UL (ref 0–1.3)
MONOCYTES NFR BLD AUTO: 9 %
NEUTROPHILS # BLD AUTO: 2.4 10E3/UL (ref 1.6–8.3)
NEUTROPHILS NFR BLD AUTO: 53 %
NRBC # BLD AUTO: 0 10E3/UL
NRBC BLD AUTO-RTO: 0 /100
PLATELET # BLD AUTO: 110 10E3/UL (ref 150–450)
POTASSIUM SERPL-SCNC: 5.8 MMOL/L (ref 3.4–5.3)
RBC # BLD AUTO: 3.75 10E6/UL (ref 4.4–5.9)
SODIUM SERPL-SCNC: 142 MMOL/L (ref 136–145)
WBC # BLD AUTO: 4.6 10E3/UL (ref 4–11)

## 2022-12-15 PROCEDURE — 36415 COLL VENOUS BLD VENIPUNCTURE: CPT | Mod: ORL | Performed by: FAMILY MEDICINE

## 2022-12-15 PROCEDURE — 80048 BASIC METABOLIC PNL TOTAL CA: CPT | Mod: ORL | Performed by: FAMILY MEDICINE

## 2022-12-15 PROCEDURE — 85025 COMPLETE CBC W/AUTO DIFF WBC: CPT | Mod: ORL | Performed by: FAMILY MEDICINE

## 2022-12-15 PROCEDURE — P9603 ONE-WAY ALLOW PRORATED MILES: HCPCS | Mod: ORL | Performed by: FAMILY MEDICINE

## 2022-12-21 ENCOUNTER — LAB REQUISITION (OUTPATIENT)
Dept: LAB | Facility: CLINIC | Age: 82
End: 2022-12-21
Payer: COMMERCIAL

## 2022-12-21 DIAGNOSIS — E87.1 HYPO-OSMOLALITY AND HYPONATREMIA: ICD-10-CM

## 2022-12-23 ENCOUNTER — LAB REQUISITION (OUTPATIENT)
Dept: LAB | Facility: CLINIC | Age: 82
End: 2022-12-23
Payer: COMMERCIAL

## 2022-12-23 DIAGNOSIS — E87.1 HYPO-OSMOLALITY AND HYPONATREMIA: ICD-10-CM

## 2022-12-23 LAB
ALBUMIN SERPL BCG-MCNC: 3.5 G/DL (ref 3.5–5.2)
ANION GAP SERPL CALCULATED.3IONS-SCNC: 11 MMOL/L (ref 7–15)
BUN SERPL-MCNC: 59.8 MG/DL (ref 8–23)
CALCIUM SERPL-MCNC: 8.7 MG/DL (ref 8.8–10.2)
CHLORIDE SERPL-SCNC: 113 MMOL/L (ref 98–107)
CREAT SERPL-MCNC: 2.41 MG/DL (ref 0.67–1.17)
DEPRECATED HCO3 PLAS-SCNC: 19 MMOL/L (ref 22–29)
GFR SERPL CREATININE-BSD FRML MDRD: 26 ML/MIN/1.73M2
GLUCOSE SERPL-MCNC: 126 MG/DL (ref 70–99)
PHOSPHATE SERPL-MCNC: 4.1 MG/DL (ref 2.5–4.5)
POTASSIUM SERPL-SCNC: 5.5 MMOL/L (ref 3.4–5.3)
SODIUM SERPL-SCNC: 143 MMOL/L (ref 136–145)

## 2022-12-23 PROCEDURE — 36415 COLL VENOUS BLD VENIPUNCTURE: CPT | Mod: ORL | Performed by: FAMILY MEDICINE

## 2022-12-23 PROCEDURE — P9604 ONE-WAY ALLOW PRORATED TRIP: HCPCS | Mod: ORL | Performed by: FAMILY MEDICINE

## 2022-12-23 PROCEDURE — 80069 RENAL FUNCTION PANEL: CPT | Mod: ORL | Performed by: FAMILY MEDICINE

## 2022-12-23 PROCEDURE — P9603 ONE-WAY ALLOW PRORATED MILES: HCPCS | Mod: ORL | Performed by: FAMILY MEDICINE

## 2023-01-02 ENCOUNTER — LAB REQUISITION (OUTPATIENT)
Dept: LAB | Facility: CLINIC | Age: 83
End: 2023-01-02
Payer: COMMERCIAL

## 2023-01-02 DIAGNOSIS — E87.1 HYPO-OSMOLALITY AND HYPONATREMIA: ICD-10-CM

## 2023-01-05 LAB
ALBUMIN SERPL BCG-MCNC: 3.6 G/DL (ref 3.5–5.2)
ANION GAP SERPL CALCULATED.3IONS-SCNC: 14 MMOL/L (ref 7–15)
BASOPHILS # BLD AUTO: 0 10E3/UL (ref 0–0.2)
BASOPHILS NFR BLD AUTO: 1 %
BUN SERPL-MCNC: 64.9 MG/DL (ref 8–23)
CALCIUM SERPL-MCNC: 8.5 MG/DL (ref 8.8–10.2)
CHLORIDE SERPL-SCNC: 115 MMOL/L (ref 98–107)
CREAT SERPL-MCNC: 2.37 MG/DL (ref 0.67–1.17)
DEPRECATED HCO3 PLAS-SCNC: 14 MMOL/L (ref 22–29)
EOSINOPHIL # BLD AUTO: 0.3 10E3/UL (ref 0–0.7)
EOSINOPHIL NFR BLD AUTO: 7 %
ERYTHROCYTE [DISTWIDTH] IN BLOOD BY AUTOMATED COUNT: 15.3 % (ref 10–15)
GFR SERPL CREATININE-BSD FRML MDRD: 26 ML/MIN/1.73M2
GLUCOSE SERPL-MCNC: 108 MG/DL (ref 70–99)
HCT VFR BLD AUTO: 38.2 % (ref 40–53)
HGB BLD-MCNC: 11.5 G/DL (ref 13.3–17.7)
IMM GRANULOCYTES # BLD: 0 10E3/UL
IMM GRANULOCYTES NFR BLD: 0 %
LYMPHOCYTES # BLD AUTO: 1.5 10E3/UL (ref 0.8–5.3)
LYMPHOCYTES NFR BLD AUTO: 35 %
MCH RBC QN AUTO: 30.2 PG (ref 26.5–33)
MCHC RBC AUTO-ENTMCNC: 30.1 G/DL (ref 31.5–36.5)
MCV RBC AUTO: 100 FL (ref 78–100)
MONOCYTES # BLD AUTO: 0.4 10E3/UL (ref 0–1.3)
MONOCYTES NFR BLD AUTO: 9 %
NEUTROPHILS # BLD AUTO: 2.1 10E3/UL (ref 1.6–8.3)
NEUTROPHILS NFR BLD AUTO: 48 %
NRBC # BLD AUTO: 0 10E3/UL
NRBC BLD AUTO-RTO: 0 /100
PHOSPHATE SERPL-MCNC: 4.1 MG/DL (ref 2.5–4.5)
PLATELET # BLD AUTO: 125 10E3/UL (ref 150–450)
POTASSIUM SERPL-SCNC: 5.3 MMOL/L (ref 3.4–5.3)
RBC # BLD AUTO: 3.81 10E6/UL (ref 4.4–5.9)
SODIUM SERPL-SCNC: 143 MMOL/L (ref 136–145)
WBC # BLD AUTO: 4.3 10E3/UL (ref 4–11)

## 2023-01-05 PROCEDURE — P9604 ONE-WAY ALLOW PRORATED TRIP: HCPCS | Mod: ORL | Performed by: FAMILY MEDICINE

## 2023-01-05 PROCEDURE — 36415 COLL VENOUS BLD VENIPUNCTURE: CPT | Mod: ORL | Performed by: FAMILY MEDICINE

## 2023-01-05 PROCEDURE — 80069 RENAL FUNCTION PANEL: CPT | Mod: ORL | Performed by: FAMILY MEDICINE

## 2023-01-05 PROCEDURE — 85025 COMPLETE CBC W/AUTO DIFF WBC: CPT | Mod: ORL | Performed by: FAMILY MEDICINE

## 2023-01-25 ENCOUNTER — LAB REQUISITION (OUTPATIENT)
Dept: LAB | Facility: CLINIC | Age: 83
End: 2023-01-25
Payer: COMMERCIAL

## 2023-01-25 DIAGNOSIS — N18.32 CHRONIC KIDNEY DISEASE, STAGE 3B (H): ICD-10-CM

## 2023-01-26 LAB
ALBUMIN SERPL BCG-MCNC: 3.6 G/DL (ref 3.5–5.2)
ANION GAP SERPL CALCULATED.3IONS-SCNC: 10 MMOL/L (ref 7–15)
BASOPHILS # BLD AUTO: 0.1 10E3/UL (ref 0–0.2)
BASOPHILS NFR BLD AUTO: 1 %
BUN SERPL-MCNC: 43 MG/DL (ref 8–23)
CALCIUM SERPL-MCNC: 8.7 MG/DL (ref 8.8–10.2)
CHLORIDE SERPL-SCNC: 111 MMOL/L (ref 98–107)
CREAT SERPL-MCNC: 2.35 MG/DL (ref 0.67–1.17)
DEPRECATED HCO3 PLAS-SCNC: 22 MMOL/L (ref 22–29)
EOSINOPHIL # BLD AUTO: 0.4 10E3/UL (ref 0–0.7)
EOSINOPHIL NFR BLD AUTO: 8 %
ERYTHROCYTE [DISTWIDTH] IN BLOOD BY AUTOMATED COUNT: 14.9 % (ref 10–15)
GFR SERPL CREATININE-BSD FRML MDRD: 27 ML/MIN/1.73M2
GLUCOSE SERPL-MCNC: 94 MG/DL (ref 70–99)
HCT VFR BLD AUTO: 40.9 % (ref 40–53)
HGB BLD-MCNC: 12.5 G/DL (ref 13.3–17.7)
IMM GRANULOCYTES # BLD: 0 10E3/UL
IMM GRANULOCYTES NFR BLD: 0 %
LYMPHOCYTES # BLD AUTO: 1.7 10E3/UL (ref 0.8–5.3)
LYMPHOCYTES NFR BLD AUTO: 34 %
MCH RBC QN AUTO: 30.2 PG (ref 26.5–33)
MCHC RBC AUTO-ENTMCNC: 30.6 G/DL (ref 31.5–36.5)
MCV RBC AUTO: 99 FL (ref 78–100)
MONOCYTES # BLD AUTO: 0.4 10E3/UL (ref 0–1.3)
MONOCYTES NFR BLD AUTO: 8 %
NEUTROPHILS # BLD AUTO: 2.5 10E3/UL (ref 1.6–8.3)
NEUTROPHILS NFR BLD AUTO: 49 %
NRBC # BLD AUTO: 0 10E3/UL
NRBC BLD AUTO-RTO: 0 /100
PHOSPHATE SERPL-MCNC: 4.1 MG/DL (ref 2.5–4.5)
PLATELET # BLD AUTO: 107 10E3/UL (ref 150–450)
POTASSIUM SERPL-SCNC: 5.5 MMOL/L (ref 3.4–5.3)
RBC # BLD AUTO: 4.14 10E6/UL (ref 4.4–5.9)
SODIUM SERPL-SCNC: 143 MMOL/L (ref 136–145)
WBC # BLD AUTO: 5 10E3/UL (ref 4–11)

## 2023-01-26 PROCEDURE — 85025 COMPLETE CBC W/AUTO DIFF WBC: CPT | Mod: ORL | Performed by: FAMILY MEDICINE

## 2023-01-26 PROCEDURE — 80069 RENAL FUNCTION PANEL: CPT | Mod: ORL | Performed by: FAMILY MEDICINE

## 2023-01-26 PROCEDURE — P9604 ONE-WAY ALLOW PRORATED TRIP: HCPCS | Mod: ORL | Performed by: FAMILY MEDICINE

## 2023-01-26 PROCEDURE — 36415 COLL VENOUS BLD VENIPUNCTURE: CPT | Mod: ORL | Performed by: FAMILY MEDICINE

## 2023-02-01 ENCOUNTER — LAB REQUISITION (OUTPATIENT)
Dept: LAB | Facility: CLINIC | Age: 83
End: 2023-02-01
Payer: COMMERCIAL

## 2023-02-01 DIAGNOSIS — N18.32 CHRONIC KIDNEY DISEASE, STAGE 3B (H): ICD-10-CM

## 2023-02-02 LAB
BASOPHILS # BLD AUTO: 0 10E3/UL (ref 0–0.2)
BASOPHILS NFR BLD AUTO: 1 %
EOSINOPHIL # BLD AUTO: 0.4 10E3/UL (ref 0–0.7)
EOSINOPHIL NFR BLD AUTO: 7 %
ERYTHROCYTE [DISTWIDTH] IN BLOOD BY AUTOMATED COUNT: 14.8 % (ref 10–15)
HCT VFR BLD AUTO: 41.5 % (ref 40–53)
HGB BLD-MCNC: 12.6 G/DL (ref 13.3–17.7)
IMM GRANULOCYTES # BLD: 0 10E3/UL
IMM GRANULOCYTES NFR BLD: 0 %
LYMPHOCYTES # BLD AUTO: 1.9 10E3/UL (ref 0.8–5.3)
LYMPHOCYTES NFR BLD AUTO: 33 %
MCH RBC QN AUTO: 30.2 PG (ref 26.5–33)
MCHC RBC AUTO-ENTMCNC: 30.4 G/DL (ref 31.5–36.5)
MCV RBC AUTO: 100 FL (ref 78–100)
MONOCYTES # BLD AUTO: 0.6 10E3/UL (ref 0–1.3)
MONOCYTES NFR BLD AUTO: 11 %
NEUTROPHILS # BLD AUTO: 2.7 10E3/UL (ref 1.6–8.3)
NEUTROPHILS NFR BLD AUTO: 48 %
NRBC # BLD AUTO: 0 10E3/UL
NRBC BLD AUTO-RTO: 0 /100
PLATELET # BLD AUTO: 114 10E3/UL (ref 150–450)
RBC # BLD AUTO: 4.17 10E6/UL (ref 4.4–5.9)
WBC # BLD AUTO: 5.6 10E3/UL (ref 4–11)

## 2023-02-02 PROCEDURE — 85025 COMPLETE CBC W/AUTO DIFF WBC: CPT | Mod: ORL | Performed by: FAMILY MEDICINE

## 2023-02-02 PROCEDURE — P9604 ONE-WAY ALLOW PRORATED TRIP: HCPCS | Mod: ORL | Performed by: FAMILY MEDICINE

## 2023-02-02 PROCEDURE — 36415 COLL VENOUS BLD VENIPUNCTURE: CPT | Mod: ORL | Performed by: FAMILY MEDICINE

## 2023-02-07 ENCOUNTER — LAB REQUISITION (OUTPATIENT)
Dept: LAB | Facility: CLINIC | Age: 83
End: 2023-02-07
Payer: COMMERCIAL

## 2023-02-07 DIAGNOSIS — N18.32 CHRONIC KIDNEY DISEASE, STAGE 3B (H): ICD-10-CM

## 2023-02-09 LAB
BASOPHILS # BLD AUTO: 0 10E3/UL (ref 0–0.2)
BASOPHILS NFR BLD AUTO: 1 %
EOSINOPHIL # BLD AUTO: 0.4 10E3/UL (ref 0–0.7)
EOSINOPHIL NFR BLD AUTO: 8 %
ERYTHROCYTE [DISTWIDTH] IN BLOOD BY AUTOMATED COUNT: 14.6 % (ref 10–15)
HCT VFR BLD AUTO: 38.7 % (ref 40–53)
HGB BLD-MCNC: 12 G/DL (ref 13.3–17.7)
IMM GRANULOCYTES # BLD: 0 10E3/UL
IMM GRANULOCYTES NFR BLD: 0 %
LYMPHOCYTES # BLD AUTO: 1.7 10E3/UL (ref 0.8–5.3)
LYMPHOCYTES NFR BLD AUTO: 33 %
MCH RBC QN AUTO: 30.2 PG (ref 26.5–33)
MCHC RBC AUTO-ENTMCNC: 31 G/DL (ref 31.5–36.5)
MCV RBC AUTO: 98 FL (ref 78–100)
MONOCYTES # BLD AUTO: 0.5 10E3/UL (ref 0–1.3)
MONOCYTES NFR BLD AUTO: 9 %
NEUTROPHILS # BLD AUTO: 2.7 10E3/UL (ref 1.6–8.3)
NEUTROPHILS NFR BLD AUTO: 49 %
NRBC # BLD AUTO: 0 10E3/UL
NRBC BLD AUTO-RTO: 0 /100
PLATELET # BLD AUTO: 116 10E3/UL (ref 150–450)
RBC # BLD AUTO: 3.97 10E6/UL (ref 4.4–5.9)
WBC # BLD AUTO: 5.3 10E3/UL (ref 4–11)

## 2023-02-09 PROCEDURE — 85025 COMPLETE CBC W/AUTO DIFF WBC: CPT | Mod: ORL | Performed by: FAMILY MEDICINE

## 2023-02-09 PROCEDURE — P9603 ONE-WAY ALLOW PRORATED MILES: HCPCS | Mod: ORL | Performed by: FAMILY MEDICINE

## 2023-02-09 PROCEDURE — 36415 COLL VENOUS BLD VENIPUNCTURE: CPT | Mod: ORL | Performed by: FAMILY MEDICINE

## 2023-02-15 ENCOUNTER — LAB REQUISITION (OUTPATIENT)
Dept: LAB | Facility: CLINIC | Age: 83
End: 2023-02-15
Payer: COMMERCIAL

## 2023-02-15 DIAGNOSIS — N18.32 CHRONIC KIDNEY DISEASE, STAGE 3B (H): ICD-10-CM

## 2023-02-15 DIAGNOSIS — N18.4 CHRONIC KIDNEY DISEASE, STAGE 4 (SEVERE) (H): ICD-10-CM

## 2023-02-16 LAB
BASOPHILS # BLD AUTO: 0.1 10E3/UL (ref 0–0.2)
BASOPHILS NFR BLD AUTO: 1 %
EOSINOPHIL # BLD AUTO: 0.4 10E3/UL (ref 0–0.7)
EOSINOPHIL NFR BLD AUTO: 8 %
ERYTHROCYTE [DISTWIDTH] IN BLOOD BY AUTOMATED COUNT: 14.7 % (ref 10–15)
HCT VFR BLD AUTO: 38.3 % (ref 40–53)
HGB BLD-MCNC: 12.1 G/DL (ref 13.3–17.7)
IMM GRANULOCYTES # BLD: 0 10E3/UL
IMM GRANULOCYTES NFR BLD: 0 %
LYMPHOCYTES # BLD AUTO: 2 10E3/UL (ref 0.8–5.3)
LYMPHOCYTES NFR BLD AUTO: 36 %
MCH RBC QN AUTO: 30.9 PG (ref 26.5–33)
MCHC RBC AUTO-ENTMCNC: 31.6 G/DL (ref 31.5–36.5)
MCV RBC AUTO: 98 FL (ref 78–100)
MONOCYTES # BLD AUTO: 0.5 10E3/UL (ref 0–1.3)
MONOCYTES NFR BLD AUTO: 9 %
NEUTROPHILS # BLD AUTO: 2.6 10E3/UL (ref 1.6–8.3)
NEUTROPHILS NFR BLD AUTO: 46 %
NRBC # BLD AUTO: 0 10E3/UL
NRBC BLD AUTO-RTO: 0 /100
PLATELET # BLD AUTO: 122 10E3/UL (ref 150–450)
RBC # BLD AUTO: 3.92 10E6/UL (ref 4.4–5.9)
WBC # BLD AUTO: 5.6 10E3/UL (ref 4–11)

## 2023-02-16 PROCEDURE — 36415 COLL VENOUS BLD VENIPUNCTURE: CPT | Mod: ORL | Performed by: FAMILY MEDICINE

## 2023-02-16 PROCEDURE — P9604 ONE-WAY ALLOW PRORATED TRIP: HCPCS | Mod: ORL | Performed by: FAMILY MEDICINE

## 2023-02-16 PROCEDURE — 85025 COMPLETE CBC W/AUTO DIFF WBC: CPT | Mod: ORL | Performed by: FAMILY MEDICINE

## 2023-02-22 ENCOUNTER — LAB REQUISITION (OUTPATIENT)
Dept: LAB | Facility: CLINIC | Age: 83
End: 2023-02-22
Payer: COMMERCIAL

## 2023-02-22 DIAGNOSIS — N18.32 CHRONIC KIDNEY DISEASE, STAGE 3B (H): ICD-10-CM

## 2023-02-22 LAB
BASOPHILS # BLD AUTO: 0 10E3/UL (ref 0–0.2)
BASOPHILS NFR BLD AUTO: 1 %
EOSINOPHIL # BLD AUTO: 0.4 10E3/UL (ref 0–0.7)
EOSINOPHIL NFR BLD AUTO: 8 %
ERYTHROCYTE [DISTWIDTH] IN BLOOD BY AUTOMATED COUNT: 15 % (ref 10–15)
HCT VFR BLD AUTO: 40.7 % (ref 40–53)
HGB BLD-MCNC: 12.6 G/DL (ref 13.3–17.7)
IMM GRANULOCYTES # BLD: 0 10E3/UL
IMM GRANULOCYTES NFR BLD: 0 %
LYMPHOCYTES # BLD AUTO: 1.7 10E3/UL (ref 0.8–5.3)
LYMPHOCYTES NFR BLD AUTO: 37 %
MCH RBC QN AUTO: 30.6 PG (ref 26.5–33)
MCHC RBC AUTO-ENTMCNC: 31 G/DL (ref 31.5–36.5)
MCV RBC AUTO: 99 FL (ref 78–100)
MONOCYTES # BLD AUTO: 0.4 10E3/UL (ref 0–1.3)
MONOCYTES NFR BLD AUTO: 9 %
NEUTROPHILS # BLD AUTO: 2.2 10E3/UL (ref 1.6–8.3)
NEUTROPHILS NFR BLD AUTO: 45 %
NRBC # BLD AUTO: 0 10E3/UL
NRBC BLD AUTO-RTO: 0 /100
PLATELET # BLD AUTO: 114 10E3/UL (ref 150–450)
RBC # BLD AUTO: 4.12 10E6/UL (ref 4.4–5.9)
WBC # BLD AUTO: 4.7 10E3/UL (ref 4–11)

## 2023-02-22 PROCEDURE — 36415 COLL VENOUS BLD VENIPUNCTURE: CPT | Mod: ORL | Performed by: FAMILY MEDICINE

## 2023-02-22 PROCEDURE — 85025 COMPLETE CBC W/AUTO DIFF WBC: CPT | Mod: ORL | Performed by: FAMILY MEDICINE

## 2023-02-22 PROCEDURE — P9603 ONE-WAY ALLOW PRORATED MILES: HCPCS | Mod: ORL | Performed by: FAMILY MEDICINE

## 2023-03-01 ENCOUNTER — LAB REQUISITION (OUTPATIENT)
Dept: LAB | Facility: CLINIC | Age: 83
End: 2023-03-01
Payer: COMMERCIAL

## 2023-03-01 DIAGNOSIS — N18.4 CHRONIC KIDNEY DISEASE, STAGE 4 (SEVERE) (H): ICD-10-CM

## 2023-03-01 DIAGNOSIS — N18.32 CHRONIC KIDNEY DISEASE, STAGE 3B (H): ICD-10-CM

## 2023-03-02 LAB
ALBUMIN SERPL BCG-MCNC: 3.3 G/DL (ref 3.5–5.2)
ANION GAP SERPL CALCULATED.3IONS-SCNC: 10 MMOL/L (ref 7–15)
BASOPHILS # BLD AUTO: 0 10E3/UL (ref 0–0.2)
BASOPHILS NFR BLD AUTO: 1 %
BUN SERPL-MCNC: 43.9 MG/DL (ref 8–23)
CALCIUM SERPL-MCNC: 8.4 MG/DL (ref 8.8–10.2)
CHLORIDE SERPL-SCNC: 112 MMOL/L (ref 98–107)
CREAT SERPL-MCNC: 2.49 MG/DL (ref 0.67–1.17)
DEPRECATED HCO3 PLAS-SCNC: 22 MMOL/L (ref 22–29)
EOSINOPHIL # BLD AUTO: 0.5 10E3/UL (ref 0–0.7)
EOSINOPHIL NFR BLD AUTO: 9 %
ERYTHROCYTE [DISTWIDTH] IN BLOOD BY AUTOMATED COUNT: 14.9 % (ref 10–15)
GFR SERPL CREATININE-BSD FRML MDRD: 25 ML/MIN/1.73M2
GLUCOSE SERPL-MCNC: 81 MG/DL (ref 70–99)
HCT VFR BLD AUTO: 37.6 % (ref 40–53)
HGB BLD-MCNC: 11.7 G/DL (ref 13.3–17.7)
IMM GRANULOCYTES # BLD: 0 10E3/UL
IMM GRANULOCYTES NFR BLD: 0 %
LYMPHOCYTES # BLD AUTO: 2.2 10E3/UL (ref 0.8–5.3)
LYMPHOCYTES NFR BLD AUTO: 43 %
MCH RBC QN AUTO: 30.9 PG (ref 26.5–33)
MCHC RBC AUTO-ENTMCNC: 31.1 G/DL (ref 31.5–36.5)
MCV RBC AUTO: 99 FL (ref 78–100)
MONOCYTES # BLD AUTO: 0.4 10E3/UL (ref 0–1.3)
MONOCYTES NFR BLD AUTO: 9 %
NEUTROPHILS # BLD AUTO: 1.9 10E3/UL (ref 1.6–8.3)
NEUTROPHILS NFR BLD AUTO: 38 %
NRBC # BLD AUTO: 0 10E3/UL
NRBC BLD AUTO-RTO: 0 /100
PHOSPHATE SERPL-MCNC: 3.9 MG/DL (ref 2.5–4.5)
PLATELET # BLD AUTO: 91 10E3/UL (ref 150–450)
POTASSIUM SERPL-SCNC: 5.5 MMOL/L (ref 3.4–5.3)
RBC # BLD AUTO: 3.79 10E6/UL (ref 4.4–5.9)
SODIUM SERPL-SCNC: 144 MMOL/L (ref 136–145)
WBC # BLD AUTO: 5.1 10E3/UL (ref 4–11)

## 2023-03-02 PROCEDURE — 36415 COLL VENOUS BLD VENIPUNCTURE: CPT | Mod: ORL | Performed by: FAMILY MEDICINE

## 2023-03-02 PROCEDURE — 85025 COMPLETE CBC W/AUTO DIFF WBC: CPT | Mod: ORL | Performed by: FAMILY MEDICINE

## 2023-03-02 PROCEDURE — 80069 RENAL FUNCTION PANEL: CPT | Mod: ORL | Performed by: FAMILY MEDICINE

## 2023-03-02 PROCEDURE — P9604 ONE-WAY ALLOW PRORATED TRIP: HCPCS | Mod: ORL | Performed by: FAMILY MEDICINE

## 2023-03-07 ENCOUNTER — LAB REQUISITION (OUTPATIENT)
Dept: LAB | Facility: CLINIC | Age: 83
End: 2023-03-07
Payer: COMMERCIAL

## 2023-03-07 DIAGNOSIS — N18.32 CHRONIC KIDNEY DISEASE, STAGE 3B (H): ICD-10-CM

## 2023-03-09 LAB
BASOPHILS # BLD AUTO: 0.1 10E3/UL (ref 0–0.2)
BASOPHILS NFR BLD AUTO: 1 %
EOSINOPHIL # BLD AUTO: 0.5 10E3/UL (ref 0–0.7)
EOSINOPHIL NFR BLD AUTO: 8 %
ERYTHROCYTE [DISTWIDTH] IN BLOOD BY AUTOMATED COUNT: 14.9 % (ref 10–15)
HCT VFR BLD AUTO: 41.3 % (ref 40–53)
HGB BLD-MCNC: 12.3 G/DL (ref 13.3–17.7)
IMM GRANULOCYTES # BLD: 0 10E3/UL
IMM GRANULOCYTES NFR BLD: 0 %
LYMPHOCYTES # BLD AUTO: 1.9 10E3/UL (ref 0.8–5.3)
LYMPHOCYTES NFR BLD AUTO: 31 %
MCH RBC QN AUTO: 30.1 PG (ref 26.5–33)
MCHC RBC AUTO-ENTMCNC: 29.8 G/DL (ref 31.5–36.5)
MCV RBC AUTO: 101 FL (ref 78–100)
MONOCYTES # BLD AUTO: 0.6 10E3/UL (ref 0–1.3)
MONOCYTES NFR BLD AUTO: 9 %
NEUTROPHILS # BLD AUTO: 3 10E3/UL (ref 1.6–8.3)
NEUTROPHILS NFR BLD AUTO: 51 %
NRBC # BLD AUTO: 0 10E3/UL
NRBC BLD AUTO-RTO: 0 /100
PLATELET # BLD AUTO: 128 10E3/UL (ref 150–450)
RBC # BLD AUTO: 4.08 10E6/UL (ref 4.4–5.9)
WBC # BLD AUTO: 6 10E3/UL (ref 4–11)

## 2023-03-09 PROCEDURE — 85025 COMPLETE CBC W/AUTO DIFF WBC: CPT | Mod: ORL | Performed by: FAMILY MEDICINE

## 2023-03-09 PROCEDURE — 36415 COLL VENOUS BLD VENIPUNCTURE: CPT | Mod: ORL | Performed by: FAMILY MEDICINE

## 2023-03-09 PROCEDURE — P9603 ONE-WAY ALLOW PRORATED MILES: HCPCS | Mod: ORL | Performed by: FAMILY MEDICINE

## 2023-03-15 ENCOUNTER — LAB REQUISITION (OUTPATIENT)
Dept: LAB | Facility: CLINIC | Age: 83
End: 2023-03-15
Payer: COMMERCIAL

## 2023-03-15 DIAGNOSIS — E87.1 HYPO-OSMOLALITY AND HYPONATREMIA: ICD-10-CM

## 2023-03-15 DIAGNOSIS — N18.32 CHRONIC KIDNEY DISEASE, STAGE 3B (H): ICD-10-CM

## 2023-03-16 LAB
BASOPHILS # BLD AUTO: 0.1 10E3/UL (ref 0–0.2)
BASOPHILS NFR BLD AUTO: 1 %
EOSINOPHIL # BLD AUTO: 0.4 10E3/UL (ref 0–0.7)
EOSINOPHIL NFR BLD AUTO: 7 %
ERYTHROCYTE [DISTWIDTH] IN BLOOD BY AUTOMATED COUNT: 15.1 % (ref 10–15)
HCT VFR BLD AUTO: 38.3 % (ref 40–53)
HGB BLD-MCNC: 11.6 G/DL (ref 13.3–17.7)
IMM GRANULOCYTES # BLD: 0 10E3/UL
IMM GRANULOCYTES NFR BLD: 0 %
LYMPHOCYTES # BLD AUTO: 1.9 10E3/UL (ref 0.8–5.3)
LYMPHOCYTES NFR BLD AUTO: 35 %
MCH RBC QN AUTO: 30.8 PG (ref 26.5–33)
MCHC RBC AUTO-ENTMCNC: 30.3 G/DL (ref 31.5–36.5)
MCV RBC AUTO: 102 FL (ref 78–100)
MONOCYTES # BLD AUTO: 0.5 10E3/UL (ref 0–1.3)
MONOCYTES NFR BLD AUTO: 10 %
NEUTROPHILS # BLD AUTO: 2.6 10E3/UL (ref 1.6–8.3)
NEUTROPHILS NFR BLD AUTO: 47 %
NRBC # BLD AUTO: 0 10E3/UL
NRBC BLD AUTO-RTO: 0 /100
PLATELET # BLD AUTO: 122 10E3/UL (ref 150–450)
RBC # BLD AUTO: 3.77 10E6/UL (ref 4.4–5.9)
WBC # BLD AUTO: 5.4 10E3/UL (ref 4–11)

## 2023-03-16 PROCEDURE — 80048 BASIC METABOLIC PNL TOTAL CA: CPT | Mod: ORL | Performed by: FAMILY MEDICINE

## 2023-03-16 PROCEDURE — 36415 COLL VENOUS BLD VENIPUNCTURE: CPT | Mod: ORL | Performed by: FAMILY MEDICINE

## 2023-03-16 PROCEDURE — P9603 ONE-WAY ALLOW PRORATED MILES: HCPCS | Mod: ORL | Performed by: FAMILY MEDICINE

## 2023-03-16 PROCEDURE — 85025 COMPLETE CBC W/AUTO DIFF WBC: CPT | Mod: ORL | Performed by: FAMILY MEDICINE

## 2023-03-17 LAB
ANION GAP SERPL CALCULATED.3IONS-SCNC: 11 MMOL/L (ref 7–15)
BUN SERPL-MCNC: 52.3 MG/DL (ref 8–23)
CALCIUM SERPL-MCNC: 9.2 MG/DL (ref 8.8–10.2)
CHLORIDE SERPL-SCNC: 112 MMOL/L (ref 98–107)
CREAT SERPL-MCNC: 2.65 MG/DL (ref 0.67–1.17)
DEPRECATED HCO3 PLAS-SCNC: 20 MMOL/L (ref 22–29)
GFR SERPL CREATININE-BSD FRML MDRD: 23 ML/MIN/1.73M2
GLUCOSE SERPL-MCNC: 78 MG/DL (ref 70–99)
POTASSIUM SERPL-SCNC: 6.8 MMOL/L (ref 3.4–5.3)
SODIUM SERPL-SCNC: 143 MMOL/L (ref 136–145)

## 2023-03-22 ENCOUNTER — LAB REQUISITION (OUTPATIENT)
Dept: LAB | Facility: CLINIC | Age: 83
End: 2023-03-22
Payer: COMMERCIAL

## 2023-03-22 DIAGNOSIS — I50.30 UNSPECIFIED DIASTOLIC (CONGESTIVE) HEART FAILURE (H): ICD-10-CM

## 2023-03-23 LAB
ALBUMIN SERPL BCG-MCNC: 3.8 G/DL (ref 3.5–5.2)
ANION GAP SERPL CALCULATED.3IONS-SCNC: 13 MMOL/L (ref 7–15)
BASOPHILS # BLD AUTO: 0 10E3/UL (ref 0–0.2)
BASOPHILS NFR BLD AUTO: 1 %
BUN SERPL-MCNC: 52.5 MG/DL (ref 8–23)
CALCIUM SERPL-MCNC: 9 MG/DL (ref 8.8–10.2)
CHLORIDE SERPL-SCNC: 108 MMOL/L (ref 98–107)
CREAT SERPL-MCNC: 2.92 MG/DL (ref 0.67–1.17)
DEPRECATED HCO3 PLAS-SCNC: 21 MMOL/L (ref 22–29)
EOSINOPHIL # BLD AUTO: 0.4 10E3/UL (ref 0–0.7)
EOSINOPHIL NFR BLD AUTO: 7 %
ERYTHROCYTE [DISTWIDTH] IN BLOOD BY AUTOMATED COUNT: 14.4 % (ref 10–15)
GFR SERPL CREATININE-BSD FRML MDRD: 21 ML/MIN/1.73M2
GLUCOSE SERPL-MCNC: 98 MG/DL (ref 70–99)
HCT VFR BLD AUTO: 40 % (ref 40–53)
HGB BLD-MCNC: 12.1 G/DL (ref 13.3–17.7)
IMM GRANULOCYTES # BLD: 0 10E3/UL
IMM GRANULOCYTES NFR BLD: 0 %
LYMPHOCYTES # BLD AUTO: 1.9 10E3/UL (ref 0.8–5.3)
LYMPHOCYTES NFR BLD AUTO: 35 %
MAGNESIUM SERPL-MCNC: 2.7 MG/DL (ref 1.7–2.3)
MCH RBC QN AUTO: 30.6 PG (ref 26.5–33)
MCHC RBC AUTO-ENTMCNC: 30.3 G/DL (ref 31.5–36.5)
MCV RBC AUTO: 101 FL (ref 78–100)
MONOCYTES # BLD AUTO: 0.5 10E3/UL (ref 0–1.3)
MONOCYTES NFR BLD AUTO: 8 %
NEUTROPHILS # BLD AUTO: 2.6 10E3/UL (ref 1.6–8.3)
NEUTROPHILS NFR BLD AUTO: 49 %
NRBC # BLD AUTO: 0 10E3/UL
NRBC BLD AUTO-RTO: 0 /100
PHOSPHATE SERPL-MCNC: 4.5 MG/DL (ref 2.5–4.5)
PLATELET # BLD AUTO: 113 10E3/UL (ref 150–450)
POTASSIUM SERPL-SCNC: 5.3 MMOL/L (ref 3.4–5.3)
RBC # BLD AUTO: 3.95 10E6/UL (ref 4.4–5.9)
SODIUM SERPL-SCNC: 142 MMOL/L (ref 136–145)
WBC # BLD AUTO: 5.3 10E3/UL (ref 4–11)

## 2023-03-23 PROCEDURE — 83735 ASSAY OF MAGNESIUM: CPT | Mod: ORL | Performed by: FAMILY MEDICINE

## 2023-03-23 PROCEDURE — P9604 ONE-WAY ALLOW PRORATED TRIP: HCPCS | Mod: ORL | Performed by: FAMILY MEDICINE

## 2023-03-23 PROCEDURE — 85025 COMPLETE CBC W/AUTO DIFF WBC: CPT | Mod: ORL | Performed by: FAMILY MEDICINE

## 2023-03-23 PROCEDURE — 80069 RENAL FUNCTION PANEL: CPT | Mod: ORL | Performed by: FAMILY MEDICINE

## 2023-03-23 PROCEDURE — 36415 COLL VENOUS BLD VENIPUNCTURE: CPT | Mod: ORL | Performed by: FAMILY MEDICINE

## 2023-03-29 ENCOUNTER — LAB REQUISITION (OUTPATIENT)
Dept: LAB | Facility: CLINIC | Age: 83
End: 2023-03-29
Payer: COMMERCIAL

## 2023-03-29 DIAGNOSIS — D64.9 ANEMIA, UNSPECIFIED: ICD-10-CM

## 2023-03-30 LAB
BASOPHILS # BLD AUTO: 0 10E3/UL (ref 0–0.2)
BASOPHILS NFR BLD AUTO: 1 %
EOSINOPHIL # BLD AUTO: 0.4 10E3/UL (ref 0–0.7)
EOSINOPHIL NFR BLD AUTO: 8 %
ERYTHROCYTE [DISTWIDTH] IN BLOOD BY AUTOMATED COUNT: 14.6 % (ref 10–15)
HCT VFR BLD AUTO: 38.5 % (ref 40–53)
HGB BLD-MCNC: 11.9 G/DL (ref 13.3–17.7)
IMM GRANULOCYTES # BLD: 0 10E3/UL
IMM GRANULOCYTES NFR BLD: 1 %
LYMPHOCYTES # BLD AUTO: 1.7 10E3/UL (ref 0.8–5.3)
LYMPHOCYTES NFR BLD AUTO: 31 %
MCH RBC QN AUTO: 31.4 PG (ref 26.5–33)
MCHC RBC AUTO-ENTMCNC: 30.9 G/DL (ref 31.5–36.5)
MCV RBC AUTO: 102 FL (ref 78–100)
MONOCYTES # BLD AUTO: 0.5 10E3/UL (ref 0–1.3)
MONOCYTES NFR BLD AUTO: 10 %
NEUTROPHILS # BLD AUTO: 2.8 10E3/UL (ref 1.6–8.3)
NEUTROPHILS NFR BLD AUTO: 49 %
NRBC # BLD AUTO: 0 10E3/UL
NRBC BLD AUTO-RTO: 0 /100
PLATELET # BLD AUTO: 109 10E3/UL (ref 150–450)
RBC # BLD AUTO: 3.79 10E6/UL (ref 4.4–5.9)
WBC # BLD AUTO: 5.6 10E3/UL (ref 4–11)

## 2023-03-30 PROCEDURE — 85025 COMPLETE CBC W/AUTO DIFF WBC: CPT | Mod: ORL | Performed by: FAMILY MEDICINE

## 2023-03-30 PROCEDURE — P9604 ONE-WAY ALLOW PRORATED TRIP: HCPCS | Mod: ORL | Performed by: FAMILY MEDICINE

## 2023-03-30 PROCEDURE — 36415 COLL VENOUS BLD VENIPUNCTURE: CPT | Mod: ORL | Performed by: FAMILY MEDICINE

## 2023-04-04 ENCOUNTER — LAB REQUISITION (OUTPATIENT)
Dept: LAB | Facility: CLINIC | Age: 83
End: 2023-04-04
Payer: COMMERCIAL

## 2023-04-04 DIAGNOSIS — D64.9 ANEMIA, UNSPECIFIED: ICD-10-CM

## 2023-04-06 LAB
BASOPHILS # BLD AUTO: 0 10E3/UL (ref 0–0.2)
BASOPHILS NFR BLD AUTO: 1 %
EOSINOPHIL # BLD AUTO: 0.5 10E3/UL (ref 0–0.7)
EOSINOPHIL NFR BLD AUTO: 8 %
ERYTHROCYTE [DISTWIDTH] IN BLOOD BY AUTOMATED COUNT: 14.5 % (ref 10–15)
HCT VFR BLD AUTO: 40.7 % (ref 40–53)
HGB BLD-MCNC: 12.6 G/DL (ref 13.3–17.7)
IMM GRANULOCYTES # BLD: 0 10E3/UL
IMM GRANULOCYTES NFR BLD: 0 %
LYMPHOCYTES # BLD AUTO: 2 10E3/UL (ref 0.8–5.3)
LYMPHOCYTES NFR BLD AUTO: 32 %
MCH RBC QN AUTO: 31.6 PG (ref 26.5–33)
MCHC RBC AUTO-ENTMCNC: 31 G/DL (ref 31.5–36.5)
MCV RBC AUTO: 102 FL (ref 78–100)
MONOCYTES # BLD AUTO: 0.5 10E3/UL (ref 0–1.3)
MONOCYTES NFR BLD AUTO: 9 %
NEUTROPHILS # BLD AUTO: 3.1 10E3/UL (ref 1.6–8.3)
NEUTROPHILS NFR BLD AUTO: 50 %
NRBC # BLD AUTO: 0 10E3/UL
NRBC BLD AUTO-RTO: 0 /100
PLATELET # BLD AUTO: 121 10E3/UL (ref 150–450)
RBC # BLD AUTO: 3.99 10E6/UL (ref 4.4–5.9)
WBC # BLD AUTO: 6.2 10E3/UL (ref 4–11)

## 2023-04-06 PROCEDURE — P9603 ONE-WAY ALLOW PRORATED MILES: HCPCS | Mod: ORL | Performed by: FAMILY MEDICINE

## 2023-04-06 PROCEDURE — 85025 COMPLETE CBC W/AUTO DIFF WBC: CPT | Mod: ORL | Performed by: FAMILY MEDICINE

## 2023-04-06 PROCEDURE — 36415 COLL VENOUS BLD VENIPUNCTURE: CPT | Mod: ORL | Performed by: FAMILY MEDICINE

## 2023-04-12 ENCOUNTER — LAB REQUISITION (OUTPATIENT)
Dept: LAB | Facility: CLINIC | Age: 83
End: 2023-04-12
Payer: COMMERCIAL

## 2023-04-12 DIAGNOSIS — D64.9 ANEMIA, UNSPECIFIED: ICD-10-CM

## 2023-04-13 LAB
ANION GAP SERPL CALCULATED.3IONS-SCNC: 15 MMOL/L (ref 7–15)
BASOPHILS # BLD AUTO: 0 10E3/UL (ref 0–0.2)
BASOPHILS NFR BLD AUTO: 1 %
BUN SERPL-MCNC: 43.2 MG/DL (ref 8–23)
CALCIUM SERPL-MCNC: 8.9 MG/DL (ref 8.8–10.2)
CHLORIDE SERPL-SCNC: 107 MMOL/L (ref 98–107)
CREAT SERPL-MCNC: 3.18 MG/DL (ref 0.67–1.17)
DEPRECATED HCO3 PLAS-SCNC: 21 MMOL/L (ref 22–29)
EOSINOPHIL # BLD AUTO: 0.4 10E3/UL (ref 0–0.7)
EOSINOPHIL NFR BLD AUTO: 8 %
ERYTHROCYTE [DISTWIDTH] IN BLOOD BY AUTOMATED COUNT: 14.4 % (ref 10–15)
GFR SERPL CREATININE-BSD FRML MDRD: 19 ML/MIN/1.73M2
GLUCOSE SERPL-MCNC: 109 MG/DL (ref 70–99)
HCT VFR BLD AUTO: 40.2 % (ref 40–53)
HGB BLD-MCNC: 12 G/DL (ref 13.3–17.7)
IMM GRANULOCYTES # BLD: 0 10E3/UL
IMM GRANULOCYTES NFR BLD: 0 %
LYMPHOCYTES # BLD AUTO: 1.7 10E3/UL (ref 0.8–5.3)
LYMPHOCYTES NFR BLD AUTO: 33 %
MCH RBC QN AUTO: 30.9 PG (ref 26.5–33)
MCHC RBC AUTO-ENTMCNC: 29.9 G/DL (ref 31.5–36.5)
MCV RBC AUTO: 104 FL (ref 78–100)
MONOCYTES # BLD AUTO: 0.5 10E3/UL (ref 0–1.3)
MONOCYTES NFR BLD AUTO: 10 %
NEUTROPHILS # BLD AUTO: 2.4 10E3/UL (ref 1.6–8.3)
NEUTROPHILS NFR BLD AUTO: 48 %
NRBC # BLD AUTO: 0 10E3/UL
NRBC BLD AUTO-RTO: 0 /100
PLATELET # BLD AUTO: 92 10E3/UL (ref 150–450)
POTASSIUM SERPL-SCNC: 5.2 MMOL/L (ref 3.4–5.3)
RBC # BLD AUTO: 3.88 10E6/UL (ref 4.4–5.9)
SODIUM SERPL-SCNC: 143 MMOL/L (ref 136–145)
WBC # BLD AUTO: 5.1 10E3/UL (ref 4–11)

## 2023-04-13 PROCEDURE — P9604 ONE-WAY ALLOW PRORATED TRIP: HCPCS | Mod: ORL | Performed by: FAMILY MEDICINE

## 2023-04-13 PROCEDURE — 36415 COLL VENOUS BLD VENIPUNCTURE: CPT | Mod: ORL | Performed by: FAMILY MEDICINE

## 2023-04-13 PROCEDURE — 80048 BASIC METABOLIC PNL TOTAL CA: CPT | Mod: ORL | Performed by: FAMILY MEDICINE

## 2023-04-13 PROCEDURE — 85025 COMPLETE CBC W/AUTO DIFF WBC: CPT | Mod: ORL | Performed by: FAMILY MEDICINE

## 2023-04-19 ENCOUNTER — LAB REQUISITION (OUTPATIENT)
Dept: LAB | Facility: CLINIC | Age: 83
End: 2023-04-19
Payer: COMMERCIAL

## 2023-04-19 DIAGNOSIS — D64.9 ANEMIA, UNSPECIFIED: ICD-10-CM

## 2023-04-20 LAB
BASOPHILS # BLD AUTO: 0 10E3/UL (ref 0–0.2)
BASOPHILS NFR BLD AUTO: 1 %
EOSINOPHIL # BLD AUTO: 0.4 10E3/UL (ref 0–0.7)
EOSINOPHIL NFR BLD AUTO: 7 %
ERYTHROCYTE [DISTWIDTH] IN BLOOD BY AUTOMATED COUNT: 14.5 % (ref 10–15)
HCT VFR BLD AUTO: 36.8 % (ref 40–53)
HGB BLD-MCNC: 11.4 G/DL (ref 13.3–17.7)
IMM GRANULOCYTES # BLD: 0 10E3/UL
IMM GRANULOCYTES NFR BLD: 0 %
LYMPHOCYTES # BLD AUTO: 2.2 10E3/UL (ref 0.8–5.3)
LYMPHOCYTES NFR BLD AUTO: 40 %
MCH RBC QN AUTO: 31.7 PG (ref 26.5–33)
MCHC RBC AUTO-ENTMCNC: 31 G/DL (ref 31.5–36.5)
MCV RBC AUTO: 102 FL (ref 78–100)
MONOCYTES # BLD AUTO: 0.5 10E3/UL (ref 0–1.3)
MONOCYTES NFR BLD AUTO: 9 %
NEUTROPHILS # BLD AUTO: 2.3 10E3/UL (ref 1.6–8.3)
NEUTROPHILS NFR BLD AUTO: 43 %
NRBC # BLD AUTO: 0 10E3/UL
NRBC BLD AUTO-RTO: 0 /100
PLATELET # BLD AUTO: 110 10E3/UL (ref 150–450)
RBC # BLD AUTO: 3.6 10E6/UL (ref 4.4–5.9)
WBC # BLD AUTO: 5.4 10E3/UL (ref 4–11)

## 2023-04-20 PROCEDURE — 36415 COLL VENOUS BLD VENIPUNCTURE: CPT | Mod: ORL | Performed by: PEDIATRICS

## 2023-04-20 PROCEDURE — 85025 COMPLETE CBC W/AUTO DIFF WBC: CPT | Mod: ORL | Performed by: FAMILY MEDICINE

## 2023-04-20 PROCEDURE — P9604 ONE-WAY ALLOW PRORATED TRIP: HCPCS | Mod: ORL | Performed by: PEDIATRICS

## 2023-04-20 PROCEDURE — 36415 COLL VENOUS BLD VENIPUNCTURE: CPT | Mod: ORL | Performed by: FAMILY MEDICINE

## 2023-04-20 PROCEDURE — 85025 COMPLETE CBC W/AUTO DIFF WBC: CPT | Mod: ORL | Performed by: PEDIATRICS

## 2023-04-20 PROCEDURE — P9604 ONE-WAY ALLOW PRORATED TRIP: HCPCS | Mod: ORL | Performed by: FAMILY MEDICINE

## 2023-04-26 ENCOUNTER — LAB REQUISITION (OUTPATIENT)
Dept: LAB | Facility: CLINIC | Age: 83
End: 2023-04-26
Payer: COMMERCIAL

## 2023-04-26 DIAGNOSIS — D64.9 ANEMIA, UNSPECIFIED: ICD-10-CM

## 2023-05-04 LAB
BASOPHILS # BLD AUTO: 0 10E3/UL (ref 0–0.2)
BASOPHILS NFR BLD AUTO: 1 %
EOSINOPHIL # BLD AUTO: 0.5 10E3/UL (ref 0–0.7)
EOSINOPHIL NFR BLD AUTO: 8 %
ERYTHROCYTE [DISTWIDTH] IN BLOOD BY AUTOMATED COUNT: 15 % (ref 10–15)
HCT VFR BLD AUTO: 36.4 % (ref 40–53)
HGB BLD-MCNC: 11.2 G/DL (ref 13.3–17.7)
IMM GRANULOCYTES # BLD: 0 10E3/UL
IMM GRANULOCYTES NFR BLD: 0 %
LYMPHOCYTES # BLD AUTO: 1.9 10E3/UL (ref 0.8–5.3)
LYMPHOCYTES NFR BLD AUTO: 34 %
MCH RBC QN AUTO: 32.2 PG (ref 26.5–33)
MCHC RBC AUTO-ENTMCNC: 30.8 G/DL (ref 31.5–36.5)
MCV RBC AUTO: 105 FL (ref 78–100)
MONOCYTES # BLD AUTO: 0.5 10E3/UL (ref 0–1.3)
MONOCYTES NFR BLD AUTO: 10 %
NEUTROPHILS # BLD AUTO: 2.7 10E3/UL (ref 1.6–8.3)
NEUTROPHILS NFR BLD AUTO: 47 %
NRBC # BLD AUTO: 0 10E3/UL
NRBC BLD AUTO-RTO: 0 /100
PLATELET # BLD AUTO: 107 10E3/UL (ref 150–450)
RBC # BLD AUTO: 3.48 10E6/UL (ref 4.4–5.9)
WBC # BLD AUTO: 5.7 10E3/UL (ref 4–11)

## 2023-05-04 PROCEDURE — 85025 COMPLETE CBC W/AUTO DIFF WBC: CPT | Mod: ORL | Performed by: FAMILY MEDICINE

## 2023-05-04 PROCEDURE — 36415 COLL VENOUS BLD VENIPUNCTURE: CPT | Mod: ORL | Performed by: FAMILY MEDICINE

## 2023-05-04 PROCEDURE — P9604 ONE-WAY ALLOW PRORATED TRIP: HCPCS | Mod: ORL | Performed by: FAMILY MEDICINE

## 2023-05-10 ENCOUNTER — LAB REQUISITION (OUTPATIENT)
Dept: LAB | Facility: CLINIC | Age: 83
End: 2023-05-10
Payer: COMMERCIAL

## 2023-05-10 DIAGNOSIS — D64.9 ANEMIA, UNSPECIFIED: ICD-10-CM

## 2023-05-11 LAB
BASOPHILS # BLD AUTO: 0 10E3/UL (ref 0–0.2)
BASOPHILS NFR BLD AUTO: 1 %
EOSINOPHIL # BLD AUTO: 0.4 10E3/UL (ref 0–0.7)
EOSINOPHIL NFR BLD AUTO: 8 %
ERYTHROCYTE [DISTWIDTH] IN BLOOD BY AUTOMATED COUNT: 14.3 % (ref 10–15)
HCT VFR BLD AUTO: 36.9 % (ref 40–53)
HGB BLD-MCNC: 11.4 G/DL (ref 13.3–17.7)
IMM GRANULOCYTES # BLD: 0 10E3/UL
IMM GRANULOCYTES NFR BLD: 0 %
LYMPHOCYTES # BLD AUTO: 1.7 10E3/UL (ref 0.8–5.3)
LYMPHOCYTES NFR BLD AUTO: 36 %
MCH RBC QN AUTO: 32.2 PG (ref 26.5–33)
MCHC RBC AUTO-ENTMCNC: 30.9 G/DL (ref 31.5–36.5)
MCV RBC AUTO: 104 FL (ref 78–100)
MONOCYTES # BLD AUTO: 0.4 10E3/UL (ref 0–1.3)
MONOCYTES NFR BLD AUTO: 8 %
NEUTROPHILS # BLD AUTO: 2.2 10E3/UL (ref 1.6–8.3)
NEUTROPHILS NFR BLD AUTO: 47 %
NRBC # BLD AUTO: 0 10E3/UL
NRBC BLD AUTO-RTO: 0 /100
PLATELET # BLD AUTO: 114 10E3/UL (ref 150–450)
RBC # BLD AUTO: 3.54 10E6/UL (ref 4.4–5.9)
WBC # BLD AUTO: 4.7 10E3/UL (ref 4–11)

## 2023-05-11 PROCEDURE — P9603 ONE-WAY ALLOW PRORATED MILES: HCPCS | Mod: ORL | Performed by: FAMILY MEDICINE

## 2023-05-11 PROCEDURE — 85025 COMPLETE CBC W/AUTO DIFF WBC: CPT | Mod: ORL | Performed by: FAMILY MEDICINE

## 2023-05-11 PROCEDURE — 36415 COLL VENOUS BLD VENIPUNCTURE: CPT | Mod: ORL | Performed by: FAMILY MEDICINE

## 2023-05-17 ENCOUNTER — LAB REQUISITION (OUTPATIENT)
Dept: LAB | Facility: CLINIC | Age: 83
End: 2023-05-17
Payer: COMMERCIAL

## 2023-05-17 DIAGNOSIS — D64.9 ANEMIA, UNSPECIFIED: ICD-10-CM

## 2023-05-18 LAB
BASOPHILS # BLD AUTO: 0 10E3/UL (ref 0–0.2)
BASOPHILS NFR BLD AUTO: 1 %
EOSINOPHIL # BLD AUTO: 0.4 10E3/UL (ref 0–0.7)
EOSINOPHIL NFR BLD AUTO: 7 %
ERYTHROCYTE [DISTWIDTH] IN BLOOD BY AUTOMATED COUNT: 14.5 % (ref 10–15)
HCT VFR BLD AUTO: 36.7 % (ref 40–53)
HGB BLD-MCNC: 11.4 G/DL (ref 13.3–17.7)
IMM GRANULOCYTES # BLD: 0 10E3/UL
IMM GRANULOCYTES NFR BLD: 0 %
LYMPHOCYTES # BLD AUTO: 1.8 10E3/UL (ref 0.8–5.3)
LYMPHOCYTES NFR BLD AUTO: 33 %
MCH RBC QN AUTO: 32.5 PG (ref 26.5–33)
MCHC RBC AUTO-ENTMCNC: 31.1 G/DL (ref 31.5–36.5)
MCV RBC AUTO: 105 FL (ref 78–100)
MONOCYTES # BLD AUTO: 0.5 10E3/UL (ref 0–1.3)
MONOCYTES NFR BLD AUTO: 9 %
NEUTROPHILS # BLD AUTO: 2.7 10E3/UL (ref 1.6–8.3)
NEUTROPHILS NFR BLD AUTO: 50 %
NRBC # BLD AUTO: 0 10E3/UL
NRBC BLD AUTO-RTO: 0 /100
PLATELET # BLD AUTO: 103 10E3/UL (ref 150–450)
RBC # BLD AUTO: 3.51 10E6/UL (ref 4.4–5.9)
WBC # BLD AUTO: 5.5 10E3/UL (ref 4–11)

## 2023-05-18 PROCEDURE — 85025 COMPLETE CBC W/AUTO DIFF WBC: CPT | Mod: ORL | Performed by: FAMILY MEDICINE

## 2023-05-18 PROCEDURE — 36415 COLL VENOUS BLD VENIPUNCTURE: CPT | Mod: ORL | Performed by: FAMILY MEDICINE

## 2023-05-18 PROCEDURE — P9604 ONE-WAY ALLOW PRORATED TRIP: HCPCS | Mod: ORL | Performed by: FAMILY MEDICINE

## 2023-05-25 ENCOUNTER — TRANSFERRED RECORDS (OUTPATIENT)
Dept: HEALTH INFORMATION MANAGEMENT | Facility: CLINIC | Age: 83
End: 2023-05-25

## 2023-06-07 ENCOUNTER — LAB REQUISITION (OUTPATIENT)
Dept: LAB | Facility: CLINIC | Age: 83
End: 2023-06-07
Payer: COMMERCIAL

## 2023-06-07 DIAGNOSIS — D64.9 ANEMIA, UNSPECIFIED: ICD-10-CM

## 2023-06-08 LAB
BASOPHILS # BLD AUTO: 0 10E3/UL (ref 0–0.2)
BASOPHILS NFR BLD AUTO: 1 %
EOSINOPHIL # BLD AUTO: 0.5 10E3/UL (ref 0–0.7)
EOSINOPHIL NFR BLD AUTO: 10 %
ERYTHROCYTE [DISTWIDTH] IN BLOOD BY AUTOMATED COUNT: 14 % (ref 10–15)
HCT VFR BLD AUTO: 37.7 % (ref 40–53)
HGB BLD-MCNC: 11.7 G/DL (ref 13.3–17.7)
IMM GRANULOCYTES # BLD: 0 10E3/UL
IMM GRANULOCYTES NFR BLD: 0 %
LYMPHOCYTES # BLD AUTO: 1.9 10E3/UL (ref 0.8–5.3)
LYMPHOCYTES NFR BLD AUTO: 38 %
MCH RBC QN AUTO: 32.3 PG (ref 26.5–33)
MCHC RBC AUTO-ENTMCNC: 31 G/DL (ref 31.5–36.5)
MCV RBC AUTO: 104 FL (ref 78–100)
MONOCYTES # BLD AUTO: 0.5 10E3/UL (ref 0–1.3)
MONOCYTES NFR BLD AUTO: 9 %
NEUTROPHILS # BLD AUTO: 2.1 10E3/UL (ref 1.6–8.3)
NEUTROPHILS NFR BLD AUTO: 42 %
NRBC # BLD AUTO: 0 10E3/UL
NRBC BLD AUTO-RTO: 0 /100
PLATELET # BLD AUTO: 104 10E3/UL (ref 150–450)
RBC # BLD AUTO: 3.62 10E6/UL (ref 4.4–5.9)
WBC # BLD AUTO: 5.1 10E3/UL (ref 4–11)

## 2023-06-08 PROCEDURE — 36415 COLL VENOUS BLD VENIPUNCTURE: CPT | Mod: ORL | Performed by: FAMILY MEDICINE

## 2023-06-08 PROCEDURE — 85025 COMPLETE CBC W/AUTO DIFF WBC: CPT | Mod: ORL | Performed by: FAMILY MEDICINE

## 2023-06-08 PROCEDURE — P9603 ONE-WAY ALLOW PRORATED MILES: HCPCS | Mod: ORL | Performed by: FAMILY MEDICINE

## 2023-06-13 ENCOUNTER — LAB REQUISITION (OUTPATIENT)
Dept: LAB | Facility: CLINIC | Age: 83
End: 2023-06-13
Payer: COMMERCIAL

## 2023-06-13 DIAGNOSIS — D64.9 ANEMIA, UNSPECIFIED: ICD-10-CM

## 2023-06-14 ENCOUNTER — LAB REQUISITION (OUTPATIENT)
Dept: LAB | Facility: CLINIC | Age: 83
End: 2023-06-14
Payer: COMMERCIAL

## 2023-06-14 DIAGNOSIS — D64.9 ANEMIA, UNSPECIFIED: ICD-10-CM

## 2023-06-15 LAB
BASOPHILS # BLD AUTO: 0 10E3/UL (ref 0–0.2)
BASOPHILS NFR BLD AUTO: 0 %
EOSINOPHIL # BLD AUTO: 0.5 10E3/UL (ref 0–0.7)
EOSINOPHIL NFR BLD AUTO: 9 %
ERYTHROCYTE [DISTWIDTH] IN BLOOD BY AUTOMATED COUNT: 13.9 % (ref 10–15)
HCT VFR BLD AUTO: 42.2 % (ref 40–53)
HGB BLD-MCNC: 13.3 G/DL (ref 13.3–17.7)
IMM GRANULOCYTES # BLD: 0 10E3/UL
IMM GRANULOCYTES NFR BLD: 0 %
LYMPHOCYTES # BLD AUTO: 1.7 10E3/UL (ref 0.8–5.3)
LYMPHOCYTES NFR BLD AUTO: 30 %
MCH RBC QN AUTO: 32.1 PG (ref 26.5–33)
MCHC RBC AUTO-ENTMCNC: 31.5 G/DL (ref 31.5–36.5)
MCV RBC AUTO: 102 FL (ref 78–100)
MONOCYTES # BLD AUTO: 0.5 10E3/UL (ref 0–1.3)
MONOCYTES NFR BLD AUTO: 8 %
NEUTROPHILS # BLD AUTO: 3 10E3/UL (ref 1.6–8.3)
NEUTROPHILS NFR BLD AUTO: 53 %
NRBC # BLD AUTO: 0 10E3/UL
NRBC BLD AUTO-RTO: 0 /100
PLATELET # BLD AUTO: 109 10E3/UL (ref 150–450)
RBC # BLD AUTO: 4.14 10E6/UL (ref 4.4–5.9)
WBC # BLD AUTO: 5.6 10E3/UL (ref 4–11)

## 2023-06-15 PROCEDURE — 36415 COLL VENOUS BLD VENIPUNCTURE: CPT | Mod: ORL | Performed by: FAMILY MEDICINE

## 2023-06-15 PROCEDURE — P9604 ONE-WAY ALLOW PRORATED TRIP: HCPCS | Mod: ORL | Performed by: FAMILY MEDICINE

## 2023-06-15 PROCEDURE — 85025 COMPLETE CBC W/AUTO DIFF WBC: CPT | Mod: ORL | Performed by: FAMILY MEDICINE

## 2023-06-19 ENCOUNTER — LAB REQUISITION (OUTPATIENT)
Dept: LAB | Facility: CLINIC | Age: 83
End: 2023-06-19
Payer: COMMERCIAL

## 2023-06-19 DIAGNOSIS — D64.9 ANEMIA, UNSPECIFIED: ICD-10-CM

## 2023-06-22 LAB
BASOPHILS # BLD AUTO: 0 10E3/UL (ref 0–0.2)
BASOPHILS NFR BLD AUTO: 1 %
EOSINOPHIL # BLD AUTO: 0.4 10E3/UL (ref 0–0.7)
EOSINOPHIL NFR BLD AUTO: 8 %
ERYTHROCYTE [DISTWIDTH] IN BLOOD BY AUTOMATED COUNT: 14 % (ref 10–15)
HCT VFR BLD AUTO: 40.4 % (ref 40–53)
HGB BLD-MCNC: 12.3 G/DL (ref 13.3–17.7)
IMM GRANULOCYTES # BLD: 0 10E3/UL
IMM GRANULOCYTES NFR BLD: 0 %
LYMPHOCYTES # BLD AUTO: 2 10E3/UL (ref 0.8–5.3)
LYMPHOCYTES NFR BLD AUTO: 37 %
MCH RBC QN AUTO: 31.7 PG (ref 26.5–33)
MCHC RBC AUTO-ENTMCNC: 30.4 G/DL (ref 31.5–36.5)
MCV RBC AUTO: 104 FL (ref 78–100)
MONOCYTES # BLD AUTO: 0.5 10E3/UL (ref 0–1.3)
MONOCYTES NFR BLD AUTO: 9 %
NEUTROPHILS # BLD AUTO: 2.5 10E3/UL (ref 1.6–8.3)
NEUTROPHILS NFR BLD AUTO: 45 %
NRBC # BLD AUTO: 0 10E3/UL
NRBC BLD AUTO-RTO: 0 /100
PLATELET # BLD AUTO: 125 10E3/UL (ref 150–450)
RBC # BLD AUTO: 3.88 10E6/UL (ref 4.4–5.9)
WBC # BLD AUTO: 5.4 10E3/UL (ref 4–11)

## 2023-06-22 PROCEDURE — P9603 ONE-WAY ALLOW PRORATED MILES: HCPCS | Mod: ORL | Performed by: FAMILY MEDICINE

## 2023-06-22 PROCEDURE — 85025 COMPLETE CBC W/AUTO DIFF WBC: CPT | Mod: ORL | Performed by: FAMILY MEDICINE

## 2023-06-22 PROCEDURE — 36415 COLL VENOUS BLD VENIPUNCTURE: CPT | Mod: ORL | Performed by: FAMILY MEDICINE

## 2023-06-27 ENCOUNTER — LAB REQUISITION (OUTPATIENT)
Dept: LAB | Facility: CLINIC | Age: 83
End: 2023-06-27
Payer: COMMERCIAL

## 2023-06-27 DIAGNOSIS — D64.9 ANEMIA, UNSPECIFIED: ICD-10-CM

## 2023-06-29 LAB
BASOPHILS # BLD AUTO: 0 10E3/UL (ref 0–0.2)
BASOPHILS NFR BLD AUTO: 1 %
EOSINOPHIL # BLD AUTO: 0.4 10E3/UL (ref 0–0.7)
EOSINOPHIL NFR BLD AUTO: 9 %
ERYTHROCYTE [DISTWIDTH] IN BLOOD BY AUTOMATED COUNT: 14 % (ref 10–15)
HCT VFR BLD AUTO: 37 % (ref 40–53)
HGB BLD-MCNC: 11.7 G/DL (ref 13.3–17.7)
IMM GRANULOCYTES # BLD: 0 10E3/UL
IMM GRANULOCYTES NFR BLD: 0 %
LYMPHOCYTES # BLD AUTO: 1.6 10E3/UL (ref 0.8–5.3)
LYMPHOCYTES NFR BLD AUTO: 35 %
MCH RBC QN AUTO: 32.4 PG (ref 26.5–33)
MCHC RBC AUTO-ENTMCNC: 31.6 G/DL (ref 31.5–36.5)
MCV RBC AUTO: 103 FL (ref 78–100)
MONOCYTES # BLD AUTO: 0.4 10E3/UL (ref 0–1.3)
MONOCYTES NFR BLD AUTO: 9 %
NEUTROPHILS # BLD AUTO: 2.1 10E3/UL (ref 1.6–8.3)
NEUTROPHILS NFR BLD AUTO: 46 %
NRBC # BLD AUTO: 0 10E3/UL
NRBC BLD AUTO-RTO: 0 /100
PLATELET # BLD AUTO: 108 10E3/UL (ref 150–450)
RBC # BLD AUTO: 3.61 10E6/UL (ref 4.4–5.9)
WBC # BLD AUTO: 4.7 10E3/UL (ref 4–11)

## 2023-06-29 PROCEDURE — 85025 COMPLETE CBC W/AUTO DIFF WBC: CPT | Mod: ORL | Performed by: FAMILY MEDICINE

## 2023-06-29 PROCEDURE — P9604 ONE-WAY ALLOW PRORATED TRIP: HCPCS | Mod: ORL | Performed by: FAMILY MEDICINE

## 2023-06-29 PROCEDURE — 36415 COLL VENOUS BLD VENIPUNCTURE: CPT | Mod: ORL | Performed by: FAMILY MEDICINE

## 2023-07-05 ENCOUNTER — LAB REQUISITION (OUTPATIENT)
Dept: LAB | Facility: CLINIC | Age: 83
End: 2023-07-05
Payer: COMMERCIAL

## 2023-07-05 DIAGNOSIS — D64.9 ANEMIA, UNSPECIFIED: ICD-10-CM

## 2023-07-06 LAB
BASOPHILS # BLD AUTO: 0 10E3/UL (ref 0–0.2)
BASOPHILS NFR BLD AUTO: 1 %
EOSINOPHIL # BLD AUTO: 0.5 10E3/UL (ref 0–0.7)
EOSINOPHIL NFR BLD AUTO: 10 %
ERYTHROCYTE [DISTWIDTH] IN BLOOD BY AUTOMATED COUNT: 14 % (ref 10–15)
HCT VFR BLD AUTO: 37.6 % (ref 40–53)
HGB BLD-MCNC: 11.9 G/DL (ref 13.3–17.7)
IMM GRANULOCYTES # BLD: 0 10E3/UL
IMM GRANULOCYTES NFR BLD: 0 %
LYMPHOCYTES # BLD AUTO: 1.7 10E3/UL (ref 0.8–5.3)
LYMPHOCYTES NFR BLD AUTO: 35 %
MCH RBC QN AUTO: 32.4 PG (ref 26.5–33)
MCHC RBC AUTO-ENTMCNC: 31.6 G/DL (ref 31.5–36.5)
MCV RBC AUTO: 103 FL (ref 78–100)
MONOCYTES # BLD AUTO: 0.5 10E3/UL (ref 0–1.3)
MONOCYTES NFR BLD AUTO: 10 %
NEUTROPHILS # BLD AUTO: 2.1 10E3/UL (ref 1.6–8.3)
NEUTROPHILS NFR BLD AUTO: 44 %
NRBC # BLD AUTO: 0 10E3/UL
NRBC BLD AUTO-RTO: 0 /100
PLATELET # BLD AUTO: 103 10E3/UL (ref 150–450)
RBC # BLD AUTO: 3.67 10E6/UL (ref 4.4–5.9)
WBC # BLD AUTO: 4.8 10E3/UL (ref 4–11)

## 2023-07-06 PROCEDURE — P9603 ONE-WAY ALLOW PRORATED MILES: HCPCS | Mod: ORL | Performed by: FAMILY MEDICINE

## 2023-07-06 PROCEDURE — 36415 COLL VENOUS BLD VENIPUNCTURE: CPT | Mod: ORL | Performed by: FAMILY MEDICINE

## 2023-07-06 PROCEDURE — 85025 COMPLETE CBC W/AUTO DIFF WBC: CPT | Mod: ORL | Performed by: FAMILY MEDICINE

## 2023-07-12 ENCOUNTER — LAB REQUISITION (OUTPATIENT)
Dept: LAB | Facility: CLINIC | Age: 83
End: 2023-07-12
Payer: COMMERCIAL

## 2023-07-12 DIAGNOSIS — D64.9 ANEMIA, UNSPECIFIED: ICD-10-CM

## 2023-07-13 LAB
BASOPHILS # BLD AUTO: 0 10E3/UL (ref 0–0.2)
BASOPHILS NFR BLD AUTO: 1 %
EOSINOPHIL # BLD AUTO: 0.4 10E3/UL (ref 0–0.7)
EOSINOPHIL NFR BLD AUTO: 8 %
ERYTHROCYTE [DISTWIDTH] IN BLOOD BY AUTOMATED COUNT: 14.2 % (ref 10–15)
HCT VFR BLD AUTO: 35.7 % (ref 40–53)
HGB BLD-MCNC: 11.2 G/DL (ref 13.3–17.7)
IMM GRANULOCYTES # BLD: 0 10E3/UL
IMM GRANULOCYTES NFR BLD: 0 %
LYMPHOCYTES # BLD AUTO: 1.8 10E3/UL (ref 0.8–5.3)
LYMPHOCYTES NFR BLD AUTO: 35 %
MCH RBC QN AUTO: 32.1 PG (ref 26.5–33)
MCHC RBC AUTO-ENTMCNC: 31.4 G/DL (ref 31.5–36.5)
MCV RBC AUTO: 102 FL (ref 78–100)
MONOCYTES # BLD AUTO: 0.5 10E3/UL (ref 0–1.3)
MONOCYTES NFR BLD AUTO: 9 %
NEUTROPHILS # BLD AUTO: 2.5 10E3/UL (ref 1.6–8.3)
NEUTROPHILS NFR BLD AUTO: 47 %
NRBC # BLD AUTO: 0 10E3/UL
NRBC BLD AUTO-RTO: 0 /100
PLATELET # BLD AUTO: 102 10E3/UL (ref 150–450)
RBC # BLD AUTO: 3.49 10E6/UL (ref 4.4–5.9)
WBC # BLD AUTO: 5.2 10E3/UL (ref 4–11)

## 2023-07-13 PROCEDURE — 36415 COLL VENOUS BLD VENIPUNCTURE: CPT | Mod: ORL | Performed by: FAMILY MEDICINE

## 2023-07-13 PROCEDURE — 85025 COMPLETE CBC W/AUTO DIFF WBC: CPT | Mod: ORL | Performed by: FAMILY MEDICINE

## 2023-07-13 PROCEDURE — P9603 ONE-WAY ALLOW PRORATED MILES: HCPCS | Mod: ORL | Performed by: FAMILY MEDICINE

## 2023-07-18 ENCOUNTER — LAB REQUISITION (OUTPATIENT)
Dept: LAB | Facility: CLINIC | Age: 83
End: 2023-07-18
Payer: COMMERCIAL

## 2023-07-18 DIAGNOSIS — R11.0 NAUSEA: ICD-10-CM

## 2023-07-18 DIAGNOSIS — D64.9 ANEMIA, UNSPECIFIED: ICD-10-CM

## 2023-07-20 LAB
ANION GAP SERPL CALCULATED.3IONS-SCNC: 13 MMOL/L (ref 7–15)
BASOPHILS # BLD AUTO: 0 10E3/UL (ref 0–0.2)
BASOPHILS NFR BLD AUTO: 0 %
BUN SERPL-MCNC: 50.5 MG/DL (ref 8–23)
CALCIUM SERPL-MCNC: 8.9 MG/DL (ref 8.8–10.2)
CHLORIDE SERPL-SCNC: 107 MMOL/L (ref 98–107)
CREAT SERPL-MCNC: 2.95 MG/DL (ref 0.67–1.17)
DEPRECATED HCO3 PLAS-SCNC: 23 MMOL/L (ref 22–29)
EOSINOPHIL # BLD AUTO: 0.4 10E3/UL (ref 0–0.7)
EOSINOPHIL NFR BLD AUTO: 8 %
ERYTHROCYTE [DISTWIDTH] IN BLOOD BY AUTOMATED COUNT: 14 % (ref 10–15)
GFR SERPL CREATININE-BSD FRML MDRD: 20 ML/MIN/1.73M2
GLUCOSE SERPL-MCNC: 93 MG/DL (ref 70–99)
HCT VFR BLD AUTO: 37.4 % (ref 40–53)
HGB BLD-MCNC: 12 G/DL (ref 13.3–17.7)
IMM GRANULOCYTES # BLD: 0 10E3/UL
IMM GRANULOCYTES NFR BLD: 0 %
LYMPHOCYTES # BLD AUTO: 2.1 10E3/UL (ref 0.8–5.3)
LYMPHOCYTES NFR BLD AUTO: 38 %
MCH RBC QN AUTO: 32.5 PG (ref 26.5–33)
MCHC RBC AUTO-ENTMCNC: 32.1 G/DL (ref 31.5–36.5)
MCV RBC AUTO: 101 FL (ref 78–100)
MONOCYTES # BLD AUTO: 0.6 10E3/UL (ref 0–1.3)
MONOCYTES NFR BLD AUTO: 10 %
NEUTROPHILS # BLD AUTO: 2.5 10E3/UL (ref 1.6–8.3)
NEUTROPHILS NFR BLD AUTO: 44 %
NRBC # BLD AUTO: 0 10E3/UL
NRBC BLD AUTO-RTO: 0 /100
PLATELET # BLD AUTO: 116 10E3/UL (ref 150–450)
POTASSIUM SERPL-SCNC: 5.2 MMOL/L (ref 3.4–5.3)
RBC # BLD AUTO: 3.69 10E6/UL (ref 4.4–5.9)
SODIUM SERPL-SCNC: 143 MMOL/L (ref 136–145)
WBC # BLD AUTO: 5.7 10E3/UL (ref 4–11)

## 2023-07-20 PROCEDURE — 36415 COLL VENOUS BLD VENIPUNCTURE: CPT | Mod: ORL | Performed by: FAMILY MEDICINE

## 2023-07-20 PROCEDURE — P9604 ONE-WAY ALLOW PRORATED TRIP: HCPCS | Mod: ORL | Performed by: FAMILY MEDICINE

## 2023-07-20 PROCEDURE — 80048 BASIC METABOLIC PNL TOTAL CA: CPT | Mod: ORL | Performed by: FAMILY MEDICINE

## 2023-07-20 PROCEDURE — 85025 COMPLETE CBC W/AUTO DIFF WBC: CPT | Mod: ORL | Performed by: FAMILY MEDICINE

## 2023-07-26 ENCOUNTER — LAB REQUISITION (OUTPATIENT)
Dept: LAB | Facility: CLINIC | Age: 83
End: 2023-07-26
Payer: COMMERCIAL

## 2023-07-26 DIAGNOSIS — D64.9 ANEMIA, UNSPECIFIED: ICD-10-CM

## 2023-07-27 LAB
BASOPHILS # BLD AUTO: 0 10E3/UL (ref 0–0.2)
BASOPHILS NFR BLD AUTO: 1 %
EOSINOPHIL # BLD AUTO: 0.4 10E3/UL (ref 0–0.7)
EOSINOPHIL NFR BLD AUTO: 7 %
ERYTHROCYTE [DISTWIDTH] IN BLOOD BY AUTOMATED COUNT: 14.1 % (ref 10–15)
HCT VFR BLD AUTO: 38.5 % (ref 40–53)
HGB BLD-MCNC: 12 G/DL (ref 13.3–17.7)
IMM GRANULOCYTES # BLD: 0 10E3/UL
IMM GRANULOCYTES NFR BLD: 0 %
LYMPHOCYTES # BLD AUTO: 2 10E3/UL (ref 0.8–5.3)
LYMPHOCYTES NFR BLD AUTO: 34 %
MCH RBC QN AUTO: 32 PG (ref 26.5–33)
MCHC RBC AUTO-ENTMCNC: 31.2 G/DL (ref 31.5–36.5)
MCV RBC AUTO: 103 FL (ref 78–100)
MONOCYTES # BLD AUTO: 0.6 10E3/UL (ref 0–1.3)
MONOCYTES NFR BLD AUTO: 10 %
NEUTROPHILS # BLD AUTO: 2.9 10E3/UL (ref 1.6–8.3)
NEUTROPHILS NFR BLD AUTO: 48 %
NRBC # BLD AUTO: 0 10E3/UL
NRBC BLD AUTO-RTO: 0 /100
PLATELET # BLD AUTO: 119 10E3/UL (ref 150–450)
RBC # BLD AUTO: 3.75 10E6/UL (ref 4.4–5.9)
WBC # BLD AUTO: 5.9 10E3/UL (ref 4–11)

## 2023-07-27 PROCEDURE — 36415 COLL VENOUS BLD VENIPUNCTURE: CPT | Mod: ORL | Performed by: FAMILY MEDICINE

## 2023-07-27 PROCEDURE — 85025 COMPLETE CBC W/AUTO DIFF WBC: CPT | Mod: ORL | Performed by: FAMILY MEDICINE

## 2023-07-27 PROCEDURE — P9603 ONE-WAY ALLOW PRORATED MILES: HCPCS | Mod: ORL | Performed by: FAMILY MEDICINE

## 2023-08-02 ENCOUNTER — LAB REQUISITION (OUTPATIENT)
Dept: LAB | Facility: CLINIC | Age: 83
End: 2023-08-02
Payer: COMMERCIAL

## 2023-08-02 DIAGNOSIS — D64.9 ANEMIA, UNSPECIFIED: ICD-10-CM

## 2023-08-03 LAB
BASOPHILS # BLD AUTO: 0 10E3/UL (ref 0–0.2)
BASOPHILS NFR BLD AUTO: 1 %
EOSINOPHIL # BLD AUTO: 0.5 10E3/UL (ref 0–0.7)
EOSINOPHIL NFR BLD AUTO: 8 %
ERYTHROCYTE [DISTWIDTH] IN BLOOD BY AUTOMATED COUNT: 13.8 % (ref 10–15)
HCT VFR BLD AUTO: 39.4 % (ref 40–53)
HGB BLD-MCNC: 12.3 G/DL (ref 13.3–17.7)
IMM GRANULOCYTES # BLD: 0 10E3/UL
IMM GRANULOCYTES NFR BLD: 0 %
LYMPHOCYTES # BLD AUTO: 2.3 10E3/UL (ref 0.8–5.3)
LYMPHOCYTES NFR BLD AUTO: 37 %
MCH RBC QN AUTO: 32 PG (ref 26.5–33)
MCHC RBC AUTO-ENTMCNC: 31.2 G/DL (ref 31.5–36.5)
MCV RBC AUTO: 103 FL (ref 78–100)
MONOCYTES # BLD AUTO: 0.5 10E3/UL (ref 0–1.3)
MONOCYTES NFR BLD AUTO: 9 %
NEUTROPHILS # BLD AUTO: 2.9 10E3/UL (ref 1.6–8.3)
NEUTROPHILS NFR BLD AUTO: 45 %
NRBC # BLD AUTO: 0 10E3/UL
NRBC BLD AUTO-RTO: 0 /100
PLATELET # BLD AUTO: 109 10E3/UL (ref 150–450)
RBC # BLD AUTO: 3.84 10E6/UL (ref 4.4–5.9)
WBC # BLD AUTO: 6.3 10E3/UL (ref 4–11)

## 2023-08-03 PROCEDURE — 36415 COLL VENOUS BLD VENIPUNCTURE: CPT | Mod: ORL | Performed by: FAMILY MEDICINE

## 2023-08-03 PROCEDURE — P9604 ONE-WAY ALLOW PRORATED TRIP: HCPCS | Mod: ORL | Performed by: FAMILY MEDICINE

## 2023-08-03 PROCEDURE — 85025 COMPLETE CBC W/AUTO DIFF WBC: CPT | Mod: ORL | Performed by: FAMILY MEDICINE

## 2023-08-09 ENCOUNTER — LAB REQUISITION (OUTPATIENT)
Dept: LAB | Facility: CLINIC | Age: 83
End: 2023-08-09
Payer: COMMERCIAL

## 2023-08-09 DIAGNOSIS — D64.9 ANEMIA, UNSPECIFIED: ICD-10-CM

## 2023-08-10 LAB
BASOPHILS # BLD AUTO: 0 10E3/UL (ref 0–0.2)
BASOPHILS NFR BLD AUTO: 1 %
EOSINOPHIL # BLD AUTO: 0.5 10E3/UL (ref 0–0.7)
EOSINOPHIL NFR BLD AUTO: 8 %
ERYTHROCYTE [DISTWIDTH] IN BLOOD BY AUTOMATED COUNT: 13.9 % (ref 10–15)
HCT VFR BLD AUTO: 36.7 % (ref 40–53)
HGB BLD-MCNC: 11.8 G/DL (ref 13.3–17.7)
IMM GRANULOCYTES # BLD: 0 10E3/UL
IMM GRANULOCYTES NFR BLD: 0 %
LYMPHOCYTES # BLD AUTO: 2.2 10E3/UL (ref 0.8–5.3)
LYMPHOCYTES NFR BLD AUTO: 35 %
MCH RBC QN AUTO: 32.7 PG (ref 26.5–33)
MCHC RBC AUTO-ENTMCNC: 32.2 G/DL (ref 31.5–36.5)
MCV RBC AUTO: 102 FL (ref 78–100)
MONOCYTES # BLD AUTO: 0.5 10E3/UL (ref 0–1.3)
MONOCYTES NFR BLD AUTO: 9 %
NEUTROPHILS # BLD AUTO: 3 10E3/UL (ref 1.6–8.3)
NEUTROPHILS NFR BLD AUTO: 47 %
NRBC # BLD AUTO: 0 10E3/UL
NRBC BLD AUTO-RTO: 0 /100
PLATELET # BLD AUTO: 108 10E3/UL (ref 150–450)
RBC # BLD AUTO: 3.61 10E6/UL (ref 4.4–5.9)
WBC # BLD AUTO: 6.3 10E3/UL (ref 4–11)

## 2023-08-10 PROCEDURE — 85025 COMPLETE CBC W/AUTO DIFF WBC: CPT | Mod: ORL | Performed by: FAMILY MEDICINE

## 2023-08-10 PROCEDURE — 36415 COLL VENOUS BLD VENIPUNCTURE: CPT | Mod: ORL | Performed by: FAMILY MEDICINE

## 2023-08-16 ENCOUNTER — LAB REQUISITION (OUTPATIENT)
Dept: LAB | Facility: CLINIC | Age: 83
End: 2023-08-16
Payer: COMMERCIAL

## 2023-08-16 DIAGNOSIS — D64.9 ANEMIA, UNSPECIFIED: ICD-10-CM

## 2023-08-17 LAB
BASOPHILS # BLD AUTO: 0.1 10E3/UL (ref 0–0.2)
BASOPHILS NFR BLD AUTO: 1 %
EOSINOPHIL # BLD AUTO: 0.6 10E3/UL (ref 0–0.7)
EOSINOPHIL NFR BLD AUTO: 9 %
ERYTHROCYTE [DISTWIDTH] IN BLOOD BY AUTOMATED COUNT: 13.9 % (ref 10–15)
HCT VFR BLD AUTO: 40 % (ref 40–53)
HGB BLD-MCNC: 12.3 G/DL (ref 13.3–17.7)
IMM GRANULOCYTES # BLD: 0 10E3/UL
IMM GRANULOCYTES NFR BLD: 0 %
LYMPHOCYTES # BLD AUTO: 2 10E3/UL (ref 0.8–5.3)
LYMPHOCYTES NFR BLD AUTO: 33 %
MCH RBC QN AUTO: 31.9 PG (ref 26.5–33)
MCHC RBC AUTO-ENTMCNC: 30.8 G/DL (ref 31.5–36.5)
MCV RBC AUTO: 104 FL (ref 78–100)
MONOCYTES # BLD AUTO: 0.6 10E3/UL (ref 0–1.3)
MONOCYTES NFR BLD AUTO: 9 %
NEUTROPHILS # BLD AUTO: 2.9 10E3/UL (ref 1.6–8.3)
NEUTROPHILS NFR BLD AUTO: 48 %
NRBC # BLD AUTO: 0 10E3/UL
NRBC BLD AUTO-RTO: 0 /100
PLATELET # BLD AUTO: 105 10E3/UL (ref 150–450)
RBC # BLD AUTO: 3.85 10E6/UL (ref 4.4–5.9)
WBC # BLD AUTO: 6 10E3/UL (ref 4–11)

## 2023-08-17 PROCEDURE — 36415 COLL VENOUS BLD VENIPUNCTURE: CPT | Mod: ORL | Performed by: FAMILY MEDICINE

## 2023-08-17 PROCEDURE — 85025 COMPLETE CBC W/AUTO DIFF WBC: CPT | Mod: ORL | Performed by: FAMILY MEDICINE

## 2023-08-17 PROCEDURE — P9604 ONE-WAY ALLOW PRORATED TRIP: HCPCS | Mod: ORL | Performed by: FAMILY MEDICINE

## 2023-08-21 ENCOUNTER — MEDICAL CORRESPONDENCE (OUTPATIENT)
Dept: HEALTH INFORMATION MANAGEMENT | Facility: CLINIC | Age: 83
End: 2023-08-21
Payer: COMMERCIAL

## 2023-08-23 ENCOUNTER — LAB REQUISITION (OUTPATIENT)
Dept: LAB | Facility: CLINIC | Age: 83
End: 2023-08-23
Payer: COMMERCIAL

## 2023-08-23 DIAGNOSIS — D63.1 ANEMIA IN CHRONIC KIDNEY DISEASE (CODE): ICD-10-CM

## 2023-08-23 DIAGNOSIS — D64.9 ANEMIA, UNSPECIFIED: ICD-10-CM

## 2023-08-29 ENCOUNTER — LAB REQUISITION (OUTPATIENT)
Dept: LAB | Facility: CLINIC | Age: 83
End: 2023-08-29
Payer: COMMERCIAL

## 2023-08-29 DIAGNOSIS — D64.9 ANEMIA, UNSPECIFIED: ICD-10-CM

## 2023-08-29 DIAGNOSIS — D63.1 ANEMIA IN CHRONIC KIDNEY DISEASE (CODE): ICD-10-CM

## 2023-08-31 LAB
BASOPHILS # BLD AUTO: 0 10E3/UL (ref 0–0.2)
BASOPHILS NFR BLD AUTO: 1 %
EOSINOPHIL # BLD AUTO: 0.4 10E3/UL (ref 0–0.7)
EOSINOPHIL NFR BLD AUTO: 7 %
ERYTHROCYTE [DISTWIDTH] IN BLOOD BY AUTOMATED COUNT: 14.1 % (ref 10–15)
FERRITIN SERPL-MCNC: 80 NG/ML (ref 31–409)
HCT VFR BLD AUTO: 38.4 % (ref 40–53)
HGB BLD-MCNC: 12.2 G/DL (ref 13.3–17.7)
IMM GRANULOCYTES # BLD: 0 10E3/UL
IMM GRANULOCYTES NFR BLD: 0 %
IRON BINDING CAPACITY (ROCHE): 216 UG/DL (ref 240–430)
IRON SATN MFR SERPL: 56 % (ref 15–46)
IRON SERPL-MCNC: 122 UG/DL (ref 61–157)
LYMPHOCYTES # BLD AUTO: 1.7 10E3/UL (ref 0.8–5.3)
LYMPHOCYTES NFR BLD AUTO: 29 %
MCH RBC QN AUTO: 32.7 PG (ref 26.5–33)
MCHC RBC AUTO-ENTMCNC: 31.8 G/DL (ref 31.5–36.5)
MCV RBC AUTO: 103 FL (ref 78–100)
MONOCYTES # BLD AUTO: 0.5 10E3/UL (ref 0–1.3)
MONOCYTES NFR BLD AUTO: 8 %
NEUTROPHILS # BLD AUTO: 3.1 10E3/UL (ref 1.6–8.3)
NEUTROPHILS NFR BLD AUTO: 55 %
NRBC # BLD AUTO: 0 10E3/UL
NRBC BLD AUTO-RTO: 0 /100
PLATELET # BLD AUTO: 110 10E3/UL (ref 150–450)
RBC # BLD AUTO: 3.73 10E6/UL (ref 4.4–5.9)
TOTAL PROTEIN SERUM FOR ELP: 6.2 G/DL (ref 6.4–8.3)
WBC # BLD AUTO: 5.7 10E3/UL (ref 4–11)

## 2023-08-31 PROCEDURE — 82728 ASSAY OF FERRITIN: CPT | Mod: ORL | Performed by: FAMILY MEDICINE

## 2023-08-31 PROCEDURE — 85025 COMPLETE CBC W/AUTO DIFF WBC: CPT | Mod: ORL | Performed by: FAMILY MEDICINE

## 2023-08-31 PROCEDURE — 36415 COLL VENOUS BLD VENIPUNCTURE: CPT | Mod: ORL | Performed by: FAMILY MEDICINE

## 2023-08-31 PROCEDURE — 83550 IRON BINDING TEST: CPT | Mod: ORL | Performed by: FAMILY MEDICINE

## 2023-08-31 PROCEDURE — P9604 ONE-WAY ALLOW PRORATED TRIP: HCPCS | Mod: ORL | Performed by: FAMILY MEDICINE

## 2023-08-31 PROCEDURE — 84155 ASSAY OF PROTEIN SERUM: CPT | Mod: ORL | Performed by: FAMILY MEDICINE

## 2023-08-31 PROCEDURE — 84165 PROTEIN E-PHORESIS SERUM: CPT | Mod: TC,ORL | Performed by: PATHOLOGY

## 2023-09-01 LAB
ALBUMIN SERPL ELPH-MCNC: 3.6 G/DL (ref 3.7–5.1)
ALPHA1 GLOB SERPL ELPH-MCNC: 0.3 G/DL (ref 0.2–0.4)
ALPHA2 GLOB SERPL ELPH-MCNC: 0.7 G/DL (ref 0.5–0.9)
B-GLOBULIN SERPL ELPH-MCNC: 0.6 G/DL (ref 0.6–1)
GAMMA GLOB SERPL ELPH-MCNC: 1 G/DL (ref 0.7–1.6)
M PROTEIN SERPL ELPH-MCNC: 0.5 G/DL
PROT PATTERN SERPL ELPH-IMP: ABNORMAL

## 2023-09-01 PROCEDURE — 84165 PROTEIN E-PHORESIS SERUM: CPT | Mod: 26

## 2023-09-06 ENCOUNTER — LAB REQUISITION (OUTPATIENT)
Dept: LAB | Facility: CLINIC | Age: 83
End: 2023-09-06
Payer: COMMERCIAL

## 2023-09-06 DIAGNOSIS — I49.9 CARDIAC ARRHYTHMIA, UNSPECIFIED: ICD-10-CM

## 2023-09-06 DIAGNOSIS — D64.9 ANEMIA, UNSPECIFIED: ICD-10-CM

## 2023-09-06 DIAGNOSIS — E11.9 TYPE 2 DIABETES MELLITUS WITHOUT COMPLICATIONS (H): ICD-10-CM

## 2023-09-07 ENCOUNTER — ANESTHESIA EVENT (OUTPATIENT)
Dept: GASTROENTEROLOGY | Facility: CLINIC | Age: 83
End: 2023-09-07
Payer: COMMERCIAL

## 2023-09-07 LAB
ANION GAP SERPL CALCULATED.3IONS-SCNC: 16 MMOL/L (ref 7–15)
BASOPHILS # BLD AUTO: 0 10E3/UL (ref 0–0.2)
BASOPHILS NFR BLD AUTO: 1 %
BUN SERPL-MCNC: 50.5 MG/DL (ref 8–23)
CALCIUM SERPL-MCNC: 8.9 MG/DL (ref 8.8–10.2)
CHLORIDE SERPL-SCNC: 107 MMOL/L (ref 98–107)
CREAT SERPL-MCNC: 2.88 MG/DL (ref 0.67–1.17)
DEPRECATED HCO3 PLAS-SCNC: 20 MMOL/L (ref 22–29)
EGFRCR SERPLBLD CKD-EPI 2021: 21 ML/MIN/1.73M2
EOSINOPHIL # BLD AUTO: 0.4 10E3/UL (ref 0–0.7)
EOSINOPHIL NFR BLD AUTO: 7 %
ERYTHROCYTE [DISTWIDTH] IN BLOOD BY AUTOMATED COUNT: 14.1 % (ref 10–15)
GLUCOSE SERPL-MCNC: 81 MG/DL (ref 70–99)
HBA1C MFR BLD: 5.3 %
HCT VFR BLD AUTO: 37.9 % (ref 40–53)
HGB BLD-MCNC: 12 G/DL (ref 13.3–17.7)
IMM GRANULOCYTES # BLD: 0 10E3/UL
IMM GRANULOCYTES NFR BLD: 1 %
LYMPHOCYTES # BLD AUTO: 1.7 10E3/UL (ref 0.8–5.3)
LYMPHOCYTES NFR BLD AUTO: 32 %
MAGNESIUM SERPL-MCNC: 2.4 MG/DL (ref 1.7–2.3)
MCH RBC QN AUTO: 32.7 PG (ref 26.5–33)
MCHC RBC AUTO-ENTMCNC: 31.7 G/DL (ref 31.5–36.5)
MCV RBC AUTO: 103 FL (ref 78–100)
MONOCYTES # BLD AUTO: 0.5 10E3/UL (ref 0–1.3)
MONOCYTES NFR BLD AUTO: 10 %
NEUTROPHILS # BLD AUTO: 2.7 10E3/UL (ref 1.6–8.3)
NEUTROPHILS NFR BLD AUTO: 49 %
NRBC # BLD AUTO: 0 10E3/UL
NRBC BLD AUTO-RTO: 0 /100
PLATELET # BLD AUTO: 122 10E3/UL (ref 150–450)
POTASSIUM SERPL-SCNC: 4.9 MMOL/L (ref 3.4–5.3)
RBC # BLD AUTO: 3.67 10E6/UL (ref 4.4–5.9)
SODIUM SERPL-SCNC: 143 MMOL/L (ref 136–145)
TSH SERPL DL<=0.005 MIU/L-ACNC: 2.88 UIU/ML (ref 0.3–4.2)
WBC # BLD AUTO: 5.4 10E3/UL (ref 4–11)

## 2023-09-07 PROCEDURE — 80048 BASIC METABOLIC PNL TOTAL CA: CPT | Mod: ORL | Performed by: FAMILY MEDICINE

## 2023-09-07 PROCEDURE — 84443 ASSAY THYROID STIM HORMONE: CPT | Mod: ORL | Performed by: FAMILY MEDICINE

## 2023-09-07 PROCEDURE — 85025 COMPLETE CBC W/AUTO DIFF WBC: CPT | Mod: ORL | Performed by: FAMILY MEDICINE

## 2023-09-07 PROCEDURE — 36415 COLL VENOUS BLD VENIPUNCTURE: CPT | Mod: ORL | Performed by: FAMILY MEDICINE

## 2023-09-07 PROCEDURE — 83036 HEMOGLOBIN GLYCOSYLATED A1C: CPT | Mod: ORL | Performed by: FAMILY MEDICINE

## 2023-09-07 PROCEDURE — 83735 ASSAY OF MAGNESIUM: CPT | Mod: ORL | Performed by: FAMILY MEDICINE

## 2023-09-07 PROCEDURE — P9603 ONE-WAY ALLOW PRORATED MILES: HCPCS | Mod: ORL | Performed by: FAMILY MEDICINE

## 2023-09-07 ASSESSMENT — LIFESTYLE VARIABLES: TOBACCO_USE: 1

## 2023-09-08 ENCOUNTER — HOSPITAL ENCOUNTER (OUTPATIENT)
Facility: CLINIC | Age: 83
Discharge: HOME | End: 2023-09-08
Attending: INTERNAL MEDICINE | Admitting: INTERNAL MEDICINE
Payer: COMMERCIAL

## 2023-09-08 ENCOUNTER — ANESTHESIA (OUTPATIENT)
Dept: GASTROENTEROLOGY | Facility: CLINIC | Age: 83
End: 2023-09-08
Payer: COMMERCIAL

## 2023-09-08 VITALS
WEIGHT: 192 LBS | HEART RATE: 69 BPM | BODY MASS INDEX: 28.44 KG/M2 | HEIGHT: 69 IN | SYSTOLIC BLOOD PRESSURE: 146 MMHG | DIASTOLIC BLOOD PRESSURE: 89 MMHG | OXYGEN SATURATION: 98 %

## 2023-09-08 LAB — UPPER GI ENDOSCOPY: NORMAL

## 2023-09-08 PROCEDURE — 250N000011 HC RX IP 250 OP 636: Mod: JZ | Performed by: NURSE ANESTHETIST, CERTIFIED REGISTERED

## 2023-09-08 PROCEDURE — 999N000010 HC STATISTIC ANES STAT CODE-CRNA PER MINUTE: Performed by: INTERNAL MEDICINE

## 2023-09-08 PROCEDURE — 370N000017 HC ANESTHESIA TECHNICAL FEE, PER MIN: Performed by: INTERNAL MEDICINE

## 2023-09-08 PROCEDURE — 43249 ESOPH EGD DILATION <30 MM: CPT | Performed by: INTERNAL MEDICINE

## 2023-09-08 PROCEDURE — 258N000003 HC RX IP 258 OP 636: Performed by: NURSE ANESTHETIST, CERTIFIED REGISTERED

## 2023-09-08 PROCEDURE — 250N000009 HC RX 250: Performed by: NURSE ANESTHETIST, CERTIFIED REGISTERED

## 2023-09-08 RX ORDER — NALOXONE HYDROCHLORIDE 0.4 MG/ML
0.4 INJECTION, SOLUTION INTRAMUSCULAR; INTRAVENOUS; SUBCUTANEOUS
Status: CANCELLED | OUTPATIENT
Start: 2023-09-08

## 2023-09-08 RX ORDER — NALOXONE HYDROCHLORIDE 0.4 MG/ML
0.2 INJECTION, SOLUTION INTRAMUSCULAR; INTRAVENOUS; SUBCUTANEOUS
Status: CANCELLED | OUTPATIENT
Start: 2023-09-08

## 2023-09-08 RX ORDER — LIDOCAINE HYDROCHLORIDE 20 MG/ML
INJECTION, SOLUTION INFILTRATION; PERINEURAL PRN
Status: DISCONTINUED | OUTPATIENT
Start: 2023-09-08 | End: 2023-09-08

## 2023-09-08 RX ORDER — ONDANSETRON 2 MG/ML
INJECTION INTRAMUSCULAR; INTRAVENOUS PRN
Status: DISCONTINUED | OUTPATIENT
Start: 2023-09-08 | End: 2023-09-08

## 2023-09-08 RX ORDER — PROPOFOL 10 MG/ML
INJECTION, EMULSION INTRAVENOUS CONTINUOUS PRN
Status: DISCONTINUED | OUTPATIENT
Start: 2023-09-08 | End: 2023-09-08

## 2023-09-08 RX ORDER — SODIUM CHLORIDE, SODIUM LACTATE, POTASSIUM CHLORIDE, CALCIUM CHLORIDE 600; 310; 30; 20 MG/100ML; MG/100ML; MG/100ML; MG/100ML
INJECTION, SOLUTION INTRAVENOUS CONTINUOUS PRN
Status: DISCONTINUED | OUTPATIENT
Start: 2023-09-08 | End: 2023-09-08

## 2023-09-08 RX ORDER — LIDOCAINE 40 MG/G
CREAM TOPICAL
Status: DISCONTINUED | OUTPATIENT
Start: 2023-09-08 | End: 2023-09-08 | Stop reason: HOSPADM

## 2023-09-08 RX ORDER — FLUMAZENIL 0.1 MG/ML
0.2 INJECTION, SOLUTION INTRAVENOUS
Status: CANCELLED | OUTPATIENT
Start: 2023-09-08 | End: 2023-09-09

## 2023-09-08 RX ADMIN — ONDANSETRON 4 MG: 2 INJECTION INTRAMUSCULAR; INTRAVENOUS at 13:35

## 2023-09-08 RX ADMIN — PROPOFOL 200 MCG/KG/MIN: 10 INJECTION, EMULSION INTRAVENOUS at 13:35

## 2023-09-08 RX ADMIN — SODIUM CHLORIDE, POTASSIUM CHLORIDE, SODIUM LACTATE AND CALCIUM CHLORIDE: 600; 310; 30; 20 INJECTION, SOLUTION INTRAVENOUS at 13:35

## 2023-09-08 RX ADMIN — LIDOCAINE HYDROCHLORIDE 50 MG: 20 INJECTION, SOLUTION INFILTRATION; PERINEURAL at 13:35

## 2023-09-08 ASSESSMENT — ACTIVITIES OF DAILY LIVING (ADL): ADLS_ACUITY_SCORE: 35

## 2023-09-08 NOTE — ANESTHESIA POSTPROCEDURE EVALUATION
Patient: Mando Freedman    Procedure: Procedure(s):  Esophagoscopy, gastroscopy, duodenoscopy (EGD), with dilation     Anesthesia Type:  MAC    Note:  Disposition: Outpatient   Postop Pain Control: Uneventful            Sign Out: Well controlled pain   PONV: No   Neuro/Psych: Uneventful            Sign Out: Acceptable/Baseline neuro status   Airway/Respiratory: Uneventful            Sign Out: Acceptable/Baseline resp. status   CV/Hemodynamics: Uneventful            Sign Out: Acceptable CV status; No obvious hypovolemia; No obvious fluid overload   Other NRE: NONE   DID A NON-ROUTINE EVENT OCCUR? No           Last vitals:  Vitals Value Taken Time   /67 09/08/23 1400   Temp     Pulse 68 09/08/23 1404   Resp 11 09/08/23 1405   SpO2 96 % 09/08/23 1405   Vitals shown include unvalidated device data.    Electronically Signed By: Ravinder Medina DO  September 8, 2023  2:06 PM

## 2023-09-08 NOTE — ANESTHESIA CARE TRANSFER NOTE
Patient: Mando Freedman    Procedure: Procedure(s):  Esophagoscopy, gastroscopy, duodenoscopy (EGD), with dilation       Diagnosis: Dysphasia [R47.02]  Diagnosis Additional Information: No value filed.    Anesthesia Type:   MAC     Note:    Oropharynx: oropharynx clear of all foreign objects and spontaneously breathing  Level of Consciousness: drowsy  Oxygen Supplementation: room air    Independent Airway: airway patency satisfactory and stable  Dentition: dentition unchanged  Vital Signs Stable: post-procedure vital signs reviewed and stable  Report to RN Given: handoff report given  Patient transferred to: PACU  Comments: At end of procedure, spontaneous respirations, patient alert to voice, able to follow commands. Patient breathing room air at room air to PACU. SpO2, NiBP, and EKG monitors and alarms on and functioning, report on patient's clinical status given to PACU RN, RN questions answered.      Handoff Report: Identifed the Patient, Identified the Reponsible Provider, Reviewed the pertinent medical history, Discussed the surgical course, Reviewed Intra-OP anesthesia mangement and issues during anesthesia, Set expectations for post-procedure period and Allowed opportunity for questions and acknowledgement of understanding      Vitals:  Vitals Value Taken Time   /79 09/08/23 1352   Temp     Pulse 70 09/08/23 1353   Resp 25 09/08/23 1353   SpO2 95 % 09/08/23 1353   Vitals shown include unvalidated device data.    Electronically Signed By: NICKO Tate CRNA  September 8, 2023  1:54 PM

## 2023-09-08 NOTE — INTERVAL H&P NOTE
"I have reviewed the surgical (or preoperative) H&P that is linked to this encounter, and examined the patient. There are no significant changes    Clinical Conditions Present on Arrival:  Clinically Significant Risk Factors Present on Admission                 # Thrombocytopenia: Lowest platelets = 122 in last 2 days, will monitor for bleeding  # Overweight: Estimated body mass index is 28.35 kg/m  as calculated from the following:    Height as of this encounter: 1.753 m (5' 9\").    Weight as of this encounter: 87.1 kg (192 lb).       "

## 2023-09-08 NOTE — ANESTHESIA PREPROCEDURE EVALUATION
Anesthesia Pre-Procedure Evaluation    Patient: Mando Freedman   MRN: [id number] : 1940        Procedure : Procedure(s):  Esophagoscopy, gastroscopy, duodenoscopy (EGD), with dilation          Past Medical History:   Diagnosis Date    Basal cell carcinoma       Past Surgical History:   Procedure Laterality Date    BONE MARROW BIOPSY, BONE SPECIMEN, NEEDLE/TROCAR N/A 2022    Procedure: BIOPSY, BONE MARROW;  Surgeon: Roly Kearney MD;  Location:  GI    COLONOSCOPY N/A 2022    Procedure: COLONOSCOPY;  Surgeon: Kimani Loo MD;  Location:  GI    ESOPHAGOSCOPY, GASTROSCOPY, DUODENOSCOPY (EGD), COMBINED N/A 2022    Procedure: ESOPHAGOGASTRODUODENOSCOPY (EGD);  Surgeon: Kimani Loo MD;  Location: [facility]      No Known Allergies   Social History     Tobacco Use    Smoking status: Former     Types: Cigars    Smokeless tobacco: Never   Substance Use Topics    Alcohol use: Not on file     Comment: rare      Wt Readings from Last 1 Encounters:   22 85.5 kg (188 lb 9.6 oz)        Anesthesia Evaluation   Pt has had prior anesthetic.     No history of anesthetic complications       ROS/MED HX  ENT/Pulmonary:     (+)                tobacco use, Past use,                      Neurologic:       Cardiovascular:       METS/Exercise Tolerance:     Hematologic: Comments: MGUS    (+)      anemia,          Musculoskeletal:       GI/Hepatic:       Renal/Genitourinary:     (+) renal disease, type: CRI,            Endo:       Psychiatric/Substance Use:       Infectious Disease:       Malignancy:   (+) Malignancy, History of Skin.    Other:               OUTSIDE LABS:  CBC:   Lab Results   Component Value Date    WBC 5.4 2023    WBC 5.7 2023    HGB 12.0 (L) 2023    HGB 12.2 (L) 2023    HCT 37.9 (L) 2023    HCT 38.4 (L) 2023     (L) 2023     (L) 2023     BMP:   Lab Results   Component Value Date     2023      07/20/2023    POTASSIUM 4.9 09/07/2023    POTASSIUM 5.2 07/20/2023    CHLORIDE 107 09/07/2023    CHLORIDE 107 07/20/2023    CO2 20 (L) 09/07/2023    CO2 23 07/20/2023    BUN 50.5 (H) 09/07/2023    BUN 50.5 (H) 07/20/2023    CR 2.88 (H) 09/07/2023    CR 2.95 (H) 07/20/2023    GLC 81 09/07/2023    GLC 93 07/20/2023     COAGS:   Lab Results   Component Value Date    PTT 38 08/19/2022    INR 1.13 08/19/2022     POC: No results found for: BGM, HCG, HCGS  HEPATIC:   Lab Results   Component Value Date    ALBUMIN 3.8 03/23/2023    PROTTOTAL 5.2 (L) 08/24/2022    ALT 16 08/24/2022    AST 12 08/24/2022    ALKPHOS 75 08/24/2022    BILITOTAL 0.4 08/24/2022     OTHER:   Lab Results   Component Value Date    A1C 5.3 09/07/2023    KATT 8.9 09/07/2023    PHOS 4.5 03/23/2023    MAG 2.4 (H) 09/07/2023    TSH 2.88 09/07/2023    T4 0.85 05/12/2022       Anesthesia Plan    ASA Status:  3    NPO Status:  NPO Appropriate    Anesthesia Type: MAC.     - Reason for MAC: immobility needed, straight local not clinically adequate              Consents    Anesthesia Plan(s) and associated risks, benefits, and realistic alternatives discussed. Questions answered and patient/representative(s) expressed understanding.     - Discussed:     - Discussed with:  Patient      - Extended Intubation/Ventilatory Support Discussed: No.      - Patient is DNR/DNI Status: No     Use of blood products discussed: Yes.     - Discussed with: Patient.     - Consented: consented to blood products            Reason for refusal: other.     Postoperative Care    Pain management: IV analgesics, Oral pain medications, Multi-modal analgesia.   PONV prophylaxis: Ondansetron (or other 5HT-3), Dexamethasone or Solumedrol, Background Propofol Infusion     Comments:                Ravinder Medina DO

## 2023-09-13 ENCOUNTER — LAB REQUISITION (OUTPATIENT)
Dept: LAB | Facility: CLINIC | Age: 83
End: 2023-09-13
Payer: COMMERCIAL

## 2023-09-13 DIAGNOSIS — N18.4 CHRONIC KIDNEY DISEASE, STAGE 4 (SEVERE) (H): ICD-10-CM

## 2023-09-13 DIAGNOSIS — D63.1 ANEMIA IN CHRONIC KIDNEY DISEASE (CODE): ICD-10-CM

## 2023-09-13 DIAGNOSIS — I10 ESSENTIAL (PRIMARY) HYPERTENSION: ICD-10-CM

## 2023-09-14 LAB
ALBUMIN SERPL BCG-MCNC: 3.8 G/DL (ref 3.5–5.2)
ANION GAP SERPL CALCULATED.3IONS-SCNC: 14 MMOL/L (ref 7–15)
BASOPHILS # BLD AUTO: 0 10E3/UL (ref 0–0.2)
BASOPHILS NFR BLD AUTO: 1 %
BUN SERPL-MCNC: 57.2 MG/DL (ref 8–23)
CALCIUM SERPL-MCNC: 9 MG/DL (ref 8.8–10.2)
CHLORIDE SERPL-SCNC: 105 MMOL/L (ref 98–107)
CREAT SERPL-MCNC: 3.21 MG/DL (ref 0.67–1.17)
DEPRECATED CALCIDIOL+CALCIFEROL SERPL-MC: 62 UG/L (ref 20–75)
DEPRECATED HCO3 PLAS-SCNC: 23 MMOL/L (ref 22–29)
EGFRCR SERPLBLD CKD-EPI 2021: 18 ML/MIN/1.73M2
EOSINOPHIL # BLD AUTO: 0.4 10E3/UL (ref 0–0.7)
EOSINOPHIL NFR BLD AUTO: 8 %
ERYTHROCYTE [DISTWIDTH] IN BLOOD BY AUTOMATED COUNT: 14.5 % (ref 10–15)
FOLATE SERPL-MCNC: 19.5 NG/ML (ref 4.6–34.8)
GLUCOSE SERPL-MCNC: 98 MG/DL (ref 70–99)
HCT VFR BLD AUTO: 36 % (ref 40–53)
HGB BLD-MCNC: 11.6 G/DL (ref 13.3–17.7)
IMM GRANULOCYTES # BLD: 0 10E3/UL
IMM GRANULOCYTES NFR BLD: 0 %
LYMPHOCYTES # BLD AUTO: 1.7 10E3/UL (ref 0.8–5.3)
LYMPHOCYTES NFR BLD AUTO: 35 %
MCH RBC QN AUTO: 32.7 PG (ref 26.5–33)
MCHC RBC AUTO-ENTMCNC: 32.2 G/DL (ref 31.5–36.5)
MCV RBC AUTO: 101 FL (ref 78–100)
MONOCYTES # BLD AUTO: 0.4 10E3/UL (ref 0–1.3)
MONOCYTES NFR BLD AUTO: 8 %
NEUTROPHILS # BLD AUTO: 2.4 10E3/UL (ref 1.6–8.3)
NEUTROPHILS NFR BLD AUTO: 48 %
NRBC # BLD AUTO: 0 10E3/UL
NRBC BLD AUTO-RTO: 0 /100
PHOSPHATE SERPL-MCNC: 4.9 MG/DL (ref 2.5–4.5)
PLATELET # BLD AUTO: 113 10E3/UL (ref 150–450)
POTASSIUM SERPL-SCNC: 5.9 MMOL/L (ref 3.4–5.3)
PTH-INTACT SERPL-MCNC: 120 PG/ML (ref 15–65)
RBC # BLD AUTO: 3.55 10E6/UL (ref 4.4–5.9)
SODIUM SERPL-SCNC: 142 MMOL/L (ref 136–145)
VIT B12 SERPL-MCNC: 1114 PG/ML (ref 232–1245)
WBC # BLD AUTO: 5 10E3/UL (ref 4–11)

## 2023-09-14 PROCEDURE — 36415 COLL VENOUS BLD VENIPUNCTURE: CPT | Mod: ORL | Performed by: INTERNAL MEDICINE

## 2023-09-14 PROCEDURE — 80069 RENAL FUNCTION PANEL: CPT | Mod: ORL | Performed by: INTERNAL MEDICINE

## 2023-09-14 PROCEDURE — 85025 COMPLETE CBC W/AUTO DIFF WBC: CPT | Mod: ORL | Performed by: INTERNAL MEDICINE

## 2023-09-14 PROCEDURE — 82306 VITAMIN D 25 HYDROXY: CPT | Mod: ORL | Performed by: INTERNAL MEDICINE

## 2023-09-14 PROCEDURE — P9604 ONE-WAY ALLOW PRORATED TRIP: HCPCS | Mod: ORL | Performed by: INTERNAL MEDICINE

## 2023-09-14 PROCEDURE — 83970 ASSAY OF PARATHORMONE: CPT | Mod: ORL | Performed by: INTERNAL MEDICINE

## 2023-09-14 PROCEDURE — 82607 VITAMIN B-12: CPT | Mod: ORL | Performed by: INTERNAL MEDICINE

## 2023-09-14 PROCEDURE — 82746 ASSAY OF FOLIC ACID SERUM: CPT | Mod: ORL | Performed by: INTERNAL MEDICINE

## 2023-09-15 ENCOUNTER — LAB REQUISITION (OUTPATIENT)
Dept: LAB | Facility: CLINIC | Age: 83
End: 2023-09-15
Payer: COMMERCIAL

## 2023-09-15 DIAGNOSIS — D63.1 ANEMIA IN CHRONIC KIDNEY DISEASE (CODE): ICD-10-CM

## 2023-09-15 DIAGNOSIS — N18.4 CHRONIC KIDNEY DISEASE, STAGE 4 (SEVERE) (H): ICD-10-CM

## 2023-09-15 DIAGNOSIS — I10 ESSENTIAL (PRIMARY) HYPERTENSION: ICD-10-CM

## 2023-09-15 LAB
ALBUMIN MFR UR ELPH: 69.2 MG/DL
ALBUMIN UR-MCNC: 70 MG/DL
APPEARANCE UR: CLEAR
BILIRUB UR QL STRIP: NEGATIVE
COLOR UR AUTO: ABNORMAL
CREAT UR-MCNC: 43.4 MG/DL
GLUCOSE UR STRIP-MCNC: NEGATIVE MG/DL
HGB UR QL STRIP: ABNORMAL
KETONES UR STRIP-MCNC: NEGATIVE MG/DL
LEUKOCYTE ESTERASE UR QL STRIP: NEGATIVE
NITRATE UR QL: NEGATIVE
PH UR STRIP: 7 [PH] (ref 5–7)
PROT/CREAT 24H UR: 1.59 MG/MG CR (ref 0–0.2)
RBC URINE: 0 /HPF
SP GR UR STRIP: 1.01 (ref 1–1.03)
UROBILINOGEN UR STRIP-MCNC: NORMAL MG/DL
WBC URINE: 0 /HPF

## 2023-09-15 PROCEDURE — 84156 ASSAY OF PROTEIN URINE: CPT | Mod: ORL

## 2023-09-15 PROCEDURE — 81001 URINALYSIS AUTO W/SCOPE: CPT | Mod: ORL

## 2023-09-20 ENCOUNTER — LAB REQUISITION (OUTPATIENT)
Dept: LAB | Facility: CLINIC | Age: 83
End: 2023-09-20
Payer: COMMERCIAL

## 2023-09-20 DIAGNOSIS — E87.1 HYPO-OSMOLALITY AND HYPONATREMIA: ICD-10-CM

## 2023-09-20 DIAGNOSIS — D63.1 ANEMIA IN CHRONIC KIDNEY DISEASE (CODE): ICD-10-CM

## 2023-09-21 LAB
ANION GAP SERPL CALCULATED.3IONS-SCNC: 11 MMOL/L (ref 7–15)
BASOPHILS # BLD AUTO: 0 10E3/UL (ref 0–0.2)
BASOPHILS NFR BLD AUTO: 1 %
BUN SERPL-MCNC: 55.3 MG/DL (ref 8–23)
CALCIUM SERPL-MCNC: 8.4 MG/DL (ref 8.8–10.2)
CHLORIDE SERPL-SCNC: 113 MMOL/L (ref 98–107)
CREAT SERPL-MCNC: 3.38 MG/DL (ref 0.67–1.17)
DEPRECATED HCO3 PLAS-SCNC: 19 MMOL/L (ref 22–29)
EGFRCR SERPLBLD CKD-EPI 2021: 17 ML/MIN/1.73M2
EOSINOPHIL # BLD AUTO: 0.4 10E3/UL (ref 0–0.7)
EOSINOPHIL NFR BLD AUTO: 9 %
ERYTHROCYTE [DISTWIDTH] IN BLOOD BY AUTOMATED COUNT: 14.5 % (ref 10–15)
GLUCOSE SERPL-MCNC: 84 MG/DL (ref 70–99)
HCT VFR BLD AUTO: 34.6 % (ref 40–53)
HGB BLD-MCNC: 11 G/DL (ref 13.3–17.7)
IMM GRANULOCYTES # BLD: 0 10E3/UL
IMM GRANULOCYTES NFR BLD: 0 %
LYMPHOCYTES # BLD AUTO: 2.1 10E3/UL (ref 0.8–5.3)
LYMPHOCYTES NFR BLD AUTO: 40 %
MCH RBC QN AUTO: 33.1 PG (ref 26.5–33)
MCHC RBC AUTO-ENTMCNC: 31.8 G/DL (ref 31.5–36.5)
MCV RBC AUTO: 104 FL (ref 78–100)
MONOCYTES # BLD AUTO: 0.4 10E3/UL (ref 0–1.3)
MONOCYTES NFR BLD AUTO: 9 %
NEUTROPHILS # BLD AUTO: 2.1 10E3/UL (ref 1.6–8.3)
NEUTROPHILS NFR BLD AUTO: 41 %
NRBC # BLD AUTO: 0 10E3/UL
NRBC BLD AUTO-RTO: 0 /100
PLATELET # BLD AUTO: 122 10E3/UL (ref 150–450)
POTASSIUM SERPL-SCNC: 5.2 MMOL/L (ref 3.4–5.3)
RBC # BLD AUTO: 3.32 10E6/UL (ref 4.4–5.9)
SODIUM SERPL-SCNC: 143 MMOL/L (ref 136–145)
WBC # BLD AUTO: 5.1 10E3/UL (ref 4–11)

## 2023-09-21 PROCEDURE — 85025 COMPLETE CBC W/AUTO DIFF WBC: CPT | Mod: ORL | Performed by: FAMILY MEDICINE

## 2023-09-21 PROCEDURE — 80048 BASIC METABOLIC PNL TOTAL CA: CPT | Mod: ORL | Performed by: FAMILY MEDICINE

## 2023-09-21 PROCEDURE — P9604 ONE-WAY ALLOW PRORATED TRIP: HCPCS | Mod: ORL | Performed by: FAMILY MEDICINE

## 2023-09-21 PROCEDURE — 36415 COLL VENOUS BLD VENIPUNCTURE: CPT | Mod: ORL | Performed by: FAMILY MEDICINE

## 2023-09-27 ENCOUNTER — LAB REQUISITION (OUTPATIENT)
Dept: LAB | Facility: CLINIC | Age: 83
End: 2023-09-27
Payer: COMMERCIAL

## 2023-09-27 DIAGNOSIS — D63.1 ANEMIA IN CHRONIC KIDNEY DISEASE (CODE): ICD-10-CM

## 2023-09-28 LAB
BASOPHILS # BLD AUTO: 0 10E3/UL (ref 0–0.2)
BASOPHILS NFR BLD AUTO: 1 %
EOSINOPHIL # BLD AUTO: 0.5 10E3/UL (ref 0–0.7)
EOSINOPHIL NFR BLD AUTO: 7 %
ERYTHROCYTE [DISTWIDTH] IN BLOOD BY AUTOMATED COUNT: 14.6 % (ref 10–15)
HCT VFR BLD AUTO: 37 % (ref 40–53)
HGB BLD-MCNC: 11.8 G/DL (ref 13.3–17.7)
IMM GRANULOCYTES # BLD: 0 10E3/UL
IMM GRANULOCYTES NFR BLD: 0 %
LYMPHOCYTES # BLD AUTO: 1.9 10E3/UL (ref 0.8–5.3)
LYMPHOCYTES NFR BLD AUTO: 28 %
MCH RBC QN AUTO: 32.5 PG (ref 26.5–33)
MCHC RBC AUTO-ENTMCNC: 31.9 G/DL (ref 31.5–36.5)
MCV RBC AUTO: 102 FL (ref 78–100)
MONOCYTES # BLD AUTO: 0.6 10E3/UL (ref 0–1.3)
MONOCYTES NFR BLD AUTO: 8 %
NEUTROPHILS # BLD AUTO: 3.7 10E3/UL (ref 1.6–8.3)
NEUTROPHILS NFR BLD AUTO: 56 %
NRBC # BLD AUTO: 0 10E3/UL
NRBC BLD AUTO-RTO: 0 /100
PLATELET # BLD AUTO: 107 10E3/UL (ref 150–450)
RBC # BLD AUTO: 3.63 10E6/UL (ref 4.4–5.9)
WBC # BLD AUTO: 6.7 10E3/UL (ref 4–11)

## 2023-09-28 PROCEDURE — 85025 COMPLETE CBC W/AUTO DIFF WBC: CPT | Mod: ORL | Performed by: FAMILY MEDICINE

## 2023-09-28 PROCEDURE — P9604 ONE-WAY ALLOW PRORATED TRIP: HCPCS | Mod: ORL | Performed by: FAMILY MEDICINE

## 2023-09-28 PROCEDURE — 36415 COLL VENOUS BLD VENIPUNCTURE: CPT | Mod: ORL | Performed by: FAMILY MEDICINE

## 2023-10-03 ENCOUNTER — LAB REQUISITION (OUTPATIENT)
Dept: LAB | Facility: CLINIC | Age: 83
End: 2023-10-03
Payer: COMMERCIAL

## 2023-10-03 DIAGNOSIS — D63.1 ANEMIA IN CHRONIC KIDNEY DISEASE (CODE): ICD-10-CM

## 2023-10-05 ENCOUNTER — LAB REQUISITION (OUTPATIENT)
Dept: LAB | Facility: CLINIC | Age: 83
End: 2023-10-05
Payer: COMMERCIAL

## 2023-10-05 DIAGNOSIS — D63.1 ANEMIA IN CHRONIC KIDNEY DISEASE (CODE): ICD-10-CM

## 2023-10-05 LAB
BASO+EOS+MONOS # BLD AUTO: ABNORMAL 10*3/UL
BASO+EOS+MONOS NFR BLD AUTO: ABNORMAL %
BASOPHILS # BLD AUTO: 0 10E3/UL (ref 0–0.2)
BASOPHILS NFR BLD AUTO: 1 %
EOSINOPHIL # BLD AUTO: 0.5 10E3/UL (ref 0–0.7)
EOSINOPHIL NFR BLD AUTO: 8 %
ERYTHROCYTE [DISTWIDTH] IN BLOOD BY AUTOMATED COUNT: 14.5 % (ref 10–15)
HCT VFR BLD AUTO: 37.8 % (ref 40–53)
HGB BLD-MCNC: 12.4 G/DL (ref 13.3–17.7)
IMM GRANULOCYTES # BLD: 0 10E3/UL
IMM GRANULOCYTES NFR BLD: 0 %
LYMPHOCYTES # BLD AUTO: 2.2 10E3/UL (ref 0.8–5.3)
LYMPHOCYTES NFR BLD AUTO: 35 %
MCH RBC QN AUTO: 33.2 PG (ref 26.5–33)
MCHC RBC AUTO-ENTMCNC: 32.8 G/DL (ref 31.5–36.5)
MCV RBC AUTO: 101 FL (ref 78–100)
MONOCYTES # BLD AUTO: 0.5 10E3/UL (ref 0–1.3)
MONOCYTES NFR BLD AUTO: 8 %
NEUTROPHILS # BLD AUTO: 3.1 10E3/UL (ref 1.6–8.3)
NEUTROPHILS NFR BLD AUTO: 48 %
NRBC # BLD AUTO: 0 10E3/UL
NRBC BLD AUTO-RTO: 0 /100
PLATELET # BLD AUTO: 111 10E3/UL (ref 150–450)
RBC # BLD AUTO: 3.73 10E6/UL (ref 4.4–5.9)
WBC # BLD AUTO: 6.5 10E3/UL (ref 4–11)

## 2023-10-05 PROCEDURE — P9604 ONE-WAY ALLOW PRORATED TRIP: HCPCS | Mod: ORL | Performed by: FAMILY MEDICINE

## 2023-10-05 PROCEDURE — 85025 COMPLETE CBC W/AUTO DIFF WBC: CPT | Mod: ORL | Performed by: FAMILY MEDICINE

## 2023-10-05 PROCEDURE — 36415 COLL VENOUS BLD VENIPUNCTURE: CPT | Mod: ORL | Performed by: FAMILY MEDICINE

## 2023-10-11 ENCOUNTER — LAB REQUISITION (OUTPATIENT)
Dept: LAB | Facility: CLINIC | Age: 83
End: 2023-10-11
Payer: COMMERCIAL

## 2023-10-12 LAB
BASO+EOS+MONOS # BLD AUTO: ABNORMAL 10*3/UL
BASO+EOS+MONOS NFR BLD AUTO: ABNORMAL %
BASOPHILS # BLD AUTO: 0 10E3/UL (ref 0–0.2)
BASOPHILS NFR BLD AUTO: 1 %
EOSINOPHIL # BLD AUTO: 0.5 10E3/UL (ref 0–0.7)
EOSINOPHIL NFR BLD AUTO: 8 %
ERYTHROCYTE [DISTWIDTH] IN BLOOD BY AUTOMATED COUNT: 14.4 % (ref 10–15)
HCT VFR BLD AUTO: 36.1 % (ref 40–53)
HGB BLD-MCNC: 11.6 G/DL (ref 13.3–17.7)
IMM GRANULOCYTES # BLD: 0 10E3/UL
IMM GRANULOCYTES NFR BLD: 0 %
LYMPHOCYTES # BLD AUTO: 2.5 10E3/UL (ref 0.8–5.3)
LYMPHOCYTES NFR BLD AUTO: 45 %
MCH RBC QN AUTO: 32.9 PG (ref 26.5–33)
MCHC RBC AUTO-ENTMCNC: 32.1 G/DL (ref 31.5–36.5)
MCV RBC AUTO: 102 FL (ref 78–100)
MONOCYTES # BLD AUTO: 0.5 10E3/UL (ref 0–1.3)
MONOCYTES NFR BLD AUTO: 8 %
NEUTROPHILS # BLD AUTO: 2.1 10E3/UL (ref 1.6–8.3)
NEUTROPHILS NFR BLD AUTO: 38 %
NRBC # BLD AUTO: 0 10E3/UL
NRBC BLD AUTO-RTO: 0 /100
PLATELET # BLD AUTO: 100 10E3/UL (ref 150–450)
RBC # BLD AUTO: 3.53 10E6/UL (ref 4.4–5.9)
WBC # BLD AUTO: 5.6 10E3/UL (ref 4–11)

## 2023-10-12 PROCEDURE — 36415 COLL VENOUS BLD VENIPUNCTURE: CPT | Mod: ORL | Performed by: FAMILY MEDICINE

## 2023-10-12 PROCEDURE — P9604 ONE-WAY ALLOW PRORATED TRIP: HCPCS | Mod: ORL | Performed by: FAMILY MEDICINE

## 2023-10-12 PROCEDURE — 85025 COMPLETE CBC W/AUTO DIFF WBC: CPT | Mod: ORL | Performed by: FAMILY MEDICINE

## 2023-10-18 ENCOUNTER — LAB REQUISITION (OUTPATIENT)
Dept: LAB | Facility: CLINIC | Age: 83
End: 2023-10-18
Payer: COMMERCIAL

## 2023-10-18 DIAGNOSIS — D63.1 ANEMIA IN CHRONIC KIDNEY DISEASE (CODE): ICD-10-CM

## 2023-10-19 LAB
BASO+EOS+MONOS # BLD AUTO: ABNORMAL 10*3/UL
BASO+EOS+MONOS NFR BLD AUTO: ABNORMAL %
BASOPHILS # BLD AUTO: 0 10E3/UL (ref 0–0.2)
BASOPHILS NFR BLD AUTO: 0 %
EOSINOPHIL # BLD AUTO: 0.4 10E3/UL (ref 0–0.7)
EOSINOPHIL NFR BLD AUTO: 8 %
ERYTHROCYTE [DISTWIDTH] IN BLOOD BY AUTOMATED COUNT: 14.1 % (ref 10–15)
HCT VFR BLD AUTO: 38.5 % (ref 40–53)
HGB BLD-MCNC: 12.4 G/DL (ref 13.3–17.7)
IMM GRANULOCYTES # BLD: 0 10E3/UL
IMM GRANULOCYTES NFR BLD: 0 %
LYMPHOCYTES # BLD AUTO: 1.5 10E3/UL (ref 0.8–5.3)
LYMPHOCYTES NFR BLD AUTO: 27 %
MCH RBC QN AUTO: 33.2 PG (ref 26.5–33)
MCHC RBC AUTO-ENTMCNC: 32.2 G/DL (ref 31.5–36.5)
MCV RBC AUTO: 103 FL (ref 78–100)
MONOCYTES # BLD AUTO: 0.5 10E3/UL (ref 0–1.3)
MONOCYTES NFR BLD AUTO: 9 %
NEUTROPHILS # BLD AUTO: 3.1 10E3/UL (ref 1.6–8.3)
NEUTROPHILS NFR BLD AUTO: 56 %
NRBC # BLD AUTO: 0 10E3/UL
NRBC BLD AUTO-RTO: 0 /100
PLATELET # BLD AUTO: 113 10E3/UL (ref 150–450)
RBC # BLD AUTO: 3.73 10E6/UL (ref 4.4–5.9)
WBC # BLD AUTO: 5.5 10E3/UL (ref 4–11)

## 2023-10-19 PROCEDURE — P9603 ONE-WAY ALLOW PRORATED MILES: HCPCS | Mod: ORL | Performed by: FAMILY MEDICINE

## 2023-10-19 PROCEDURE — 85025 COMPLETE CBC W/AUTO DIFF WBC: CPT | Mod: ORL | Performed by: FAMILY MEDICINE

## 2023-10-19 PROCEDURE — 36415 COLL VENOUS BLD VENIPUNCTURE: CPT | Mod: ORL | Performed by: FAMILY MEDICINE

## 2023-11-01 ENCOUNTER — LAB REQUISITION (OUTPATIENT)
Dept: LAB | Facility: CLINIC | Age: 83
End: 2023-11-01
Payer: COMMERCIAL

## 2023-11-01 DIAGNOSIS — D63.1 ANEMIA IN CHRONIC KIDNEY DISEASE (CODE): ICD-10-CM

## 2023-11-02 LAB
BASOPHILS # BLD AUTO: 0 10E3/UL (ref 0–0.2)
BASOPHILS NFR BLD AUTO: 1 %
EOSINOPHIL # BLD AUTO: 0.4 10E3/UL (ref 0–0.7)
EOSINOPHIL NFR BLD AUTO: 6 %
ERYTHROCYTE [DISTWIDTH] IN BLOOD BY AUTOMATED COUNT: 14.1 % (ref 10–15)
HCT VFR BLD AUTO: 41.2 % (ref 40–53)
HGB BLD-MCNC: 13.1 G/DL (ref 13.3–17.7)
IMM GRANULOCYTES # BLD: 0 10E3/UL
IMM GRANULOCYTES NFR BLD: 0 %
LYMPHOCYTES # BLD AUTO: 2.3 10E3/UL (ref 0.8–5.3)
LYMPHOCYTES NFR BLD AUTO: 33 %
MCH RBC QN AUTO: 32.8 PG (ref 26.5–33)
MCHC RBC AUTO-ENTMCNC: 31.8 G/DL (ref 31.5–36.5)
MCV RBC AUTO: 103 FL (ref 78–100)
MONOCYTES # BLD AUTO: 0.6 10E3/UL (ref 0–1.3)
MONOCYTES NFR BLD AUTO: 8 %
NEUTROPHILS # BLD AUTO: 3.7 10E3/UL (ref 1.6–8.3)
NEUTROPHILS NFR BLD AUTO: 52 %
NRBC # BLD AUTO: 0 10E3/UL
NRBC BLD AUTO-RTO: 0 /100
PLATELET # BLD AUTO: 122 10E3/UL (ref 150–450)
RBC # BLD AUTO: 3.99 10E6/UL (ref 4.4–5.9)
WBC # BLD AUTO: 7 10E3/UL (ref 4–11)

## 2023-11-02 PROCEDURE — 85025 COMPLETE CBC W/AUTO DIFF WBC: CPT | Mod: ORL | Performed by: FAMILY MEDICINE

## 2023-11-02 PROCEDURE — P9603 ONE-WAY ALLOW PRORATED MILES: HCPCS | Mod: ORL | Performed by: FAMILY MEDICINE

## 2023-11-02 PROCEDURE — 36415 COLL VENOUS BLD VENIPUNCTURE: CPT | Mod: ORL | Performed by: FAMILY MEDICINE

## 2023-11-08 ENCOUNTER — LAB REQUISITION (OUTPATIENT)
Dept: LAB | Facility: CLINIC | Age: 83
End: 2023-11-08
Payer: COMMERCIAL

## 2023-11-08 DIAGNOSIS — D63.1 ANEMIA IN CHRONIC KIDNEY DISEASE (CODE): ICD-10-CM

## 2023-11-09 LAB
BASOPHILS # BLD AUTO: 0 10E3/UL (ref 0–0.2)
BASOPHILS NFR BLD AUTO: 1 %
EOSINOPHIL # BLD AUTO: 0.4 10E3/UL (ref 0–0.7)
EOSINOPHIL NFR BLD AUTO: 8 %
ERYTHROCYTE [DISTWIDTH] IN BLOOD BY AUTOMATED COUNT: 14.4 % (ref 10–15)
HCT VFR BLD AUTO: 35.7 % (ref 40–53)
HGB BLD-MCNC: 11.6 G/DL (ref 13.3–17.7)
IMM GRANULOCYTES # BLD: 0 10E3/UL
IMM GRANULOCYTES NFR BLD: 0 %
LYMPHOCYTES # BLD AUTO: 1.8 10E3/UL (ref 0.8–5.3)
LYMPHOCYTES NFR BLD AUTO: 33 %
MCH RBC QN AUTO: 32.2 PG (ref 26.5–33)
MCHC RBC AUTO-ENTMCNC: 32.5 G/DL (ref 31.5–36.5)
MCV RBC AUTO: 99 FL (ref 78–100)
MONOCYTES # BLD AUTO: 0.5 10E3/UL (ref 0–1.3)
MONOCYTES NFR BLD AUTO: 9 %
NEUTROPHILS # BLD AUTO: 2.7 10E3/UL (ref 1.6–8.3)
NEUTROPHILS NFR BLD AUTO: 49 %
NRBC # BLD AUTO: 0 10E3/UL
NRBC BLD AUTO-RTO: 0 /100
PLATELET # BLD AUTO: 109 10E3/UL (ref 150–450)
RBC # BLD AUTO: 3.6 10E6/UL (ref 4.4–5.9)
WBC # BLD AUTO: 5.4 10E3/UL (ref 4–11)

## 2023-11-09 PROCEDURE — 85025 COMPLETE CBC W/AUTO DIFF WBC: CPT | Mod: ORL | Performed by: FAMILY MEDICINE

## 2023-11-09 PROCEDURE — 36415 COLL VENOUS BLD VENIPUNCTURE: CPT | Mod: ORL | Performed by: FAMILY MEDICINE

## 2023-11-09 PROCEDURE — P9604 ONE-WAY ALLOW PRORATED TRIP: HCPCS | Mod: ORL | Performed by: FAMILY MEDICINE

## 2023-11-15 ENCOUNTER — LAB REQUISITION (OUTPATIENT)
Dept: LAB | Facility: CLINIC | Age: 83
End: 2023-11-15
Payer: COMMERCIAL

## 2023-11-15 DIAGNOSIS — D69.59 OTHER SECONDARY THROMBOCYTOPENIA: ICD-10-CM

## 2023-11-16 LAB
BASOPHILS # BLD AUTO: 0 10E3/UL (ref 0–0.2)
BASOPHILS NFR BLD AUTO: 1 %
EOSINOPHIL # BLD AUTO: 0.5 10E3/UL (ref 0–0.7)
EOSINOPHIL NFR BLD AUTO: 7 %
ERYTHROCYTE [DISTWIDTH] IN BLOOD BY AUTOMATED COUNT: 14.4 % (ref 10–15)
HCT VFR BLD AUTO: 38.4 % (ref 40–53)
HGB BLD-MCNC: 12.4 G/DL (ref 13.3–17.7)
IMM GRANULOCYTES # BLD: 0 10E3/UL
IMM GRANULOCYTES NFR BLD: 0 %
LYMPHOCYTES # BLD AUTO: 2.2 10E3/UL (ref 0.8–5.3)
LYMPHOCYTES NFR BLD AUTO: 35 %
MCH RBC QN AUTO: 32.5 PG (ref 26.5–33)
MCHC RBC AUTO-ENTMCNC: 32.3 G/DL (ref 31.5–36.5)
MCV RBC AUTO: 101 FL (ref 78–100)
MONOCYTES # BLD AUTO: 0.5 10E3/UL (ref 0–1.3)
MONOCYTES NFR BLD AUTO: 9 %
NEUTROPHILS # BLD AUTO: 3 10E3/UL (ref 1.6–8.3)
NEUTROPHILS NFR BLD AUTO: 48 %
NRBC # BLD AUTO: 0 10E3/UL
NRBC BLD AUTO-RTO: 0 /100
PLATELET # BLD AUTO: 114 10E3/UL (ref 150–450)
RBC # BLD AUTO: 3.81 10E6/UL (ref 4.4–5.9)
WBC # BLD AUTO: 6.2 10E3/UL (ref 4–11)

## 2023-11-16 PROCEDURE — 85025 COMPLETE CBC W/AUTO DIFF WBC: CPT | Mod: ORL | Performed by: FAMILY MEDICINE

## 2023-11-16 PROCEDURE — P9604 ONE-WAY ALLOW PRORATED TRIP: HCPCS | Mod: ORL | Performed by: FAMILY MEDICINE

## 2023-11-16 PROCEDURE — 36415 COLL VENOUS BLD VENIPUNCTURE: CPT | Mod: ORL | Performed by: FAMILY MEDICINE

## 2023-11-21 ENCOUNTER — LAB REQUISITION (OUTPATIENT)
Dept: LAB | Facility: CLINIC | Age: 83
End: 2023-11-21
Payer: COMMERCIAL

## 2023-11-21 DIAGNOSIS — D63.1 ANEMIA IN CHRONIC KIDNEY DISEASE (CODE): ICD-10-CM

## 2023-11-22 LAB
BASOPHILS # BLD AUTO: 0 10E3/UL (ref 0–0.2)
BASOPHILS NFR BLD AUTO: 1 %
EOSINOPHIL # BLD AUTO: 0.5 10E3/UL (ref 0–0.7)
EOSINOPHIL NFR BLD AUTO: 8 %
ERYTHROCYTE [DISTWIDTH] IN BLOOD BY AUTOMATED COUNT: 14.2 % (ref 10–15)
HCT VFR BLD AUTO: 36.8 % (ref 40–53)
HGB BLD-MCNC: 12 G/DL (ref 13.3–17.7)
IMM GRANULOCYTES # BLD: 0 10E3/UL
IMM GRANULOCYTES NFR BLD: 0 %
LYMPHOCYTES # BLD AUTO: 1.9 10E3/UL (ref 0.8–5.3)
LYMPHOCYTES NFR BLD AUTO: 29 %
MCH RBC QN AUTO: 32.4 PG (ref 26.5–33)
MCHC RBC AUTO-ENTMCNC: 32.6 G/DL (ref 31.5–36.5)
MCV RBC AUTO: 100 FL (ref 78–100)
MONOCYTES # BLD AUTO: 0.4 10E3/UL (ref 0–1.3)
MONOCYTES NFR BLD AUTO: 7 %
NEUTROPHILS # BLD AUTO: 3.6 10E3/UL (ref 1.6–8.3)
NEUTROPHILS NFR BLD AUTO: 55 %
NRBC # BLD AUTO: 0 10E3/UL
NRBC BLD AUTO-RTO: 0 /100
PLATELET # BLD AUTO: 102 10E3/UL (ref 150–450)
RBC # BLD AUTO: 3.7 10E6/UL (ref 4.4–5.9)
WBC # BLD AUTO: 6.4 10E3/UL (ref 4–11)

## 2023-11-22 PROCEDURE — 85025 COMPLETE CBC W/AUTO DIFF WBC: CPT | Mod: ORL | Performed by: FAMILY MEDICINE

## 2023-11-22 PROCEDURE — P9603 ONE-WAY ALLOW PRORATED MILES: HCPCS | Mod: ORL | Performed by: FAMILY MEDICINE

## 2023-11-22 PROCEDURE — 36415 COLL VENOUS BLD VENIPUNCTURE: CPT | Mod: ORL | Performed by: FAMILY MEDICINE

## 2023-12-06 ENCOUNTER — LAB REQUISITION (OUTPATIENT)
Dept: LAB | Facility: CLINIC | Age: 83
End: 2023-12-06
Payer: COMMERCIAL

## 2023-12-06 DIAGNOSIS — R79.9 ABNORMAL FINDING OF BLOOD CHEMISTRY, UNSPECIFIED: ICD-10-CM

## 2023-12-07 LAB
BASOPHILS # BLD AUTO: 0.1 10E3/UL (ref 0–0.2)
BASOPHILS NFR BLD AUTO: 1 %
EOSINOPHIL # BLD AUTO: 0.5 10E3/UL (ref 0–0.7)
EOSINOPHIL NFR BLD AUTO: 8 %
ERYTHROCYTE [DISTWIDTH] IN BLOOD BY AUTOMATED COUNT: 14.3 % (ref 10–15)
HCT VFR BLD AUTO: 37.8 % (ref 40–53)
HGB BLD-MCNC: 12.1 G/DL (ref 13.3–17.7)
IMM GRANULOCYTES # BLD: 0 10E3/UL
IMM GRANULOCYTES NFR BLD: 0 %
LYMPHOCYTES # BLD AUTO: 2.1 10E3/UL (ref 0.8–5.3)
LYMPHOCYTES NFR BLD AUTO: 32 %
MCH RBC QN AUTO: 33.2 PG (ref 26.5–33)
MCHC RBC AUTO-ENTMCNC: 32 G/DL (ref 31.5–36.5)
MCV RBC AUTO: 104 FL (ref 78–100)
MONOCYTES # BLD AUTO: 0.7 10E3/UL (ref 0–1.3)
MONOCYTES NFR BLD AUTO: 10 %
NEUTROPHILS # BLD AUTO: 3.2 10E3/UL (ref 1.6–8.3)
NEUTROPHILS NFR BLD AUTO: 49 %
NRBC # BLD AUTO: 0 10E3/UL
NRBC BLD AUTO-RTO: 0 /100
PLATELET # BLD AUTO: 116 10E3/UL (ref 150–450)
RBC # BLD AUTO: 3.65 10E6/UL (ref 4.4–5.9)
WBC # BLD AUTO: 6.5 10E3/UL (ref 4–11)

## 2023-12-07 PROCEDURE — P9603 ONE-WAY ALLOW PRORATED MILES: HCPCS | Mod: ORL | Performed by: FAMILY MEDICINE

## 2023-12-07 PROCEDURE — 85025 COMPLETE CBC W/AUTO DIFF WBC: CPT | Mod: ORL | Performed by: FAMILY MEDICINE

## 2023-12-07 PROCEDURE — 36415 COLL VENOUS BLD VENIPUNCTURE: CPT | Mod: ORL | Performed by: FAMILY MEDICINE

## 2023-12-13 ENCOUNTER — LAB REQUISITION (OUTPATIENT)
Dept: LAB | Facility: CLINIC | Age: 83
End: 2023-12-13
Payer: COMMERCIAL

## 2023-12-13 DIAGNOSIS — D64.9 ANEMIA, UNSPECIFIED: ICD-10-CM

## 2023-12-14 LAB
BASOPHILS # BLD AUTO: 0 10E3/UL (ref 0–0.2)
BASOPHILS NFR BLD AUTO: 1 %
EOSINOPHIL # BLD AUTO: 0.5 10E3/UL (ref 0–0.7)
EOSINOPHIL NFR BLD AUTO: 8 %
ERYTHROCYTE [DISTWIDTH] IN BLOOD BY AUTOMATED COUNT: 14.1 % (ref 10–15)
HCT VFR BLD AUTO: 36.2 % (ref 40–53)
HGB BLD-MCNC: 11.9 G/DL (ref 13.3–17.7)
IMM GRANULOCYTES # BLD: 0 10E3/UL
IMM GRANULOCYTES NFR BLD: 0 %
LYMPHOCYTES # BLD AUTO: 2 10E3/UL (ref 0.8–5.3)
LYMPHOCYTES NFR BLD AUTO: 34 %
MCH RBC QN AUTO: 32.4 PG (ref 26.5–33)
MCHC RBC AUTO-ENTMCNC: 32.9 G/DL (ref 31.5–36.5)
MCV RBC AUTO: 99 FL (ref 78–100)
MONOCYTES # BLD AUTO: 0.6 10E3/UL (ref 0–1.3)
MONOCYTES NFR BLD AUTO: 10 %
NEUTROPHILS # BLD AUTO: 2.8 10E3/UL (ref 1.6–8.3)
NEUTROPHILS NFR BLD AUTO: 47 %
NRBC # BLD AUTO: 0 10E3/UL
NRBC BLD AUTO-RTO: 0 /100
PLATELET # BLD AUTO: 112 10E3/UL (ref 150–450)
RBC # BLD AUTO: 3.67 10E6/UL (ref 4.4–5.9)
WBC # BLD AUTO: 5.9 10E3/UL (ref 4–11)

## 2023-12-14 PROCEDURE — 36415 COLL VENOUS BLD VENIPUNCTURE: CPT | Mod: ORL | Performed by: FAMILY MEDICINE

## 2023-12-14 PROCEDURE — P9604 ONE-WAY ALLOW PRORATED TRIP: HCPCS | Mod: ORL | Performed by: FAMILY MEDICINE

## 2023-12-14 PROCEDURE — 85025 COMPLETE CBC W/AUTO DIFF WBC: CPT | Mod: ORL | Performed by: FAMILY MEDICINE

## 2023-12-27 ENCOUNTER — LAB REQUISITION (OUTPATIENT)
Dept: LAB | Facility: CLINIC | Age: 83
End: 2023-12-27
Payer: COMMERCIAL

## 2023-12-27 DIAGNOSIS — R79.9 ABNORMAL FINDING OF BLOOD CHEMISTRY, UNSPECIFIED: ICD-10-CM

## 2023-12-28 LAB
BASOPHILS # BLD AUTO: 0 10E3/UL (ref 0–0.2)
BASOPHILS NFR BLD AUTO: 1 %
EOSINOPHIL # BLD AUTO: 0.4 10E3/UL (ref 0–0.7)
EOSINOPHIL NFR BLD AUTO: 6 %
ERYTHROCYTE [DISTWIDTH] IN BLOOD BY AUTOMATED COUNT: 14.3 % (ref 10–15)
HCT VFR BLD AUTO: 36.8 % (ref 40–53)
HGB BLD-MCNC: 12 G/DL (ref 13.3–17.7)
IMM GRANULOCYTES # BLD: 0 10E3/UL
IMM GRANULOCYTES NFR BLD: 0 %
LYMPHOCYTES # BLD AUTO: 2.1 10E3/UL (ref 0.8–5.3)
LYMPHOCYTES NFR BLD AUTO: 34 %
MCH RBC QN AUTO: 32.3 PG (ref 26.5–33)
MCHC RBC AUTO-ENTMCNC: 32.6 G/DL (ref 31.5–36.5)
MCV RBC AUTO: 99 FL (ref 78–100)
MONOCYTES # BLD AUTO: 0.6 10E3/UL (ref 0–1.3)
MONOCYTES NFR BLD AUTO: 9 %
NEUTROPHILS # BLD AUTO: 3.1 10E3/UL (ref 1.6–8.3)
NEUTROPHILS NFR BLD AUTO: 50 %
NRBC # BLD AUTO: 0 10E3/UL
NRBC BLD AUTO-RTO: 0 /100
PLATELET # BLD AUTO: 117 10E3/UL (ref 150–450)
RBC # BLD AUTO: 3.72 10E6/UL (ref 4.4–5.9)
WBC # BLD AUTO: 6.2 10E3/UL (ref 4–11)

## 2023-12-28 PROCEDURE — 85025 COMPLETE CBC W/AUTO DIFF WBC: CPT | Mod: ORL | Performed by: FAMILY MEDICINE

## 2023-12-28 PROCEDURE — P9604 ONE-WAY ALLOW PRORATED TRIP: HCPCS | Mod: ORL | Performed by: FAMILY MEDICINE

## 2023-12-28 PROCEDURE — 36415 COLL VENOUS BLD VENIPUNCTURE: CPT | Mod: ORL | Performed by: FAMILY MEDICINE

## 2024-01-03 ENCOUNTER — LAB REQUISITION (OUTPATIENT)
Dept: LAB | Facility: CLINIC | Age: 84
End: 2024-01-03
Payer: COMMERCIAL

## 2024-01-03 DIAGNOSIS — D64.9 ANEMIA, UNSPECIFIED: ICD-10-CM

## 2024-01-04 LAB
BASOPHILS # BLD AUTO: 0 10E3/UL (ref 0–0.2)
BASOPHILS NFR BLD AUTO: 1 %
EOSINOPHIL # BLD AUTO: 0.4 10E3/UL (ref 0–0.7)
EOSINOPHIL NFR BLD AUTO: 7 %
ERYTHROCYTE [DISTWIDTH] IN BLOOD BY AUTOMATED COUNT: 14.3 % (ref 10–15)
HCT VFR BLD AUTO: 37.9 % (ref 40–53)
HGB BLD-MCNC: 12.3 G/DL (ref 13.3–17.7)
IMM GRANULOCYTES # BLD: 0 10E3/UL
IMM GRANULOCYTES NFR BLD: 0 %
LYMPHOCYTES # BLD AUTO: 1.8 10E3/UL (ref 0.8–5.3)
LYMPHOCYTES NFR BLD AUTO: 31 %
MCH RBC QN AUTO: 32.4 PG (ref 26.5–33)
MCHC RBC AUTO-ENTMCNC: 32.5 G/DL (ref 31.5–36.5)
MCV RBC AUTO: 100 FL (ref 78–100)
MONOCYTES # BLD AUTO: 0.5 10E3/UL (ref 0–1.3)
MONOCYTES NFR BLD AUTO: 8 %
NEUTROPHILS # BLD AUTO: 3.3 10E3/UL (ref 1.6–8.3)
NEUTROPHILS NFR BLD AUTO: 53 %
NRBC # BLD AUTO: 0 10E3/UL
NRBC BLD AUTO-RTO: 0 /100
PLATELET # BLD AUTO: 115 10E3/UL (ref 150–450)
RBC # BLD AUTO: 3.8 10E6/UL (ref 4.4–5.9)
WBC # BLD AUTO: 6 10E3/UL (ref 4–11)

## 2024-01-04 PROCEDURE — 36415 COLL VENOUS BLD VENIPUNCTURE: CPT | Mod: ORL | Performed by: FAMILY MEDICINE

## 2024-01-04 PROCEDURE — 85025 COMPLETE CBC W/AUTO DIFF WBC: CPT | Mod: ORL | Performed by: FAMILY MEDICINE

## 2024-01-04 PROCEDURE — P9604 ONE-WAY ALLOW PRORATED TRIP: HCPCS | Mod: ORL | Performed by: FAMILY MEDICINE

## 2024-01-10 ENCOUNTER — LAB REQUISITION (OUTPATIENT)
Dept: LAB | Facility: CLINIC | Age: 84
End: 2024-01-10
Payer: COMMERCIAL

## 2024-01-10 DIAGNOSIS — R79.9 ABNORMAL FINDING OF BLOOD CHEMISTRY, UNSPECIFIED: ICD-10-CM

## 2024-01-11 LAB
BASOPHILS # BLD AUTO: 0.1 10E3/UL (ref 0–0.2)
BASOPHILS NFR BLD AUTO: 1 %
EOSINOPHIL # BLD AUTO: 0.5 10E3/UL (ref 0–0.7)
EOSINOPHIL NFR BLD AUTO: 7 %
ERYTHROCYTE [DISTWIDTH] IN BLOOD BY AUTOMATED COUNT: 14.2 % (ref 10–15)
HCT VFR BLD AUTO: 40.9 % (ref 40–53)
HGB BLD-MCNC: 13.3 G/DL (ref 13.3–17.7)
IMM GRANULOCYTES # BLD: 0 10E3/UL
IMM GRANULOCYTES NFR BLD: 0 %
LYMPHOCYTES # BLD AUTO: 2.5 10E3/UL (ref 0.8–5.3)
LYMPHOCYTES NFR BLD AUTO: 36 %
MCH RBC QN AUTO: 33.2 PG (ref 26.5–33)
MCHC RBC AUTO-ENTMCNC: 32.5 G/DL (ref 31.5–36.5)
MCV RBC AUTO: 102 FL (ref 78–100)
MONOCYTES # BLD AUTO: 0.5 10E3/UL (ref 0–1.3)
MONOCYTES NFR BLD AUTO: 7 %
NEUTROPHILS # BLD AUTO: 3.5 10E3/UL (ref 1.6–8.3)
NEUTROPHILS NFR BLD AUTO: 49 %
NRBC # BLD AUTO: 0 10E3/UL
NRBC BLD AUTO-RTO: 0 /100
PLATELET # BLD AUTO: 117 10E3/UL (ref 150–450)
RBC # BLD AUTO: 4.01 10E6/UL (ref 4.4–5.9)
WBC # BLD AUTO: 7 10E3/UL (ref 4–11)

## 2024-01-11 PROCEDURE — P9604 ONE-WAY ALLOW PRORATED TRIP: HCPCS | Mod: ORL | Performed by: FAMILY MEDICINE

## 2024-01-11 PROCEDURE — 36415 COLL VENOUS BLD VENIPUNCTURE: CPT | Mod: ORL | Performed by: FAMILY MEDICINE

## 2024-01-11 PROCEDURE — 85025 COMPLETE CBC W/AUTO DIFF WBC: CPT | Mod: ORL | Performed by: FAMILY MEDICINE

## 2024-01-17 ENCOUNTER — LAB REQUISITION (OUTPATIENT)
Dept: LAB | Facility: CLINIC | Age: 84
End: 2024-01-17
Payer: COMMERCIAL

## 2024-01-17 DIAGNOSIS — D64.9 ANEMIA, UNSPECIFIED: ICD-10-CM

## 2024-01-18 LAB
BASOPHILS # BLD AUTO: 0 10E3/UL (ref 0–0.2)
BASOPHILS NFR BLD AUTO: 1 %
EOSINOPHIL # BLD AUTO: 0.4 10E3/UL (ref 0–0.7)
EOSINOPHIL NFR BLD AUTO: 8 %
ERYTHROCYTE [DISTWIDTH] IN BLOOD BY AUTOMATED COUNT: 14.6 % (ref 10–15)
HCT VFR BLD AUTO: 36.2 % (ref 40–53)
HGB BLD-MCNC: 11.7 G/DL (ref 13.3–17.7)
IMM GRANULOCYTES # BLD: 0 10E3/UL
IMM GRANULOCYTES NFR BLD: 0 %
LYMPHOCYTES # BLD AUTO: 1.9 10E3/UL (ref 0.8–5.3)
LYMPHOCYTES NFR BLD AUTO: 35 %
MCH RBC QN AUTO: 32.3 PG (ref 26.5–33)
MCHC RBC AUTO-ENTMCNC: 32.3 G/DL (ref 31.5–36.5)
MCV RBC AUTO: 100 FL (ref 78–100)
MONOCYTES # BLD AUTO: 0.4 10E3/UL (ref 0–1.3)
MONOCYTES NFR BLD AUTO: 7 %
NEUTROPHILS # BLD AUTO: 2.7 10E3/UL (ref 1.6–8.3)
NEUTROPHILS NFR BLD AUTO: 49 %
NRBC # BLD AUTO: 0 10E3/UL
NRBC BLD AUTO-RTO: 0 /100
PLATELET # BLD AUTO: 103 10E3/UL (ref 150–450)
RBC # BLD AUTO: 3.62 10E6/UL (ref 4.4–5.9)
WBC # BLD AUTO: 5.4 10E3/UL (ref 4–11)

## 2024-01-18 PROCEDURE — 85025 COMPLETE CBC W/AUTO DIFF WBC: CPT | Mod: ORL | Performed by: FAMILY MEDICINE

## 2024-01-18 PROCEDURE — 36415 COLL VENOUS BLD VENIPUNCTURE: CPT | Mod: ORL | Performed by: FAMILY MEDICINE

## 2024-01-18 PROCEDURE — P9604 ONE-WAY ALLOW PRORATED TRIP: HCPCS | Mod: ORL | Performed by: FAMILY MEDICINE

## 2024-01-24 ENCOUNTER — LAB REQUISITION (OUTPATIENT)
Dept: LAB | Facility: CLINIC | Age: 84
End: 2024-01-24
Payer: COMMERCIAL

## 2024-01-24 DIAGNOSIS — E11.9 TYPE 2 DIABETES MELLITUS WITHOUT COMPLICATIONS (H): ICD-10-CM

## 2024-01-24 DIAGNOSIS — R79.9 ABNORMAL FINDING OF BLOOD CHEMISTRY, UNSPECIFIED: ICD-10-CM

## 2024-01-24 DIAGNOSIS — E55.9 VITAMIN D DEFICIENCY, UNSPECIFIED: ICD-10-CM

## 2024-01-24 DIAGNOSIS — I50.30 UNSPECIFIED DIASTOLIC (CONGESTIVE) HEART FAILURE (H): ICD-10-CM

## 2024-01-25 LAB
BASOPHILS # BLD AUTO: 0 10E3/UL (ref 0–0.2)
BASOPHILS NFR BLD AUTO: 1 %
EOSINOPHIL # BLD AUTO: 0.5 10E3/UL (ref 0–0.7)
EOSINOPHIL NFR BLD AUTO: 9 %
ERYTHROCYTE [DISTWIDTH] IN BLOOD BY AUTOMATED COUNT: 14.4 % (ref 10–15)
HBA1C MFR BLD: 5.5 %
HCT VFR BLD AUTO: 40 % (ref 40–53)
HGB BLD-MCNC: 13.1 G/DL (ref 13.3–17.7)
IMM GRANULOCYTES # BLD: 0 10E3/UL
IMM GRANULOCYTES NFR BLD: 0 %
LYMPHOCYTES # BLD AUTO: 2 10E3/UL (ref 0.8–5.3)
LYMPHOCYTES NFR BLD AUTO: 33 %
MCH RBC QN AUTO: 32.3 PG (ref 26.5–33)
MCHC RBC AUTO-ENTMCNC: 32.8 G/DL (ref 31.5–36.5)
MCV RBC AUTO: 99 FL (ref 78–100)
MONOCYTES # BLD AUTO: 0.5 10E3/UL (ref 0–1.3)
MONOCYTES NFR BLD AUTO: 8 %
NEUTROPHILS # BLD AUTO: 3 10E3/UL (ref 1.6–8.3)
NEUTROPHILS NFR BLD AUTO: 49 %
NRBC # BLD AUTO: 0 10E3/UL
NRBC BLD AUTO-RTO: 0 /100
PLATELET # BLD AUTO: 106 10E3/UL (ref 150–450)
RBC # BLD AUTO: 4.06 10E6/UL (ref 4.4–5.9)
WBC # BLD AUTO: 6 10E3/UL (ref 4–11)

## 2024-01-25 PROCEDURE — 85025 COMPLETE CBC W/AUTO DIFF WBC: CPT | Mod: ORL | Performed by: FAMILY MEDICINE

## 2024-01-25 PROCEDURE — 36415 COLL VENOUS BLD VENIPUNCTURE: CPT | Mod: ORL | Performed by: FAMILY MEDICINE

## 2024-01-25 PROCEDURE — 83036 HEMOGLOBIN GLYCOSYLATED A1C: CPT | Mod: ORL | Performed by: FAMILY MEDICINE

## 2024-01-25 PROCEDURE — P9603 ONE-WAY ALLOW PRORATED MILES: HCPCS | Mod: ORL | Performed by: FAMILY MEDICINE

## 2024-01-25 PROCEDURE — 80048 BASIC METABOLIC PNL TOTAL CA: CPT | Mod: ORL | Performed by: FAMILY MEDICINE

## 2024-01-25 PROCEDURE — 82306 VITAMIN D 25 HYDROXY: CPT | Mod: ORL | Performed by: FAMILY MEDICINE

## 2024-01-26 LAB
ANION GAP SERPL CALCULATED.3IONS-SCNC: 17 MMOL/L (ref 7–15)
BUN SERPL-MCNC: 65.1 MG/DL (ref 8–23)
CALCIUM SERPL-MCNC: 8.8 MG/DL (ref 8.8–10.2)
CHLORIDE SERPL-SCNC: 105 MMOL/L (ref 98–107)
CREAT SERPL-MCNC: 3.12 MG/DL (ref 0.67–1.17)
DEPRECATED HCO3 PLAS-SCNC: 18 MMOL/L (ref 22–29)
EGFRCR SERPLBLD CKD-EPI 2021: 19 ML/MIN/1.73M2
GLUCOSE SERPL-MCNC: 87 MG/DL (ref 70–99)
POTASSIUM SERPL-SCNC: 5.5 MMOL/L (ref 3.4–5.3)
SODIUM SERPL-SCNC: 140 MMOL/L (ref 135–145)
VIT D+METAB SERPL-MCNC: 86 NG/ML (ref 20–50)

## 2024-01-31 ENCOUNTER — LAB REQUISITION (OUTPATIENT)
Dept: LAB | Facility: CLINIC | Age: 84
End: 2024-01-31
Payer: COMMERCIAL

## 2024-01-31 DIAGNOSIS — D64.9 ANEMIA, UNSPECIFIED: ICD-10-CM

## 2024-01-31 DIAGNOSIS — I50.30 UNSPECIFIED DIASTOLIC (CONGESTIVE) HEART FAILURE (H): ICD-10-CM

## 2024-02-01 LAB
BASOPHILS # BLD AUTO: 0 10E3/UL (ref 0–0.2)
BASOPHILS NFR BLD AUTO: 0 %
EOSINOPHIL # BLD AUTO: 0.4 10E3/UL (ref 0–0.7)
EOSINOPHIL NFR BLD AUTO: 8 %
ERYTHROCYTE [DISTWIDTH] IN BLOOD BY AUTOMATED COUNT: 14.1 % (ref 10–15)
HCT VFR BLD AUTO: 37.7 % (ref 40–53)
HGB BLD-MCNC: 12.3 G/DL (ref 13.3–17.7)
IMM GRANULOCYTES # BLD: 0 10E3/UL
IMM GRANULOCYTES NFR BLD: 0 %
LYMPHOCYTES # BLD AUTO: 1.8 10E3/UL (ref 0.8–5.3)
LYMPHOCYTES NFR BLD AUTO: 34 %
MCH RBC QN AUTO: 33.2 PG (ref 26.5–33)
MCHC RBC AUTO-ENTMCNC: 32.6 G/DL (ref 31.5–36.5)
MCV RBC AUTO: 102 FL (ref 78–100)
MONOCYTES # BLD AUTO: 0.5 10E3/UL (ref 0–1.3)
MONOCYTES NFR BLD AUTO: 9 %
NEUTROPHILS # BLD AUTO: 2.6 10E3/UL (ref 1.6–8.3)
NEUTROPHILS NFR BLD AUTO: 49 %
NRBC # BLD AUTO: 0 10E3/UL
NRBC BLD AUTO-RTO: 0 /100
PLATELET # BLD AUTO: 114 10E3/UL (ref 150–450)
RBC # BLD AUTO: 3.71 10E6/UL (ref 4.4–5.9)
WBC # BLD AUTO: 5.3 10E3/UL (ref 4–11)

## 2024-02-01 PROCEDURE — P9603 ONE-WAY ALLOW PRORATED MILES: HCPCS | Mod: ORL | Performed by: FAMILY MEDICINE

## 2024-02-01 PROCEDURE — 80048 BASIC METABOLIC PNL TOTAL CA: CPT | Mod: ORL | Performed by: FAMILY MEDICINE

## 2024-02-01 PROCEDURE — 36415 COLL VENOUS BLD VENIPUNCTURE: CPT | Mod: ORL | Performed by: FAMILY MEDICINE

## 2024-02-01 PROCEDURE — 85025 COMPLETE CBC W/AUTO DIFF WBC: CPT | Mod: ORL | Performed by: FAMILY MEDICINE

## 2024-02-02 LAB
ANION GAP SERPL CALCULATED.3IONS-SCNC: 13 MMOL/L (ref 7–15)
BUN SERPL-MCNC: 65.6 MG/DL (ref 8–23)
CALCIUM SERPL-MCNC: 8.7 MG/DL (ref 8.8–10.2)
CHLORIDE SERPL-SCNC: 107 MMOL/L (ref 98–107)
CREAT SERPL-MCNC: 3.33 MG/DL (ref 0.67–1.17)
DEPRECATED HCO3 PLAS-SCNC: 22 MMOL/L (ref 22–29)
EGFRCR SERPLBLD CKD-EPI 2021: 17 ML/MIN/1.73M2
GLUCOSE SERPL-MCNC: 77 MG/DL (ref 70–99)
POTASSIUM SERPL-SCNC: 5.3 MMOL/L (ref 3.4–5.3)
SODIUM SERPL-SCNC: 142 MMOL/L (ref 135–145)

## 2024-02-07 ENCOUNTER — LAB REQUISITION (OUTPATIENT)
Dept: LAB | Facility: CLINIC | Age: 84
End: 2024-02-07
Payer: COMMERCIAL

## 2024-02-07 DIAGNOSIS — D64.9 ANEMIA, UNSPECIFIED: ICD-10-CM

## 2024-02-07 DIAGNOSIS — I50.30 UNSPECIFIED DIASTOLIC (CONGESTIVE) HEART FAILURE (H): ICD-10-CM

## 2024-02-08 LAB
ANION GAP SERPL CALCULATED.3IONS-SCNC: 11 MMOL/L (ref 7–15)
BASOPHILS # BLD AUTO: 0 10E3/UL (ref 0–0.2)
BASOPHILS NFR BLD AUTO: 1 %
BUN SERPL-MCNC: 61.7 MG/DL (ref 8–23)
CALCIUM SERPL-MCNC: 8.9 MG/DL (ref 8.8–10.2)
CHLORIDE SERPL-SCNC: 108 MMOL/L (ref 98–107)
CREAT SERPL-MCNC: 3.29 MG/DL (ref 0.67–1.17)
DEPRECATED HCO3 PLAS-SCNC: 22 MMOL/L (ref 22–29)
EGFRCR SERPLBLD CKD-EPI 2021: 18 ML/MIN/1.73M2
EOSINOPHIL # BLD AUTO: 0.4 10E3/UL (ref 0–0.7)
EOSINOPHIL NFR BLD AUTO: 8 %
ERYTHROCYTE [DISTWIDTH] IN BLOOD BY AUTOMATED COUNT: 14.1 % (ref 10–15)
GLUCOSE SERPL-MCNC: 90 MG/DL (ref 70–99)
HCT VFR BLD AUTO: 37.8 % (ref 40–53)
HGB BLD-MCNC: 12.1 G/DL (ref 13.3–17.7)
IMM GRANULOCYTES # BLD: 0 10E3/UL
IMM GRANULOCYTES NFR BLD: 0 %
LYMPHOCYTES # BLD AUTO: 1.6 10E3/UL (ref 0.8–5.3)
LYMPHOCYTES NFR BLD AUTO: 31 %
MCH RBC QN AUTO: 32.1 PG (ref 26.5–33)
MCHC RBC AUTO-ENTMCNC: 32 G/DL (ref 31.5–36.5)
MCV RBC AUTO: 100 FL (ref 78–100)
MONOCYTES # BLD AUTO: 0.4 10E3/UL (ref 0–1.3)
MONOCYTES NFR BLD AUTO: 8 %
NEUTROPHILS # BLD AUTO: 2.8 10E3/UL (ref 1.6–8.3)
NEUTROPHILS NFR BLD AUTO: 52 %
NRBC # BLD AUTO: 0 10E3/UL
NRBC BLD AUTO-RTO: 0 /100
PLATELET # BLD AUTO: 113 10E3/UL (ref 150–450)
POTASSIUM SERPL-SCNC: 5 MMOL/L (ref 3.4–5.3)
RBC # BLD AUTO: 3.77 10E6/UL (ref 4.4–5.9)
SODIUM SERPL-SCNC: 141 MMOL/L (ref 135–145)
WBC # BLD AUTO: 5.3 10E3/UL (ref 4–11)

## 2024-02-08 PROCEDURE — 36415 COLL VENOUS BLD VENIPUNCTURE: CPT | Mod: ORL | Performed by: FAMILY MEDICINE

## 2024-02-08 PROCEDURE — 85025 COMPLETE CBC W/AUTO DIFF WBC: CPT | Mod: ORL | Performed by: FAMILY MEDICINE

## 2024-02-08 PROCEDURE — 80048 BASIC METABOLIC PNL TOTAL CA: CPT | Mod: ORL | Performed by: FAMILY MEDICINE

## 2024-02-08 PROCEDURE — P9604 ONE-WAY ALLOW PRORATED TRIP: HCPCS | Mod: ORL | Performed by: FAMILY MEDICINE

## 2024-02-21 ENCOUNTER — LAB REQUISITION (OUTPATIENT)
Dept: LAB | Facility: CLINIC | Age: 84
End: 2024-02-21
Payer: COMMERCIAL

## 2024-02-21 DIAGNOSIS — R79.9 ABNORMAL FINDING OF BLOOD CHEMISTRY, UNSPECIFIED: ICD-10-CM

## 2024-02-22 LAB
BASOPHILS # BLD AUTO: 0 10E3/UL (ref 0–0.2)
BASOPHILS NFR BLD AUTO: 1 %
EOSINOPHIL # BLD AUTO: 0.5 10E3/UL (ref 0–0.7)
EOSINOPHIL NFR BLD AUTO: 8 %
ERYTHROCYTE [DISTWIDTH] IN BLOOD BY AUTOMATED COUNT: 14.1 % (ref 10–15)
HCT VFR BLD AUTO: 40.5 % (ref 40–53)
HGB BLD-MCNC: 13.2 G/DL (ref 13.3–17.7)
IMM GRANULOCYTES # BLD: 0 10E3/UL
IMM GRANULOCYTES NFR BLD: 0 %
LYMPHOCYTES # BLD AUTO: 2.5 10E3/UL (ref 0.8–5.3)
LYMPHOCYTES NFR BLD AUTO: 38 %
MCH RBC QN AUTO: 32.9 PG (ref 26.5–33)
MCHC RBC AUTO-ENTMCNC: 32.6 G/DL (ref 31.5–36.5)
MCV RBC AUTO: 101 FL (ref 78–100)
MONOCYTES # BLD AUTO: 0.5 10E3/UL (ref 0–1.3)
MONOCYTES NFR BLD AUTO: 8 %
NEUTROPHILS # BLD AUTO: 2.9 10E3/UL (ref 1.6–8.3)
NEUTROPHILS NFR BLD AUTO: 45 %
NRBC # BLD AUTO: 0 10E3/UL
NRBC BLD AUTO-RTO: 0 /100
PLATELET # BLD AUTO: 106 10E3/UL (ref 150–450)
RBC # BLD AUTO: 4.01 10E6/UL (ref 4.4–5.9)
WBC # BLD AUTO: 6.4 10E3/UL (ref 4–11)

## 2024-02-22 PROCEDURE — 85025 COMPLETE CBC W/AUTO DIFF WBC: CPT | Mod: ORL | Performed by: FAMILY MEDICINE

## 2024-02-22 PROCEDURE — P9604 ONE-WAY ALLOW PRORATED TRIP: HCPCS | Mod: ORL | Performed by: FAMILY MEDICINE

## 2024-02-22 PROCEDURE — 36415 COLL VENOUS BLD VENIPUNCTURE: CPT | Mod: ORL | Performed by: FAMILY MEDICINE

## 2024-03-06 ENCOUNTER — LAB REQUISITION (OUTPATIENT)
Dept: LAB | Facility: CLINIC | Age: 84
End: 2024-03-06
Payer: COMMERCIAL

## 2024-03-06 DIAGNOSIS — R79.9 ABNORMAL FINDING OF BLOOD CHEMISTRY, UNSPECIFIED: ICD-10-CM

## 2024-03-07 LAB
BASOPHILS # BLD AUTO: 0 10E3/UL (ref 0–0.2)
BASOPHILS NFR BLD AUTO: 1 %
EOSINOPHIL # BLD AUTO: 0.4 10E3/UL (ref 0–0.7)
EOSINOPHIL NFR BLD AUTO: 9 %
ERYTHROCYTE [DISTWIDTH] IN BLOOD BY AUTOMATED COUNT: 14.2 % (ref 10–15)
HCT VFR BLD AUTO: 36.8 % (ref 40–53)
HGB BLD-MCNC: 12.1 G/DL (ref 13.3–17.7)
IMM GRANULOCYTES # BLD: 0 10E3/UL
IMM GRANULOCYTES NFR BLD: 0 %
LYMPHOCYTES # BLD AUTO: 1.7 10E3/UL (ref 0.8–5.3)
LYMPHOCYTES NFR BLD AUTO: 34 %
MCH RBC QN AUTO: 33.8 PG (ref 26.5–33)
MCHC RBC AUTO-ENTMCNC: 32.9 G/DL (ref 31.5–36.5)
MCV RBC AUTO: 103 FL (ref 78–100)
MONOCYTES # BLD AUTO: 0.3 10E3/UL (ref 0–1.3)
MONOCYTES NFR BLD AUTO: 7 %
NEUTROPHILS # BLD AUTO: 2.4 10E3/UL (ref 1.6–8.3)
NEUTROPHILS NFR BLD AUTO: 49 %
NRBC # BLD AUTO: 0 10E3/UL
NRBC BLD AUTO-RTO: 0 /100
PLATELET # BLD AUTO: 115 10E3/UL (ref 150–450)
RBC # BLD AUTO: 3.58 10E6/UL (ref 4.4–5.9)
WBC # BLD AUTO: 5 10E3/UL (ref 4–11)

## 2024-03-07 PROCEDURE — 85025 COMPLETE CBC W/AUTO DIFF WBC: CPT | Mod: ORL | Performed by: FAMILY MEDICINE

## 2024-03-07 PROCEDURE — P9603 ONE-WAY ALLOW PRORATED MILES: HCPCS | Mod: ORL | Performed by: FAMILY MEDICINE

## 2024-03-07 PROCEDURE — 36415 COLL VENOUS BLD VENIPUNCTURE: CPT | Mod: ORL | Performed by: FAMILY MEDICINE

## 2024-03-20 ENCOUNTER — LAB REQUISITION (OUTPATIENT)
Dept: LAB | Facility: CLINIC | Age: 84
End: 2024-03-20
Payer: COMMERCIAL

## 2024-03-20 DIAGNOSIS — R79.9 ABNORMAL FINDING OF BLOOD CHEMISTRY, UNSPECIFIED: ICD-10-CM

## 2024-03-21 LAB
BASOPHILS # BLD AUTO: 0 10E3/UL (ref 0–0.2)
BASOPHILS NFR BLD AUTO: 1 %
EOSINOPHIL # BLD AUTO: 0.4 10E3/UL (ref 0–0.7)
EOSINOPHIL NFR BLD AUTO: 7 %
ERYTHROCYTE [DISTWIDTH] IN BLOOD BY AUTOMATED COUNT: 14.5 % (ref 10–15)
HCT VFR BLD AUTO: 37.8 % (ref 40–53)
HGB BLD-MCNC: 12.3 G/DL (ref 13.3–17.7)
IMM GRANULOCYTES # BLD: 0 10E3/UL
IMM GRANULOCYTES NFR BLD: 0 %
LYMPHOCYTES # BLD AUTO: 2 10E3/UL (ref 0.8–5.3)
LYMPHOCYTES NFR BLD AUTO: 34 %
MCH RBC QN AUTO: 33 PG (ref 26.5–33)
MCHC RBC AUTO-ENTMCNC: 32.5 G/DL (ref 31.5–36.5)
MCV RBC AUTO: 101 FL (ref 78–100)
MONOCYTES # BLD AUTO: 0.5 10E3/UL (ref 0–1.3)
MONOCYTES NFR BLD AUTO: 8 %
NEUTROPHILS # BLD AUTO: 2.9 10E3/UL (ref 1.6–8.3)
NEUTROPHILS NFR BLD AUTO: 50 %
NRBC # BLD AUTO: 0 10E3/UL
NRBC BLD AUTO-RTO: 0 /100
PLATELET # BLD AUTO: 114 10E3/UL (ref 150–450)
RBC # BLD AUTO: 3.73 10E6/UL (ref 4.4–5.9)
WBC # BLD AUTO: 5.8 10E3/UL (ref 4–11)

## 2024-03-21 PROCEDURE — 85025 COMPLETE CBC W/AUTO DIFF WBC: CPT | Mod: ORL | Performed by: FAMILY MEDICINE

## 2024-03-21 PROCEDURE — P9604 ONE-WAY ALLOW PRORATED TRIP: HCPCS | Mod: ORL | Performed by: FAMILY MEDICINE

## 2024-03-21 PROCEDURE — 36415 COLL VENOUS BLD VENIPUNCTURE: CPT | Mod: ORL | Performed by: FAMILY MEDICINE

## 2024-04-03 ENCOUNTER — LAB REQUISITION (OUTPATIENT)
Dept: LAB | Facility: CLINIC | Age: 84
End: 2024-04-03
Payer: COMMERCIAL

## 2024-04-03 DIAGNOSIS — R79.9 ABNORMAL FINDING OF BLOOD CHEMISTRY, UNSPECIFIED: ICD-10-CM

## 2024-04-04 LAB
BASOPHILS # BLD AUTO: 0 10E3/UL (ref 0–0.2)
BASOPHILS NFR BLD AUTO: 1 %
EOSINOPHIL # BLD AUTO: 0.4 10E3/UL (ref 0–0.7)
EOSINOPHIL NFR BLD AUTO: 5 %
ERYTHROCYTE [DISTWIDTH] IN BLOOD BY AUTOMATED COUNT: 15 % (ref 10–15)
HCT VFR BLD AUTO: 33.9 % (ref 40–53)
HGB BLD-MCNC: 11.1 G/DL (ref 13.3–17.7)
IMM GRANULOCYTES # BLD: 0 10E3/UL
IMM GRANULOCYTES NFR BLD: 0 %
LYMPHOCYTES # BLD AUTO: 1.9 10E3/UL (ref 0.8–5.3)
LYMPHOCYTES NFR BLD AUTO: 29 %
MCH RBC QN AUTO: 32.7 PG (ref 26.5–33)
MCHC RBC AUTO-ENTMCNC: 32.7 G/DL (ref 31.5–36.5)
MCV RBC AUTO: 100 FL (ref 78–100)
MONOCYTES # BLD AUTO: 0.5 10E3/UL (ref 0–1.3)
MONOCYTES NFR BLD AUTO: 7 %
NEUTROPHILS # BLD AUTO: 3.9 10E3/UL (ref 1.6–8.3)
NEUTROPHILS NFR BLD AUTO: 58 %
NRBC # BLD AUTO: 0 10E3/UL
NRBC BLD AUTO-RTO: 0 /100
PLATELET # BLD AUTO: 103 10E3/UL (ref 150–450)
RBC # BLD AUTO: 3.39 10E6/UL (ref 4.4–5.9)
WBC # BLD AUTO: 6.7 10E3/UL (ref 4–11)

## 2024-04-04 PROCEDURE — 85025 COMPLETE CBC W/AUTO DIFF WBC: CPT | Mod: ORL | Performed by: FAMILY MEDICINE

## 2024-04-04 PROCEDURE — P9604 ONE-WAY ALLOW PRORATED TRIP: HCPCS | Mod: ORL | Performed by: FAMILY MEDICINE

## 2024-04-04 PROCEDURE — 36415 COLL VENOUS BLD VENIPUNCTURE: CPT | Mod: ORL | Performed by: FAMILY MEDICINE

## 2024-04-17 ENCOUNTER — LAB REQUISITION (OUTPATIENT)
Dept: LAB | Facility: CLINIC | Age: 84
End: 2024-04-17
Payer: COMMERCIAL

## 2024-04-17 DIAGNOSIS — R79.9 ABNORMAL FINDING OF BLOOD CHEMISTRY, UNSPECIFIED: ICD-10-CM

## 2024-04-18 LAB
BASOPHILS # BLD AUTO: 0 10E3/UL (ref 0–0.2)
BASOPHILS NFR BLD AUTO: 0 %
EOSINOPHIL # BLD AUTO: 0.3 10E3/UL (ref 0–0.7)
EOSINOPHIL NFR BLD AUTO: 6 %
ERYTHROCYTE [DISTWIDTH] IN BLOOD BY AUTOMATED COUNT: 14.9 % (ref 10–15)
HCT VFR BLD AUTO: 37.1 % (ref 40–53)
HGB BLD-MCNC: 11.6 G/DL (ref 13.3–17.7)
IMM GRANULOCYTES # BLD: 0 10E3/UL
IMM GRANULOCYTES NFR BLD: 0 %
LYMPHOCYTES # BLD AUTO: 1.3 10E3/UL (ref 0.8–5.3)
LYMPHOCYTES NFR BLD AUTO: 23 %
MCH RBC QN AUTO: 32.1 PG (ref 26.5–33)
MCHC RBC AUTO-ENTMCNC: 31.3 G/DL (ref 31.5–36.5)
MCV RBC AUTO: 103 FL (ref 78–100)
MONOCYTES # BLD AUTO: 0.5 10E3/UL (ref 0–1.3)
MONOCYTES NFR BLD AUTO: 9 %
NEUTROPHILS # BLD AUTO: 3.4 10E3/UL (ref 1.6–8.3)
NEUTROPHILS NFR BLD AUTO: 62 %
NRBC # BLD AUTO: 0 10E3/UL
NRBC BLD AUTO-RTO: 0 /100
PLATELET # BLD AUTO: 110 10E3/UL (ref 150–450)
RBC # BLD AUTO: 3.61 10E6/UL (ref 4.4–5.9)
WBC # BLD AUTO: 5.5 10E3/UL (ref 4–11)

## 2024-04-18 PROCEDURE — 85025 COMPLETE CBC W/AUTO DIFF WBC: CPT | Mod: ORL | Performed by: FAMILY MEDICINE

## 2024-04-18 PROCEDURE — 36415 COLL VENOUS BLD VENIPUNCTURE: CPT | Mod: ORL | Performed by: FAMILY MEDICINE

## 2024-04-18 PROCEDURE — P9603 ONE-WAY ALLOW PRORATED MILES: HCPCS | Mod: ORL | Performed by: FAMILY MEDICINE

## 2024-05-08 ENCOUNTER — LAB REQUISITION (OUTPATIENT)
Dept: LAB | Facility: CLINIC | Age: 84
End: 2024-05-08
Payer: COMMERCIAL

## 2024-05-08 DIAGNOSIS — R79.9 ABNORMAL FINDING OF BLOOD CHEMISTRY, UNSPECIFIED: ICD-10-CM

## 2024-05-09 LAB
BASOPHILS # BLD AUTO: 0 10E3/UL (ref 0–0.2)
BASOPHILS NFR BLD AUTO: 1 %
EOSINOPHIL # BLD AUTO: 0.4 10E3/UL (ref 0–0.7)
EOSINOPHIL NFR BLD AUTO: 7 %
ERYTHROCYTE [DISTWIDTH] IN BLOOD BY AUTOMATED COUNT: 15 % (ref 10–15)
HCT VFR BLD AUTO: 33.8 % (ref 40–53)
HGB BLD-MCNC: 10.8 G/DL (ref 13.3–17.7)
IMM GRANULOCYTES # BLD: 0 10E3/UL
IMM GRANULOCYTES NFR BLD: 0 %
LYMPHOCYTES # BLD AUTO: 2.4 10E3/UL (ref 0.8–5.3)
LYMPHOCYTES NFR BLD AUTO: 37 %
MCH RBC QN AUTO: 32.8 PG (ref 26.5–33)
MCHC RBC AUTO-ENTMCNC: 32 G/DL (ref 31.5–36.5)
MCV RBC AUTO: 103 FL (ref 78–100)
MONOCYTES # BLD AUTO: 0.5 10E3/UL (ref 0–1.3)
MONOCYTES NFR BLD AUTO: 8 %
NEUTROPHILS # BLD AUTO: 3 10E3/UL (ref 1.6–8.3)
NEUTROPHILS NFR BLD AUTO: 47 %
NRBC # BLD AUTO: 0 10E3/UL
NRBC BLD AUTO-RTO: 0 /100
PLATELET # BLD AUTO: 128 10E3/UL (ref 150–450)
RBC # BLD AUTO: 3.29 10E6/UL (ref 4.4–5.9)
WBC # BLD AUTO: 6.5 10E3/UL (ref 4–11)

## 2024-05-09 PROCEDURE — P9603 ONE-WAY ALLOW PRORATED MILES: HCPCS | Mod: ORL | Performed by: FAMILY MEDICINE

## 2024-05-09 PROCEDURE — 36415 COLL VENOUS BLD VENIPUNCTURE: CPT | Mod: ORL | Performed by: FAMILY MEDICINE

## 2024-05-09 PROCEDURE — 85025 COMPLETE CBC W/AUTO DIFF WBC: CPT | Mod: ORL | Performed by: FAMILY MEDICINE

## 2024-05-15 ENCOUNTER — LAB REQUISITION (OUTPATIENT)
Dept: LAB | Facility: CLINIC | Age: 84
End: 2024-05-15
Payer: COMMERCIAL

## 2024-05-15 DIAGNOSIS — R79.9 ABNORMAL FINDING OF BLOOD CHEMISTRY, UNSPECIFIED: ICD-10-CM

## 2024-05-16 LAB
BASOPHILS # BLD AUTO: 0 10E3/UL (ref 0–0.2)
BASOPHILS NFR BLD AUTO: 1 %
EOSINOPHIL # BLD AUTO: 0.4 10E3/UL (ref 0–0.7)
EOSINOPHIL NFR BLD AUTO: 6 %
ERYTHROCYTE [DISTWIDTH] IN BLOOD BY AUTOMATED COUNT: 15 % (ref 10–15)
HBA1C MFR BLD: 5.2 %
HCT VFR BLD AUTO: 38.5 % (ref 40–53)
HGB BLD-MCNC: 12.5 G/DL (ref 13.3–17.7)
IMM GRANULOCYTES # BLD: 0 10E3/UL
IMM GRANULOCYTES NFR BLD: 0 %
LYMPHOCYTES # BLD AUTO: 2 10E3/UL (ref 0.8–5.3)
LYMPHOCYTES NFR BLD AUTO: 33 %
MCH RBC QN AUTO: 33.3 PG (ref 26.5–33)
MCHC RBC AUTO-ENTMCNC: 32.5 G/DL (ref 31.5–36.5)
MCV RBC AUTO: 103 FL (ref 78–100)
MONOCYTES # BLD AUTO: 0.4 10E3/UL (ref 0–1.3)
MONOCYTES NFR BLD AUTO: 6 %
NEUTROPHILS # BLD AUTO: 3.2 10E3/UL (ref 1.6–8.3)
NEUTROPHILS NFR BLD AUTO: 54 %
NRBC # BLD AUTO: 0 10E3/UL
NRBC BLD AUTO-RTO: 0 /100
PLATELET # BLD AUTO: 117 10E3/UL (ref 150–450)
RBC # BLD AUTO: 3.75 10E6/UL (ref 4.4–5.9)
WBC # BLD AUTO: 5.9 10E3/UL (ref 4–11)

## 2024-05-16 PROCEDURE — 36415 COLL VENOUS BLD VENIPUNCTURE: CPT | Mod: ORL | Performed by: FAMILY MEDICINE

## 2024-05-16 PROCEDURE — 83036 HEMOGLOBIN GLYCOSYLATED A1C: CPT | Mod: ORL | Performed by: FAMILY MEDICINE

## 2024-05-16 PROCEDURE — 80048 BASIC METABOLIC PNL TOTAL CA: CPT | Mod: ORL | Performed by: FAMILY MEDICINE

## 2024-05-16 PROCEDURE — 83550 IRON BINDING TEST: CPT | Mod: ORL | Performed by: FAMILY MEDICINE

## 2024-05-16 PROCEDURE — 85025 COMPLETE CBC W/AUTO DIFF WBC: CPT | Mod: ORL | Performed by: FAMILY MEDICINE

## 2024-05-16 PROCEDURE — 82306 VITAMIN D 25 HYDROXY: CPT | Mod: ORL | Performed by: FAMILY MEDICINE

## 2024-05-16 PROCEDURE — 82728 ASSAY OF FERRITIN: CPT | Mod: ORL | Performed by: FAMILY MEDICINE

## 2024-05-16 PROCEDURE — 82607 VITAMIN B-12: CPT | Mod: ORL | Performed by: FAMILY MEDICINE

## 2024-05-16 PROCEDURE — 84550 ASSAY OF BLOOD/URIC ACID: CPT | Mod: ORL | Performed by: FAMILY MEDICINE

## 2024-05-17 LAB
ANION GAP SERPL CALCULATED.3IONS-SCNC: 12 MMOL/L (ref 7–15)
BUN SERPL-MCNC: 46.6 MG/DL (ref 8–23)
CALCIUM SERPL-MCNC: 8.9 MG/DL (ref 8.8–10.2)
CHLORIDE SERPL-SCNC: 109 MMOL/L (ref 98–107)
CREAT SERPL-MCNC: 2.9 MG/DL (ref 0.67–1.17)
DEPRECATED HCO3 PLAS-SCNC: 20 MMOL/L (ref 22–29)
EGFRCR SERPLBLD CKD-EPI 2021: 21 ML/MIN/1.73M2
FERRITIN SERPL-MCNC: 94 NG/ML (ref 31–409)
GLUCOSE SERPL-MCNC: 83 MG/DL (ref 70–99)
IRON BINDING CAPACITY (ROCHE): 204 UG/DL (ref 240–430)
IRON SATN MFR SERPL: 54 % (ref 15–46)
IRON SERPL-MCNC: 111 UG/DL (ref 61–157)
POTASSIUM SERPL-SCNC: 5.3 MMOL/L (ref 3.4–5.3)
SODIUM SERPL-SCNC: 141 MMOL/L (ref 135–145)
URATE SERPL-MCNC: 7.4 MG/DL (ref 3.4–7)
VIT B12 SERPL-MCNC: 1463 PG/ML (ref 232–1245)
VIT D+METAB SERPL-MCNC: 71 NG/ML (ref 20–50)

## 2024-05-29 ENCOUNTER — LAB REQUISITION (OUTPATIENT)
Dept: LAB | Facility: CLINIC | Age: 84
End: 2024-05-29
Payer: COMMERCIAL

## 2024-05-29 DIAGNOSIS — R79.9 ABNORMAL FINDING OF BLOOD CHEMISTRY, UNSPECIFIED: ICD-10-CM

## 2024-05-30 LAB
BASOPHILS # BLD AUTO: 0 10E3/UL (ref 0–0.2)
BASOPHILS NFR BLD AUTO: 1 %
EOSINOPHIL # BLD AUTO: 0.5 10E3/UL (ref 0–0.7)
EOSINOPHIL NFR BLD AUTO: 9 %
ERYTHROCYTE [DISTWIDTH] IN BLOOD BY AUTOMATED COUNT: 15 % (ref 10–15)
HCT VFR BLD AUTO: 35.8 % (ref 40–53)
HGB BLD-MCNC: 11.4 G/DL (ref 13.3–17.7)
IMM GRANULOCYTES # BLD: 0 10E3/UL
IMM GRANULOCYTES NFR BLD: 0 %
LYMPHOCYTES # BLD AUTO: 2.3 10E3/UL (ref 0.8–5.3)
LYMPHOCYTES NFR BLD AUTO: 38 %
MCH RBC QN AUTO: 32.9 PG (ref 26.5–33)
MCHC RBC AUTO-ENTMCNC: 31.8 G/DL (ref 31.5–36.5)
MCV RBC AUTO: 104 FL (ref 78–100)
MONOCYTES # BLD AUTO: 0.5 10E3/UL (ref 0–1.3)
MONOCYTES NFR BLD AUTO: 9 %
NEUTROPHILS # BLD AUTO: 2.6 10E3/UL (ref 1.6–8.3)
NEUTROPHILS NFR BLD AUTO: 43 %
NRBC # BLD AUTO: 0 10E3/UL
NRBC BLD AUTO-RTO: 0 /100
PLATELET # BLD AUTO: 105 10E3/UL (ref 150–450)
RBC # BLD AUTO: 3.46 10E6/UL (ref 4.4–5.9)
WBC # BLD AUTO: 6 10E3/UL (ref 4–11)

## 2024-05-30 PROCEDURE — 85025 COMPLETE CBC W/AUTO DIFF WBC: CPT | Mod: ORL | Performed by: FAMILY MEDICINE

## 2024-05-30 PROCEDURE — 36415 COLL VENOUS BLD VENIPUNCTURE: CPT | Mod: ORL | Performed by: FAMILY MEDICINE

## 2024-05-30 PROCEDURE — P9603 ONE-WAY ALLOW PRORATED MILES: HCPCS | Mod: ORL | Performed by: FAMILY MEDICINE

## 2024-06-12 ENCOUNTER — LAB REQUISITION (OUTPATIENT)
Dept: LAB | Facility: CLINIC | Age: 84
End: 2024-06-12
Payer: COMMERCIAL

## 2024-06-12 DIAGNOSIS — R79.9 ABNORMAL FINDING OF BLOOD CHEMISTRY, UNSPECIFIED: ICD-10-CM

## 2024-06-13 LAB
BASOPHILS # BLD AUTO: 0 10E3/UL (ref 0–0.2)
BASOPHILS NFR BLD AUTO: 1 %
EOSINOPHIL # BLD AUTO: 0.5 10E3/UL (ref 0–0.7)
EOSINOPHIL NFR BLD AUTO: 8 %
ERYTHROCYTE [DISTWIDTH] IN BLOOD BY AUTOMATED COUNT: 15 % (ref 10–15)
HCT VFR BLD AUTO: 35.9 % (ref 40–53)
HGB BLD-MCNC: 11.2 G/DL (ref 13.3–17.7)
IMM GRANULOCYTES # BLD: 0 10E3/UL
IMM GRANULOCYTES NFR BLD: 0 %
LYMPHOCYTES # BLD AUTO: 1.9 10E3/UL (ref 0.8–5.3)
LYMPHOCYTES NFR BLD AUTO: 31 %
MCH RBC QN AUTO: 32.3 PG (ref 26.5–33)
MCHC RBC AUTO-ENTMCNC: 31.2 G/DL (ref 31.5–36.5)
MCV RBC AUTO: 104 FL (ref 78–100)
MONOCYTES # BLD AUTO: 0.4 10E3/UL (ref 0–1.3)
MONOCYTES NFR BLD AUTO: 6 %
NEUTROPHILS # BLD AUTO: 3.3 10E3/UL (ref 1.6–8.3)
NEUTROPHILS NFR BLD AUTO: 54 %
NRBC # BLD AUTO: 0 10E3/UL
NRBC BLD AUTO-RTO: 0 /100
PLATELET # BLD AUTO: 113 10E3/UL (ref 150–450)
RBC # BLD AUTO: 3.47 10E6/UL (ref 4.4–5.9)
WBC # BLD AUTO: 6 10E3/UL (ref 4–11)

## 2024-06-13 PROCEDURE — P9604 ONE-WAY ALLOW PRORATED TRIP: HCPCS | Mod: ORL | Performed by: FAMILY MEDICINE

## 2024-06-13 PROCEDURE — 36415 COLL VENOUS BLD VENIPUNCTURE: CPT | Mod: ORL | Performed by: FAMILY MEDICINE

## 2024-06-13 PROCEDURE — 85025 COMPLETE CBC W/AUTO DIFF WBC: CPT | Mod: ORL | Performed by: FAMILY MEDICINE

## 2024-06-25 ENCOUNTER — LAB REQUISITION (OUTPATIENT)
Dept: LAB | Facility: CLINIC | Age: 84
End: 2024-06-25
Payer: COMMERCIAL

## 2024-06-25 DIAGNOSIS — R79.9 ABNORMAL FINDING OF BLOOD CHEMISTRY, UNSPECIFIED: ICD-10-CM

## 2024-06-27 LAB
BASOPHILS # BLD AUTO: 0 10E3/UL (ref 0–0.2)
BASOPHILS NFR BLD AUTO: 1 %
EOSINOPHIL # BLD AUTO: 0.4 10E3/UL (ref 0–0.7)
EOSINOPHIL NFR BLD AUTO: 6 %
ERYTHROCYTE [DISTWIDTH] IN BLOOD BY AUTOMATED COUNT: 14.6 % (ref 10–15)
HCT VFR BLD AUTO: 35.7 % (ref 40–53)
HGB BLD-MCNC: 11.6 G/DL (ref 13.3–17.7)
IMM GRANULOCYTES # BLD: 0 10E3/UL
IMM GRANULOCYTES NFR BLD: 0 %
LYMPHOCYTES # BLD AUTO: 1.8 10E3/UL (ref 0.8–5.3)
LYMPHOCYTES NFR BLD AUTO: 28 %
MCH RBC QN AUTO: 33.2 PG (ref 26.5–33)
MCHC RBC AUTO-ENTMCNC: 32.5 G/DL (ref 31.5–36.5)
MCV RBC AUTO: 102 FL (ref 78–100)
MONOCYTES # BLD AUTO: 0.5 10E3/UL (ref 0–1.3)
MONOCYTES NFR BLD AUTO: 8 %
NEUTROPHILS # BLD AUTO: 3.7 10E3/UL (ref 1.6–8.3)
NEUTROPHILS NFR BLD AUTO: 57 %
NRBC # BLD AUTO: 0 10E3/UL
NRBC BLD AUTO-RTO: 0 /100
PLATELET # BLD AUTO: 97 10E3/UL (ref 150–450)
RBC # BLD AUTO: 3.49 10E6/UL (ref 4.4–5.9)
WBC # BLD AUTO: 6.5 10E3/UL (ref 4–11)

## 2024-06-27 PROCEDURE — P9604 ONE-WAY ALLOW PRORATED TRIP: HCPCS | Mod: ORL | Performed by: FAMILY MEDICINE

## 2024-06-27 PROCEDURE — 36415 COLL VENOUS BLD VENIPUNCTURE: CPT | Mod: ORL | Performed by: FAMILY MEDICINE

## 2024-06-27 PROCEDURE — 85025 COMPLETE CBC W/AUTO DIFF WBC: CPT | Mod: ORL | Performed by: FAMILY MEDICINE

## 2024-07-09 ENCOUNTER — LAB REQUISITION (OUTPATIENT)
Dept: LAB | Facility: CLINIC | Age: 84
End: 2024-07-09
Payer: COMMERCIAL

## 2024-07-09 DIAGNOSIS — R79.9 ABNORMAL FINDING OF BLOOD CHEMISTRY, UNSPECIFIED: ICD-10-CM

## 2024-07-11 LAB
BASOPHILS # BLD AUTO: 0 10E3/UL (ref 0–0.2)
BASOPHILS NFR BLD AUTO: 1 %
EOSINOPHIL # BLD AUTO: 0.4 10E3/UL (ref 0–0.7)
EOSINOPHIL NFR BLD AUTO: 6 %
ERYTHROCYTE [DISTWIDTH] IN BLOOD BY AUTOMATED COUNT: 14.6 % (ref 10–15)
HCT VFR BLD AUTO: 35.2 % (ref 40–53)
HGB BLD-MCNC: 11.2 G/DL (ref 13.3–17.7)
IMM GRANULOCYTES # BLD: 0 10E3/UL
IMM GRANULOCYTES NFR BLD: 0 %
LYMPHOCYTES # BLD AUTO: 2.3 10E3/UL (ref 0.8–5.3)
LYMPHOCYTES NFR BLD AUTO: 35 %
MCH RBC QN AUTO: 32.4 PG (ref 26.5–33)
MCHC RBC AUTO-ENTMCNC: 31.8 G/DL (ref 31.5–36.5)
MCV RBC AUTO: 102 FL (ref 78–100)
MONOCYTES # BLD AUTO: 0.5 10E3/UL (ref 0–1.3)
MONOCYTES NFR BLD AUTO: 7 %
NEUTROPHILS # BLD AUTO: 3.3 10E3/UL (ref 1.6–8.3)
NEUTROPHILS NFR BLD AUTO: 51 %
NRBC # BLD AUTO: 0 10E3/UL
NRBC BLD AUTO-RTO: 0 /100
PLATELET # BLD AUTO: 146 10E3/UL (ref 150–450)
RBC # BLD AUTO: 3.46 10E6/UL (ref 4.4–5.9)
WBC # BLD AUTO: 6.5 10E3/UL (ref 4–11)

## 2024-07-11 PROCEDURE — 36415 COLL VENOUS BLD VENIPUNCTURE: CPT | Mod: ORL | Performed by: FAMILY MEDICINE

## 2024-07-11 PROCEDURE — P9604 ONE-WAY ALLOW PRORATED TRIP: HCPCS | Mod: ORL | Performed by: FAMILY MEDICINE

## 2024-07-11 PROCEDURE — 85025 COMPLETE CBC W/AUTO DIFF WBC: CPT | Mod: ORL | Performed by: FAMILY MEDICINE

## 2024-07-23 ENCOUNTER — LAB REQUISITION (OUTPATIENT)
Dept: LAB | Facility: CLINIC | Age: 84
End: 2024-07-23
Payer: COMMERCIAL

## 2024-07-23 DIAGNOSIS — R79.9 ABNORMAL FINDING OF BLOOD CHEMISTRY, UNSPECIFIED: ICD-10-CM

## 2024-07-25 LAB
BASOPHILS # BLD AUTO: 0 10E3/UL (ref 0–0.2)
BASOPHILS NFR BLD AUTO: 1 %
EOSINOPHIL # BLD AUTO: 0.4 10E3/UL (ref 0–0.7)
EOSINOPHIL NFR BLD AUTO: 6 %
ERYTHROCYTE [DISTWIDTH] IN BLOOD BY AUTOMATED COUNT: 14.5 % (ref 10–15)
HCT VFR BLD AUTO: 36.7 % (ref 40–53)
HGB BLD-MCNC: 11.3 G/DL (ref 13.3–17.7)
IMM GRANULOCYTES # BLD: 0 10E3/UL
IMM GRANULOCYTES NFR BLD: 0 %
LYMPHOCYTES # BLD AUTO: 2.1 10E3/UL (ref 0.8–5.3)
LYMPHOCYTES NFR BLD AUTO: 29 %
MCH RBC QN AUTO: 31.9 PG (ref 26.5–33)
MCHC RBC AUTO-ENTMCNC: 30.8 G/DL (ref 31.5–36.5)
MCV RBC AUTO: 104 FL (ref 78–100)
MONOCYTES # BLD AUTO: 0.5 10E3/UL (ref 0–1.3)
MONOCYTES NFR BLD AUTO: 7 %
NEUTROPHILS # BLD AUTO: 4.2 10E3/UL (ref 1.6–8.3)
NEUTROPHILS NFR BLD AUTO: 57 %
NRBC # BLD AUTO: 0 10E3/UL
NRBC BLD AUTO-RTO: 0 /100
PLATELET # BLD AUTO: 109 10E3/UL (ref 150–450)
RBC # BLD AUTO: 3.54 10E6/UL (ref 4.4–5.9)
WBC # BLD AUTO: 7.3 10E3/UL (ref 4–11)

## 2024-07-25 PROCEDURE — P9604 ONE-WAY ALLOW PRORATED TRIP: HCPCS | Mod: ORL | Performed by: FAMILY MEDICINE

## 2024-07-25 PROCEDURE — 85025 COMPLETE CBC W/AUTO DIFF WBC: CPT | Mod: ORL | Performed by: FAMILY MEDICINE

## 2024-07-25 PROCEDURE — 36415 COLL VENOUS BLD VENIPUNCTURE: CPT | Mod: ORL | Performed by: FAMILY MEDICINE

## 2024-08-07 ENCOUNTER — LAB REQUISITION (OUTPATIENT)
Dept: LAB | Facility: CLINIC | Age: 84
End: 2024-08-07
Payer: COMMERCIAL

## 2024-08-07 DIAGNOSIS — I50.30 UNSPECIFIED DIASTOLIC (CONGESTIVE) HEART FAILURE (H): ICD-10-CM

## 2024-08-07 DIAGNOSIS — D64.9 ANEMIA, UNSPECIFIED: ICD-10-CM

## 2024-08-08 LAB
ANION GAP SERPL CALCULATED.3IONS-SCNC: 11 MMOL/L (ref 7–15)
BASOPHILS # BLD AUTO: 0 10E3/UL (ref 0–0.2)
BASOPHILS NFR BLD AUTO: 0 %
BUN SERPL-MCNC: 51.9 MG/DL (ref 8–23)
CALCIUM SERPL-MCNC: 8.7 MG/DL (ref 8.8–10.4)
CHLORIDE SERPL-SCNC: 111 MMOL/L (ref 98–107)
CREAT SERPL-MCNC: 2.97 MG/DL (ref 0.67–1.17)
EGFRCR SERPLBLD CKD-EPI 2021: 20 ML/MIN/1.73M2
EOSINOPHIL # BLD AUTO: 0.5 10E3/UL (ref 0–0.7)
EOSINOPHIL NFR BLD AUTO: 7 %
ERYTHROCYTE [DISTWIDTH] IN BLOOD BY AUTOMATED COUNT: 14.9 % (ref 10–15)
GLUCOSE SERPL-MCNC: 79 MG/DL (ref 70–99)
HCO3 SERPL-SCNC: 21 MMOL/L (ref 22–29)
HCT VFR BLD AUTO: 35 % (ref 40–53)
HGB BLD-MCNC: 10.7 G/DL (ref 13.3–17.7)
IMM GRANULOCYTES # BLD: 0 10E3/UL
IMM GRANULOCYTES NFR BLD: 0 %
LYMPHOCYTES # BLD AUTO: 2.5 10E3/UL (ref 0.8–5.3)
LYMPHOCYTES NFR BLD AUTO: 36 %
MCH RBC QN AUTO: 31.9 PG (ref 26.5–33)
MCHC RBC AUTO-ENTMCNC: 30.6 G/DL (ref 31.5–36.5)
MCV RBC AUTO: 105 FL (ref 78–100)
MONOCYTES # BLD AUTO: 0.5 10E3/UL (ref 0–1.3)
MONOCYTES NFR BLD AUTO: 7 %
NEUTROPHILS # BLD AUTO: 3.4 10E3/UL (ref 1.6–8.3)
NEUTROPHILS NFR BLD AUTO: 50 %
NRBC # BLD AUTO: 0 10E3/UL
NRBC BLD AUTO-RTO: 0 /100
PLATELET # BLD AUTO: 116 10E3/UL (ref 150–450)
POTASSIUM SERPL-SCNC: 5.1 MMOL/L (ref 3.4–5.3)
RBC # BLD AUTO: 3.35 10E6/UL (ref 4.4–5.9)
SODIUM SERPL-SCNC: 143 MMOL/L (ref 135–145)
WBC # BLD AUTO: 6.8 10E3/UL (ref 4–11)

## 2024-08-08 PROCEDURE — P9604 ONE-WAY ALLOW PRORATED TRIP: HCPCS | Mod: ORL | Performed by: FAMILY MEDICINE

## 2024-08-08 PROCEDURE — 85025 COMPLETE CBC W/AUTO DIFF WBC: CPT | Mod: ORL | Performed by: FAMILY MEDICINE

## 2024-08-08 PROCEDURE — 80048 BASIC METABOLIC PNL TOTAL CA: CPT | Mod: ORL | Performed by: FAMILY MEDICINE

## 2024-08-08 PROCEDURE — 36415 COLL VENOUS BLD VENIPUNCTURE: CPT | Mod: ORL | Performed by: FAMILY MEDICINE

## 2024-08-21 ENCOUNTER — LAB REQUISITION (OUTPATIENT)
Dept: LAB | Facility: CLINIC | Age: 84
End: 2024-08-21
Payer: COMMERCIAL

## 2024-08-21 DIAGNOSIS — R79.9 ABNORMAL FINDING OF BLOOD CHEMISTRY, UNSPECIFIED: ICD-10-CM

## 2024-08-29 LAB
BASOPHILS # BLD AUTO: 0 10E3/UL (ref 0–0.2)
BASOPHILS NFR BLD AUTO: 0 %
EOSINOPHIL # BLD AUTO: 0.3 10E3/UL (ref 0–0.7)
EOSINOPHIL NFR BLD AUTO: 4 %
ERYTHROCYTE [DISTWIDTH] IN BLOOD BY AUTOMATED COUNT: 15.3 % (ref 10–15)
HCT VFR BLD AUTO: 33.6 % (ref 40–53)
HGB BLD-MCNC: 10.5 G/DL (ref 13.3–17.7)
IMM GRANULOCYTES # BLD: 0 10E3/UL
IMM GRANULOCYTES NFR BLD: 0 %
LYMPHOCYTES # BLD AUTO: 2.4 10E3/UL (ref 0.8–5.3)
LYMPHOCYTES NFR BLD AUTO: 30 %
MCH RBC QN AUTO: 32.6 PG (ref 26.5–33)
MCHC RBC AUTO-ENTMCNC: 31.3 G/DL (ref 31.5–36.5)
MCV RBC AUTO: 104 FL (ref 78–100)
MONOCYTES # BLD AUTO: 0.7 10E3/UL (ref 0–1.3)
MONOCYTES NFR BLD AUTO: 8 %
NEUTROPHILS # BLD AUTO: 4.6 10E3/UL (ref 1.6–8.3)
NEUTROPHILS NFR BLD AUTO: 58 %
NRBC # BLD AUTO: 0 10E3/UL
NRBC BLD AUTO-RTO: 0 /100
PLATELET # BLD AUTO: 95 10E3/UL (ref 150–450)
RBC # BLD AUTO: 3.22 10E6/UL (ref 4.4–5.9)
WBC # BLD AUTO: 8 10E3/UL (ref 4–11)

## 2024-08-29 PROCEDURE — 85025 COMPLETE CBC W/AUTO DIFF WBC: CPT | Mod: ORL | Performed by: FAMILY MEDICINE

## 2024-08-29 PROCEDURE — P9604 ONE-WAY ALLOW PRORATED TRIP: HCPCS | Mod: ORL | Performed by: FAMILY MEDICINE

## 2024-08-29 PROCEDURE — 36415 COLL VENOUS BLD VENIPUNCTURE: CPT | Mod: ORL | Performed by: FAMILY MEDICINE

## 2024-09-04 ENCOUNTER — LAB REQUISITION (OUTPATIENT)
Dept: LAB | Facility: CLINIC | Age: 84
End: 2024-09-04
Payer: COMMERCIAL

## 2024-09-04 DIAGNOSIS — R79.9 ABNORMAL FINDING OF BLOOD CHEMISTRY, UNSPECIFIED: ICD-10-CM

## 2024-09-05 LAB
BASOPHILS # BLD AUTO: 0 10E3/UL (ref 0–0.2)
BASOPHILS NFR BLD AUTO: 1 %
EOSINOPHIL # BLD AUTO: 0.4 10E3/UL (ref 0–0.7)
EOSINOPHIL NFR BLD AUTO: 6 %
ERYTHROCYTE [DISTWIDTH] IN BLOOD BY AUTOMATED COUNT: 14.9 % (ref 10–15)
HCT VFR BLD AUTO: 34.7 % (ref 40–53)
HGB BLD-MCNC: 10.8 G/DL (ref 13.3–17.7)
IMM GRANULOCYTES # BLD: 0 10E3/UL
IMM GRANULOCYTES NFR BLD: 0 %
LYMPHOCYTES # BLD AUTO: 2.2 10E3/UL (ref 0.8–5.3)
LYMPHOCYTES NFR BLD AUTO: 36 %
MCH RBC QN AUTO: 32.4 PG (ref 26.5–33)
MCHC RBC AUTO-ENTMCNC: 31.1 G/DL (ref 31.5–36.5)
MCV RBC AUTO: 104 FL (ref 78–100)
MONOCYTES # BLD AUTO: 0.5 10E3/UL (ref 0–1.3)
MONOCYTES NFR BLD AUTO: 8 %
NEUTROPHILS # BLD AUTO: 3.1 10E3/UL (ref 1.6–8.3)
NEUTROPHILS NFR BLD AUTO: 49 %
NRBC # BLD AUTO: 0 10E3/UL
NRBC BLD AUTO-RTO: 0 /100
PLATELET # BLD AUTO: 121 10E3/UL (ref 150–450)
RBC # BLD AUTO: 3.33 10E6/UL (ref 4.4–5.9)
WBC # BLD AUTO: 6.1 10E3/UL (ref 4–11)

## 2024-09-05 PROCEDURE — 85025 COMPLETE CBC W/AUTO DIFF WBC: CPT | Mod: ORL | Performed by: FAMILY MEDICINE

## 2024-09-05 PROCEDURE — P9603 ONE-WAY ALLOW PRORATED MILES: HCPCS | Mod: ORL | Performed by: FAMILY MEDICINE

## 2024-09-05 PROCEDURE — 36415 COLL VENOUS BLD VENIPUNCTURE: CPT | Mod: ORL | Performed by: FAMILY MEDICINE

## 2024-09-18 ENCOUNTER — LAB REQUISITION (OUTPATIENT)
Dept: LAB | Facility: CLINIC | Age: 84
End: 2024-09-18
Payer: COMMERCIAL

## 2024-09-18 DIAGNOSIS — R79.9 ABNORMAL FINDING OF BLOOD CHEMISTRY, UNSPECIFIED: ICD-10-CM

## 2024-09-19 LAB
BASOPHILS # BLD AUTO: 0 10E3/UL (ref 0–0.2)
BASOPHILS NFR BLD AUTO: 0 %
EOSINOPHIL # BLD AUTO: 0.4 10E3/UL (ref 0–0.7)
EOSINOPHIL NFR BLD AUTO: 5 %
ERYTHROCYTE [DISTWIDTH] IN BLOOD BY AUTOMATED COUNT: 15.2 % (ref 10–15)
HCT VFR BLD AUTO: 35.4 % (ref 40–53)
HGB BLD-MCNC: 11.3 G/DL (ref 13.3–17.7)
IMM GRANULOCYTES # BLD: 0 10E3/UL
IMM GRANULOCYTES NFR BLD: 0 %
LYMPHOCYTES # BLD AUTO: 2 10E3/UL (ref 0.8–5.3)
LYMPHOCYTES NFR BLD AUTO: 29 %
MCH RBC QN AUTO: 33.1 PG (ref 26.5–33)
MCHC RBC AUTO-ENTMCNC: 31.9 G/DL (ref 31.5–36.5)
MCV RBC AUTO: 104 FL (ref 78–100)
MONOCYTES # BLD AUTO: 0.6 10E3/UL (ref 0–1.3)
MONOCYTES NFR BLD AUTO: 8 %
NEUTROPHILS # BLD AUTO: 4 10E3/UL (ref 1.6–8.3)
NEUTROPHILS NFR BLD AUTO: 57 %
NRBC # BLD AUTO: 0 10E3/UL
NRBC BLD AUTO-RTO: 0 /100
PLATELET # BLD AUTO: 93 10E3/UL (ref 150–450)
RBC # BLD AUTO: 3.41 10E6/UL (ref 4.4–5.9)
WBC # BLD AUTO: 7 10E3/UL (ref 4–11)

## 2024-09-19 PROCEDURE — 36415 COLL VENOUS BLD VENIPUNCTURE: CPT | Mod: ORL | Performed by: FAMILY MEDICINE

## 2024-09-19 PROCEDURE — P9604 ONE-WAY ALLOW PRORATED TRIP: HCPCS | Mod: ORL | Performed by: FAMILY MEDICINE

## 2024-09-19 PROCEDURE — 85025 COMPLETE CBC W/AUTO DIFF WBC: CPT | Mod: ORL | Performed by: FAMILY MEDICINE

## 2024-10-15 ENCOUNTER — LAB REQUISITION (OUTPATIENT)
Dept: LAB | Facility: CLINIC | Age: 84
End: 2024-10-15
Payer: COMMERCIAL

## 2024-10-15 DIAGNOSIS — R79.9 ABNORMAL FINDING OF BLOOD CHEMISTRY, UNSPECIFIED: ICD-10-CM

## 2024-10-17 LAB
BASOPHILS # BLD AUTO: 0 10E3/UL (ref 0–0.2)
BASOPHILS NFR BLD AUTO: 1 %
EOSINOPHIL # BLD AUTO: 0.4 10E3/UL (ref 0–0.7)
EOSINOPHIL NFR BLD AUTO: 6 %
ERYTHROCYTE [DISTWIDTH] IN BLOOD BY AUTOMATED COUNT: 15.6 % (ref 10–15)
HCT VFR BLD AUTO: 34.6 % (ref 40–53)
HGB BLD-MCNC: 10.7 G/DL (ref 13.3–17.7)
IMM GRANULOCYTES # BLD: 0 10E3/UL
IMM GRANULOCYTES NFR BLD: 1 %
LYMPHOCYTES # BLD AUTO: 2.1 10E3/UL (ref 0.8–5.3)
LYMPHOCYTES NFR BLD AUTO: 32 %
MCH RBC QN AUTO: 32.4 PG (ref 26.5–33)
MCHC RBC AUTO-ENTMCNC: 30.9 G/DL (ref 31.5–36.5)
MCV RBC AUTO: 105 FL (ref 78–100)
MONOCYTES # BLD AUTO: 0.5 10E3/UL (ref 0–1.3)
MONOCYTES NFR BLD AUTO: 8 %
NEUTROPHILS # BLD AUTO: 3.5 10E3/UL (ref 1.6–8.3)
NEUTROPHILS NFR BLD AUTO: 53 %
NRBC # BLD AUTO: 0 10E3/UL
NRBC BLD AUTO-RTO: 0 /100
PLATELET # BLD AUTO: 100 10E3/UL (ref 150–450)
RBC # BLD AUTO: 3.3 10E6/UL (ref 4.4–5.9)
WBC # BLD AUTO: 6.5 10E3/UL (ref 4–11)

## 2024-10-17 PROCEDURE — P9604 ONE-WAY ALLOW PRORATED TRIP: HCPCS | Mod: ORL | Performed by: PHYSICIAN ASSISTANT

## 2024-10-17 PROCEDURE — 85025 COMPLETE CBC W/AUTO DIFF WBC: CPT | Mod: ORL | Performed by: PHYSICIAN ASSISTANT

## 2024-10-17 PROCEDURE — 36415 COLL VENOUS BLD VENIPUNCTURE: CPT | Mod: ORL | Performed by: PHYSICIAN ASSISTANT

## 2024-11-05 ENCOUNTER — LAB REQUISITION (OUTPATIENT)
Dept: LAB | Facility: CLINIC | Age: 84
End: 2024-11-05
Payer: COMMERCIAL

## 2024-11-05 DIAGNOSIS — R79.9 ABNORMAL FINDING OF BLOOD CHEMISTRY, UNSPECIFIED: ICD-10-CM

## 2024-11-07 LAB
BASOPHILS # BLD AUTO: 0 10E3/UL (ref 0–0.2)
BASOPHILS NFR BLD AUTO: 1 %
EOSINOPHIL # BLD AUTO: 0.4 10E3/UL (ref 0–0.7)
EOSINOPHIL NFR BLD AUTO: 6 %
ERYTHROCYTE [DISTWIDTH] IN BLOOD BY AUTOMATED COUNT: 15.3 % (ref 10–15)
HCT VFR BLD AUTO: 37.1 % (ref 40–53)
HGB BLD-MCNC: 11.6 G/DL (ref 13.3–17.7)
IMM GRANULOCYTES # BLD: 0 10E3/UL
IMM GRANULOCYTES NFR BLD: 0 %
LYMPHOCYTES # BLD AUTO: 3 10E3/UL (ref 0.8–5.3)
LYMPHOCYTES NFR BLD AUTO: 43 %
MCH RBC QN AUTO: 32.6 PG (ref 26.5–33)
MCHC RBC AUTO-ENTMCNC: 31.3 G/DL (ref 31.5–36.5)
MCV RBC AUTO: 104 FL (ref 78–100)
MONOCYTES # BLD AUTO: 0.5 10E3/UL (ref 0–1.3)
MONOCYTES NFR BLD AUTO: 6 %
NEUTROPHILS # BLD AUTO: 3 10E3/UL (ref 1.6–8.3)
NEUTROPHILS NFR BLD AUTO: 44 %
NRBC # BLD AUTO: 0 10E3/UL
NRBC BLD AUTO-RTO: 0 /100
PLATELET # BLD AUTO: 94 10E3/UL (ref 150–450)
RBC # BLD AUTO: 3.56 10E6/UL (ref 4.4–5.9)
WBC # BLD AUTO: 7 10E3/UL (ref 4–11)

## 2024-11-07 PROCEDURE — 36415 COLL VENOUS BLD VENIPUNCTURE: CPT | Mod: ORL | Performed by: PHYSICIAN ASSISTANT

## 2024-11-07 PROCEDURE — 85025 COMPLETE CBC W/AUTO DIFF WBC: CPT | Mod: ORL | Performed by: PHYSICIAN ASSISTANT

## 2024-11-07 PROCEDURE — P9604 ONE-WAY ALLOW PRORATED TRIP: HCPCS | Mod: ORL | Performed by: PHYSICIAN ASSISTANT

## 2024-11-12 ENCOUNTER — LAB REQUISITION (OUTPATIENT)
Dept: LAB | Facility: CLINIC | Age: 84
End: 2024-11-12
Payer: COMMERCIAL

## 2024-11-12 DIAGNOSIS — R79.9 ABNORMAL FINDING OF BLOOD CHEMISTRY, UNSPECIFIED: ICD-10-CM

## 2024-11-14 LAB
BASOPHILS # BLD AUTO: 0 10E3/UL (ref 0–0.2)
BASOPHILS NFR BLD AUTO: 1 %
EOSINOPHIL # BLD AUTO: 0.4 10E3/UL (ref 0–0.7)
EOSINOPHIL NFR BLD AUTO: 6 %
ERYTHROCYTE [DISTWIDTH] IN BLOOD BY AUTOMATED COUNT: 14.8 % (ref 10–15)
HCT VFR BLD AUTO: 36.7 % (ref 40–53)
HGB BLD-MCNC: 11.7 G/DL (ref 13.3–17.7)
IMM GRANULOCYTES # BLD: 0.1 10E3/UL
IMM GRANULOCYTES NFR BLD: 1 %
LYMPHOCYTES # BLD AUTO: 1.9 10E3/UL (ref 0.8–5.3)
LYMPHOCYTES NFR BLD AUTO: 31 %
MCH RBC QN AUTO: 33 PG (ref 26.5–33)
MCHC RBC AUTO-ENTMCNC: 31.9 G/DL (ref 31.5–36.5)
MCV RBC AUTO: 103 FL (ref 78–100)
MONOCYTES # BLD AUTO: 0.4 10E3/UL (ref 0–1.3)
MONOCYTES NFR BLD AUTO: 7 %
NEUTROPHILS # BLD AUTO: 3.3 10E3/UL (ref 1.6–8.3)
NEUTROPHILS NFR BLD AUTO: 55 %
NRBC # BLD AUTO: 0 10E3/UL
NRBC BLD AUTO-RTO: 0 /100
PLATELET # BLD AUTO: 99 10E3/UL (ref 150–450)
RBC # BLD AUTO: 3.55 10E6/UL (ref 4.4–5.9)
WBC # BLD AUTO: 6.1 10E3/UL (ref 4–11)

## 2024-11-14 PROCEDURE — 85025 COMPLETE CBC W/AUTO DIFF WBC: CPT | Mod: ORL | Performed by: PHYSICIAN ASSISTANT

## 2024-11-14 PROCEDURE — P9603 ONE-WAY ALLOW PRORATED MILES: HCPCS | Mod: ORL | Performed by: PHYSICIAN ASSISTANT

## 2024-11-14 PROCEDURE — 36415 COLL VENOUS BLD VENIPUNCTURE: CPT | Mod: ORL | Performed by: PHYSICIAN ASSISTANT

## 2024-12-04 ENCOUNTER — LAB REQUISITION (OUTPATIENT)
Dept: LAB | Facility: CLINIC | Age: 84
End: 2024-12-04
Payer: COMMERCIAL

## 2024-12-04 DIAGNOSIS — R79.9 ABNORMAL FINDING OF BLOOD CHEMISTRY, UNSPECIFIED: ICD-10-CM

## 2024-12-05 LAB
BASOPHILS # BLD AUTO: 0 10E3/UL (ref 0–0.2)
BASOPHILS NFR BLD AUTO: 1 %
EOSINOPHIL # BLD AUTO: 0.4 10E3/UL (ref 0–0.7)
EOSINOPHIL NFR BLD AUTO: 7 %
ERYTHROCYTE [DISTWIDTH] IN BLOOD BY AUTOMATED COUNT: 15.5 % (ref 10–15)
HCT VFR BLD AUTO: 36.1 % (ref 40–53)
HGB BLD-MCNC: 11.5 G/DL (ref 13.3–17.7)
IMM GRANULOCYTES # BLD: 0 10E3/UL
IMM GRANULOCYTES NFR BLD: 0 %
LYMPHOCYTES # BLD AUTO: 2 10E3/UL (ref 0.8–5.3)
LYMPHOCYTES NFR BLD AUTO: 33 %
MCH RBC QN AUTO: 32.7 PG (ref 26.5–33)
MCHC RBC AUTO-ENTMCNC: 31.9 G/DL (ref 31.5–36.5)
MCV RBC AUTO: 103 FL (ref 78–100)
MONOCYTES # BLD AUTO: 0.4 10E3/UL (ref 0–1.3)
MONOCYTES NFR BLD AUTO: 7 %
NEUTROPHILS # BLD AUTO: 3 10E3/UL (ref 1.6–8.3)
NEUTROPHILS NFR BLD AUTO: 51 %
NRBC # BLD AUTO: 0 10E3/UL
NRBC BLD AUTO-RTO: 0 /100
PLATELET # BLD AUTO: 102 10E3/UL (ref 150–450)
RBC # BLD AUTO: 3.52 10E6/UL (ref 4.4–5.9)
WBC # BLD AUTO: 6 10E3/UL (ref 4–11)

## 2024-12-05 PROCEDURE — 36415 COLL VENOUS BLD VENIPUNCTURE: CPT | Mod: ORL | Performed by: FAMILY MEDICINE

## 2024-12-05 PROCEDURE — P9603 ONE-WAY ALLOW PRORATED MILES: HCPCS | Mod: ORL | Performed by: FAMILY MEDICINE

## 2024-12-05 PROCEDURE — 85025 COMPLETE CBC W/AUTO DIFF WBC: CPT | Mod: ORL | Performed by: FAMILY MEDICINE

## 2024-12-31 ENCOUNTER — LAB REQUISITION (OUTPATIENT)
Dept: LAB | Facility: CLINIC | Age: 84
End: 2024-12-31
Payer: COMMERCIAL

## 2024-12-31 DIAGNOSIS — R79.9 ABNORMAL FINDING OF BLOOD CHEMISTRY, UNSPECIFIED: ICD-10-CM

## 2025-01-01 ENCOUNTER — APPOINTMENT (OUTPATIENT)
Dept: ULTRASOUND IMAGING | Facility: CLINIC | Age: 85
DRG: 871 | End: 2025-01-01
Attending: PHYSICIAN ASSISTANT
Payer: COMMERCIAL

## 2025-01-01 ENCOUNTER — APPOINTMENT (OUTPATIENT)
Dept: SPEECH THERAPY | Facility: CLINIC | Age: 85
DRG: 871 | End: 2025-01-01
Attending: INTERNAL MEDICINE
Payer: COMMERCIAL

## 2025-01-01 ENCOUNTER — LAB REQUISITION (OUTPATIENT)
Dept: LAB | Facility: CLINIC | Age: 85
End: 2025-01-01
Payer: COMMERCIAL

## 2025-01-01 ENCOUNTER — APPOINTMENT (OUTPATIENT)
Dept: SPEECH THERAPY | Facility: CLINIC | Age: 85
DRG: 871 | End: 2025-01-01
Payer: COMMERCIAL

## 2025-01-01 ENCOUNTER — APPOINTMENT (OUTPATIENT)
Dept: GENERAL RADIOLOGY | Facility: CLINIC | Age: 85
DRG: 871 | End: 2025-01-01
Attending: INTERNAL MEDICINE
Payer: COMMERCIAL

## 2025-01-01 ENCOUNTER — APPOINTMENT (OUTPATIENT)
Dept: PHYSICAL THERAPY | Facility: CLINIC | Age: 85
End: 2025-01-01
Payer: COMMERCIAL

## 2025-01-01 DIAGNOSIS — D64.9 ANEMIA, UNSPECIFIED: ICD-10-CM

## 2025-01-01 PROCEDURE — 93971 EXTREMITY STUDY: CPT | Mod: RT

## 2025-01-01 PROCEDURE — 74220 X-RAY XM ESOPHAGUS 1CNTRST: CPT

## 2025-01-02 LAB
BASOPHILS # BLD AUTO: 0 10E3/UL (ref 0–0.2)
BASOPHILS NFR BLD AUTO: 0 %
EOSINOPHIL # BLD AUTO: 0.4 10E3/UL (ref 0–0.7)
EOSINOPHIL NFR BLD AUTO: 5 %
ERYTHROCYTE [DISTWIDTH] IN BLOOD BY AUTOMATED COUNT: 17.3 % (ref 10–15)
FERRITIN SERPL-MCNC: 120 NG/ML (ref 31–409)
HCT VFR BLD AUTO: 37.9 % (ref 40–53)
HGB BLD-MCNC: 12.4 G/DL (ref 13.3–17.7)
IMM GRANULOCYTES # BLD: 0 10E3/UL
IMM GRANULOCYTES NFR BLD: 0 %
IRON BINDING CAPACITY (ROCHE): 205 UG/DL (ref 240–430)
IRON SATN MFR SERPL: 53 % (ref 15–46)
IRON SERPL-MCNC: 109 UG/DL (ref 61–157)
LYMPHOCYTES # BLD AUTO: 3.1 10E3/UL (ref 0.8–5.3)
LYMPHOCYTES NFR BLD AUTO: 38 %
MCH RBC QN AUTO: 33.2 PG (ref 26.5–33)
MCHC RBC AUTO-ENTMCNC: 32.7 G/DL (ref 31.5–36.5)
MCV RBC AUTO: 102 FL (ref 78–100)
MONOCYTES # BLD AUTO: 0.5 10E3/UL (ref 0–1.3)
MONOCYTES NFR BLD AUTO: 6 %
NEUTROPHILS # BLD AUTO: 4.3 10E3/UL (ref 1.6–8.3)
NEUTROPHILS NFR BLD AUTO: 51 %
NRBC # BLD AUTO: 0 10E3/UL
NRBC BLD AUTO-RTO: 0 /100
PLATELET # BLD AUTO: 126 10E3/UL (ref 150–450)
RBC # BLD AUTO: 3.73 10E6/UL (ref 4.4–5.9)
WBC # BLD AUTO: 8.4 10E3/UL (ref 4–11)

## 2025-01-02 PROCEDURE — 83550 IRON BINDING TEST: CPT | Mod: ORL | Performed by: PHYSICIAN ASSISTANT

## 2025-01-02 PROCEDURE — 85025 COMPLETE CBC W/AUTO DIFF WBC: CPT | Mod: ORL | Performed by: PHYSICIAN ASSISTANT

## 2025-01-02 PROCEDURE — 36415 COLL VENOUS BLD VENIPUNCTURE: CPT | Mod: ORL | Performed by: PHYSICIAN ASSISTANT

## 2025-01-02 PROCEDURE — P9604 ONE-WAY ALLOW PRORATED TRIP: HCPCS | Mod: ORL | Performed by: PHYSICIAN ASSISTANT

## 2025-01-02 PROCEDURE — 82728 ASSAY OF FERRITIN: CPT | Mod: ORL | Performed by: PHYSICIAN ASSISTANT

## 2025-02-11 ENCOUNTER — LAB REQUISITION (OUTPATIENT)
Dept: LAB | Facility: CLINIC | Age: 85
End: 2025-02-11
Payer: COMMERCIAL

## 2025-02-11 DIAGNOSIS — I12.9 HYPERTENSIVE CHRONIC KIDNEY DISEASE WITH STAGE 1 THROUGH STAGE 4 CHRONIC KIDNEY DISEASE, OR UNSPECIFIED CHRONIC KIDNEY DISEASE: ICD-10-CM

## 2025-02-11 DIAGNOSIS — E53.8 DEFICIENCY OF OTHER SPECIFIED B GROUP VITAMINS: ICD-10-CM

## 2025-02-11 DIAGNOSIS — E11.9 TYPE 2 DIABETES MELLITUS WITHOUT COMPLICATIONS (H): ICD-10-CM

## 2025-02-11 DIAGNOSIS — D64.9 ANEMIA, UNSPECIFIED: ICD-10-CM

## 2025-02-11 DIAGNOSIS — E55.9 VITAMIN D DEFICIENCY, UNSPECIFIED: ICD-10-CM

## 2025-02-13 LAB
ERYTHROCYTE [DISTWIDTH] IN BLOOD BY AUTOMATED COUNT: 18.5 % (ref 10–15)
EST. AVERAGE GLUCOSE BLD GHB EST-MCNC: 103 MG/DL
HBA1C MFR BLD: 5.2 %
HCT VFR BLD AUTO: 33.5 % (ref 40–53)
HGB BLD-MCNC: 11 G/DL (ref 13.3–17.7)
MCH RBC QN AUTO: 33.6 PG (ref 26.5–33)
MCHC RBC AUTO-ENTMCNC: 32.8 G/DL (ref 31.5–36.5)
MCV RBC AUTO: 102 FL (ref 78–100)
PLATELET # BLD AUTO: 123 10E3/UL (ref 150–450)
RBC # BLD AUTO: 3.27 10E6/UL (ref 4.4–5.9)
WBC # BLD AUTO: 7.4 10E3/UL (ref 4–11)

## 2025-02-13 PROCEDURE — 82607 VITAMIN B-12: CPT | Mod: ORL | Performed by: PHYSICIAN ASSISTANT

## 2025-02-13 PROCEDURE — 80048 BASIC METABOLIC PNL TOTAL CA: CPT | Mod: ORL | Performed by: PHYSICIAN ASSISTANT

## 2025-02-13 PROCEDURE — 82306 VITAMIN D 25 HYDROXY: CPT | Mod: ORL | Performed by: PHYSICIAN ASSISTANT

## 2025-02-13 PROCEDURE — 85027 COMPLETE CBC AUTOMATED: CPT | Mod: ORL | Performed by: PHYSICIAN ASSISTANT

## 2025-02-13 PROCEDURE — P9604 ONE-WAY ALLOW PRORATED TRIP: HCPCS | Mod: ORL | Performed by: PHYSICIAN ASSISTANT

## 2025-02-13 PROCEDURE — 83036 HEMOGLOBIN GLYCOSYLATED A1C: CPT | Mod: ORL | Performed by: PHYSICIAN ASSISTANT

## 2025-02-13 PROCEDURE — 36415 COLL VENOUS BLD VENIPUNCTURE: CPT | Mod: ORL | Performed by: PHYSICIAN ASSISTANT

## 2025-02-14 LAB
ANION GAP SERPL CALCULATED.3IONS-SCNC: 16 MMOL/L (ref 7–15)
BUN SERPL-MCNC: 75.4 MG/DL (ref 8–23)
CALCIUM SERPL-MCNC: 8.5 MG/DL (ref 8.8–10.4)
CHLORIDE SERPL-SCNC: 113 MMOL/L (ref 98–107)
CREAT SERPL-MCNC: 4.74 MG/DL (ref 0.67–1.17)
EGFRCR SERPLBLD CKD-EPI 2021: 11 ML/MIN/1.73M2
GLUCOSE SERPL-MCNC: 97 MG/DL (ref 70–99)
HCO3 SERPL-SCNC: 13 MMOL/L (ref 22–29)
POTASSIUM SERPL-SCNC: 4.6 MMOL/L (ref 3.4–5.3)
SODIUM SERPL-SCNC: 142 MMOL/L (ref 135–145)
VIT B12 SERPL-MCNC: 2026 PG/ML (ref 232–1245)
VIT D+METAB SERPL-MCNC: 56 NG/ML (ref 20–50)

## 2025-02-20 ENCOUNTER — APPOINTMENT (OUTPATIENT)
Dept: CARDIOLOGY | Facility: CLINIC | Age: 85
End: 2025-02-20
Attending: PHYSICIAN ASSISTANT
Payer: COMMERCIAL

## 2025-02-20 ENCOUNTER — HOSPITAL ENCOUNTER (INPATIENT)
Facility: CLINIC | Age: 85
End: 2025-02-20
Attending: EMERGENCY MEDICINE
Payer: COMMERCIAL

## 2025-02-20 VITALS
SYSTOLIC BLOOD PRESSURE: 118 MMHG | TEMPERATURE: 97.7 F | OXYGEN SATURATION: 100 % | HEIGHT: 69 IN | WEIGHT: 160 LBS | RESPIRATION RATE: 26 BRPM | BODY MASS INDEX: 23.7 KG/M2 | DIASTOLIC BLOOD PRESSURE: 90 MMHG | HEART RATE: 62 BPM

## 2025-02-20 DIAGNOSIS — R65.20 SEVERE SEPSIS (H): ICD-10-CM

## 2025-02-20 DIAGNOSIS — B96.5 PSEUDOMONAL BACTEREMIA: ICD-10-CM

## 2025-02-20 DIAGNOSIS — I35.0 NONRHEUMATIC AORTIC VALVE STENOSIS: Primary | ICD-10-CM

## 2025-02-20 DIAGNOSIS — N18.4 CKD (CHRONIC KIDNEY DISEASE) STAGE 4, GFR 15-29 ML/MIN (H): ICD-10-CM

## 2025-02-20 DIAGNOSIS — R78.81 PSEUDOMONAL BACTEREMIA: ICD-10-CM

## 2025-02-20 DIAGNOSIS — R62.7 FAILURE TO THRIVE IN ADULT: ICD-10-CM

## 2025-02-20 DIAGNOSIS — L03.115 CELLULITIS OF RIGHT LEG: ICD-10-CM

## 2025-02-20 DIAGNOSIS — N17.9 ACUTE KIDNEY INJURY: ICD-10-CM

## 2025-02-20 DIAGNOSIS — A41.9 SEVERE SEPSIS (H): ICD-10-CM

## 2025-02-20 LAB
ALBUMIN MFR UR ELPH: 27 MG/DL
ALBUMIN SERPL BCG-MCNC: 3.3 G/DL (ref 3.5–5.2)
ALBUMIN UR-MCNC: 20 MG/DL
ALP SERPL-CCNC: 90 U/L (ref 40–150)
ALT SERPL W P-5'-P-CCNC: 11 U/L (ref 0–70)
ANION GAP SERPL CALCULATED.3IONS-SCNC: 16 MMOL/L (ref 7–15)
APPEARANCE UR: CLEAR
AST SERPL W P-5'-P-CCNC: 25 U/L (ref 0–45)
ATRIAL RATE - MUSE: 95 BPM
B-OH-BUTYR SERPL-SCNC: 0.52 MMOL/L
BACTERIA BLD CULT: NORMAL
BACTERIA BLD CULT: NORMAL
BASE EXCESS BLDV CALC-SCNC: -16 MMOL/L (ref -3–3)
BASOPHILS # BLD AUTO: 0 10E3/UL (ref 0–0.2)
BASOPHILS NFR BLD AUTO: 0 %
BILIRUB SERPL-MCNC: 0.3 MG/DL
BILIRUB UR QL STRIP: NEGATIVE
BUN SERPL-MCNC: 72.9 MG/DL (ref 8–23)
CALCIUM SERPL-MCNC: 8.1 MG/DL (ref 8.8–10.4)
CHLORIDE SERPL-SCNC: 115 MMOL/L (ref 98–107)
COLOR UR AUTO: ABNORMAL
CREAT SERPL-MCNC: 5.42 MG/DL (ref 0.67–1.17)
CREAT UR-MCNC: 69 MG/DL
DIASTOLIC BLOOD PRESSURE - MUSE: NORMAL MMHG
EGFRCR SERPLBLD CKD-EPI 2021: 10 ML/MIN/1.73M2
EOSINOPHIL # BLD AUTO: 0.1 10E3/UL (ref 0–0.7)
EOSINOPHIL NFR BLD AUTO: 1 %
ERYTHROCYTE [DISTWIDTH] IN BLOOD BY AUTOMATED COUNT: 18 % (ref 10–15)
FERRITIN SERPL-MCNC: 132 NG/ML (ref 31–409)
GLUCOSE SERPL-MCNC: 107 MG/DL (ref 70–99)
GLUCOSE UR STRIP-MCNC: NEGATIVE MG/DL
HCO3 BLDV-SCNC: 10 MMOL/L (ref 21–28)
HCO3 SERPL-SCNC: 12 MMOL/L (ref 22–29)
HCT VFR BLD AUTO: 35.2 % (ref 40–53)
HGB BLD-MCNC: 12 G/DL (ref 13.3–17.7)
HGB UR QL STRIP: ABNORMAL
HOLD SPECIMEN: NORMAL
IMM GRANULOCYTES # BLD: 0.1 10E3/UL
IMM GRANULOCYTES NFR BLD: 1 %
INTERPRETATION ECG - MUSE: NORMAL
IRON BINDING CAPACITY (ROCHE): 161 UG/DL (ref 240–430)
IRON SATN MFR SERPL: 8 % (ref 15–46)
IRON SERPL-MCNC: 13 UG/DL (ref 61–157)
KETONES UR STRIP-MCNC: NEGATIVE MG/DL
LACTATE BLD-SCNC: 2.6 MMOL/L
LACTATE SERPL-SCNC: 1.5 MMOL/L (ref 0.7–2)
LEUKOCYTE ESTERASE UR QL STRIP: ABNORMAL
LVEF ECHO: NORMAL
LYMPHOCYTES # BLD AUTO: 0.6 10E3/UL (ref 0.8–5.3)
LYMPHOCYTES NFR BLD AUTO: 3 %
MCH RBC QN AUTO: 34 PG (ref 26.5–33)
MCHC RBC AUTO-ENTMCNC: 34.1 G/DL (ref 31.5–36.5)
MCV RBC AUTO: 100 FL (ref 78–100)
MONOCYTES # BLD AUTO: 1.3 10E3/UL (ref 0–1.3)
MONOCYTES NFR BLD AUTO: 7 %
MRSA DNA SPEC QL NAA+PROBE: NEGATIVE
NEUTROPHILS # BLD AUTO: 15 10E3/UL (ref 1.6–8.3)
NEUTROPHILS NFR BLD AUTO: 88 %
NITRATE UR QL: NEGATIVE
NRBC # BLD AUTO: 0 10E3/UL
NRBC BLD AUTO-RTO: 0 /100
P AXIS - MUSE: 32 DEGREES
PCO2 BLDV: 24 MM HG (ref 40–50)
PH BLDV: 7.22 [PH] (ref 7.32–7.43)
PH UR STRIP: 5 [PH] (ref 5–7)
PLATELET # BLD AUTO: 120 10E3/UL (ref 150–450)
PO2 BLDV: 32 MM HG (ref 25–47)
POTASSIUM SERPL-SCNC: 4.5 MMOL/L (ref 3.4–5.3)
PR INTERVAL - MUSE: 192 MS
PROT SERPL-MCNC: 5.6 G/DL (ref 6.4–8.3)
PROT/CREAT 24H UR: 0.39 MG/MG CR (ref 0–0.2)
QRS DURATION - MUSE: 134 MS
QT - MUSE: 402 MS
QTC - MUSE: 505 MS
R AXIS - MUSE: -81 DEGREES
RBC # BLD AUTO: 3.53 10E6/UL (ref 4.4–5.9)
RBC URINE: 39 /HPF
SA TARGET DNA: NEGATIVE
SAO2 % BLDV: 53 % (ref 70–75)
SODIUM SERPL-SCNC: 143 MMOL/L (ref 135–145)
SP GR UR STRIP: 1.01 (ref 1–1.03)
SQUAMOUS EPITHELIAL: <1 /HPF
SYSTOLIC BLOOD PRESSURE - MUSE: NORMAL MMHG
T AXIS - MUSE: 56 DEGREES
TROPONIN T SERPL HS-MCNC: 228 NG/L
TROPONIN T SERPL HS-MCNC: 229 NG/L
TROPONIN T SERPL HS-MCNC: 235 NG/L
TROPONIN T SERPL HS-MCNC: NORMAL NG/L
UROBILINOGEN UR STRIP-MCNC: NORMAL MG/DL
VENTRICULAR RATE- MUSE: 95 BPM
VIT D+METAB SERPL-MCNC: 47 NG/ML (ref 20–50)
WBC # BLD AUTO: 17 10E3/UL (ref 4–11)
WBC URINE: 8 /HPF

## 2025-02-20 PROCEDURE — 36415 COLL VENOUS BLD VENIPUNCTURE: CPT | Performed by: EMERGENCY MEDICINE

## 2025-02-20 PROCEDURE — 87186 SC STD MICRODIL/AGAR DIL: CPT | Performed by: EMERGENCY MEDICINE

## 2025-02-20 PROCEDURE — 85004 AUTOMATED DIFF WBC COUNT: CPT | Performed by: EMERGENCY MEDICINE

## 2025-02-20 PROCEDURE — 120N000013 HC R&B IMCU

## 2025-02-20 PROCEDURE — 87149 DNA/RNA DIRECT PROBE: CPT | Performed by: EMERGENCY MEDICINE

## 2025-02-20 PROCEDURE — G0463 HOSPITAL OUTPT CLINIC VISIT: HCPCS

## 2025-02-20 PROCEDURE — 82435 ASSAY OF BLOOD CHLORIDE: CPT | Performed by: EMERGENCY MEDICINE

## 2025-02-20 PROCEDURE — 87641 MR-STAPH DNA AMP PROBE: CPT | Performed by: PHYSICIAN ASSISTANT

## 2025-02-20 PROCEDURE — 36415 COLL VENOUS BLD VENIPUNCTURE: CPT | Performed by: PHYSICIAN ASSISTANT

## 2025-02-20 PROCEDURE — 255N000002 HC RX 255 OP 636: Performed by: PHYSICIAN ASSISTANT

## 2025-02-20 PROCEDURE — 99222 1ST HOSP IP/OBS MODERATE 55: CPT | Performed by: INTERNAL MEDICINE

## 2025-02-20 PROCEDURE — 258N000002 HC RX IP 258 OP 250: Performed by: INTERNAL MEDICINE

## 2025-02-20 PROCEDURE — C8929 TTE W OR WO FOL WCON,DOPPLER: HCPCS

## 2025-02-20 PROCEDURE — 99223 1ST HOSP IP/OBS HIGH 75: CPT | Performed by: INTERNAL MEDICINE

## 2025-02-20 PROCEDURE — 999N000208 ECHOCARDIOGRAM COMPLETE

## 2025-02-20 PROCEDURE — 258N000003 HC RX IP 258 OP 636: Performed by: EMERGENCY MEDICINE

## 2025-02-20 PROCEDURE — 83605 ASSAY OF LACTIC ACID: CPT

## 2025-02-20 PROCEDURE — 250N000011 HC RX IP 250 OP 636: Performed by: PHYSICIAN ASSISTANT

## 2025-02-20 PROCEDURE — 96361 HYDRATE IV INFUSION ADD-ON: CPT

## 2025-02-20 PROCEDURE — 93306 TTE W/DOPPLER COMPLETE: CPT | Mod: 26 | Performed by: INTERNAL MEDICINE

## 2025-02-20 PROCEDURE — 82947 ASSAY GLUCOSE BLOOD QUANT: CPT | Performed by: EMERGENCY MEDICINE

## 2025-02-20 PROCEDURE — 84484 ASSAY OF TROPONIN QUANT: CPT | Performed by: EMERGENCY MEDICINE

## 2025-02-20 PROCEDURE — 82010 KETONE BODYS QUAN: CPT | Performed by: EMERGENCY MEDICINE

## 2025-02-20 PROCEDURE — 96365 THER/PROPH/DIAG IV INF INIT: CPT

## 2025-02-20 PROCEDURE — 83550 IRON BINDING TEST: CPT | Performed by: INTERNAL MEDICINE

## 2025-02-20 PROCEDURE — 99223 1ST HOSP IP/OBS HIGH 75: CPT | Performed by: PHYSICIAN ASSISTANT

## 2025-02-20 PROCEDURE — 82247 BILIRUBIN TOTAL: CPT | Performed by: EMERGENCY MEDICINE

## 2025-02-20 PROCEDURE — 93005 ELECTROCARDIOGRAM TRACING: CPT

## 2025-02-20 PROCEDURE — 36415 COLL VENOUS BLD VENIPUNCTURE: CPT

## 2025-02-20 PROCEDURE — 250N000013 HC RX MED GY IP 250 OP 250 PS 637: Performed by: PHYSICIAN ASSISTANT

## 2025-02-20 PROCEDURE — 81001 URINALYSIS AUTO W/SCOPE: CPT | Performed by: INTERNAL MEDICINE

## 2025-02-20 PROCEDURE — 82728 ASSAY OF FERRITIN: CPT | Performed by: INTERNAL MEDICINE

## 2025-02-20 PROCEDURE — 84156 ASSAY OF PROTEIN URINE: CPT | Performed by: INTERNAL MEDICINE

## 2025-02-20 PROCEDURE — 82803 BLOOD GASES ANY COMBINATION: CPT

## 2025-02-20 PROCEDURE — 84295 ASSAY OF SERUM SODIUM: CPT | Performed by: EMERGENCY MEDICINE

## 2025-02-20 PROCEDURE — 82306 VITAMIN D 25 HYDROXY: CPT | Performed by: INTERNAL MEDICINE

## 2025-02-20 PROCEDURE — 87640 STAPH A DNA AMP PROBE: CPT | Performed by: PHYSICIAN ASSISTANT

## 2025-02-20 PROCEDURE — 85025 COMPLETE CBC W/AUTO DIFF WBC: CPT | Performed by: EMERGENCY MEDICINE

## 2025-02-20 PROCEDURE — 84484 ASSAY OF TROPONIN QUANT: CPT | Performed by: PHYSICIAN ASSISTANT

## 2025-02-20 PROCEDURE — 250N000011 HC RX IP 250 OP 636: Performed by: EMERGENCY MEDICINE

## 2025-02-20 PROCEDURE — 87040 BLOOD CULTURE FOR BACTERIA: CPT | Performed by: EMERGENCY MEDICINE

## 2025-02-20 PROCEDURE — 83540 ASSAY OF IRON: CPT | Performed by: INTERNAL MEDICINE

## 2025-02-20 PROCEDURE — 250N000009 HC RX 250: Performed by: INTERNAL MEDICINE

## 2025-02-20 PROCEDURE — 99285 EMERGENCY DEPT VISIT HI MDM: CPT | Mod: 25

## 2025-02-20 PROCEDURE — 99207 PR NO BILLABLE SERVICE THIS VISIT: CPT | Performed by: INTERNAL MEDICINE

## 2025-02-20 RX ORDER — NALOXONE HYDROCHLORIDE 0.4 MG/ML
0.4 INJECTION, SOLUTION INTRAMUSCULAR; INTRAVENOUS; SUBCUTANEOUS
Status: DISCONTINUED | OUTPATIENT
Start: 2025-02-20 | End: 2025-02-25

## 2025-02-20 RX ORDER — ONDANSETRON 2 MG/ML
4 INJECTION INTRAMUSCULAR; INTRAVENOUS EVERY 6 HOURS PRN
Status: DISCONTINUED | OUTPATIENT
Start: 2025-02-20 | End: 2025-02-21

## 2025-02-20 RX ORDER — CALCIUM CARBONATE 500 MG/1
1000 TABLET, CHEWABLE ORAL 4 TIMES DAILY PRN
Status: DISCONTINUED | OUTPATIENT
Start: 2025-02-20 | End: 2025-03-06 | Stop reason: HOSPADM

## 2025-02-20 RX ORDER — PIPERACILLIN SODIUM, TAZOBACTAM SODIUM 2; .25 G/10ML; G/10ML
2.25 INJECTION, POWDER, LYOPHILIZED, FOR SOLUTION INTRAVENOUS EVERY 6 HOURS
Status: DISCONTINUED | OUTPATIENT
Start: 2025-02-20 | End: 2025-02-23

## 2025-02-20 RX ORDER — NALOXONE HYDROCHLORIDE 0.4 MG/ML
0.2 INJECTION, SOLUTION INTRAMUSCULAR; INTRAVENOUS; SUBCUTANEOUS
Status: DISCONTINUED | OUTPATIENT
Start: 2025-02-20 | End: 2025-02-25

## 2025-02-20 RX ORDER — ASPIRIN 81 MG/1
324 TABLET, CHEWABLE ORAL ONCE
Status: COMPLETED | OUTPATIENT
Start: 2025-02-20 | End: 2025-02-20

## 2025-02-20 RX ORDER — FUROSEMIDE 40 MG/1
40 TABLET ORAL DAILY
Status: ON HOLD | COMMUNITY
End: 2025-03-02

## 2025-02-20 RX ORDER — SODIUM CHLORIDE 9 MG/ML
INJECTION, SOLUTION INTRAVENOUS CONTINUOUS
Status: DISCONTINUED | OUTPATIENT
Start: 2025-02-20 | End: 2025-02-20

## 2025-02-20 RX ORDER — HEPARIN SODIUM 10000 [USP'U]/100ML
0-5000 INJECTION, SOLUTION INTRAVENOUS CONTINUOUS
Status: DISCONTINUED | OUTPATIENT
Start: 2025-02-20 | End: 2025-02-22

## 2025-02-20 RX ORDER — ASPIRIN 81 MG/1
324 TABLET, CHEWABLE ORAL ONCE
Status: DISCONTINUED | OUTPATIENT
Start: 2025-02-20 | End: 2025-02-20

## 2025-02-20 RX ORDER — LIDOCAINE 40 MG/G
CREAM TOPICAL
Status: DISCONTINUED | OUTPATIENT
Start: 2025-02-20 | End: 2025-03-06 | Stop reason: HOSPADM

## 2025-02-20 RX ORDER — HYDROMORPHONE HCL IN WATER/PF 6 MG/30 ML
0.4 PATIENT CONTROLLED ANALGESIA SYRINGE INTRAVENOUS
Status: DISCONTINUED | OUTPATIENT
Start: 2025-02-20 | End: 2025-02-27

## 2025-02-20 RX ORDER — CHOLESTYRAMINE 4 G/9G
4 POWDER, FOR SUSPENSION ORAL DAILY
Status: ON HOLD | COMMUNITY
End: 2025-03-02

## 2025-02-20 RX ORDER — ASPIRIN 81 MG/1
81 TABLET ORAL DAILY
Status: DISCONTINUED | OUTPATIENT
Start: 2025-02-20 | End: 2025-02-20

## 2025-02-20 RX ORDER — ONDANSETRON 4 MG/1
4 TABLET, ORALLY DISINTEGRATING ORAL EVERY 6 HOURS PRN
Status: DISCONTINUED | OUTPATIENT
Start: 2025-02-20 | End: 2025-02-21

## 2025-02-20 RX ORDER — PROCHLORPERAZINE MALEATE 5 MG/1
5 TABLET ORAL EVERY 6 HOURS PRN
Status: DISCONTINUED | OUTPATIENT
Start: 2025-02-20 | End: 2025-03-06 | Stop reason: HOSPADM

## 2025-02-20 RX ORDER — AMOXICILLIN 250 MG
2 CAPSULE ORAL 2 TIMES DAILY PRN
Status: DISCONTINUED | OUTPATIENT
Start: 2025-02-20 | End: 2025-02-25

## 2025-02-20 RX ORDER — ACETAMINOPHEN 325 MG/1
650 TABLET ORAL EVERY 4 HOURS PRN
Status: DISCONTINUED | OUTPATIENT
Start: 2025-02-20 | End: 2025-03-06 | Stop reason: HOSPADM

## 2025-02-20 RX ORDER — PIPERACILLIN SODIUM, TAZOBACTAM SODIUM 2; .25 G/10ML; G/10ML
4.5 INJECTION, POWDER, LYOPHILIZED, FOR SOLUTION INTRAVENOUS ONCE
Status: DISCONTINUED | OUTPATIENT
Start: 2025-02-20 | End: 2025-02-20

## 2025-02-20 RX ORDER — ACETAMINOPHEN 325 MG/1
975 TABLET ORAL EVERY 8 HOURS
Status: DISCONTINUED | OUTPATIENT
Start: 2025-02-20 | End: 2025-02-27

## 2025-02-20 RX ORDER — POLYETHYLENE GLYCOL 3350 17 G/17G
17 POWDER, FOR SOLUTION ORAL 2 TIMES DAILY PRN
Status: DISCONTINUED | OUTPATIENT
Start: 2025-02-20 | End: 2025-03-06 | Stop reason: HOSPADM

## 2025-02-20 RX ORDER — LIDOCAINE 40 MG/G
CREAM TOPICAL
Status: DISCONTINUED | OUTPATIENT
Start: 2025-02-20 | End: 2025-02-20

## 2025-02-20 RX ORDER — ASPIRIN 81 MG/1
81 TABLET, CHEWABLE ORAL DAILY
Status: DISCONTINUED | OUTPATIENT
Start: 2025-02-21 | End: 2025-02-25

## 2025-02-20 RX ORDER — ACETAMINOPHEN 650 MG/1
650 SUPPOSITORY RECTAL EVERY 4 HOURS PRN
Status: DISCONTINUED | OUTPATIENT
Start: 2025-02-20 | End: 2025-03-06 | Stop reason: HOSPADM

## 2025-02-20 RX ORDER — CHOLESTYRAMINE 4 G/9G
4 POWDER, FOR SUSPENSION ORAL DAILY
Status: DISCONTINUED | OUTPATIENT
Start: 2025-02-20 | End: 2025-02-28

## 2025-02-20 RX ORDER — HYDROMORPHONE HCL IN WATER/PF 6 MG/30 ML
0.2 PATIENT CONTROLLED ANALGESIA SYRINGE INTRAVENOUS
Status: DISCONTINUED | OUTPATIENT
Start: 2025-02-20 | End: 2025-02-27

## 2025-02-20 RX ORDER — PIPERACILLIN SODIUM, TAZOBACTAM SODIUM 3; .375 G/15ML; G/15ML
3.38 INJECTION, POWDER, LYOPHILIZED, FOR SOLUTION INTRAVENOUS ONCE
Status: COMPLETED | OUTPATIENT
Start: 2025-02-20 | End: 2025-02-20

## 2025-02-20 RX ORDER — AMOXICILLIN 250 MG
1 CAPSULE ORAL 2 TIMES DAILY PRN
Status: DISCONTINUED | OUTPATIENT
Start: 2025-02-20 | End: 2025-02-25

## 2025-02-20 RX ORDER — SALIVA STIMULANT COMB. NO.3
1 SPRAY, NON-AEROSOL (ML) MUCOUS MEMBRANE 4 TIMES DAILY
Status: DISCONTINUED | OUTPATIENT
Start: 2025-02-20 | End: 2025-03-06 | Stop reason: HOSPADM

## 2025-02-20 RX ORDER — CLONIDINE 0.1 MG/24H
1 PATCH, EXTENDED RELEASE TRANSDERMAL WEEKLY
Status: ON HOLD | COMMUNITY
End: 2025-03-02

## 2025-02-20 RX ORDER — BISACODYL 10 MG
10 SUPPOSITORY, RECTAL RECTAL DAILY PRN
Status: DISCONTINUED | OUTPATIENT
Start: 2025-02-20 | End: 2025-03-06 | Stop reason: HOSPADM

## 2025-02-20 RX ORDER — OXYCODONE HYDROCHLORIDE 5 MG/1
5 TABLET ORAL EVERY 4 HOURS PRN
Status: DISCONTINUED | OUTPATIENT
Start: 2025-02-20 | End: 2025-02-25

## 2025-02-20 RX ADMIN — CHOLESTYRAMINE 4 G: 4 POWDER, FOR SUSPENSION ORAL at 14:32

## 2025-02-20 RX ADMIN — SODIUM CHLORIDE 1500 ML: 9 INJECTION, SOLUTION INTRAVENOUS at 07:51

## 2025-02-20 RX ADMIN — SODIUM CHLORIDE 1000 ML: 0.9 INJECTION, SOLUTION INTRAVENOUS at 07:06

## 2025-02-20 RX ADMIN — PIPERACILLIN AND TAZOBACTAM 3.38 G: 3; .375 INJECTION, POWDER, FOR SOLUTION INTRAVENOUS at 08:42

## 2025-02-20 RX ADMIN — Medication 1 SPRAY: at 16:44

## 2025-02-20 RX ADMIN — VANCOMYCIN HYDROCHLORIDE 1500 MG: 10 INJECTION, POWDER, LYOPHILIZED, FOR SOLUTION INTRAVENOUS at 09:15

## 2025-02-20 RX ADMIN — ASPIRIN 324 MG: 81 TABLET, CHEWABLE ORAL at 13:15

## 2025-02-20 RX ADMIN — Medication 1 SPRAY: at 20:40

## 2025-02-20 RX ADMIN — PIPERACILLIN AND TAZOBACTAM 2.25 G: 2; .25 INJECTION, POWDER, FOR SOLUTION INTRAVENOUS at 14:31

## 2025-02-20 RX ADMIN — ACETAMINOPHEN 975 MG: 325 TABLET, FILM COATED ORAL at 20:46

## 2025-02-20 RX ADMIN — Medication 5 MG: at 14:32

## 2025-02-20 RX ADMIN — ACETAMINOPHEN 975 MG: 325 TABLET, FILM COATED ORAL at 13:15

## 2025-02-20 RX ADMIN — SODIUM BICARBONATE: 84 INJECTION, SOLUTION INTRAVENOUS at 16:44

## 2025-02-20 RX ADMIN — PERFLUTREN 1.5 ML: 6.52 INJECTION, SUSPENSION INTRAVENOUS at 15:19

## 2025-02-20 RX ADMIN — PIPERACILLIN AND TAZOBACTAM 2.25 G: 2; .25 INJECTION, POWDER, FOR SOLUTION INTRAVENOUS at 20:40

## 2025-02-20 ASSESSMENT — ACTIVITIES OF DAILY LIVING (ADL)
ADLS_ACUITY_SCORE: 52

## 2025-02-20 ASSESSMENT — COLUMBIA-SUICIDE SEVERITY RATING SCALE - C-SSRS
2. HAVE YOU ACTUALLY HAD ANY THOUGHTS OF KILLING YOURSELF IN THE PAST MONTH?: NO
6. HAVE YOU EVER DONE ANYTHING, STARTED TO DO ANYTHING, OR PREPARED TO DO ANYTHING TO END YOUR LIFE?: NO
1. IN THE PAST MONTH, HAVE YOU WISHED YOU WERE DEAD OR WISHED YOU COULD GO TO SLEEP AND NOT WAKE UP?: NO

## 2025-02-20 NOTE — ED TRIAGE NOTES
"Patient BIBA from independent living for eval of bilateral extremity weakness. Pt states he went to bed feeling \"fine\" and able to walk with a walker to bed. Pt awoke to use the bathroom, could not bear weight on either leg. Pt's right lower extremity was found to be warm, red, w/ two open wounds noted to the toes and heel. Per pt he is being seen by podiatry (last visit 4 weeks ago). Of note, per EMS pt was found to be in A-fib, no known dx, not anticoagulated.      Triage Assessment (Adult)       Row Name 02/20/25 0618          Triage Assessment    Airway WDL WDL        Respiratory WDL    Respiratory WDL WDL        Cardiac WDL    Cardiac WDL X     Cardiac Rhythm Atrial fibrillation         Peripheral/Neurovascular WDL    Peripheral Neurovascular WDL X;neurovascular assessment lower        LLE Neurovascular Assessment    Temperature LLE warm     Color LLE no discoloration     Sensation LLE no tenderness        RLE Neurovascular Assessment    Temperature RLE hot      Color RLE red      Sensation RLE tenderness present;no tingling                     "

## 2025-02-20 NOTE — ED PROVIDER NOTES
Emergency Department Note      History of Present Illness     Chief Complaint   Extremity Weakness    HPI   Mando Freedman is a 84 year old male with history of gout, arthritis, chronic kidney disease, hypertension, and more who presents for evaluation for lower extremity weakness. The patient reports that the weakness in his legs started yesterday. Then this morning, the patient was unable to use the bathroom because he couldn't bear weight on his legs. The patient notes that he uses a walker to ambulate. He also has difficulty putting on his shoes. He further endorses redness to his right lower leg, which he has had for a couple of months. The patient states that this began when he was prescribed a certain medication for his blood pressure but is unable to recall the name of it. He was then taken off of the blood pressure medication in pill form and was given a patch instead. The patient also has blood in his mouth for several weeks and reports that it resulted from a tooth extraction.    Independent Historian   None    Review of External Notes   Reviewed labs from 2/14/25. Cr elevated at that time.     Past Medical History     Medical History and Problem List   Acute renal failure syndrome  Arthritis  Anemia of chronic renal failure  Basal cell carcinoma  Blood transfusion history  Chronic kidney disease  Edema of lower extremity  Episodic cluster headache  Essential hypertension  Gastrointestinal hemorrhage  Gout  Hypertension  Inclusion body myositis  Iron deficiency anemia  Metabolic acidosis  MGUS (monoclonal gammopathy of unknown significance)  Von Willebrand Disorder    Medications   Allopurinol  Amlodipine  Atenolol  Cholestyramine  Citalopram  Clonidine  Furosemide    Surgical History   Esophagoscopy, gastroscopy, duodenoscopy (EGD), combined  Colonoscopy  Bone marrow biopsy, bone specimen, needle/trocar  Cholecystectomy   Knee arthroscopy   Skin cancer resection     Physical Exam     Patient Vitals for  "the past 24 hrs:   BP Temp Temp src Pulse Resp SpO2 Height Weight   02/20/25 1050 108/82 -- -- 87 16 -- -- --   02/20/25 1040 97/73 -- -- 98 14 -- -- --   02/20/25 1030 120/81 -- -- 96 21 -- -- --   02/20/25 1022 -- -- -- 93 10 100 % -- --   02/20/25 1010 111/77 -- -- 120 10 98 % -- --   02/20/25 0959 -- -- -- 105 15 98 % -- --   02/20/25 0942 -- -- -- 101 16 99 % -- --   02/20/25 0940 106/79 -- -- 98 13 -- -- --   02/20/25 0931 -- -- -- -- 18 98 % -- --   02/20/25 0930 104/81 -- -- 111 -- -- -- --   02/20/25 0920 92/74 -- -- 107 15 98 % -- --   02/20/25 0918 98/76 -- -- 103 15 93 % -- --   02/20/25 0845 (!) 113/96 -- -- 111 12 100 % -- --   02/20/25 0745 104/75 -- -- 94 16 99 % -- --   02/20/25 0730 119/79 -- -- 86 14 100 % -- --   02/20/25 0652 92/61 -- -- 98 18 99 % -- --   02/20/25 0640 (!) 76/65 -- -- 101 19 96 % -- --   02/20/25 0630 106/66 -- -- 92 18 97 % -- --   02/20/25 0620 105/67 -- -- 93 16 98 % -- --   02/20/25 0616 105/67 98.4  F (36.9  C) Oral 93 16 99 % 1.753 m (5' 9\") 72.6 kg (160 lb)     Physical Exam  Nursing note and vitals reviewed.  HENT:   Mouth/Throat: Moist mucous membranes.   Dried blood in mouth/lips/teeth  Eyes: EOMI, nonicteric sclera  Cardiovascular: Normal rate, irregular rhythm, no murmur  Pulmonary/Chest: Effort normal and breath sounds normal. No respiratory distress. No wheezes. No rales.   Abdominal: Soft. Nontender, nondistended, no guarding or rigidity.   Musculoskeletal: Normal range of motion. RLE erythema/warmth extending from foot to medial thigh. Mild pain to palpation.  Neurological: Alert. Moves all extremities spontaneously.   Skin: Skin is warm and dry. No rash noted.         Diagnostics     Lab Results   Labs Ordered and Resulted from Time of ED Arrival to Time of ED Departure   COMPREHENSIVE METABOLIC PANEL - Abnormal       Result Value    Sodium 143      Potassium 4.5      Carbon Dioxide (CO2) 12 (*)     Anion Gap 16 (*)     Urea Nitrogen 72.9 (*)     Creatinine " 5.42 (*)     GFR Estimate 10 (*)     Calcium 8.1 (*)     Chloride 115 (*)     Glucose 107 (*)     Alkaline Phosphatase 90      AST 25      ALT 11      Protein Total 5.6 (*)     Albumin 3.3 (*)     Bilirubin Total 0.3     CBC WITH PLATELETS AND DIFFERENTIAL - Abnormal    WBC Count 17.0 (*)     RBC Count 3.53 (*)     Hemoglobin 12.0 (*)     Hematocrit 35.2 (*)           MCH 34.0 (*)     MCHC 34.1      RDW 18.0 (*)     Platelet Count 120 (*)     % Neutrophils 88      % Lymphocytes 3      % Monocytes 7      % Eosinophils 1      % Basophils 0      % Immature Granulocytes 1      NRBCs per 100 WBC 0      Absolute Neutrophils 15.0 (*)     Absolute Lymphocytes 0.6 (*)     Absolute Monocytes 1.3      Absolute Eosinophils 0.1      Absolute Basophils 0.0      Absolute Immature Granulocytes 0.1      Absolute NRBCs 0.0     ISTAT GASES LACTATE VENOUS POCT - Abnormal    Lactic Acid POCT 2.6 (*)     Bicarbonate Venous POCT 10 (*)     O2 Sat, Venous POCT 53 (*)     pCO2 Venous POCT 24 (*)     pH Venous POCT 7.22 (*)     pO2 Venous POCT 32      Base Excess/Deficit (+/-) POCT -16.0 (*)    KETONE BETA-HYDROXYBUTYRATE QUANTITATIVE, RAPID - Abnormal    Ketone (Beta-Hydroxybutyrate) Quantitative 0.52 (*)    TROPONIN T, HIGH SENSITIVITY - Abnormal    Troponin T, High Sensitivity 235 (*)    LACTIC ACID WHOLE BLOOD - Normal    Lactic Acid 1.5     TROPONIN T, HIGH SENSITIVITY    Troponin T, High Sensitivity       ISTAT GASES LACTATE VENOUS POCT   GLUCOSE MONITOR NURSING POCT   TROPONIN T, HIGH SENSITIVITY   BLOOD CULTURE   BLOOD CULTURE   MRSA MSSA PCR, NASAL SWAB       Imaging   Echocardiogram Complete    (Results Pending)       EKG   ECG taken at 0636, ECG read at 0651  Sinus rhythm with premature atrial complexes  Right bundle branch block  Left anterior fascicular block  Bifascicular block  Abnormal ECG   Rate 95 bpm. WI interval 192 ms. QRS duration 134 ms. QT/QTc 402/505 ms. P-R-T axes 32 -81 56.    Independent Interpretation    None    ED Course      Medications Administered   Medications   heparin 25,000 units in 0.45% NaCl 250 mL ANTICOAGULANT infusion (has no administration in time range)   cholestyramine (QUESTRAN) powder 4 g (has no administration in time range)   citalopram (celeXA) half-tab 5 mg (has no administration in time range)   lidocaine 1 % 0.1-1 mL (has no administration in time range)   lidocaine (LMX4) cream (has no administration in time range)   sodium chloride (PF) 0.9% PF flush 3 mL (has no administration in time range)   sodium chloride (PF) 0.9% PF flush 3 mL (has no administration in time range)   acetaminophen (TYLENOL) tablet 650 mg (has no administration in time range)     Or   acetaminophen (TYLENOL) Suppository 650 mg (has no administration in time range)   senna-docusate (SENOKOT-S/PERICOLACE) 8.6-50 MG per tablet 1 tablet (has no administration in time range)     Or   senna-docusate (SENOKOT-S/PERICOLACE) 8.6-50 MG per tablet 2 tablet (has no administration in time range)   polyethylene glycol (MIRALAX) Packet 17 g (has no administration in time range)   bisacodyl (DULCOLAX) suppository 10 mg (has no administration in time range)   ondansetron (ZOFRAN ODT) ODT tab 4 mg (has no administration in time range)     Or   ondansetron (ZOFRAN) injection 4 mg (has no administration in time range)   calcium carbonate (TUMS) chewable tablet 1,000 mg (has no administration in time range)   lidocaine 1 % 0.1-1 mL (has no administration in time range)   lidocaine (LMX4) cream (has no administration in time range)   sodium chloride (PF) 0.9% PF flush 3 mL (has no administration in time range)   sodium chloride (PF) 0.9% PF flush 3 mL (has no administration in time range)   piperacillin-tazobactam (ZOSYN) 2.25 g vial to attach to  ml bag (has no administration in time range)   Patient is already receiving anticoagulation with heparin, enoxaparin (LOVENOX), warfarin (COUMADIN)  or other anticoagulant medication (has  no administration in time range)   aspirin EC tablet 81 mg (has no administration in time range)   sodium chloride 0.9 % infusion (has no administration in time range)   HYDROmorphone (DILAUDID) injection 0.2 mg (has no administration in time range)   HYDROmorphone (DILAUDID) injection 0.4 mg (has no administration in time range)   oxyCODONE IR (ROXICODONE) half-tab 2.5 mg (has no administration in time range)   oxyCODONE (ROXICODONE) tablet 5 mg (has no administration in time range)   melatonin tablet 1 mg (has no administration in time range)   prochlorperazine (COMPAZINE) injection 5 mg (has no administration in time range)     Or   prochlorperazine (COMPAZINE) tablet 5 mg (has no administration in time range)   artificial saliva (BIOTENE MT) solution 1 spray (has no administration in time range)   acetaminophen (TYLENOL) tablet 975 mg (has no administration in time range)   sodium chloride 0.9% BOLUS 1,000 mL (0 mLs Intravenous Stopped 2/20/25 0751)   sodium chloride 0.9% BOLUS 1,500 mL (0 mLs Intravenous Stopped 2/20/25 0849)   piperacillin-tazobactam (ZOSYN) 3.375 g vial to attach to  mL bag (0 g Intravenous Stopped 2/20/25 0915)   vancomycin (VANCOCIN) 1,500 mg in 0.9% NaCl 265 mL intermittent infusion (1,500 mg Intravenous $New Bag 2/20/25 0915)       Procedures   Procedures     Discussion of Management   Admitting Hospitalist Matti BURGER PA-C    ED Course   ED Course as of 02/20/25 1121   Thu Feb 20, 2025 0658 I obtained history and performed physical exam as noted above.    1004 I spoke with Matti Egan PA-C of the hospitalist team, regarding the patient. They accepted the patient for admission.         Additional Documentation  None    Medical Decision Making / Diagnosis     CMS Diagnoses: The patient has signs of sepsis {  Sepsis ED evaluation   The patient has signs of sepsis as evidenced by:  1. Presence of 2 SIRS criteria, suspected infection, AND  2. Organ dysfunction: Initial  "hypotension due to sepsis and Lactic Acidosis with value >2.0 due to sepsis    Sepsis Care Initiation: Starting at 0658 on 02/20/25, until specified. Prior to this documentation, sepsis, severe sepsis, or septic shock was NOT thought to be a significant cause of illness. This order represents the first time infection was seriously considered to be affecting the patient.     Lactic Acid Results:  Recent Labs   Lab Test 02/20/25  0918 02/20/25  0622   LACT 1.5 2.6*       3 Hour Bundle 6 Hour Bundle (Reassessment)   Blood Cultures before IV Antibiotics: Yes  Antibiotics given: see below  Prehospital fluid volume (mL):                     Total fluids given (ED +Pre-hospital):  2.5L   Repeat Lactic Acid Level: Ordered by reflex for 2 hours after initial lactic acid collection.  Vasopressors: MAP>65 after initial IVF bolus, will continue to monitor fluid status and vital signs.  Repeat perfusion exam: I attest to having performed a repeat sepsis exam and assessment of perfusion at  0940 AM.   BMI Readings from Last 1 Encounters:   02/20/25 23.63 kg/m        Anti-infectives (From admission through now)      Start     Dose/Rate Route Frequency Ordered Stop    02/20/25 1115  piperacillin-tazobactam (ZOSYN) 2.25 g vial to attach to  ml bag        Note to Pharmacy: For [facility]: For Zosyn-naive patients, use the \"Zosyn initial dose + extended infusion\" order panel.    2.25 g  over 30 Minutes Intravenous EVERY 8 HOURS 02/20/25 1110      02/20/25 0820  vancomycin (VANCOCIN) 1,500 mg in 0.9% NaCl 265 mL intermittent infusion         1,500 mg  over 90 Minutes Intravenous ONCE 02/20/25 0819 02/20/25 1045    02/20/25 0805  piperacillin-tazobactam (ZOSYN) 3.375 g vial to attach to  mL bag        Note to Pharmacy: For [facility] and [facility]: For Zosyn-naive patients, use the \"Zosyn initial dose + extended infusion\" order panel.    3.375 g  over 30 Minutes Intravenous ONCE 02/20/25 0804 02/20/25 0915                MIPS "       None    [facility]   Mando Freedman is a 84 year old male who presents with chief complaint extremity weakness.  Vital signs notable for initial hypotension.  On exam he has right lower extremity erythema suggestive of cellulitis.  Lactic acid mildly elevated.  Presentation is consistent with severe sepsis.  Lactic acid level improved after fluid bolus and patient is maintaining his blood pressure.  Therefore, no evidence of septic shock.  Given diagnosis of sepsis, vancomycin/Zosyn given.  Labs also notable for troponin elevation that is significantly elevated upon recheck.  Likely diagnostic of NSTEMI.  Aspirin/heparin initiated. Labs also notable for acute kidney injury. Labs appear similar to 1 week ago, but no documentation in EMS [facility] who ordered the labs.  Admission indicated for further evaluation and treatment.  I discussed with hospitalist PA who accepts patient for admission. Pt in agreement with the plan. All questions answered.     Disposition   The patient was admitted to the hospital.     Diagnosis     ICD-10-CM    1. Severe sepsis (H)  A41.9     R65.20       2. Cellulitis of right leg  L03.115       3. Acute kidney injury  N17.9            Scribe Disclosure:  Constantin WILLARD, am serving as a scribe at 7:09 AM on 2/20/2025 to document services personally performed by Benton Stack MD based on my observations and the provider's statements to me.     Scribe Disclosure:  Sayda WILLARD, am serving as a scribe at 10:42 AM on 2/20/2025 to document services personally performed by Benton Stack MD based on my observations and the provider's statements to me.       Benton Stack MD  02/20/25 8972

## 2025-02-20 NOTE — ED NOTES
Bed: ED24  Expected date: 2/20/25  Expected time: 6:05 AM  Means of arrival: Ambulance  Comments:  HEMS 416 84M weakness; new Afib

## 2025-02-20 NOTE — PHARMACY-VANCOMYCIN DOSING SERVICE
"Pharmacy Vancomycin Initial Note  Date of Service 2025  Patient's  1940  84 year old, male    Indication: Sepsis and Skin and Soft Tissue Infection    Current estimated CrCl = Estimated Creatinine Clearance: 10 mL/min (A) (based on SCr of 5.42 mg/dL (H)).    Creatinine for last 3 days  2025:  7:53 AM Creatinine 5.42 mg/dL      Recent Vancomycin Level(s) for last 3 days  No results found for requested labs within last 3 days.      Vancomycin IV Administrations (past 72 hours)                     vancomycin (VANCOCIN) 1,500 mg in 0.9% NaCl 265 mL intermittent infusion (mg) 1,500 mg New Bag 25 0915                    Nephrotoxins and other renal medications (From now, onward)      Start     Dose/Rate Route Frequency Ordered Stop    25 1524  vancomycin place miles - receiving intermittent dosing         1 each Intravenous SEE ADMIN INSTRUCTIONS 25 1525      25 1500  piperacillin-tazobactam (ZOSYN) 2.25 g vial to attach to  ml bag        Note to Pharmacy: For [facility], [facility] and [facility]: For Zosyn-naive patients, use the \"Zosyn initial dose + extended infusion\" order panel.    2.25 g  over 30 Minutes Intravenous EVERY 6 HOURS 25 1110              Contrast Orders - past 72 hours (72h ago, onward)      Start     Dose/Rate Route Frequency Stop    25 1520  perflutren diluted in saline (DEFINITY) injection 1.5 mL        Note to Pharmacy: NDC# [id number]    1.5 mL Intravenous ONCE 25 1519                  Plan:  Given acute on chronic renal failure will order vancomycin intermittently and get a random level tomorrow morning.  Vancomycin monitoring method: Trough (Method 1 = dosing nomogram)  Vancomycin therapeutic monitoring goal: 10-15 mg/L  Vancomycin level ordered for  in the morning to assess further dosing  Serum creatinine levels will be ordered daily for the first week of therapy and at least twice weekly for subsequent weeks.      Dell Bhat, [facility]   "

## 2025-02-20 NOTE — H&P
[facility]  History and Physical - Hospitalist Service       Date of Admission:  2/20/2025  PRIMARY CARE PROVIDER:    Aye Queen    Assessment & Plan   Mando Freedman is a 84 year old male admitted on 2/20/2025 due to Sepsis secondary to right lower extremity cellulitis, NSTEMI, and JUAN R.      Past medical history significant for HTN, CKD stage III-IV, Aortic stenosis, Aortic root dilatation, Chronic lower extremity edema, Gout, OA, MDD with anxiety, Anemia of chronic disease, Iron def, MGUS, Type II Von Willebrand D/O.    Patient presented to the ED via EMS from home due to bilateral lower extremity weakness.  Patient reported that he noticed some weakness in his lower extremities the day prior to presentation.  He awoke the morning of presentation could not bear weight on either leg.  EMS reported that the right lower extremity was warm and red with wounds present on the toes and heel. Patient reported following with outpatient Podiatry with last visit occurring ~ 4 weeks ago.  EMS suspected patient was in atrial fib.      Work-up in the ED included a POCT lactic acid level that was elevated at 2.6.  POCT the VBG revealed a pH of 7.22, base excess/deficit of -16, pCO2 of 24, O2 saturation of 53 and bicarbonate of 10.  CBC with differential revealed a WBC of 17, hemoglobin of 12, hematocrit of 35.2, platelet count of 120, RBC count of 3.53, MCH of 34, RDW of 18, absolute lymphocytes of 0.6, absolute neutrophils of 15 otherwise unremarkable.  CMP revealed a chloride of 115, CO2 of 12, BUN of 72.9, creatinine of 5.42 with a GFR of 10, calcium of 8.1, anion gap of 16, albumin of 3.3, total protein of 5.6 and glucose of 107 otherwise unremarkable.  Blood cultures were obtained x 2 and in process.  Quantitative ketones was negative at less than 0.18.  Troponin T was initially elevated at 75 and upon recheck significantly elevated to 235.  Repeat lactic acid level had normalized to  1.5.    EKG revealed sinus tachycardia with PACs.     Patient received IV vancomycin 1500 mg, IV Zosyn 3.375 g, IV fluid resuscitation with a total of 2500 cc with normal saline while in the ED.  Full dose has been ordered but yet to be administered and heparin bolus has been ordered with plans to proceed with continuous infusion but yet to be administered.    Sepsis   *Sepsis criteria met due to due to leukocytosis (WBC of 17) and tachycardia (HR of 111) and right lower extremity cellulitis.    - IMC status.    - IV antibiotics (empiric treatment with IV Vanco and IV Zosyn).  --MRSA nasal PCR assessment ordered.      - IV fluids 100 ml/hr with NS.    - CBC ordered for 2/21.    - Vital signs every 2 hours.      Right lower extremity cellulitis  - IV antibiotics (as above).    - Patient care order placed to outline erythema and to monitor for either progression/regression.    - Pain management:               --Schedule APAP 975 mg every 8 hours.                 --PRN oxycodone 2.5-5 mg every 4 hours based of pain severity.               --PRN IV dilaudid 0.2-0.4 mg every 2 hours based off severity.    - WOCN consult requested.      Lactic acidosis (Resolved)  - Continue IV fluids and IV antibiotics as above.      NSTEMI  - Cardiology consult requested.    - Heparin infusion.   - Continue to trend troponin.    - Monitor on telemetry.    - ECHO ordered.    - ASA 81 mg/d to start 2/21.    - Holding off on starting betablocker due to hypotension and unable to start ACE/ARB due to JUAN R.      JUAN R  CKD stage III-IV  *Baseline creatinine between 2.9-3.4 throughout 2024.  Recent creatinine on 2/13/25 elevated at 4.74 and even higher on day of admission at 5.42.   - IV fluids as above.  - Nephrology consult requested.    - BMP ordered for 2/21.    - Hold PTA lasix 40 mg/d.      Chronic anemia  Thrombocytopenia  Oral bleeding  Type II Von Willebrand D/O  - Close monitoring with heparin infusion.    - CBC ordered for 2/21.        Hypochloremia   Hypoalbuminemia  - IV fluids as above.    - BMP ordered for 2/21.    - Hepatic panel ordered for 2/21.      Anion gap metabolic acidosis  *Suspected secondary to JUAN R.    - IV fluids as above.    - BMP ordered for 2/21.      Hypotension  HLP  - Hold PTA lasix 40 mg/d.    - PTA clonidine patch stopped at this time.      Aortic stenosis  Aortic root dilatation  *Noted on chart review however do not see an ECHO within our system or Care Everywhere    - ECHO ordered.      Chronic lower extremity edema  - Hold PTA lasix as above.      Gout  - Hold PTA allopurinol 100 mg/d due to JUAN R with GFR below 15.      OA  - Pain management as above      MDD with anxiety  - Resumed on PTA Celexa 5 mg/d.      Iron def  - Hold PTA iron supplements.      MGUS  *Noted on chart review.  Previously seen by Heme/Onc in 2022.      Clinically Significant Risk Factors Present on Admission          # Hyperchloremia: Highest Cl = 115 mmol/L in last 2 days, will monitor as appropriate      # Hypocalcemia: Lowest Ca = 8.1 mg/dL in last 2 days, will monitor and replace as appropriate     # Hypoalbuminemia: Lowest albumin = 3.3 g/dL at 2/20/2025  7:53 AM, will monitor as appropriate   # Thrombocytopenia: Lowest platelets = 120 in last 2 days, will monitor for bleeding    # Acute Kidney Injury, unspecified: based on a >150% or 0.3 mg/dL increase in last creatinine compared to past 90 day average, will monitor renal function      Diet: Renal diet ordered  DVT Prophylaxis: Heparin infusion  Angulo Catheter: Not present  Lines: None     Cardiac Monitoring: ORDERED  Code Status: DNR/DNI       Disposition Plan   Inpatient status.  Anticipate greater than 2 evening hospitalization while undergoing continued work-up/management of Sepsis secondary to right lower extremity cellulitis, NSTEMI, and JUAN R.      Medically Ready for Discharge: Anticipated in 2-4 Days    The patient's care was discussed with the Patient and Dr. Stack .  Reviewed  ED notes.      The patient has been discussed with Dr. Coe, who agrees with the assessment and plan at this time.    FELIX Hernandez [facility]  Securely message with the Vocera Web Console (learn more here)    ______________________________________________________________________    Chief Complaint   Lower extremity weakness    History is obtained from Dr. Stack, the patient and EMR.      History of Present Illness   Mando Freedman is a 84 year old male admitted on 2/20/2025 due to Sepsis secondary to right lower extremity cellulitis, NSTEMI, and JUAN R.      Past medical history significant for HTN, CKD stage III-IV, Aortic stenosis, Aortic root dilatation, Chronic lower extremity edema, Gout, OA, MDD with anxiety, Anemia of chronic disease, Iron def, MGUS, Type II Von Willebrand D/O.    Patient presented to the ED via EMS from home due to bilateral lower extremity weakness.  Patient reported that he noticed some weakness in his lower extremities the day prior to presentation.  He awoke the morning of presentation could not bear weight on either leg.  EMS reported that the right lower extremity was warm and red with wounds present on the toes and heel. Patient reported following with outpatient Podiatry with last visit occurring ~ 4 weeks ago.  EMS suspected patient was in atrial fib.      Work-up in the ED included a POCT lactic acid level that was elevated at 2.6.  POCT the VBG revealed a pH of 7.22, base excess/deficit of -16, pCO2 of 24, O2 saturation of 53 and bicarbonate of 10.  CBC with differential revealed a WBC of 17, hemoglobin of 12, hematocrit of 35.2, platelet count of 120, RBC count of 3.53, MCH of 34, RDW of 18, absolute lymphocytes of 0.6, absolute neutrophils of 15 otherwise unremarkable.  CMP revealed a chloride of 115, CO2 of 12, BUN of 72.9, creatinine of 5.42 with a GFR of 10, calcium of 8.1, anion gap of 16, albumin of 3.3, total protein of 5.6 and  glucose of 107 otherwise unremarkable.  Blood cultures were obtained x 2 and in process.  Quantitative ketones was negative at less than 0.18.  Troponin T was initially elevated at 75 and upon recheck significantly elevated to 235.  Repeat lactic acid level had normalized to 1.5.    EKG revealed sinus tachycardia with PACs.     Patient received IV vancomycin 1500 mg, IV Zosyn 3.375 g, IV fluid resuscitation with a total of 2500 cc with normal saline while in the ED.  Full dose has been ordered but yet to be administered and heparin bolus has been ordered with plans to proceed with continuous infusion but yet to be administered.    Patient was seen in the ED where he was resting somewhat comfortably on the gurney upon arrival.  Reviewed events that led to patient's presentation to the ED.  He reported attempting to get up out of bed in the middle of the night to go to the bathroom and found that he was unable to bear weight on either leg.  He indicated he laid in bed for approximately 3 hours while calling out for help when eventually a neighbor was able to contact staff at the [facility].    Upon questioning, patient indicated that he routinely gets up at least once a night to use the bathroom.  He described having worsening swelling of his right leg and recent injury to the back of his leg that he scratched on his bed.  He believes this occurred approximately 1 week ago.  Patient reported that he follows with podiatry as an outpatient usually every 6 weeks and was last assessed maybe 3 to 4 weeks ago.  Currently patient has dried blood coating his teeth and lips for which he indicated this is a common occurrence due to dry lips that are cracked.  He believes that he might have some bleeding that is occurring in his mouth at times as well as minimal nosebleeds that occur when he wakes up and blows his nose but these are short-lived.  He describes having shortness of breath from time to time and had indicated that yesterday  "he completed 2 loads of laundry and changed his bed sheets which overall wore him out and led to him going to bed at 8 PM.  He had issues with ongoing diarrhea which is since resolved since starting cholestyramine approximately 2 months ago.  He reports he bruises quite easily.  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    Patient resides in his own apartment in assisted living facility ([facility]).  He reported that he is a former smoker who quit approximately 7 years ago.  He does occasionally consume alcohol and indicated that the assisted living facility has happy hour twice a week where he might have a jose carlos from time to time.  He does not use recreational drugs.  He ambulates with a walker.  He does not utilize supplemental oxygen or CPAP machine.    Discussed and reviewed CODE STATUS and patient elected to be DNR/DNI.      Past Medical History    I have reviewed this patient's medical history and updated it with pertinent information if needed.   Past Medical History:   Diagnosis Date    Arthritis     Basal cell carcinoma     History of blood transfusion     Hypertension    HTN, CKD stage III-IV, Aortic stenosis, Aortic root dilatation, Chronic lower extremity edema, Gout, OA, MDD with anxiety, Anemia of chronic disease, Iron def, MGUS, Type II Von Willebrand D/O.    Prior to Admission Medications   Prior to Admission Medications   Prescriptions Last Dose Informant Patient Reported? Taking?   Vitamin D3 (CHOLECALCIFEROL) 125 MCG (5000 UT) tablet  Self No No   Sig: Take 1 tablet (125 mcg) by mouth daily   allopurinol (ZYLOPRIM) 100 MG tablet  Self Yes No   Sig: Take 100 mg by mouth daily   amLODIPine (NORVASC) 10 MG tablet   No No   Sig: Take 1 tablet (10 mg) by mouth daily for 30 days   calcium carbonate (TUMS) 500 MG chewable tablet   No No   Sig: Take 1 tablet " (500 mg) by mouth 2 times daily as needed for heartburn   citalopram (CELEXA) 10 MG tablet  Self Yes No   Sig: Take 5 mg by mouth daily   ferrous fumarate 65 mg, [place]. FE,-Vitamin C 125 mg (VITRON C)  MG TABS tablet  Self Yes No   Sig: Take 1 tablet by mouth 2 times daily   multivitamin (CENTRUM SILVER) tablet  Self Yes No   Sig: Take 1 tablet by mouth daily      Facility-Administered Medications: None     Allergies   No Known Allergies    Physical Exam   Vital Signs: Temp: 98.4  F (36.9  C) Temp src: Oral BP: 104/81 Pulse: 111   Resp: 18 SpO2: 98 % O2 Device: None (Room air)    Weight: 160 lbs 0 oz    Constitutional: Awake, alert, cooperative, no apparent distress.    ENT: Normocephalic, without obvious abnormality, atraumatic.  Lips with dried black blood as well as his gums/teeth, with noted cracked lips and dry oral mucosa.    Neck: Supple, symmetrical, trachea midline, no adenopathy.  Pulmonary: No increased work of breathing, fair air exchange, clear to auscultation bilaterally, no crackles or wheezing.  Cardiovascular: Regular rhythm and tachycardic, normal S1 and S2, no S3 or S4, and soft murmur noted.  GI: Normal bowel sounds, soft, non-distended, non-tender.    Skin/Integumen: Right lower extremity with wound at the heel and back of the thigh with associated erythema around the foot and tracking up the thigh which is tender to palpation and warm to touch.    Neuro: CN II-XII grossly intact.    Psych:  Alert and oriented x 3. Normal affect.  Extremities: Bilateral lower extremity edema noted (R>L).  Right lower extremity with wound at the heel and back of the thigh with associated erythema around the foot and tracking up the thigh which is tender to palpation and warm to touch.      Medical Decision Making       Please see A&P for additional details of medical decision making.  75 MINUTES SPENT BY ME on the date of service doing chart review, history, exam, documentation & further activities per the  note.       Data   Data reviewed today: I reviewed all medications, new labs and imaging results over the last 24 hours. I personally reviewed the EKG tracing showing sinus tachycardia with PACs .      I have personally reviewed the following data over the past 24 hrs:    17.0 (H)  \   12.0 (L)   / 120 (L)     143 115 (H) 72.9 (H) /  107 (H)   4.5 12 (L) 5.42 (H) \     ALT: 11 AST: 25 AP: 90 TBILI: 0.3   ALB: 3.3 (L) TOT PROTEIN: 5.6 (L) LIPASE: N/A     Trop: 235 (HH) BNP: N/A     Procal: N/A CRP: N/A Lactic Acid: 1.5         Imaging results reviewed over the past 24 hrs:   No results found for this or any previous visit (from the past 24 hours).

## 2025-02-20 NOTE — ED NOTES
Patient has had bleeding for a while from a tooth extraction socket. Matti BURGER and Dr. Harley with cardiology aware. Per Dr. Harley- do NOT start the heparin at this time.

## 2025-02-20 NOTE — ED NOTES
[facility]  ED Nurse Handoff Report    ED Chief complaint: Extremity Weakness and Irregular Heart Beat      ED Diagnosis:   Final diagnoses:   None       Code Status: DNR / DNI    Allergies: No Known Allergies    Patient Story: Mando Freedman is a 84 year old male who presents to the Emergency Department for an evaluation of extremity weakness and irregular heart beat.   Focused Assessment:  Cardiac: WDL  Resp: WDL   Neuro: A&Ox4, increased LE weakness  Skin:  ENT: Pt reports having bleeding coming from his mouth after a tooth extraction.    Treatments and/or interventions provided: NS 2500ml bolus, zosyn 3.375g  Patient's response to treatments and/or interventions: na    To be done/followed up on inpatient unit:  Continue care    Does this patient have any cognitive concerns?:  na    Activity level - Baseline/Home:  Independent  Activity Level - Current:   Unknown    Patient's Preferred language: English   Needed?: No    Isolation: None  Infection: Not Applicable  Patient tested for COVID 19 prior to admission: NO  Bariatric?: No    Vital Signs:   Vitals:    02/20/25 0640 02/20/25 0652 02/20/25 0730 02/20/25 0745   BP: (!) 76/65 92/61 119/79 104/75   Pulse: 101 98 86 94   Resp: 19 18 14 16   Temp:       TempSrc:       SpO2: 96% 99% 100% 99%   Weight:       Height:           Cardiac Rhythm:Cardiac Rhythm: (S) Atrial fibrillation    Was the PSS-3 completed:   Yes  What interventions are required if any?               Family Comments: na  OBS brochure/video discussed/provided to patient/family: No              Name of person given brochure if not patient: na              Relationship to patient: na    For the majority of the shift this patient's behavior was Green.   Behavioral interventions performed were na.    ED NURSE PHONE NUMBER: *[id number]

## 2025-02-20 NOTE — PROGRESS NOTES
UPDATE/ADDENDUM:      Due to active oral bleeding and after brief discussion with Dr. Harley of Cardiology.  As patient does not report having chest pain will not start Heparin therapy.  Will also hold off on ASA therapy.      Matti Egan PA-C    Reviewed Dr. Harley's note and will proceed with ASA therapy.      Matti Egan PA-C

## 2025-02-20 NOTE — PHARMACY-ADMISSION MEDICATION HISTORY
Pharmacist Admission Medication History    Admission medication history is complete. The information provided in this note is only as accurate as the sources available at the time of the update.    Information Source(s): Patient and CareEverywhere/SureScripts via in-person    Pertinent Information: none    Changes made to PTA medication list:  Added: clonidine patch, cholestyramine, furosemide  Deleted: amlodipine  Changed: vitron-C to once a day    Allergies reviewed with patient and updates made in EHR: yes    Medication History Completed By: Maddy Schwartz [facility] 2/20/2025 9:44 AM    PTA Med List   Medication Sig Last Dose/Taking    allopurinol (ZYLOPRIM) 100 MG tablet Take 100 mg by mouth daily 2/19/2025 Morning    calcium carbonate (TUMS) 500 MG chewable tablet Take 1 tablet (500 mg) by mouth 2 times daily as needed for heartburn Past Month    cholestyramine (QUESTRAN) 4 GM/DOSE powder Take 4 g by mouth daily. 2/19/2025 Noon    citalopram (CELEXA) 10 MG tablet Take 5 mg by mouth daily 2/19/2025 Morning    cloNIDine (CATAPRES-TTS1) 0.1 MG/24HR WK patch Place 1 patch onto the skin once a week. 2/17/2025 Noon    ferrous fumarate 65 mg, [place]. FE,-Vitamin C 125 mg (VITRON C)  MG TABS tablet Take 1 tablet by mouth daily. 2/19/2025 Morning    furosemide (LASIX) 40 MG tablet Take 40 mg by mouth daily. 2/19/2025 Morning    multivitamin (CENTRUM SILVER) tablet Take 1 tablet by mouth daily 2/19/2025 Morning    Vitamin D3 (CHOLECALCIFEROL) 125 MCG (5000 UT) tablet Take 1 tablet (125 mcg) by mouth daily 2/19/2025 Morning

## 2025-02-20 NOTE — CONSULTS
"[facility] Nurse Inpatient Assessment     Consulted for: Right lower extremity heel     Summary: 1) Suspected evolving deep tissue pressure injury to right heel, as well as reabsorbing blister to right heel, both present on admission  2) Edema, erythema, and scattered injuries to bilateral legs and toes, present on admission  Recommend regular skin care to legs and off-loading to right heel.     [facility] nurse follow-up plan: weekly    Patient History (according to provider note(s):      \"Mando Freedman is a 84 year old male admitted on 2/20/2025 due to Sepsis secondary to right lower extremity cellulitis, NSTEMI, and JUAN R.       Past medical history significant for HTN, CKD stage III-IV, Aortic stenosis, Aortic root dilatation, Chronic lower extremity edema, Gout, OA, MDD with anxiety, Anemia of chronic disease, Iron def, MGUS, Type II Von Willebrand D/O.     Patient presented to the ED via EMS from home due to bilateral lower extremity weakness.  Patient reported that he noticed some weakness in his lower extremities the day prior to presentation.  He awoke the morning of presentation could not bear weight on either leg.  EMS reported that the right lower extremity was warm and red with wounds present on the toes and heel. Patient reported following with outpatient Podiatry with last visit occurring ~ 4 weeks ago.  EMS suspected patient was in atrial fib.\"    Assessment:      Areas visualized during today's visit: Lower extremities , Heel, and Bilateral    Pressure Injury Location: Right heel            Last photo: 2/20  Wound type: Pressure Injury     Pressure Injury Stage: Deep Tissue Pressure Injury (DTPI), present on admission      Wound history/plan of care: Patient reports he thinks this is a pressure injury. He has been covering wound with a bandage. He also sees a podiatrist every few months. He \"scraped\" his heel when he fell PTA. Appearance of posterior heel wound is consistent " with reabsorbing blister r/t deep tissue injury. Medial wound is non-blanchable and could be evolving deep tissue injury.     Wound base: 50 % Maroon, Dry, Lifting , and devitalized epidermis , 50 % Non-blanchable, Maroon, Epidermis, Dry, and Smooth      Palpation of the wound bed: boggy      Drainage: none     Description of drainage: none     Measurements (length x width x depth, in cm) ~ 6  x 5  x  < 0.1 cm      Tunneling N/A     Undermining N/A  Periwound skin: Dry/scaly and Erythema- blanchable      Color: pink and red      Temperature: normal   Odor: none  Pain: mild, intermittent and tender  Pain intervention prior to dressing change: patient tolerated well and slow and gentle cares   Treatment goal: Protection  STATUS: initial assessment and evolving  Supplies ordered: gathered, supplies stored on unit, discussed with RN, and discussed with patient     My PI Risk Assessment     Sensory Perception: 3 - Slightly Limited     Moisture: 3 - Occasionally moist      Activity: 2 - Chairfast     Mobility: 2 - Very limited     Nutrition: 2 - Probably inadequate      Friction/Shear: 1 - Problem     TOTAL: 13      Skin Injury Location: Bilateral legs and toes      Right toes    Right leg    Left toes      Last photo: 2/20  Skin injury due to: {skin injury etiology:083132}  Skin history and plan of care:   ***  Affected area:      Skin assessment: {skin injury assessment:546552}     Measurements (length x width x depth, in cm) ***  x ***  x  *** cm      Color: {PERIWOUND COLOR:415973}     Temperature  {WOUND TEMPERATURE:038124}     Drainage: {DRAINAGE AMOUNT:520339} .      Color: {DRAINAGE DESCRIPTION:958809}      Odor: {ODOR:669599}  Pain: {PAIN LEVEL WOC:968825}, {pain assessment woc:842204}  Pain interventions prior to dressing change: {WO pain intervention:020990}  Treatment goal: {WO treatment goals:678635}  STATUS: {WOUND STATUS:116436}  Supplies ordered: supplies stored on unit and discussed with patient   "    Treatment Plan:     Right heel wound(s): Daily   Cleanse foot and leg with Vashe and gauze. Pat dry.  Swab jazzy-wound skin with skin prep (Cavilon or other barrier film) and let dry.  Apply a 4x4 Mepilex bordered dressing over wound. Initial and date.  Follow PIP Plan.     Bilateral legs and toes: Daily   Cleanse legs with Bath wipes and pat dry.  Apply Sween cream to both legs.  Apply skin prep (Cavilon or other barrier film) to maroon blisters on toes.  Apply EdemaWear (small, navy stripe #[id number] or small Lite, purple stripe #[id number]) bilaterally.     Pressure Injury Prevention (PIP) Plan:  -If patient is declining pressure injury prevention interventions: Explore reason why and address patient's concerns, Educate on pressure injury risk and prevention intervention(s), If patient is still declining, document \"informed refusal\" , and Ensure Care team is aware ( provider, charge nurse, etc)  -Mattress: Add MIRANDA pump to mattress PRN moisture issues  -HOB: Maintain at or below 30 degrees, unless contraindicated  -Repositioning in bed: Every 1-2 hours , Left/right positioning; avoid supine, and Raise foot of bed prior to raising head of bed, to reduce patient sliding down (shear)  -Heels: Keep elevated off mattress  -Protective Dressing: None  -Positioning Equipment:  Pillows  -Chair positioning: Assist patient to reposition hourly and Do NOT use a donut for sitting (this increases pressure to smaller area and creates a higher potential for injury)    -Moisture Management: Perineal cleansing /protection: Follow Incontinence Protocol, Avoid brief in bed, Clean and dry skin folds with bathing , and Moisturize dry skin  -Under Devices: Inspect skin under all medical devices during skin inspection , Ensure tubes are stabilized without tension, and Ensure patient is not lying on medical devices or equipment when repositioned     Orders: Written    RECOMMEND PRIMARY TEAM ORDER:  Radha text to hospitalist PA with " "recommendation for US of RLE- concern for possible clot  Education provided: plan of care, wound progress, Infection prevention , Hygiene, and Off-loading pressure  Discussed plan of care with: Patient  Notify WOC if wound(s) deteriorate.  Nursing to notify the Provider(s) and re-consult the WOC Nurse if new skin concern.    DATA:     Current support surface: Standard  ED cart  Containment of urine/stool: Incontinence Protocol and Suction based external urinary catheter   BMI: Body mass index is 23.63 kg/m .   Active diet order: Orders Placed This Encounter      Combination Diet Renal Diet (non-dialysis)     Output: No intake/output data recorded.     Labs:   Recent Labs   Lab 02/20/25  0753 02/20/25  0622   ALBUMIN 3.3*  --    HGB  --  12.0*   WBC  --  17.0*     Pressure injury risk assessment:                          Jolly Mcclain RN, CWOCN  Please contact via [employer] at group \"WO nurse\"- M-F 8A-4P  "

## 2025-02-20 NOTE — ED NOTES
DATE/TIME OF CALL RECEIVED FROM LAB:  02/20/25 at 9:20 AM   LAB TEST:  Troponin  LAB VALUE:  235  PROVIDER NOTIFIED?: Yes  PROVIDER NAME: Seda CARMONA   DATE/TIME LAB VALUE REPORTED TO PROVIDER: 02/20/25 @ 0920  MECHANISM OF PROVIDER NOTIFICATION: Face-To-Face  PROVIDER RESPONSE: noted

## 2025-02-20 NOTE — CONSULTS
Cardiology Consultation      Mando Freedman MRN# [id number]   YOB: 1940 Age: 84 year old   Date of Admission: 2/20/2025     Reason for consult: Non-ST elevation myocardial infarction           Assessment and Plan:     84-year-old gentleman with a past medical history significant for hypertension, CKD and moderate aortic stenosis who was admitted to [facility] on 2/20/2025 with bilateral lower extremity weakness.  Here he was found to have evidence of right lower extremity cellulitis with possible sepsis.  Cardiac troponins were also noted to be elevated and cardiology were consulted.    IMPRESSION:    Sepsis secondary to right lower extremity cellulitis.  Acute on chronic renal insufficiency.  Creatinine 5.4 this morning.  Non-ST elevation myocardial infarction.  Possibly demand related secondary to #1.  Moderate aortic stenosis.  Recent dental extraction with ongoing bleeding from socket.    PLAN    -Agree with aspirin and trending troponins.  -Would hold off IV heparin at this time given his ongoing oral bleeding and lack of cardiac symptoms.  -Given his acute on chronic renal insufficiency and asymptomatic status I would not pursue coronary angiography at this time.  An echocardiogram has been ordered appropriately which we will review.  -We will continue to follow.      85 minutes of time spent today pre and post charting, reviewing pertinent investigations personally as well as extensive review of previous records and coordinating plans for further evaluation.             Chief Complaint:   Extremity Weakness and Irregular Heart Beat           History of Present Illness:   This patient is a 84 year old male with a history of moderate aortic stenosis noted on echocardiography in August 2022 who was admitted to [facility] on 2/20/2025 for generalized weakness and subsequently found to have right lower extremity cellulitis with evidence of sepsis.  Cardiac troponins were also noted  "to be elevated and he also had acute on chronic renal insufficiency with a creatinine of 4.74 (baseline 2.8-3).    He has received IV fluids and antibiotics with improvement in his blood pressure.  Cardiology consultation was requested given his elevated troponins.    He denies any chest discomfort or dyspnea.  He lives in assisted living and does remember scraping his right heel approximately a week ago.  This subsequently progressed to the active infection noted today.  He also underwent a dental extraction approximately a month ago, but reports spitting up blood on a daily basis.          Physical Exam:   Vitals were reviewed  Blood pressure 108/82, pulse 87, temperature 98.4  F (36.9  C), temperature source Oral, resp. rate 16, height 1.753 m (5' 9\"), weight 72.6 kg (160 lb), SpO2 100%.  Temperatures:  Current - Temp: 98.4  F (36.9  C); Max - Temp  Av.4  F (36.9  C)  Min: 98.4  F (36.9  C)  Max: 98.4  F (36.9  C)  Respiration range: Resp  Avg: 15.4  Min: 10  Max: 21  Pulse range: Pulse  Av.3  Min: 86  Max: 120  Blood pressure range: Systolic (24hrs), Av , Min:76 , Max:120   ; Diastolic (24hrs), Av, Min:61, Max:96    Pulse oximetry range: SpO2  Av.1 %  Min: 93 %  Max: 100 %  No intake or output data in the 24 hours ending 25 1135  Constitutional:   awake, alert, cooperative, no apparent distress, and appears stated age.  Stigmata of oral bleeding noted     Eyes:   Lids and lashes normal, pupils equal, round and reactive to light, extra ocular muscles intact, sclera clear, conjunctiva normal     Neck:   supple, symmetrical, trachea midline, no JVD     Back:   symmetric     Lungs:   No increased work of breathing, good air exchange, clear to auscultation bilaterally, no crackles or wheezing       Cardiovascular:   Normal apical impulse, regular rate and rhythm, normal S1 and S2, no S3 or S4, 2/6 systolic ejection murmur at right upper sternal border     Abdomen:   non-tender "     Musculoskeletal:   motor strength is 5 out of 5 all extremities bilaterally     Neurologic:   Grossly nonfocal     Skin:   Right lower extremity erythema and swelling     Additional findings:     No edema                 Past Medical History:   I have reviewed this patient's past medical history  Past Medical History:   Diagnosis Date    Arthritis     Basal cell carcinoma     History of blood transfusion     Hypertension              Past Surgical History:   I have reviewed this patient's past surgical history  Past Surgical History:   Procedure Laterality Date    BONE MARROW BIOPSY, BONE SPECIMEN, NEEDLE/TROCAR N/A 8/23/2022    Procedure: BIOPSY, BONE MARROW;  Surgeon: Roly Kearney MD;  Location:  GI    COLONOSCOPY N/A 8/5/2022    Procedure: COLONOSCOPY;  Surgeon: Kimani Loo MD;  Location:  GI    ESOPHAGOSCOPY, GASTROSCOPY, DUODENOSCOPY (EGD), COMBINED N/A 8/2/2022    Procedure: ESOPHAGOGASTRODUODENOSCOPY (EGD);  Surgeon: Kimani Loo MD;  Location: [facility]               Social History:   I have reviewed this patient's social history  Social History     Tobacco Use    Smoking status: Former     Types: Cigars    Smokeless tobacco: Never   Substance Use Topics    Alcohol use: Yes     Comment: rare             Family History:   I have reviewed this patient's family history  No family history on file.          Allergies:   No Known Allergies          Medications:   I have reviewed this patient's current medications  (Not in a hospital admission)    Current Facility-Administered Medications   Medication Dose Route Frequency Provider Last Rate Last Admin    acetaminophen (TYLENOL) tablet 650 mg  650 mg Oral Q4H PRN Jeramy Egan PA-C        Or    acetaminophen (TYLENOL) Suppository 650 mg  650 mg Rectal Q4H PRN Jeramy Egan PA-C        acetaminophen (TYLENOL) tablet 975 mg  975 mg Oral Q8H Jeramy Egan PA-C        artificial saliva (BIOTENE MT) solution 1  spray  1 spray Mouth/Throat 4x Daily Jeramy Egan PA-C        aspirin EC tablet 81 mg  81 mg Oral Daily Jeramy Egan PA-C        bisacodyl (DULCOLAX) suppository 10 mg  10 mg Rectal Daily PRN Jeramy Egan PA-C        calcium carbonate (TUMS) chewable tablet 1,000 mg  1,000 mg Oral 4x Daily PRN Jeramy Egan PA-C        cholestyramine (QUESTRAN) powder 4 g  4 g Oral Daily Jeramy Egan PA-C        citalopram (celeXA) half-tab 5 mg  5 mg Oral Daily Jeramy Egan PA-C        heparin 25,000 units in 0.45% NaCl 250 mL ANTICOAGULANT infusion  0-5,000 Units/hr Intravenous Continuous Jeramy Egan PA-C        HYDROmorphone (DILAUDID) injection 0.2 mg  0.2 mg Intravenous Q2H PRN Jeramy Egan PA-C        HYDROmorphone (DILAUDID) injection 0.4 mg  0.4 mg Intravenous Q2H PRN Jeramy Egan PA-C        lidocaine (LMX4) cream   Topical Q1H PRN Jeramy Egan PA-C        lidocaine (LMX4) cream   Topical Q1H PRN Jeramy Egan PA-C        lidocaine 1 % 0.1-1 mL  0.1-1 mL Other Q1H PRN Jeramy Egan PA-C        lidocaine 1 % 0.1-1 mL  0.1-1 mL Other Q1H PRN Jeramy Egan PA-C        melatonin tablet 1 mg  1 mg Oral At Bedtime PRN Jeramy Egan PA-C        ondansetron (ZOFRAN ODT) ODT tab 4 mg  4 mg Oral Q6H PRN Jeramy Egan PA-C        Or    ondansetron (ZOFRAN) injection 4 mg  4 mg Intravenous Q6H PRN Jeramy Egan PA-C        oxyCODONE (ROXICODONE) tablet 5 mg  5 mg Oral Q4H PRN Jeramy Egan PA-C        oxyCODONE IR (ROXICODONE) half-tab 2.5 mg  2.5 mg Oral Q4H PRN Jeramy Egan PA-C        Patient is already receiving anticoagulation with heparin, enoxaparin (LOVENOX), warfarin (COUMADIN)  or other anticoagulant medication   Does not apply Continuous PRN Jeramy Egan PA-C        piperacillin-tazobactam  (ZOSYN) 2.25 g vial to attach to  ml bag  2.25 g Intravenous Q8H Jeramy Egan PA-C        polyethylene glycol (MIRALAX) Packet 17 g  17 g Oral BID PRN Jeramy Egan PA-C        prochlorperazine (COMPAZINE) injection 5 mg  5 mg Intravenous Q6H PRN Jeramy Egan PA-C        Or    prochlorperazine (COMPAZINE) tablet 5 mg  5 mg Oral Q6H PRN Jeramy Egan PA-C        senna-docusate (SENOKOT-S/PERICOLACE) 8.6-50 MG per tablet 1 tablet  1 tablet Oral BID PRN Jeramy Egan PA-C        Or    senna-docusate (SENOKOT-S/PERICOLACE) 8.6-50 MG per tablet 2 tablet  2 tablet Oral BID PRN Jeramy Egan PA-C        sodium chloride (PF) 0.9% PF flush 3 mL  3 mL Intracatheter Q8H Jeramy Egan PA-C        sodium chloride (PF) 0.9% PF flush 3 mL  3 mL Intracatheter q1 min prn Jeramy Egan PA-C        sodium chloride (PF) 0.9% PF flush 3 mL  3 mL Intracatheter Q8H Jeramy Egan PA-C        sodium chloride (PF) 0.9% PF flush 3 mL  3 mL Intracatheter q1 min prn Jeramy Egan PA-C        sodium chloride 0.9 % infusion   Intravenous Continuous Jeramy Egan PA-C         Current Outpatient Medications   Medication Sig Dispense Refill    allopurinol (ZYLOPRIM) 100 MG tablet Take 100 mg by mouth daily      calcium carbonate (TUMS) 500 MG chewable tablet Take 1 tablet (500 mg) by mouth 2 times daily as needed for heartburn 30 tablet 0    cholestyramine (QUESTRAN) 4 GM/DOSE powder Take 4 g by mouth daily.      citalopram (CELEXA) 10 MG tablet Take 5 mg by mouth daily      cloNIDine (CATAPRES-TTS1) 0.1 MG/24HR WK patch Place 1 patch onto the skin once a week.      ferrous fumarate 65 mg, [place]. FE,-Vitamin C 125 mg (VITRON C)  MG TABS tablet Take 1 tablet by mouth daily.      furosemide (LASIX) 40 MG tablet Take 40 mg by mouth daily.      multivitamin (CENTRUM SILVER) tablet Take 1 tablet by mouth daily       Vitamin D3 (CHOLECALCIFEROL) 125 MCG (5000 UT) tablet Take 1 tablet (125 mcg) by mouth daily 30 tablet 0             Review of Systems:     The 10 point Review of Systems is negative other than noted in the HPI            Data:   All laboratory data reviewed  Results for orders placed or performed during the hospital encounter of 02/20/25 (from the past 24 hours)   CBC with platelets + differential    Narrative    The following orders were created for panel order CBC with platelets + differential.  Procedure                               Abnormality         Status                     ---------                               -----------         ------                     CBC with platelets and d...[[id number]]  Abnormal            Final result                 Please view results for these tests on the individual orders.   [facility] Draw    Narrative    The following orders were created for panel order [place] Draw.  Procedure                               Abnormality         Status                     ---------                               -----------         ------                     Extra Blue Top Tube[[id number]]                              Final result                 Please view results for these tests on the individual orders.   CBC with platelets and differential   Result Value Ref Range    WBC Count 17.0 (H) 4.0 - 11.0 10e3/uL    RBC Count 3.53 (L) 4.40 - 5.90 10e6/uL    Hemoglobin 12.0 (L) 13.3 - 17.7 g/dL    Hematocrit 35.2 (L) 40.0 - 53.0 %     78 - 100 fL    MCH 34.0 (H) 26.5 - 33.0 pg    MCHC 34.1 31.5 - 36.5 g/dL    RDW 18.0 (H) 10.0 - 15.0 %    Platelet Count 120 (L) 150 - 450 10e3/uL    % Neutrophils 88 %    % Lymphocytes 3 %    % Monocytes 7 %    % Eosinophils 1 %    % Basophils 0 %    % Immature Granulocytes 1 %    NRBCs per 100 WBC 0 <1 /100    Absolute Neutrophils 15.0 (H) 1.6 - 8.3 10e3/uL    Absolute Lymphocytes 0.6 (L) 0.8 - 5.3 10e3/uL    Absolute Monocytes 1.3 0.0 - 1.3 10e3/uL     Absolute Eosinophils 0.1 0.0 - 0.7 10e3/uL    Absolute Basophils 0.0 0.0 - 0.2 10e3/uL    Absolute Immature Granulocytes 0.1 <=0.4 10e3/uL    Absolute NRBCs 0.0 10e3/uL   Extra Blue Top Tube   Result Value Ref Range    Hold Specimen x    iStat Gases (lactate) venous, POCT   Result Value Ref Range    Lactic Acid POCT 2.6 (H) <=2.0 mmol/L    Bicarbonate Venous POCT 10 (LL) 21 - 28 mmol/L    O2 Sat, Venous POCT 53 (L) 70 - 75 %    pCO2 Venous POCT 24 (L) 40 - 50 mm Hg    pH Venous POCT 7.22 (L) 7.32 - 7.43    pO2 Venous POCT 32 25 - 47 mm Hg    Base Excess/Deficit (+/-) POCT -16.0 (L) -3.0 - 3.0 mmol/L   EKG 12 lead   Result Value Ref Range    Systolic Blood Pressure  mmHg    Diastolic Blood Pressure  mmHg    Ventricular Rate 95 BPM    Atrial Rate 95 BPM    PA Interval 192 ms    QRS Duration 134 ms     ms    QTc 505 ms    P Axis 32 degrees    R AXIS -81 degrees    T Axis 56 degrees    Interpretation ECG       Sinus rhythm with Premature atrial complexes  Right bundle branch block  Left anterior fascicular block  ** Bifascicular block **  Abnormal ECG  When compared with ECG of 19-Aug-2022 15:16,  Previous ECG has undetermined rhythm, needs review  Confirmed by GENERATED REPORT, COMPUTER ([id number]),  Lance Power ([id number]) on 2/20/2025 7:30:01 AM     Troponin T, High Sensitivity   Result Value Ref Range    Troponin T, High Sensitivity     Ketone Beta-Hydroxybutyrate Quantitative   Result Value Ref Range    Ketone (Beta-Hydroxybutyrate) Quantitative 0.52 (H) <=0.30 mmol/L   Comprehensive metabolic panel   Result Value Ref Range    Sodium 143 135 - 145 mmol/L    Potassium 4.5 3.4 - 5.3 mmol/L    Carbon Dioxide (CO2) 12 (L) 22 - 29 mmol/L    Anion Gap 16 (H) 7 - 15 mmol/L    Urea Nitrogen 72.9 (H) 8.0 - 23.0 mg/dL    Creatinine 5.42 (H) 0.67 - 1.17 mg/dL    GFR Estimate 10 (L) >60 mL/min/1.73m2    Calcium 8.1 (L) 8.8 - 10.4 mg/dL    Chloride 115 (H) 98 - 107 mmol/L    Glucose 107 (H) 70 - 99 mg/dL    Alkaline  Phosphatase 90 40 - 150 U/L    AST 25 0 - 45 U/L    ALT 11 0 - 70 U/L    Protein Total 5.6 (L) 6.4 - 8.3 g/dL    Albumin 3.3 (L) 3.5 - 5.2 g/dL    Bilirubin Total 0.3 <=1.2 mg/dL   Troponin T, High Sensitivity   Result Value Ref Range    Troponin T, High Sensitivity 235 (HH) <=22 ng/L   Lactic acid whole blood   Result Value Ref Range    Lactic Acid 1.5 0.7 - 2.0 mmol/L     EKG results: Sinus rhythm with PACs and bifascicular block.    Clinically Significant Risk Factors Present on Admission          # Hyperchloremia: Highest Cl = 115 mmol/L in last 2 days, will monitor as appropriate      # Hypocalcemia: Lowest Ca = 8.1 mg/dL in last 2 days, will monitor and replace as appropriate     # Hypoalbuminemia: Lowest albumin = 3.3 g/dL at 2/20/2025  7:53 AM, will monitor as appropriate   # Thrombocytopenia: Lowest platelets = 120 in last 2 days, will monitor for bleeding  # Acute Kidney Injury, unspecified: based on a >150% or 0.3 mg/dL increase in last creatinine compared to past 90 day average, will monitor renal function  # Hypertension: Home medication list includes antihypertensive(s)

## 2025-02-20 NOTE — CONSULTS
RENAL CONSULTATION NOTE      REFERRING MD:  Julisa    REASON FOR CONSULTATION:  CKD/Acute Kidney Injury      A/P:     1.  Advanced CKD   -creatinine baseline 3.5 to 4 range?   -stage 4 to 5   -atrophic right kidney    Likely component of chronic progressive disease.  Exactly baseline not entirely clear  Suspect as noted above somewhere between 3-1/2 and 4.0 mg/dL    2.  Proteinuria   -PCR; previous 1 gm/gm  3.  Acute Kidney Injury   -pre renal ?   -in setting of sepsis/NSTEMI  4.  Acidosis   -NAG + AG   -lactate   -probable renal HCO3- loss   -previous prescribed oral HCO3-  5.  Secondary hyperparathyroidism  6.  Fe deficiency history   7.  Hypoalbuminemia       Adjust IVF to include HCO3-  No IV contrast  D and PTH  Fe studies    Follow labs  No dialysis indication         HPI:     Patient with a number of underlying medical problems as noted  Longstanding CKD  Progressive  Most recent creatinine in the range of 3.0 mg/dL August 2024    Previously seen at [facility] consultants Dr. Herring's dated 04.14.23  Known atrophic kidney    Admitted through the ED in the setting of concern for sepsis secondary to right lower extremity cellulitis  Non-STEMI noted  Cardiology has evaluated the patient    Home meds were reviewed  Oral bicarbonate not listed    Awoke with bilateral lower extremity weakness at the morning of admission presented with a warm and erythematous wounds on his toes.    Has been receiving saline infusion    No overt uremic symptoms  Does acknowledge poor appetite  Does acknowledge weight loss    Unclear if he would proceed to dialysis.  This discussion will need to be forthcoming.    ROS:  A complete 10 point review of systems was performed and is negative except as noted above.    PMH:    Past Medical History:   Diagnosis Date    Arthritis     Basal cell carcinoma     History of blood transfusion     Hypertension        PSH:    Past Surgical History:   Procedure Laterality Date    BONE MARROW BIOPSY, BONE  SPECIMEN, NEEDLE/TROCAR N/A 8/23/2022    Procedure: BIOPSY, BONE MARROW;  Surgeon: Roly Kearney MD;  Location:  GI    COLONOSCOPY N/A 8/5/2022    Procedure: COLONOSCOPY;  Surgeon: Kimani Loo MD;  Location:  GI    ESOPHAGOSCOPY, GASTROSCOPY, DUODENOSCOPY (EGD), COMBINED N/A 8/2/2022    Procedure: ESOPHAGOGASTRODUODENOSCOPY (EGD);  Surgeon: Kimani Loo MD;  Location:  GI       MEDICATIONS:    Current Outpatient Medications   Medication Sig Dispense Refill    allopurinol (ZYLOPRIM) 100 MG tablet Take 100 mg by mouth daily      calcium carbonate (TUMS) 500 MG chewable tablet Take 1 tablet (500 mg) by mouth 2 times daily as needed for heartburn 30 tablet 0    cholestyramine (QUESTRAN) 4 GM/DOSE powder Take 4 g by mouth daily.      citalopram (CELEXA) 10 MG tablet Take 5 mg by mouth daily      cloNIDine (CATAPRES-TTS1) 0.1 MG/24HR WK patch Place 1 patch onto the skin once a week.      ferrous fumarate 65 mg, [place]. FE,-Vitamin C 125 mg (VITRON C)  MG TABS tablet Take 1 tablet by mouth daily.      furosemide (LASIX) 40 MG tablet Take 40 mg by mouth daily.      multivitamin (CENTRUM SILVER) tablet Take 1 tablet by mouth daily      Vitamin D3 (CHOLECALCIFEROL) 125 MCG (5000 UT) tablet Take 1 tablet (125 mcg) by mouth daily 30 tablet 0       ALLERGIES:    Allergies as of 02/20/2025    (No Known Allergies)       FH:  No family history on file.    SH:    Social History     Socioeconomic History    Marital status: Single     Spouse name: Not on file    Number of children: Not on file    Years of education: Not on file    Highest education level: Not on file   Occupational History    Not on file   Tobacco Use    Smoking status: Former     Types: Cigars    Smokeless tobacco: Never   Vaping Use    Vaping status: Never Used   Substance and Sexual Activity    Alcohol use: Yes     Comment: rare    Drug use: Never    Sexual activity: Not on file   Other Topics Concern    Not on file   Social History  Narrative    Not on file     Social Drivers of Health     Financial Resource Strain: High Risk (1/1/2022)    Received from [facility], [facility]    Financial Resource Strain     Difficulty of Paying Living Expenses: Not on file     Difficulty of Paying Living Expenses: Not on file   Food Insecurity: Not on file   Transportation Needs: Not on file   Physical Activity: Not on file   Stress: Not on file   Social Connections: Unknown (1/1/2022)    Received from [facility], [facility]    Social Connections     Frequency of Communication with Friends and Family: Not on file   Interpersonal Safety: Not on file   Housing Stability: Not on file       PHYSICAL EXAM:      Vitals were reviewed  Patient Vitals for the past 8 hrs:   BP Pulse Resp SpO2   02/20/25 1216 -- 90 18 --   02/20/25 1208 -- 82 12 99 %   02/20/25 1050 108/82 87 16 --   02/20/25 1040 97/73 98 14 --   02/20/25 1030 120/81 96 21 --   02/20/25 1022 -- 93 10 100 %   02/20/25 1010 111/77 120 10 98 %   02/20/25 0959 -- 105 15 98 %   02/20/25 0942 -- 101 16 99 %   02/20/25 0940 106/79 98 13 --   02/20/25 0931 -- -- 18 98 %   02/20/25 0930 104/81 111 -- --   02/20/25 0920 92/74 107 15 98 %   02/20/25 0918 98/76 103 15 93 %   02/20/25 0845 (!) 113/96 111 12 100 %   02/20/25 0745 104/75 94 16 99 %   02/20/25 0730 119/79 86 14 100 %     Wt Readings from Last 4 Encounters:   02/20/25 72.6 kg (160 lb)   09/08/23 87.1 kg (192 lb)   08/25/22 85.5 kg (188 lb 9.6 oz)   08/05/22 83.9 kg (184 lb 14.4 oz)     No intake/output data recorded.      Vitals:    02/20/25 0616   Weight: 72.6 kg (160 lb)         GENERAL: awake, alert, follows  HEENT: NC/AT, PERRLA, EOMI, non icteric, pharynx dry/blood present    RESP:  clear anteriorly  CV: RRR, normal S1 S2  ABDOMEN: soft, nontender, no HSM or masses and bowel sounds normal  NEURO: speech normal and  cranial nerves 2-12 intact  PSYCH: affect normal/bright  EXT: bl erythema+ edema       LABS:        Recent Labs   Lab 02/20/25  0753      POTASSIUM 4.5   CHLORIDE 115*   CO2 12*   ANIONGAP 16*   *   BUN 72.9*   CR 5.42*   GFRESTIMATED 10*   KATT 8.1*     Recent Labs   Lab 02/20/25  0753   POTASSIUM 4.5     Recent Labs   Lab 02/20/25  0753   ALBUMIN 3.3*     Recent Labs   Lab 02/20/25  0622   HGB 12.0*       DIAGNOSTICS:  Reviewed      PRUDENCIO Kaye    [facility] consultants  [phone]

## 2025-02-20 NOTE — PROGRESS NOTES
UPDATE/ADDENDUM:    Paged by [facility] nurse paged regarding significant swelling of right lower extremity when compared to left.  Recommending US assessment to rule out DVT.    - Right lower extremity venous US ordered.    Matti Egan PA-C

## 2025-02-21 ENCOUNTER — APPOINTMENT (OUTPATIENT)
Dept: PHYSICAL THERAPY | Facility: CLINIC | Age: 85
End: 2025-02-21
Attending: PHYSICIAN ASSISTANT
Payer: COMMERCIAL

## 2025-02-21 LAB
ACINETOBACTER SPECIES: NOT DETECTED
ALBUMIN SERPL BCG-MCNC: 2.9 G/DL (ref 3.5–5.2)
ALP SERPL-CCNC: 62 U/L (ref 40–150)
ALT SERPL W P-5'-P-CCNC: 22 U/L (ref 0–70)
ANION GAP SERPL CALCULATED.3IONS-SCNC: 15 MMOL/L (ref 7–15)
AST SERPL W P-5'-P-CCNC: 88 U/L (ref 0–45)
ATRIAL RATE - MUSE: 82 BPM
BASOPHILS # BLD AUTO: 0 10E3/UL (ref 0–0.2)
BASOPHILS NFR BLD AUTO: 0 %
BILIRUB DIRECT SERPL-MCNC: 0.13 MG/DL (ref 0–0.3)
BILIRUB SERPL-MCNC: 0.3 MG/DL
BLACTX-M ISLT/SPM QL: NOT DETECTED
BLAIMP ISLT/SPM QL: NOT DETECTED
BLAKPC ISLT/SPM QL: NOT DETECTED
BLAVIM ISLT/SPM QL: NOT DETECTED
BUN SERPL-MCNC: 73.9 MG/DL (ref 8–23)
CALCIUM SERPL-MCNC: 7.9 MG/DL (ref 8.8–10.4)
CHLORIDE SERPL-SCNC: 116 MMOL/L (ref 98–107)
CITROBACTER SPECIES: NOT DETECTED
CREAT SERPL-MCNC: 4.81 MG/DL (ref 0.67–1.17)
DIASTOLIC BLOOD PRESSURE - MUSE: NORMAL MMHG
E COLI DNA BLD POS QL NAA+NON-PROBE: NOT DETECTED
EGFRCR SERPLBLD CKD-EPI 2021: 11 ML/MIN/1.73M2
ENTEROBACTER SPECIES: NOT DETECTED
EOSINOPHIL # BLD AUTO: 0 10E3/UL (ref 0–0.7)
EOSINOPHIL NFR BLD AUTO: 0 %
ERYTHROCYTE [DISTWIDTH] IN BLOOD BY AUTOMATED COUNT: 17.6 % (ref 10–15)
GLUCOSE BLDC GLUCOMTR-MCNC: 122 MG/DL (ref 70–99)
GLUCOSE BLDC GLUCOMTR-MCNC: 66 MG/DL (ref 70–99)
GLUCOSE BLDC GLUCOMTR-MCNC: 72 MG/DL (ref 70–99)
GLUCOSE BLDC GLUCOMTR-MCNC: 81 MG/DL (ref 70–99)
GLUCOSE BLDC GLUCOMTR-MCNC: 82 MG/DL (ref 70–99)
GLUCOSE BLDC GLUCOMTR-MCNC: 83 MG/DL (ref 70–99)
GLUCOSE SERPL-MCNC: 76 MG/DL (ref 70–99)
HCO3 SERPL-SCNC: 12 MMOL/L (ref 22–29)
HCT VFR BLD AUTO: 28.8 % (ref 40–53)
HGB BLD-MCNC: 9.7 G/DL (ref 13.3–17.7)
IMM GRANULOCYTES # BLD: 0.1 10E3/UL
IMM GRANULOCYTES NFR BLD: 1 %
INTERPRETATION ECG - MUSE: NORMAL
K OXYTOCA DNA BLD POS QL NAA+NON-PROBE: NOT DETECTED
KLEBSIELLA PNEUMONIAE: NOT DETECTED
LYMPHOCYTES # BLD AUTO: 1.4 10E3/UL (ref 0.8–5.3)
LYMPHOCYTES NFR BLD AUTO: 11 %
MCH RBC QN AUTO: 33.1 PG (ref 26.5–33)
MCHC RBC AUTO-ENTMCNC: 33.7 G/DL (ref 31.5–36.5)
MCV RBC AUTO: 98 FL (ref 78–100)
MONOCYTES # BLD AUTO: 0.5 10E3/UL (ref 0–1.3)
MONOCYTES NFR BLD AUTO: 4 %
NDM: NOT DETECTED
NEUTROPHILS # BLD AUTO: 10.4 10E3/UL (ref 1.6–8.3)
NEUTROPHILS NFR BLD AUTO: 83 %
NRBC # BLD AUTO: 0 10E3/UL
NRBC BLD AUTO-RTO: 0 /100
OXA (DETECTED/NOT DETECTED): NOT DETECTED
P AERUGINOSA DNA BLD POS NAA+NON-PROBE: DETECTED
P AXIS - MUSE: NORMAL DEGREES
PLATELET # BLD AUTO: 83 10E3/UL (ref 150–450)
POTASSIUM SERPL-SCNC: 4.4 MMOL/L (ref 3.4–5.3)
PR INTERVAL - MUSE: NORMAL MS
PROT SERPL-MCNC: 5.1 G/DL (ref 6.4–8.3)
PROTEUS SP DNA BLD POS QL NAA+NON-PROBE: NOT DETECTED
PTH-INTACT SERPL-MCNC: 129 PG/ML (ref 15–65)
QRS DURATION - MUSE: 130 MS
QT - MUSE: 396 MS
QTC - MUSE: 476 MS
R AXIS - MUSE: 268 DEGREES
RBC # BLD AUTO: 2.93 10E6/UL (ref 4.4–5.9)
SODIUM SERPL-SCNC: 143 MMOL/L (ref 135–145)
SYSTOLIC BLOOD PRESSURE - MUSE: NORMAL MMHG
T AXIS - MUSE: 60 DEGREES
VANCOMYCIN SERPL-MCNC: 11.2 UG/ML
VENTRICULAR RATE- MUSE: 87 BPM
WBC # BLD AUTO: 12.5 10E3/UL (ref 4–11)

## 2025-02-21 PROCEDURE — 85025 COMPLETE CBC W/AUTO DIFF WBC: CPT | Performed by: PHYSICIAN ASSISTANT

## 2025-02-21 PROCEDURE — 80048 BASIC METABOLIC PNL TOTAL CA: CPT | Performed by: PHYSICIAN ASSISTANT

## 2025-02-21 PROCEDURE — 99233 SBSQ HOSP IP/OBS HIGH 50: CPT | Mod: FS

## 2025-02-21 PROCEDURE — 87040 BLOOD CULTURE FOR BACTERIA: CPT | Performed by: INTERNAL MEDICINE

## 2025-02-21 PROCEDURE — 97530 THERAPEUTIC ACTIVITIES: CPT | Mod: GP

## 2025-02-21 PROCEDURE — 258N000002 HC RX IP 258 OP 250

## 2025-02-21 PROCEDURE — 250N000009 HC RX 250: Performed by: INTERNAL MEDICINE

## 2025-02-21 PROCEDURE — 36415 COLL VENOUS BLD VENIPUNCTURE: CPT | Performed by: PHYSICIAN ASSISTANT

## 2025-02-21 PROCEDURE — 99233 SBSQ HOSP IP/OBS HIGH 50: CPT | Performed by: INTERNAL MEDICINE

## 2025-02-21 PROCEDURE — 258N000002 HC RX IP 258 OP 250: Performed by: INTERNAL MEDICINE

## 2025-02-21 PROCEDURE — 250N000011 HC RX IP 250 OP 636: Performed by: PHYSICIAN ASSISTANT

## 2025-02-21 PROCEDURE — 83970 ASSAY OF PARATHORMONE: CPT | Performed by: INTERNAL MEDICINE

## 2025-02-21 PROCEDURE — 250N000009 HC RX 250

## 2025-02-21 PROCEDURE — 82248 BILIRUBIN DIRECT: CPT | Performed by: PHYSICIAN ASSISTANT

## 2025-02-21 PROCEDURE — 93005 ELECTROCARDIOGRAM TRACING: CPT

## 2025-02-21 PROCEDURE — 82310 ASSAY OF CALCIUM: CPT | Performed by: PHYSICIAN ASSISTANT

## 2025-02-21 PROCEDURE — 36415 COLL VENOUS BLD VENIPUNCTURE: CPT | Performed by: INTERNAL MEDICINE

## 2025-02-21 PROCEDURE — 250N000013 HC RX MED GY IP 250 OP 250 PS 637: Performed by: PHYSICIAN ASSISTANT

## 2025-02-21 PROCEDURE — 250N000013 HC RX MED GY IP 250 OP 250 PS 637: Performed by: INTERNAL MEDICINE

## 2025-02-21 PROCEDURE — 258N000003 HC RX IP 258 OP 636: Performed by: INTERNAL MEDICINE

## 2025-02-21 PROCEDURE — 250N000011 HC RX IP 250 OP 636: Performed by: INTERNAL MEDICINE

## 2025-02-21 PROCEDURE — 92610 EVALUATE SWALLOWING FUNCTION: CPT | Mod: GN

## 2025-02-21 PROCEDURE — 97161 PT EVAL LOW COMPLEX 20 MIN: CPT | Mod: GP

## 2025-02-21 PROCEDURE — 120N000013 HC R&B IMCU

## 2025-02-21 PROCEDURE — 93010 ELECTROCARDIOGRAM REPORT: CPT | Performed by: INTERNAL MEDICINE

## 2025-02-21 PROCEDURE — 92526 ORAL FUNCTION THERAPY: CPT | Mod: GN

## 2025-02-21 PROCEDURE — 80202 ASSAY OF VANCOMYCIN: CPT | Performed by: PHYSICIAN ASSISTANT

## 2025-02-21 RX ORDER — NICOTINE POLACRILEX 4 MG
15-30 LOZENGE BUCCAL
Status: DISCONTINUED | OUTPATIENT
Start: 2025-02-21 | End: 2025-03-06 | Stop reason: HOSPADM

## 2025-02-21 RX ORDER — DEXTROSE MONOHYDRATE 25 G/50ML
25-50 INJECTION, SOLUTION INTRAVENOUS
Status: DISCONTINUED | OUTPATIENT
Start: 2025-02-21 | End: 2025-03-06 | Stop reason: HOSPADM

## 2025-02-21 RX ORDER — MULTIPLE VITAMINS W/ MINERALS TAB 9MG-400MCG
1 TAB ORAL DAILY
Status: DISCONTINUED | OUTPATIENT
Start: 2025-02-21 | End: 2025-02-25

## 2025-02-21 RX ORDER — SODIUM BICARBONATE 650 MG/1
650 TABLET ORAL 2 TIMES DAILY
Status: DISCONTINUED | OUTPATIENT
Start: 2025-02-21 | End: 2025-02-22

## 2025-02-21 RX ADMIN — SODIUM BICARBONATE: 84 INJECTION, SOLUTION INTRAVENOUS at 04:22

## 2025-02-21 RX ADMIN — ACETAMINOPHEN 975 MG: 325 TABLET, FILM COATED ORAL at 23:03

## 2025-02-21 RX ADMIN — PIPERACILLIN AND TAZOBACTAM 2.25 G: 2; .25 INJECTION, POWDER, FOR SOLUTION INTRAVENOUS at 16:01

## 2025-02-21 RX ADMIN — IRON SUCROSE 200 MG: 20 INJECTION, SOLUTION INTRAVENOUS at 11:38

## 2025-02-21 RX ADMIN — Medication 5 MG: at 08:40

## 2025-02-21 RX ADMIN — Medication 1 SPRAY: at 16:10

## 2025-02-21 RX ADMIN — SODIUM BICARBONATE: 84 INJECTION, SOLUTION INTRAVENOUS at 12:54

## 2025-02-21 RX ADMIN — Medication 1 SPRAY: at 11:38

## 2025-02-21 RX ADMIN — ACETAMINOPHEN 975 MG: 325 TABLET, FILM COATED ORAL at 16:02

## 2025-02-21 RX ADMIN — SODIUM BICARBONATE 650 MG TABLET 650 MG: at 21:05

## 2025-02-21 RX ADMIN — Medication 1 SPRAY: at 08:39

## 2025-02-21 RX ADMIN — Medication 1 TABLET: at 16:01

## 2025-02-21 RX ADMIN — CHOLESTYRAMINE 4 G: 4 POWDER, FOR SUSPENSION ORAL at 09:36

## 2025-02-21 RX ADMIN — PIPERACILLIN AND TAZOBACTAM 2.25 G: 2; .25 INJECTION, POWDER, FOR SOLUTION INTRAVENOUS at 08:41

## 2025-02-21 RX ADMIN — ACETAMINOPHEN 975 MG: 325 TABLET, FILM COATED ORAL at 05:48

## 2025-02-21 RX ADMIN — PIPERACILLIN AND TAZOBACTAM 2.25 G: 2; .25 INJECTION, POWDER, FOR SOLUTION INTRAVENOUS at 23:02

## 2025-02-21 RX ADMIN — PIPERACILLIN AND TAZOBACTAM 2.25 G: 2; .25 INJECTION, POWDER, FOR SOLUTION INTRAVENOUS at 03:46

## 2025-02-21 RX ADMIN — ASPIRIN 81 MG CHEWABLE TABLET 81 MG: 81 TABLET CHEWABLE at 08:39

## 2025-02-21 RX ADMIN — SODIUM BICARBONATE 650 MG TABLET 650 MG: at 11:38

## 2025-02-21 ASSESSMENT — ACTIVITIES OF DAILY LIVING (ADL)
ADLS_ACUITY_SCORE: 44
ADLS_ACUITY_SCORE: 41
ADLS_ACUITY_SCORE: 50
ADLS_ACUITY_SCORE: 41
ADLS_ACUITY_SCORE: 41
ADLS_ACUITY_SCORE: 46
ADLS_ACUITY_SCORE: 46
ADLS_ACUITY_SCORE: 41
ADLS_ACUITY_SCORE: 44
ADLS_ACUITY_SCORE: 41
ADLS_ACUITY_SCORE: 44
ADLS_ACUITY_SCORE: 41
ADLS_ACUITY_SCORE: 50
ADLS_ACUITY_SCORE: 41
ADLS_ACUITY_SCORE: 46
ADLS_ACUITY_SCORE: 41
ADLS_ACUITY_SCORE: 50
ADLS_ACUITY_SCORE: 41
ADLS_ACUITY_SCORE: 50
ADLS_ACUITY_SCORE: 41
ADLS_ACUITY_SCORE: 43

## 2025-02-21 NOTE — CONSULTS
Care Management Initial Consult    General Information  Assessment completed with: Patient,         Primary Care Provider verified and updated as needed:     Readmission within the last 30 days: no previous admission in last 30 days      Reason for Consult: discharge planning  Advance Care Planning:            Communication Assessment  Patient's communication style: spoken language (English or Bilingual)    Hearing Difficulty or Deaf: no   Wear Glasses or Blind: yes    Cognitive  Cognitive/Neuro/Behavioral: WDL, all  Level of Consciousness: alert  Arousal Level: opens eyes spontaneously  Orientation: oriented x 4  Mood/Behavior: calm, cooperative  Best Language: 1 - Mild to moderate  Speech: clear, spontaneous, logical    Living Environment:   People in home: alone     Current living Arrangements: assisted living ([facility])      Able to return to prior arrangements: yes       Family/Social Support:  Care provided by: self  Provides care for: no one     Support system:            Description of Support System:           Current Resources:   Patient receiving home care services: No        Community Resources: None  Equipment currently used at home: walker, rolling  Supplies currently used at home: None    Employment/Financial:  Employment Status: retired        Financial Concerns:             Does the patient's insurance plan have a 3 day qualifying hospital stay waiver?  No    Lifestyle & Psychosocial Needs:  Social Drivers of Health     Food Insecurity: Not on file   Depression: Not at risk (7/28/2021)    [facility], [facility]    PHQ-2     PHQ-2 TOTAL SCORE: 0   Housing Stability: Not on file   Tobacco Use: Medium Risk (9/8/2023)    Patient History     Smoking Tobacco Use: Former     Smokeless Tobacco Use: Never     Passive Exposure: Not on file   Financial Resource Strain: High Risk (1/1/2022)    [email or web address], [email or web address]    Financial Resource Strain     Difficulty of Paying Living Expenses: Not on file     Difficulty of Paying Living Expenses: Not on file   Alcohol Use: Not on file   Transportation Needs: Not on file   Physical Activity: Not on file   Interpersonal Safety: Low Risk  (2/20/2025)    Interpersonal Safety     Do you feel physically and emotionally safe where you currently live?: Yes     Within the past 12 months, have you been hit, slapped, kicked or otherwise physically hurt by someone?: No     Within the past 12 months, have you been humiliated or emotionally abused in other ways by your partner or ex-partner?: No   Stress: Not on file   Social Connections: Unknown (1/1/2022)    [facility], [facility]    Social Connections     Frequency of Communication with Friends and Family: Not on file   Health Literacy: Not on file       Functional Status:  Prior to admission patient needed assistance:              Mental Health Status:          Chemical Dependency Status:                Values/Beliefs:  Spiritual, Cultural Beliefs, Christianity Practices, Values that affect care:                 Discussed  Partnership in Safe Discharge Planning  document with patient/family: No    Additional Information:   84-year-old gentleman with a past medical history significant for hypertension, CKD and moderate aortic stenosis who was admitted to [facility] on 2/20/2025 with bilateral lower extremity weakness. Here he was found to have evidence of right lower extremity cellulitis with possible sepsis.     CRISTIAN met with pt at bedside and confirmed information:   Housing - alone, in assisted living [facility] or [place]-   Uses walker at home otherwise independent in all ADL/IADLS.  Confirmed pcp- pt indicated this is a new provider as previous retired.   SW discussed  possible need for TCU, pt did not have any specific places in mind but indicated that this is important to be close to [facility] as that is where his family lives.  Pt would also like to have some input on location from his son.  Sw left the TCU list with pt and can look over with son.  SW indicated that they can send his son the list as well if needed.      Next Steps: Secure  TCU - transport Deneen jackson, BSW

## 2025-02-21 NOTE — CONSULTS
[facility] Consultation     Mando Freedman  [address]    [place] [address]  84 year old male     Admission Date/Time: 2/20/2025  Primary Care Provider: Aye Queen  Referring / Attending Physician: Carolin     We were asked to see the patient in consultation by Dr. Coe for evaluation of dysphagia.    ASSESSMENT/RECS:    Dysphagia with established presbyesophagus and previously dilated Schatzki's ring without improvement in symptoms.   By history, it sounds as if the patients symptoms are less likely related to Schatzki's ring, more so related to presbyesophagus.       Recent non-ST elevation MI is a contraindication to elective endoscopy.  Esophagram would be appropriate, however.  The patient could be referred for EGD in 6-8 wks if symptoms persist.    Will sign off.      Thank you for involving us in this patient's care.  Please call me with any questions.    Total time spent in chart review, direct medical discussion, examination, and documentation was 30 minutes    Devan Avery DO   [facility]  Office [phone]    ________________________________________________________________________        CC: Weakness     HPI:  Mando Freedman is a 84 year old male with a history of moderate aortic stenosis chronic kidney disease, type II von Willebrand's who we are asked to see for dysphagia.  He was admitted yesterday with lower extremity weakness and found to have cellulitis possible sepsis.  Troponin was noted to be elevated (peaked 235ng/L yesterday), heparin initially started, stopped around noon today.    Previous endoscopy for symptoms of dysphagia was performed 9/8/2023 at which time the esophagus was noted to be tortuous, associated with a large hiatal hernia and a Schatzki's ring that was dilated up to 20 mm.  The patient recalls this procedure being performed and that was not terribly helpful for his symptoms of dysphagia which have continued.  He feels that these are  symptoms associated with both liquids and solids.  If he drinks thin liquids slowly it tends to be well-tolerated.  He denies symptoms of esophageal food impaction, or abdominal pain.  He denies melena or hematochezia.     ROS: A comprehensive ten point review of systems was negative aside from those in mentioned in the HPI.      PAST MEDICAL HISTORY:  Patient Active Problem List    Diagnosis Date Noted    Cellulitis of right leg 02/20/2025     Priority: Medium    Acute kidney injury 02/20/2025     Priority: Medium    Severe sepsis (H) 02/20/2025     Priority: Medium    Anemia, unspecified type 08/19/2022     Priority: Medium    Gastrointestinal hemorrhage, unspecified gastrointestinal hemorrhage type 08/01/2022     Priority: Medium       SOCIAL HISTORY:  Social History     Tobacco Use    Smoking status: Former     Types: Cigars    Smokeless tobacco: Never   Vaping Use    Vaping status: Never Used   Substance Use Topics    Alcohol use: Yes     Comment: rare    Drug use: Never       FAMILY HISTORY:  No family history on file.    ALLERGIES: No Known Allergies  MEDICATIONS:   Current Facility-Administered Medications   Medication Dose Route Frequency Provider Last Rate Last Admin    acetaminophen (TYLENOL) tablet 650 mg  650 mg Oral Q4H PRN Jeramy Egan PA-C        Or    acetaminophen (TYLENOL) Suppository 650 mg  650 mg Rectal Q4H PRN Jeramy Egan PA-C        acetaminophen (TYLENOL) tablet 975 mg  975 mg Oral Q8H Jeramy Egan PA-C   975 mg at 02/21/25 0548    artificial saliva (BIOTENE MT) solution 1 spray  1 spray Mouth/Throat 4x Daily Jeramy Egan PA-C   1 spray at 02/21/25 1138    aspirin (ASA) chewable tablet 81 mg  81 mg Oral Daily Jeramy Egan PA-C   81 mg at 02/21/25 0839    bisacodyl (DULCOLAX) suppository 10 mg  10 mg Rectal Daily PRN Jeramy Egan PA-C        calcium carbonate (TUMS) chewable tablet 1,000 mg  1,000 mg Oral 4x Daily  PRN Jeramy Egan PA-C        cholestyramine (QUESTRAN) Packet 4 g  4 g Oral Daily Jeramy Egan PA-C   4 g at 02/21/25 0936    citalopram (celeXA) half-tab 5 mg  5 mg Oral Daily Jeramy Egan PA-C   5 mg at 02/21/25 0840    glucose gel 15-30 g  15-30 g Oral Q15 Min PRN Luma Coe MD        Or    dextrose 50 % injection 25-50 mL  25-50 mL Intravenous Q15 Min PRN Luma Coe MD        Or    glucagon injection 1 mg  1 mg Subcutaneous Q15 Min PRN Luma Coe MD        [Held by provider] heparin 25,000 units in 0.45% NaCl 250 mL ANTICOAGULANT infusion  0-5,000 Units/hr Intravenous Continuous Jeramy Egan PA-C   Stopped at 02/21/25 1137    HYDROmorphone (DILAUDID) injection 0.2 mg  0.2 mg Intravenous Q2H PRN Jeramy Egan PA-C        HYDROmorphone (DILAUDID) injection 0.4 mg  0.4 mg Intravenous Q2H PRN Jeramy Egan PA-C        iron sucrose (VENOFER) 200 mg in sodium chloride 0.9 % 120 mL intermittent infusion  200 mg Intravenous Q24H TRANG Kaye  mL/hr at 02/21/25 1138 200 mg at 02/21/25 1138    lidocaine (LMX4) cream   Topical Q1H PRN Jeramy Egan PA-C        lidocaine 1 % 0.1-1 mL  0.1-1 mL Other Q1H PRN Jeramy Egan PA-C        melatonin tablet 1 mg  1 mg Oral At Bedtime PRN Jeramy Egan PA-C        naloxone (NARCAN) injection 0.2 mg  0.2 mg Intravenous Q2 Min PRN Jeramy Egan PA-C        Or    naloxone (NARCAN) injection 0.4 mg  0.4 mg Intravenous Q2 Min PRN Jeramy Egan PA-C        Or    naloxone (NARCAN) injection 0.2 mg  0.2 mg Intramuscular Q2 Min PRN Jeramy Egan PA-C        Or    naloxone (NARCAN) injection 0.4 mg  0.4 mg Intramuscular Q2 Min PRN Jeramy Egan PA-C        oxyCODONE (ROXICODONE) tablet 5 mg  5 mg Oral Q4H PRN Jeramy Egan PA-C        oxyCODONE IR (ROXICODONE)  "half-tab 2.5 mg  2.5 mg Oral Q4H PRN Jeramy Egna PA-C        Patient is already receiving anticoagulation with heparin, enoxaparin (LOVENOX), warfarin (COUMADIN)  or other anticoagulant medication   Does not apply Continuous PRN Jeramy Egan PA-C        piperacillin-tazobactam (ZOSYN) 2.25 g vial to attach to  ml bag  2.25 g Intravenous Q6H Jeramy Egan PA-C   2.25 g at 02/21/25 0841    polyethylene glycol (MIRALAX) Packet 17 g  17 g Oral BID PRN Jeramy Egan PA-C        prochlorperazine (COMPAZINE) injection 5 mg  5 mg Intravenous Q6H PRN Jeramy Egan PA-C        Or    prochlorperazine (COMPAZINE) tablet 5 mg  5 mg Oral Q6H PRN Jeramy Egan PA-C        senna-docusate (SENOKOT-S/PERICOLACE) 8.6-50 MG per tablet 1 tablet  1 tablet Oral BID PRN Jeramy Egan PA-C        Or    senna-docusate (SENOKOT-S/PERICOLACE) 8.6-50 MG per tablet 2 tablet  2 tablet Oral BID PRN Jeramy Egan PA-C        sodium bicarbonate tablet 650 mg  650 mg Oral BID TRANG Kaye MD   650 mg at 02/21/25 1138    sodium chloride (PF) 0.9% PF flush 3 mL  3 mL Intracatheter Q8H Jeramy Egan PA-C        sodium chloride (PF) 0.9% PF flush 3 mL  3 mL Intracatheter q1 min prn Jeramy Egan PA-C        sodium chloride 0.45 % 1,000 mL with sodium bicarbonate 75 mEq/L infusion   Intravenous Continuous TRANG Kaye  mL/hr at 02/21/25 1254 New Bag at 02/21/25 1254       PHYSICAL EXAM:   BP 97/65 (BP Location: Left arm, Patient Position: Sitting, Cuff Size: Adult Regular)   Pulse 98   Temp 97.6  F (36.4  C) (Oral)   Resp 28   Ht 1.753 m (5' 9\")   Wt 72.6 kg (160 lb)   SpO2 96%   BMI 23.63 kg/m     GEN: Alert, oriented x3, communicative and in NAD.  ABD: ND, +BS, no guarding or pain to palpation, no rebound, no HSM.     ADDITIONAL DATA:   I reviewed the patient's new clinical lab test results.   Recent " Labs   Lab Test 02/21/25  0509 02/20/25  0622 02/13/25  1208 08/20/22  0108 08/19/22  1517 08/02/22  0414 08/01/22  1947   WBC 12.5* 17.0* 7.4   < >  --    < >  --    HGB 9.7* 12.0* 11.0*   < >  --    < >  --    MCV 98 100 102*   < >  --    < >  --    PLT 83* 120* 123*   < >  --    < >  --    INR  --   --   --   --  1.13  --  1.13    < > = values in this interval not displayed.     Recent Labs   Lab Test 02/21/25  0509 02/20/25  0753 02/13/25  1208   POTASSIUM 4.4 4.5 4.6   CHLORIDE 116* 115* 113*   CO2 12* 12* 13*   BUN 73.9* 72.9* 75.4*   ANIONGAP 15 16* 16*     Recent Labs   Lab Test 02/21/25  0509 02/20/25  1854 02/20/25  0753 09/15/23  1225 09/14/23  0938 12/01/22  1915 08/24/22  0750 08/02/22  0414 08/01/22  1845   ALBUMIN 2.9*  --  3.3*  --  3.8   < > 2.6*   < >  --    BILITOTAL 0.3  --  0.3  --   --   --  0.4   < >  --    ALT 22  --  11  --   --   --  16   < >  --    AST 88*  --  25  --   --   --  12   < >  --    PROTEIN  --  20*  --  70*  --   --   --   --  20*    < > = values in this interval not displayed.        I reviewed the patient's new imaging results.

## 2025-02-21 NOTE — CONSULTS
CLINICAL NUTRITION SERVICES - ASSESSMENT NOTE    Malnutrition Status:    % Intake: </=75% for >/= 1 month (severe)  % Weight Loss: Unable to assess  - suspect would meet, however there isn't enough data within the specified timeframes  Subcutaneous Fat Loss: Orbital: Severe and Triceps: Severe  Muscle Loss: Wasting of the temples (temporalis muscle): Moderate, Clavicles (pectoralis and deltoids): Severe, and Shoulders (deltoids): Severe  Fluid Accumulation/Edema: Moderate, 2+  Malnutrition Diagnosis: Severe malnutrition in the context of acute illness or injury  Malnutrition Present on Admission: Yes    Registered Dietitian Interventions:  - Add Ensure Enlive BID between meals.   - Small/frequent meals to support adequate intakes in setting of difficulty swallowing and inability to eat a lot at once  - Add Thera vit M for wound healing        REASON FOR ASSESSMENT  Positive admission nutrition risk screen    SUBJECTIVE INFORMATION  Assessed patient in room. Son was also present.     NUTRITION HISTORY  - Pt lives in an independent living facility where he does much of his own ADLs - laundry, showering, most of his meal prep. His son does some of his grocery shopping, occasionally brings a meal over for them to share.   - Pt has been struggling with progressive difficulty swallowing for at least a year or so - has history of Schatzki's ring, last dilation was in 2023.   - He reports it takes a long time for him to eat, as it takes a long time for food / liquids to travel down his esophagus.   - Pt endorses wt loss from a little over 200# (205-210#) down to his current wt of 158# in the past 3 years. His son at bedside feels the large majority of this loss occurred just in the past year.   - Took Ensure supplements at one point, but not currently. OK with adding those back into his eating pattern.     - eats soft foods that are easier to swallow. Will eat fries, hot dogs, hamburgers.. etc  - he likes English muffin  "toast, cheddar hotdogs, soup.   - uses an air fryer, counter top oven, or stove to heat most things up    CURRENT NUTRITION ORDERS  Diet: Full Liquid    CURRENT INTAKE/TOLERANCE  Limited PO since arrival due to diet restriction and difficulty swallowing.     LABS  Nutrition-relevant labs:   - BUN 73.9 (H), Cr 4.81 (H), GFR 11 (L)  - K NL.     MEDICATIONS  Nutrition-relevant medications: Reviewed    ANTHROPOMETRICS  Height: 175.3 cm (5' 9\")  Most Recent Weight: 72.6 kg (160 lb)  IBW: 72.7 kg  % IBW: 100%  BMI (kg/m ): Normal BMI  Weight History: Pt and son report majority of wt loss has likely occurred in the past year or less. Pt estimates up to 21% (from his stated usual weight of ~200# to his recent wt at home 158#)  Wt Readings from Last 10 Encounters:   02/20/25 72.6 kg (160 lb)   09/08/23 87.1 kg (192 lb)   08/25/22 85.5 kg (188 lb 9.6 oz)   08/05/22 83.9 kg (184 lb 14.4 oz)     ASSESSED NUTRITION NEEDS  Dosing Weight: 73 kg, based on actual wt  Estimated Energy Needs: 9134-3471 kcals/day (25 - 30 kcals/kg)  Justification: Repletion  Estimated Protein Needs: 73-86 grams protein/day (1 - 1.2+ grams of pro/kg)  Justification: CKD, Repletion, and Wound healing  Estimated Fluid Needs: defer to nephrology     SYSTEM FINDINGS    Skin/wounds:   2/20 - WOCN:   Right Heel -->  Deep Tissue Pressure Injury (DTPI), present on admission   Bilateral legs and toes -->  Scattered maroon blisters to bilateral toes.   GI symptoms: No BM yet this admission     MALNUTRITION  % Intake: </=75% for >/= 1 month (severe)  % Weight Loss: Unable to assess  - suspect would meet, however there isn't enough data within the specified time frames  Subcutaneous Fat Loss: Orbital: Severe and Triceps: Severe  Muscle Loss: Wasting of the temples (temporalis muscle): Moderate, Clavicles (pectoralis and deltoids): Severe, and Shoulders (deltoids): Severe  Fluid Accumulation/Edema: Moderate, 2+  Malnutrition Diagnosis: Severe malnutrition in the " context of acute illness or injury  Malnutrition Present on Admission: Yes    NUTRITION DIAGNOSIS  Inadequate oral intake related to difficulty swallowing as evidenced by patient endorses eating less due to work of eating and progressive weight loss over 1-3 years.     INTERVENTIONS  Collaboration by nutrition professional with other providers  Medical food supplement therapy    Goals  Patient to consume % of nutritionally adequate meal trays TID, or the equivalent with supplements/snacks.     Monitoring/Evaluation  Progress toward goals will be monitored and evaluated per policy.'

## 2025-02-21 NOTE — PROGRESS NOTES
[facility]  Medicine Progress Note - Hospitalist Service  Date of Admission:  2/20/2025    Assessment & Plan   84 year old male with a past medical history of type II von willebrand disorder, hypertension, CKD stage 3-4 and aortic stenosis among other chronic conditions who was admitted on 2/20/2025 for treatment of sepsis associated with right lower extremity cellulitis and gram negative bacteremia. Cardiac troponins were noted to be elevated.      Severe sepsis  Bacteremia, gram negative bacilli  Pseudomonas aeruginosa detected  RLE cellulitis as source suspected  Elevated WBC, lactate, heart rate.   -blood cultures from 2/20 positive. Redraw cultures.   -continue zosyn Q 6 hours  -MRSA swab negative. Discontinue vancomycin.  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   -TTE on 2/20: LVEF 55-60%, no wall motion abnormality, moderate to severe aortic stenosis  -cardiology consulted. No further cardiac workup at this time.  -continue daily aspirin 81 mg     New onset atrial fibrillation  Irregular heart rate noted by bedside nurse and 12 lead EKG confirmed atrial fibrillation (new) and right bundle branch block (not new). Was not present when TTE was obtained on 2/20.   -continue telemetry  -no rate control needed at this time given pulse 58-68 with occasional rates of 100-110.   -High risk for therapeutic anticoagulation complications given multiple other active problems (chronic anemia, acute anemia after dental procedure, von willebrand disorder). Reassess rhythm patterns daily.     Acute on chronic CKD stage 3-4  Anemia of chronic disease  -IV fluids with bicarbonate  -avoid nephrotoxins    Type II von willebrand disorder  Chronic anemia, thrombocytopenia  Oral bleeding post dental procedure  -continue on aspirin 81 mg daily  -resume oral  iron replacement at discharge    Recent Labs   Lab 02/21/25  0509 02/20/25  0622   WBC 12.5* 17.0*   HGB 9.7* 12.0*   HCT 28.8* 35.2*   MCV 98 100   PLT 83* 120*     H/o Schatzki ring   S/p dilation in 2023  -SLP swallow eval. She recommended GI consult. Consult placed to [facility]    Depression, anxiety  -continue PTA celexa    Gout  -holding PTA allopurinol with GFR <15    Physical Deconditioning  Anticipate need for TCU at discharge. Previously living independently.  -PTA, OT, SW, CC consults        Diet: Combination Diet Renal Diet (non-dialysis)    DVT Prophylaxis: Ambulate every shift  Angulo Catheter: Not present  Lines: None     Cardiac Monitoring: ACTIVE order. Indication: AMI (NSTEMI/ STEMI) (48 hours)  Code Status: No CPR- Do NOT Intubate      Clinically Significant Risk Factors          # Hyperchloremia: Highest Cl = 116 mmol/L in last 2 days, will monitor as appropriate          # Hypoalbuminemia: Lowest albumin = 2.9 g/dL at 2/21/2025  5:09 AM, will monitor as appropriate   # Thrombocytopenia: Lowest platelets = 83 in last 2 days, will monitor for bleeding                         Social Drivers of Health    Tobacco Use: Medium Risk (9/8/2023)    Patient History     Smoking Tobacco Use: Former     Smokeless Tobacco Use: Never    [facility] [facility]    Financial Resource Strain    Received from [facility] [employer] & [email or web address]    Social Connections          Disposition Plan     Medically Ready for Discharge: Anticipated in 2-4 Days once blood cultures negative, renal function improved, able to transition to po antibiotic. May need TCU vs increased [facility] services.        Luma Coe MD  Hospitalist Service  [facility]  Securely message with [employer] (more info)  Text page via [employer] Paging/Directory    ______________________________________________________________________    Interval History   Reports difficulty swallowing. This has been progressing over time and not new since admission.   We reviewed findings to date and questions answered about bacteremia. Denies palpitations, chest pain, shortness of breath. Afebrile.     Physical Exam   Vital Signs: Temp: 97.6  F (36.4  C) Temp src: Oral BP: 119/75 Pulse: 93   Resp: 26 SpO2: 98 % O2 Device: None (Room air)    Weight: 160 lbs 0 oz    General Appearance: Sitting up in chair, calm and cooperative  Respiratory: clear to auscultation bilaterally with good inspiratory effort, no wheezes or crackles  Cardiovascular: irregular, systolic murmur present  GI: Benign, soft, nontender  Skin: Fragile skin, multiple bruises on extremities in various stages of healing  Neuro: No evidence for delirium or confusion at the time of my visit, no tremors or myoclonus    Medical Decision Making       52 MINUTES SPENT BY ME on the date of service doing chart review, history, exam, documentation & further activities per the note.  MANAGEMENT DISCUSSED with the following over the past 24 hours: patient, bedside nurse   NOTE(S)/MEDICAL RECORDS REVIEWED over the past 24 hours: consultant notes, nursing notes, med admin record, micro results  Tests ORDERED & REVIEWED in the past 24 hours:  - See lab/imaging results included in the data section of the note      Data     I have personally reviewed the following data over the past 24 hrs:    12.5 (H)  \   9.7 (L)   / 83 (L)     143 116 (H) 73.9 (H) /  82   4.4 12 (L) 4.81 (H) \     ALT: 22 AST: 88 (H) AP: 62 TBILI: 0.3   ALB: 2.9 (L) TOT PROTEIN: 5.1 (L) LIPASE: N/A     Trop: 229 (HH) BNP: N/A     Ferritin:  132 % Retic:  N/A LDH:  N/A       Imaging results reviewed over the past 24 hrs:   Recent Results (from the past 24 hours)   Echocardiogram Complete   Result Value    LVEF  55-60%    Narrative     [id number]  [facility]  [id number]  [id number]^ANDRA^TRAVIS^VALERIA     [facility]  Echocardiography Laboratory  [address] [address]     Name: IVANIA GOMEZ  MRN: [id number]  : 1940  Study Date: 2025 01:49 PM  Age: 84 yrs  Gender: Male  Patient Location: [facility]  Reason For Study: Tachycardia  Ordering Physician: TRAVIS SILVERMAN  Performed By: CHHAYA Renteria     BSA: 1.9 m2  Height: 69 in  Weight: 160 lb  HR: 75  BP: 111/77 mmHg  ______________________________________________________________________________  Procedure  Echocardiogram with two-dimensional, color and spectral Doppler. Definity (NDC  #61098-349) given intravenously. Contrast Definity. Technically difficult  study.Extremely difficult acoustic windows despite the use of contrast for  endcardial border definition. Technically difficult study.  ______________________________________________________________________________  Interpretation Summary     Left ventricular systolic function is normal.The visual ejection fraction is  55-60%.  The right ventricular systolic function is normal.  Moderate to severe aortic valve stenosis, peak aortic valve velocity 3.4 m/s,  mean gradients 26 mmHg, aortic valve area 0.95 cmÂ , DI 0.24  Aortic root aneurysm is present- 4.5 cm  Ascending aorta dilatation is present- 4 cm.  The inferior vena cava was normal in size with preserved respiratory  variability.     Echo dated  moderate aortic valve stenosis, aortic root 4.5 cm,  ascending aorta 3.9 cm  ______________________________________________________________________________  Left Ventricle  Diastolic Doppler findings (E/E' ratio and/or other parameters) suggest left  ventricular filling pressures are indeterminate. Left ventricular systolic  function is normal. The visual ejection fraction is 55-60%. No regional wall  motion abnormalities noted.     Right Ventricle  The right ventricle is normal  size. The right ventricular systolic function is  normal.     Atria  The left atrium is mildly dilated. Right atrial size is normal. There is no  color Doppler evidence of an atrial shunt.     Mitral Valve  There is trace mitral regurgitation.     Tricuspid Valve  There is trace tricuspid regurgitation. The right ventricular systolic  pressure is approximated at 26.8 mmHg plus the right atrial pressure.     Aortic Valve  There is trace aortic regurgitation. Moderate to severe aortic valve stenosis,  peak aortic valve velocity 3.4 m/s, mean gradients 26 mmHg, aortic valve area  0.95 cmÂ , DI 0.24.     Pulmonic Valve  There is mild (1+) pulmonic valvular regurgitation. There is no pulmonic  valvular stenosis.     Vessels  Aortic root aneurysm is present. 4.5 cm. Ascending aorta dilatation is  present. 4 cm. The inferior vena cava was normal in size with preserved  respiratory variability.     Pericardium  There is no pericardial effusion.     Rhythm  Sinus rhythm was noted.  ______________________________________________________________________________  MMode/2D Measurements & Calculations     Ao root diam: 4.5 cm  asc Aorta Diam: 4.0 cm  LVOT diam: 2.2 cm  LVOT area: 3.8 cm2  Ao root diam index Ht(cm/m): 2.6  Ao root diam index BSA (cm/m2): 2.4  Asc Ao diam index BSA (cm/m2): 2.1  Asc Ao diam index Ht(cm/m): 2.3  EF Biplane: 60.3 %  TAPSE: 2.1 cm     Doppler Measurements & Calculations  MV E max kevin: 68.0 cm/sec  MV A max kevin: 109.1 cm/sec  MV E/A: 0.62  MV dec slope: 162.4 cm/sec2  MV dec time: 0.42 sec  Ao V2 max: 337.6 cm/sec  Ao max P.6 mmHg  Ao V2 mean: 246.6 cm/sec  Ao mean P.5 mmHg  Ao V2 VTI: 66.9 cm  SUYAPA(I,D): 0.99 cm2  SUYAPA(V,D): 0.90 cm2  LV V1 max P.6 mmHg  LV V1 max: 79.9 cm/sec  LV V1 VTI: 17.4 cm  SV(LVOT): 66.4 ml  SI(LVOT): 35.3 ml/m2     PA acc time: 0.16 sec  TR max kevin: 258.7 cm/sec  TR max P.8 mmHg  AV Kevin Ratio (DI): 0.24  SUYAPA Index (cm2/m2): 0.53  E/E' av.6  Lateral E/e':  5.1  Medial E/e': 10.1  RV S Kevin: 13.9 cm/sec     ______________________________________________________________________________  Report approved by: Chon Granados MD on 02/20/2025 03:45 PM         US Lower Extremity Venous Duplex Right    Narrative    EXAM: US LOWER EXTREMITY VENOUS DUPLEX RIGHT  LOCATION: [facility]  DATE: 02/20/2025    INDICATION: Swelling and pain, right lower extremity.  COMPARISON: None.  TECHNIQUE: Venous Duplex ultrasound of the right lower extremity with and without compression, augmentation, and duplex. Color flow and spectral Doppler with waveform analysis performed.    FINDINGS: Exam includes the common femoral, femoral, popliteal, and contralateral common femoral veins as well as segmentally visualized deep calf veins and greater saphenous vein.     RIGHT: No deep vein thrombosis. No superficial thrombophlebitis. Chronic, calcified post-thrombotic change noted involving the lesser saphenous vein in the proximal calf. No popliteal cyst.      Impression    IMPRESSION:  1.  No deep venous thrombosis in the right lower extremity.

## 2025-02-21 NOTE — DISCHARGE INSTRUCTIONS
Wound Care    Penis (under foreskin): Twice a day and after episodes of incontinence  Cleanse wounds with Vashe wound cleanser and pat dry with gauze   Apply Triad paste     Right heel wound(s): Daily   Cleanse foot and leg with Vashe and gauze. Pat dry.  Swab jazzy-wound skin with skin prep (Cavilon or other barrier film) and let dry.  Apply a 4x4 Mepilex bordered dressing over wound. Initial and date.  Follow PIP Plan.      Bilateral legs and toes: Daily   Cleanse legs with Bath wipes and pat dry.  Apply Sween cream to both legs.  Apply skin prep (Cavilon or other barrier film) to maroon blisters on toes.  Apply EdemaWear (small, navy stripe #[id number] or small Lite, purple stripe #[id number]) bilaterally.

## 2025-02-21 NOTE — PROGRESS NOTES
[facility]    ~Cardiology Progress Note~    Primary cardiologist: Will be Dr. Harley     Date of Admission: 2/20/2025  Service Date: 02/21/2025    Summary:  Mr. Mando Freedman is a very pleasant 84 year old male with a past medical history of hypertension, CKD and aortic stenosis  who was admitted on 2/20/2025 for bilateral lower extremity weakness. He was found to have evidence of right lower extremity cellulitis with possible sepsis. Cardiac troponins were noted to be elevated.      Interval History:  States his RLE is feeling slightly better. Denies chest pain. Has had dyspnea for the past several years, remains at baseline.  Reviewed echocardiogram results at the bedside.            Assessment:     Sepsis secondary to right lower extremity cellulitis     NSTEMI, likely demand secondary to #1   Troponin peaked at 235  Echo 2/20/2025- LVEF 55-60% with no WMA     Acute on chronic renal insufficiency   Creatinine down trending 4.74 -> 5.42 -> 4.81    Moderate to severe aortic stenosis   Noted on echo 2/20/2025, previously moderate on echo dating back to 2022  Mean gradient 26 mmHg     4.   Aortic root aneurysm, 4.5 cm      5.   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   Will coordinate outpatient cardiology follow up   Cardiology to sign off       Plan of care was formulated under the direction and guidance of Dr. Harley.         Kristal MARQUEZC  Physician Assistant   [facility]      Patient Active Problem List   Diagnosis    Gastrointestinal hemorrhage, unspecified gastrointestinal hemorrhage type    Anemia, unspecified type    Cellulitis of right leg    Acute kidney injury    Severe sepsis (H)       Physical Exam   Temp: 97.6  F (36.4  C) Temp src: Oral BP: 119/75 Pulse: 93   Resp: 26  SpO2: 98 % O2 Device: None (Room air)    Vitals:    02/20/25 0616   Weight: 72.6 kg (160 lb)     I/O last 3 completed shifts:  In: 50 [P.O.:50]  Out: 800 [Urine:800]    Constitutional: Appears stated age, well nourished, NAD.  Neck: Supple. JVD not visualized.   Respiratory: Non-labored. Lungs CTAB.  Cardiovascular: RRR, with holosystolic murmur   GI: Soft, non-distended, non-tender.  Skin: Warm and dry.   Musculoskeletal/Extremities: Symmetrical movement.  Neurologic: No gross focal deficits. Alert, awake.  Psychiatric: Affect appropriate. Mentation normal.    Medications   Current Facility-Administered Medications   Medication Dose Route Frequency Provider Last Rate Last Admin    [Held by provider] heparin 25,000 units in 0.45% NaCl 250 mL ANTICOAGULANT infusion  0-5,000 Units/hr Intravenous Continuous Jeramy Egan PA-C        Patient is already receiving anticoagulation with heparin, enoxaparin (LOVENOX), warfarin (COUMADIN)  or other anticoagulant medication   Does not apply Continuous PRN Jeramy Egan PA-C        sodium bicarbonate 75 mEq in sodium chloride 0.45 % 1,075 mL infusion   Intravenous Continuous Mariana Canales  mL/hr at 02/21/25 0422 New Bag at 02/21/25 0422     Current Facility-Administered Medications   Medication Dose Route Frequency Provider Last Rate Last Admin    acetaminophen (TYLENOL) tablet 975 mg  975 mg Oral Q8H Jeramy Egan PA-C   975 mg at 02/21/25 0548    artificial saliva (BIOTENE MT) solution 1 spray  1 spray Mouth/Throat 4x Daily Jeramy Egan PA-C   1 spray at 02/21/25 0839    aspirin (ASA) chewable tablet 81 mg  81 mg Oral Daily Jeramy Egan PA-C   81 mg at 02/21/25 0839    cholestyramine (QUESTRAN) Packet 4 g  4 g Oral Daily Jeramy Egan PA-C   4 g at 02/21/25 0936    citalopram (celeXA) half-tab 5 mg  5 mg Oral Daily Jeramy Egan PA-C   5 mg at 02/21/25 0840     piperacillin-tazobactam (ZOSYN) 2.25 g vial to attach to  ml bag  2.25 g Intravenous Q6H Jeramy Silverman PA-C   2.25 g at 25 0841    sodium chloride (PF) 0.9% PF flush 3 mL  3 mL Intracatheter Q8H Jeramy Silverman PA-C        vancomycin place miles - receiving intermittent dosing  1 each Intravenous See Admin Instructions Jeramy Silverman PA-C           Data   Last 24 hours labs personally reviewed.  Echo:   Recent Results (from the past 4320 hours)   Echocardiogram Complete   Result Value    LVEF  55-60%    Narrative    [id number]  [id number]  [id number]  [id number]^ANDRA^JERAMY^VALERIA     [facility]  Echocardiography Laboratory  [address] [place], [place] [address]     Name: IVANIA GOMEZ  MRN: [id number]  : 1940  Study Date: 2025 01:49 PM  Age: 84 yrs  Gender: Male  Patient Location: [facility]  Reason For Study: Tachycardia  Ordering Physician: JERAMY SILVERMAN  Performed By: CHHAYA Renteria     BSA: 1.9 m2  Height: 69 in  Weight: 160 lb  HR: 75  BP: 111/77 mmHg  ______________________________________________________________________________  Procedure  Echocardiogram with two-dimensional, color and spectral Doppler. Definity (NDC  #[id number]) given intravenously. Contrast Definity. Technically difficult  study.Extremely difficult acoustic windows despite the use of contrast for  endcardial border definition. Technically difficult study.  ______________________________________________________________________________  Interpretation Summary     Left ventricular systolic function is normal.The visual ejection fraction is  55-60%.  The right ventricular systolic function is normal.  Moderate to severe aortic valve stenosis, peak aortic valve velocity 3.4 m/s,  mean gradients 26 mmHg, aortic valve area 0.95 cmÂ , DI 0.24  Aortic root aneurysm is present- 4.5 cm  Ascending aorta dilatation is present- 4 cm.  The inferior vena cava was  normal in size with preserved respiratory  variability.     Echo dated 028/03/2022 moderate aortic valve stenosis, aortic root 4.5 cm,  ascending aorta 3.9 cm  ______________________________________________________________________________  Left Ventricle  Diastolic Doppler findings (E/E' ratio and/or other parameters) suggest left  ventricular filling pressures are indeterminate. Left ventricular systolic  function is normal. The visual ejection fraction is 55-60%. No regional wall  motion abnormalities noted.     Right Ventricle  The right ventricle is normal size. The right ventricular systolic function is  normal.     Atria  The left atrium is mildly dilated. Right atrial size is normal. There is no  color Doppler evidence of an atrial shunt.     Mitral Valve  There is trace mitral regurgitation.     Tricuspid Valve  There is trace tricuspid regurgitation. The right ventricular systolic  pressure is approximated at 26.8 mmHg plus the right atrial pressure.     Aortic Valve  There is trace aortic regurgitation. Moderate to severe aortic valve stenosis,  peak aortic valve velocity 3.4 m/s, mean gradients 26 mmHg, aortic valve area  0.95 cmÂ , DI 0.24.     Pulmonic Valve  There is mild (1+) pulmonic valvular regurgitation. There is no pulmonic  valvular stenosis.     Vessels  Aortic root aneurysm is present. 4.5 cm. Ascending aorta dilatation is  present. 4 cm. The inferior vena cava was normal in size with preserved  respiratory variability.     Pericardium  There is no pericardial effusion.     Rhythm  Sinus rhythm was noted.  ______________________________________________________________________________  MMode/2D Measurements & Calculations     Ao root diam: 4.5 cm  asc Aorta Diam: 4.0 cm  LVOT diam: 2.2 cm  LVOT area: 3.8 cm2  Ao root diam index Ht(cm/m): 2.6  Ao root diam index BSA (cm/m2): 2.4  Asc Ao diam index BSA (cm/m2): 2.1  Asc Ao diam index Ht(cm/m): 2.3  EF Biplane: 60.3 %  TAPSE: 2.1 cm     Doppler  Measurements & Calculations  MV E max kevin: 68.0 cm/sec  MV A max kevin: 109.1 cm/sec  MV E/A: 0.62  MV dec slope: 162.4 cm/sec2  MV dec time: 0.42 sec  Ao V2 max: 337.6 cm/sec  Ao max P.6 mmHg  Ao V2 mean: 246.6 cm/sec  Ao mean P.5 mmHg  Ao V2 VTI: 66.9 cm  SUYAPA(I,D): 0.99 cm2  SUYAPA(V,D): 0.90 cm2  LV V1 max P.6 mmHg  LV V1 max: 79.9 cm/sec  LV V1 VTI: 17.4 cm  SV(LVOT): 66.4 ml  SI(LVOT): 35.3 ml/m2     PA acc time: 0.16 sec  TR max kevin: 258.7 cm/sec  TR max P.8 mmHg  AV Kevin Ratio (DI): 0.24  SUYAPA Index (cm2/m2): 0.53  E/E' av.6  Lateral E/e': 5.1  Medial E/e': 10.1  RV S Kevin: 13.9 cm/sec     ______________________________________________________________________________  Report approved by: Chon Granados MD on 2025 03:45 PM

## 2025-02-21 NOTE — PROGRESS NOTES
"Clinical Swallow Evaluation (CSE):     02/21/25 9477   Appointment Info   Signing Clinician's Name / Credentials (SLP) Maricarmen Tolentino MS CCC-SLP   General Information   Onset of Illness/Injury or Date of Surgery 02/20/25   Referring Physician Luma Coe MD   Patient/Family Therapy Goal Statement (SLP) To improve swallowing   Pertinent History of Current Problem   Per provider \"Mando Freedman is a 84 year old male admitted on 2/20/2025 due to Sepsis secondary to right lower extremity cellulitis, NSTEMI, and JUAN R.\"     SLP consulted given overt coughing, gurgling and suction needs with PO trials this AM     General Observations Pt fully alert, very pleasant, upright in chair; son present. Dietician present for some of session.   Pain Assessment   Patient Currently in Pain No   Type of Evaluation   Type of Evaluation Swallow Evaluation   Dentition (Oral Motor)   Dentition (Oral Motor) natural dentition;some missing teeth   Vocal Quality/Secretion Management (Oral Motor)   Vocal Quality (Oral Motor)   (nasal changes for ~ 1 month per pt/family)   Secretion Management (Oral Motor) WNL   General Swallowing Observations   Past History of Dysphagia   Pt with chronic esophageal dysphagia, with multiple EGD's. Most recent EGD revealed \"- Large hiatal hernia. - Tortuous distal esophagus. - Non-obstructing Schatzki ring. Dilated up to 20 mm.\" He reports prior to that he was habing similar symptoms to today: food/liquids sticking, fullness, reduced intake. He reports he has been working with a HH SLP at his facility, reports self-selecting softer/moister items but no specific diet chagnes (e.g., will still eat french fries, onion rings, hamburgers) with thin liquids.     Current Diet/Method of Nutritional Intake (General Swallowing Observations, NIS) regular diet;thin liquids (level 0)  (but RN holding until SLP)   Swallowing Evaluation Clinical swallow evaluation   Clinical Swallow Evaluation   Feeding Assistance no " assistance needed   Clinical Swallow Evaluation Textures Trialed thin liquids;pureed   Clinical Swallow Eval: Thin Liquid Texture Trial   Mode of Presentation, Thin Liquids spoon;cup   Volume of Liquid or Food Presented ice chips x6, x8 sips via cup   Oral Phase of Swallow premature pharyngeal entry   Pharyngeal Phase of Swallow impaired;reduction in laryngeal movement;throat clearing;wet vocal quality after swallow;feeling of something stuck in throat;repeated swallows   Diagnostic Statement Pt reports its slow going down, retnetion; double swallows. Delayed throat clearing/wet vocal quality intermittently, cough and re-swallow clears. Suspect esophageal retention/build up (known tortuous esophagus, schatski's ring needing dilation in past) with post-prandial aspiration risk   Clinical Swallow Evaluation: Puree Solid Texture Trial   Mode of Presentation, Puree spoon;self-fed   Volume of Puree Presented 3 bites   Oral Phase, Puree WFL   Pharyngeal Phase, Puree impaired;reduction in laryngeal movement;feeling of something stuck in throat;throat clearing;repeated swallows   Diagnostic Statement Pt reports its slow going down, retnetion; double swallows. Throat clearing x1. Suspect esophageal retention/build up (known tortuous esophagus, schatski's ring needing dilation in past) with post-prandial aspiration risk.   Esophageal Phase of Swallow   Patient reports or presents with symptoms of esophageal dysphagia Yes   Swallowing Recommendations   Diet Consistency Recommendations full liquid diet;thin liquids (level 0)   Supervision Level for Intake close supervision needed   Mode of Delivery Recommendations bolus size, small;slow rate of intake   Swallowing Maneuver Recommendations alternate food and liquid intake;supraglottic swallow;double dry swallow   Monitoring/Assistance Required (Eating/Swallowing) stop eating activities when fatigue is present;monitor for cough or change in vocal quality with intake   Recommended  Feeding/Eating Techniques (Swallow Eval) maintain upright sitting position for eating;maintain upright posture during/after eating for 30 minutes;provide 6 smaller meals throughout day   Medication Administration Recommendations, Swallowing (SLP) rec with puree + strategies   Instrumental Assessment Recommendations VFSS (videofluoroscopic swallowing study)  (potential VFSS pending GI consult)   General Therapy Interventions   Planned Therapy Interventions Dysphagia Treatment   Clinical Impression   Criteria for Skilled Therapeutic Interventions Met (SLP Eval) Yes, treatment indicated   SLP Diagnosis pharyngeal vs esophageal dysphagia   Clinical Impression Comments   Clinical swallow evaluation completed with thin liquids, puree solids. Pt currently presents with pharyngeal vs esophageal dysphagia -- acute on chronic, esophageal deficits/hx including large hiatal hernia, tortuous esophagus, schatski's ring requiring dilation. Today, pt describes current symptoms are similar to prior to 2023 EGD/pre-dilation; dilation did help symptoms for a few months after. Across trials pt report food/liquids feel slow going down, retention; double swallowing consistently. Delayed throat clearing/wet vocal quality intermittently, cough and re-swallow clears. Belching also noted after majority of bites/sips. Suspect esophageal retention/build up with post-prandial aspiration risk. Pt/son inquiring re: GI consult given recurrance of symptoms, weight loss. Paged provider. Educated on potential VFSS pending GI consult/intervention.     SLP Total Evaluation Time   Eval: oral/pharyngeal swallow function, clinical swallow Minutes (66915) 10   SLP Goals   Therapy Frequency (SLP Eval) daily   SLP Predicted Duration/Target Date for Goal Attainment 02/28/25   SLP Goals Swallow   SLP: Safely tolerate diet without signs/symptoms of aspiration Easy to chew diet;Thin liquids;With use of swallow precautions;Independently   Interventions    Interventions Quick Adds Swallowing Dysfunction   Swallowing Intervention   Treatment of Swallowing Dysfunction &/or Oral Function for Feeding Minutes (03664) 8   Symptoms Noted During/After Treatment None   Treatment Detail/Skilled Intervention Educated pt/son on swallow eval, potential work up/plan pending physician orders. Trained pt and son in pharyngoesophageal clearance and antireflux precations.   SLP Discharge Planning   SLP Plan PO tolerance/ADAT/strategies; monitor GI consult; VFSS monday if needed pending GI   SLP Discharge Recommendation home with home health   SLP Rationale for DC Rec resume HH SLP services for dysphagia f/u   SLP Brief overview of current status    Recommend cautious PO of Full Liquid Diet; Thin Liquids when up to chair for PO, single bites/sips, slow rate, alternate between bites/sips; smaller more frequent meals a day and rest breaks between bites.     Highly recommend GI Consult: pt with hx of Schatzki's ring needing dilation and tortuous esophagus, large hiatal hernia. Current symptoms of slow movement of/sensation of food/liquids building up in throat, belching and delayed throat clearing c/w symptoms pt had prior to last dilation in 2023.. reduced intake/weight loss related to this as well and impacting overall medical status.     Discussed results/recs with RN and MD.     SLP Time and Intention   Total Session Time (sum of timed and untimed services) 18

## 2025-02-21 NOTE — PLAN OF CARE
"Patient Name: Pola  MRN: [id number]  Date of Admission: 2/20/2025  Reason for Admission: RLE cellulitis   Level of Care: [facility]    Vitals:   BP Readings from Last 1 Encounters:   02/21/25 116/70     Pulse Readings from Last 1 Encounters:   02/21/25 62     Wt Readings from Last 1 Encounters:   02/20/25 72.6 kg (160 lb)     Ht Readings from Last 1 Encounters:   02/20/25 1.753 m (5' 9\")     Estimated body mass index is 23.63 kg/m  as calculated from the following:    Height as of this encounter: 1.753 m (5' 9\").    Weight as of this encounter: 72.6 kg (160 lb).  Temp Readings from Last 1 Encounters:   02/21/25 97.7  F (36.5  C) (Oral)       Pain:  Pain Rating 7/10 Effective pain medication/regimen Scheduled tylenol, refused PRN medication     CV Surgery Patient: No    Assessment    Resp: LS clear on RA, productive cough  Telemetry: SR/sinus dysthymia, freq PACs, RBBB  Neuro: A&O x4  GI/: No BM overnight, +BS. External catheter in place   Skin/Wounds: RLE tim and edema +2/+3 in foot. R- heel wound, mepilex in place. Bilateral open/closed blisters to toes. Scattered bruising.   Lines/Drains: 2 PIVs, infusing sodium bicarb 75 mEq in 0.45 NS @ 100 ml/hr per nephrology. External catheter   Activity: Ax1 GBW  Sleep: fair  Abnormal Labs: Positive blood cultures, notified on-call.     Aggression Stop Light: Green          Patient Care Plan: IV Abx. Continue plan of care   "

## 2025-02-21 NOTE — PLAN OF CARE
"Patient Name: Pola  MRN: [id number]  Date of Admission: 2/20/2025  Reason for Admission: Cellulitis of RLE  Level of Care: [facility]    Vitals:   BP Readings from Last 1 Encounters:   02/21/25 103/66     Pulse Readings from Last 1 Encounters:   02/21/25 108     Wt Readings from Last 1 Encounters:   02/20/25 72.6 kg (160 lb)     Ht Readings from Last 1 Encounters:   02/20/25 1.753 m (5' 9\")     Estimated body mass index is 23.63 kg/m  as calculated from the following:    Height as of this encounter: 1.753 m (5' 9\").    Weight as of this encounter: 72.6 kg (160 lb).  Temp Readings from Last 1 Encounters:   02/21/25 97.5  F (36.4  C) (Oral)       Pain: Pain goal 0 Pain Rating 3-6 Effective pain medication/regimen Tylenol    CV Surgery Patient: No    Assessment    Resp: LSC - RA  Telemetry: Afib CVR/RVR  Neuro: A/Ox4  GI/: Straight cath this AM, 2 BM during shift, BS audible  Skin/Wounds: PI to R heel, Redness to sacrum, excoriation to penis, ruddiness to BLLE, Bleeding in mouth  Lines/Drains: 2 PIV  Activity: Sera steady  Sleep: -  Abnormal Labs: Positive blood cultures    Aggression Stop Light: Green          Patient Care Plan: IV ABX     Goal Outcome Evaluation:                        "

## 2025-02-21 NOTE — PROGRESS NOTES
02/21/25 1000   Appointment Info   Signing Clinician's Name / Credentials (PT) Mrieille Ellis, PT   Living Environment   People in Home alone   Current Living Arrangements apartment   Home Accessibility no concerns   Transportation Anticipated family or friend will provide   Living Environment Comments Pt reports that he lives in an ILF apartment. Son provides transportation, is otherwise independent.   Self-Care   Usual Activity Tolerance moderate   Current Activity Tolerance poor   Regular Exercise No   Equipment Currently Used at Home walker, rolling   Fall history within last six months no   General Information   Onset of Illness/Injury or Date of Surgery 02/20/25   Referring Physician Dr. Coe   Patient/Family Therapy Goals Statement (PT) To go home   Pertinent History of Current Problem (include personal factors and/or comorbidities that impact the POC) Pt is an 84 year old male admitted with LE cellulitis.   Existing Precautions/Restrictions fall   Cognition   Affect/Mental Status (Cognition) WFL   Orientation Status (Cognition) oriented x 3   Follows Commands (Cognition) WFL   Pain Assessment   Patient Currently in Pain No   Range of Motion (ROM)   Range of Motion ROM is WFL   Strength (Manual Muscle Testing)   Strength (Manual Muscle Testing) Deficits observed during functional mobility   Bed Mobility   Comment, (Bed Mobility) Mod A   Transfers   Comment, (Transfers) Mod A x 2   Gait/Stairs (Locomotion)   Comment, (Gait/Stairs) Unable to ambulate   Clinical Impression   Criteria for Skilled Therapeutic Intervention Yes, treatment indicated   PT Diagnosis (PT) Impaired ambulation   Influenced by the following impairments Decreased strength, decreased endurance, decreased balance   Functional limitations due to impairments Difficulty with bed mobility, transfers, ambulation   Clinical Presentation (PT Evaluation Complexity) stable   Clinical Presentation Rationale VSS, pain controlled   Clinical Decision  Making (Complexity) low complexity   Planned Therapy Interventions (PT) balance training;bed mobility training;gait training;patient/family education;transfer training;strengthening   Risk & Benefits of therapy have been explained evaluation/treatment results reviewed;care plan/treatment goals reviewed;risks/benefits reviewed;current/potential barriers reviewed;participants voiced agreement with care plan;participants included;patient   PT Total Evaluation Time   PT Eval, Low Complexity Minutes (24401) 10   PT Discharge Planning   PT Plan Progress sit to/from stand, amb with w/c follow when ready, general strengthening   PT Discharge Recommendation (DC Rec) Transitional Care Facility   PT Rationale for DC Rec At baseline, pt lives alone in an [facility] apartment. Pt reports that he is modified independent with FWW, only assist that he receives is transportation from his son. This date, pt is well below baseline and would be a considerable fall risk if he returned to home environment. Pt currently requires assist of 1-2 with all mobility, is unable to ambulate, requires Nuris Steady for transfers. Pt will benefit from continued therapy at TCU to address impairments and increase mobility and functional independence prior to returning home.   PT Brief overview of current status Goals of therapy will be to address safe mobility and make recs for d/c to next level of care. Pt and RN will continue to follow all falls risk precautions as documented by RN staff while hospitalized. Assist 1-2, Nuris Steady   PT Total Distance Amb During Session (feet) 0   Physical Therapy Time and Intention   Total Session Time (sum of timed and untimed services) 10

## 2025-02-21 NOTE — PROGRESS NOTES
Renal Medicine Progress Note                                Mando Freedman MRN# [id number]   Age: 84 year old YOB: 1940   Date of Admission: 2/20/2025 Hospital LOS: 1                  Assessment/Plan:     1.  Advanced CKD              -creatinine baseline 3.5 to 4 range?              -stage 4 to 5              -atrophic right kidney     Likely component of chronic progressive disease.  Exactly baseline not entirely clear  Suspect as noted above somewhere between 3.5 and 4.0 mg/dL     2.  Proteinuria              -PCR; previous 1 gm/gm  3.  Acute Kidney Injury              -pre renal ?              -in setting of sepsis/NSTEMI  4.  Acidosis              -NAG + AG              -lactate              -probable renal HCO3- loss              -previous prescribed oral HCO3-  5.  Secondary hyperparathyroidism  6.  Fe deficiency history   7.  Hypoalbuminemia       Needs IV Fe  D OK  Modest PTH increase    Adjust IVF  Add oral HCO3-      Interval History:     Comfortable   Up in chair   Lab reviewed    UO reviewed     ROS:     GENERAL: NAD, No fever,chills  R: NEGATIVE for significant cough or SOB  CV: NEGATIVE for chest pain, palpitations  EXT: no change edema  ROS otherwise negative    Medications and Allergies:     Reviewed    Physical Exam:     Vitals were reviewed  Patient Vitals for the past 8 hrs:   BP Temp Temp src Pulse Resp SpO2   02/21/25 0945 -- -- -- 93 26 98 %   02/21/25 0816 119/75 97.6  F (36.4  C) Oral 73 20 100 %   02/21/25 0600 129/73 -- -- 62 13 100 %   02/21/25 0400 116/70 -- -- 62 10 100 %   02/21/25 0349 -- 97.7  F (36.5  C) Oral -- -- --     Wt Readings from Last 4 Encounters:   02/20/25 72.6 kg (160 lb)   09/08/23 87.1 kg (192 lb)   08/25/22 85.5 kg (188 lb 9.6 oz)   08/05/22 83.9 kg (184 lb 14.4 oz)     I/O last 3 completed shifts:  In: 50 [P.O.:50]  Out: 800 [Urine:800]    Vitals:    02/20/25 0616   Weight: 72.6 kg (160 lb)         GENERAL: awake, alert, follows  HEENT: NC/AT, PERRLA,  EOMI, non icteric, pharynx moist without lesion  RESP:  clear anteriorly  CV: RRR, normal S1 S2  MS: no clubbing, cyanosis   SKIN: clear without significant rashes or lesions  EXT: + edema     Data:     Recent Labs   Lab 02/21/25  1018 02/21/25  0925 02/21/25  0852 02/21/25  0509 02/20/25  0753   NA  --   --   --  143 143   POTASSIUM  --   --   --  4.4 4.5   CHLORIDE  --   --   --  116* 115*   CO2  --   --   --  12* 12*   ANIONGAP  --   --   --  15 16*   GLC 81 72 66* 76 107*   BUN  --   --   --  73.9* 72.9*   CR  --   --   --  4.81* 5.42*   GFRESTIMATED  --   --   --  11* 10*   KATT  --   --   --  7.9* 8.1*         Recent Labs   Lab 02/21/25  0509 02/20/25  0753   CR 4.81* 5.42*     Recent Labs   Lab 02/21/25  0509 02/20/25  0753   POTASSIUM 4.4 4.5     Recent Labs   Lab 02/21/25  0509 02/20/25  0753   ALBUMIN 2.9* 3.3*     Recent Labs   Lab 02/21/25  0509 02/20/25  0622   HGB 9.7* 12.0*     Recent Labs   Lab 02/20/25  1132   IRON 13*   IRONSAT 8*   *   DAVIDE 132         G Jamal Kaye MD    [facility] - Nephrology  [phone]

## 2025-02-22 LAB
ANION GAP SERPL CALCULATED.3IONS-SCNC: 17 MMOL/L (ref 7–15)
BUN SERPL-MCNC: 73.5 MG/DL (ref 8–23)
CALCIUM SERPL-MCNC: 8 MG/DL (ref 8.8–10.4)
CHLORIDE SERPL-SCNC: 114 MMOL/L (ref 98–107)
CREAT SERPL-MCNC: 4.31 MG/DL (ref 0.67–1.17)
EGFRCR SERPLBLD CKD-EPI 2021: 13 ML/MIN/1.73M2
ERYTHROCYTE [DISTWIDTH] IN BLOOD BY AUTOMATED COUNT: 17.9 % (ref 10–15)
GLUCOSE SERPL-MCNC: 86 MG/DL (ref 70–99)
HCO3 SERPL-SCNC: 13 MMOL/L (ref 22–29)
HCT VFR BLD AUTO: 29.5 % (ref 40–53)
HGB BLD-MCNC: 9.8 G/DL (ref 13.3–17.7)
MCH RBC QN AUTO: 33 PG (ref 26.5–33)
MCHC RBC AUTO-ENTMCNC: 33.2 G/DL (ref 31.5–36.5)
MCV RBC AUTO: 99 FL (ref 78–100)
PLATELET # BLD AUTO: 91 10E3/UL (ref 150–450)
POTASSIUM SERPL-SCNC: 4.2 MMOL/L (ref 3.4–5.3)
RBC # BLD AUTO: 2.97 10E6/UL (ref 4.4–5.9)
SODIUM SERPL-SCNC: 144 MMOL/L (ref 135–145)
WBC # BLD AUTO: 10.6 10E3/UL (ref 4–11)

## 2025-02-22 PROCEDURE — 250N000009 HC RX 250: Performed by: INTERNAL MEDICINE

## 2025-02-22 PROCEDURE — 84132 ASSAY OF SERUM POTASSIUM: CPT | Performed by: INTERNAL MEDICINE

## 2025-02-22 PROCEDURE — 250N000013 HC RX MED GY IP 250 OP 250 PS 637: Performed by: INTERNAL MEDICINE

## 2025-02-22 PROCEDURE — 250N000013 HC RX MED GY IP 250 OP 250 PS 637: Performed by: PHYSICIAN ASSISTANT

## 2025-02-22 PROCEDURE — 36415 COLL VENOUS BLD VENIPUNCTURE: CPT | Performed by: INTERNAL MEDICINE

## 2025-02-22 PROCEDURE — 99233 SBSQ HOSP IP/OBS HIGH 50: CPT | Performed by: INTERNAL MEDICINE

## 2025-02-22 PROCEDURE — 85018 HEMOGLOBIN: CPT | Performed by: INTERNAL MEDICINE

## 2025-02-22 PROCEDURE — 82565 ASSAY OF CREATININE: CPT | Performed by: INTERNAL MEDICINE

## 2025-02-22 PROCEDURE — 92526 ORAL FUNCTION THERAPY: CPT | Mod: GN

## 2025-02-22 PROCEDURE — 258N000002 HC RX IP 258 OP 250: Performed by: INTERNAL MEDICINE

## 2025-02-22 PROCEDURE — 250N000011 HC RX IP 250 OP 636: Performed by: PHYSICIAN ASSISTANT

## 2025-02-22 PROCEDURE — 120N000001 HC R&B MED SURG/OB

## 2025-02-22 PROCEDURE — 99232 SBSQ HOSP IP/OBS MODERATE 35: CPT | Performed by: INTERNAL MEDICINE

## 2025-02-22 PROCEDURE — 80048 BASIC METABOLIC PNL TOTAL CA: CPT | Performed by: INTERNAL MEDICINE

## 2025-02-22 PROCEDURE — 250N000011 HC RX IP 250 OP 636: Performed by: INTERNAL MEDICINE

## 2025-02-22 PROCEDURE — 258N000003 HC RX IP 258 OP 636: Performed by: INTERNAL MEDICINE

## 2025-02-22 RX ORDER — SODIUM BICARBONATE 650 MG/1
1300 TABLET ORAL 2 TIMES DAILY
Status: DISCONTINUED | OUTPATIENT
Start: 2025-02-22 | End: 2025-02-25

## 2025-02-22 RX ADMIN — SODIUM BICARBONATE 650 MG TABLET 1300 MG: at 20:20

## 2025-02-22 RX ADMIN — Medication 1 SPRAY: at 16:36

## 2025-02-22 RX ADMIN — Medication 1 SPRAY: at 20:20

## 2025-02-22 RX ADMIN — Medication 1 SPRAY: at 09:11

## 2025-02-22 RX ADMIN — PIPERACILLIN AND TAZOBACTAM 2.25 G: 2; .25 INJECTION, POWDER, FOR SOLUTION INTRAVENOUS at 22:06

## 2025-02-22 RX ADMIN — PIPERACILLIN AND TAZOBACTAM 2.25 G: 2; .25 INJECTION, POWDER, FOR SOLUTION INTRAVENOUS at 06:43

## 2025-02-22 RX ADMIN — ACETAMINOPHEN 975 MG: 325 TABLET, FILM COATED ORAL at 16:36

## 2025-02-22 RX ADMIN — ASPIRIN 81 MG CHEWABLE TABLET 81 MG: 81 TABLET CHEWABLE at 09:09

## 2025-02-22 RX ADMIN — PIPERACILLIN AND TAZOBACTAM 2.25 G: 2; .25 INJECTION, POWDER, FOR SOLUTION INTRAVENOUS at 16:36

## 2025-02-22 RX ADMIN — ACETAMINOPHEN 975 MG: 325 TABLET, FILM COATED ORAL at 22:03

## 2025-02-22 RX ADMIN — PIPERACILLIN AND TAZOBACTAM 2.25 G: 2; .25 INJECTION, POWDER, FOR SOLUTION INTRAVENOUS at 10:13

## 2025-02-22 RX ADMIN — SODIUM BICARBONATE: 84 INJECTION, SOLUTION INTRAVENOUS at 00:45

## 2025-02-22 RX ADMIN — Medication 1 SPRAY: at 12:41

## 2025-02-22 RX ADMIN — Medication 5 MG: at 09:10

## 2025-02-22 RX ADMIN — Medication 1 TABLET: at 09:09

## 2025-02-22 RX ADMIN — CHOLESTYRAMINE 4 G: 4 POWDER, FOR SUSPENSION ORAL at 10:13

## 2025-02-22 RX ADMIN — Medication 1 SPRAY: at 00:44

## 2025-02-22 RX ADMIN — IRON SUCROSE 200 MG: 20 INJECTION, SOLUTION INTRAVENOUS at 12:41

## 2025-02-22 RX ADMIN — SODIUM BICARBONATE 650 MG TABLET 650 MG: at 09:09

## 2025-02-22 ASSESSMENT — ACTIVITIES OF DAILY LIVING (ADL)
ADLS_ACUITY_SCORE: 50
ADLS_ACUITY_SCORE: 52
ADLS_ACUITY_SCORE: 50
ADLS_ACUITY_SCORE: 50
ADLS_ACUITY_SCORE: 56
ADLS_ACUITY_SCORE: 50
ADLS_ACUITY_SCORE: 56
ADLS_ACUITY_SCORE: 50
ADLS_ACUITY_SCORE: 56
ADLS_ACUITY_SCORE: 50
ADLS_ACUITY_SCORE: 56
ADLS_ACUITY_SCORE: 62
ADLS_ACUITY_SCORE: 52
ADLS_ACUITY_SCORE: 50
ADLS_ACUITY_SCORE: 62
ADLS_ACUITY_SCORE: 52
ADLS_ACUITY_SCORE: 50
ADLS_ACUITY_SCORE: 52
ADLS_ACUITY_SCORE: 52
ADLS_ACUITY_SCORE: 58
ADLS_ACUITY_SCORE: 56

## 2025-02-22 NOTE — PROVIDER NOTIFICATION
MD Notification    Notified Person: MD    Notified Person Name: Dr. Coe    Notification Date/Time: 1726, 2/21    Notification Interaction: Vocera message    Purpose of Notification: Pt in new onset Afib RVR/CVR after obtaining 12 lead EKG    Orders Received: no new orders     Comments:

## 2025-02-22 NOTE — PROGRESS NOTES
Orientation: A/oX4    Vitals/Tele: SR with PAC, VSS RA    IV Access/drains: Sodium bicarb infusing 100 ml/hr    Diet: full liquid    Mobility: A2 serasteady, not OOB this shift    GI/: used urinal in bed, 275-bladder scanned-0 ml following.     Wound/Skin: RLE red, skin is not as hot, blisters on R toes,  edema L and R feet.      Consults: neph, pt, cardi    Discharge Plan: pending

## 2025-02-22 NOTE — PROVIDER NOTIFICATION
MD Notification    Notified Person: MD    Notified Person Name: Dr. Coe    Notification Date/Time: 0858, 2/21    Notification Interaction: vocera message    Purpose of Notification: BG of 66    Orders Received: Follow Hypoglycemia protocol    Comments:

## 2025-02-22 NOTE — PROGRESS NOTES
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 Atrophic R kidney.     JUAN R: with met acidosis and mild elevation AG. Oral bicarb. S/P IVF with NS. Now on 1/2NS with 75 bicarb at 100 per h.    ml yest , /76.     Cont IVF.  Follow labs.             Interval History:   Anemia: CKD and Fe def. Hgb 9.8.  IV venofer. Aranesp.     On zosyn and vanco.  RLE cellulitis.            Review of Systems:   Not out of bed much. Edema both legs. RLE appears infected.           Medications:     Current Facility-Administered Medications   Medication Dose Route Frequency Provider Last Rate Last Admin    acetaminophen (TYLENOL) tablet 975 mg  975 mg Oral Q8H Jeramy Egan PA-C   975 mg at 02/21/25 2303    artificial saliva (BIOTENE MT) solution 1 spray  1 spray Mouth/Throat 4x Daily Jeramy Egan PA-C   1 spray at 02/22/25 0911    aspirin (ASA) chewable tablet 81 mg  81 mg Oral Daily Jeramy Egan PA-C   81 mg at 02/22/25 0909    cholestyramine (QUESTRAN) Packet 4 g  4 g Oral Daily Jeramy Egan PA-C   4 g at 02/21/25 0936    citalopram (celeXA) half-tab 5 mg  5 mg Oral Daily Jeramy Egan PA-C   5 mg at 02/22/25 0910    iron sucrose (VENOFER) 200 mg in sodium chloride 0.9 % 120 mL intermittent infusion  200 mg Intravenous Q24H TRANG Kaye  mL/hr at 02/21/25 1138 200 mg at 02/21/25 1138    multivitamin w/minerals (THERA-VIT-M) tablet 1 tablet  1 tablet Oral Daily Luma Coe MD   1 tablet at 02/22/25 0909    piperacillin-tazobactam (ZOSYN) 2.25 g vial to attach to  ml bag  2.25 g Intravenous Q6H Jeramy Egan PA-C   2.25 g at 02/22/25 0643    sodium bicarbonate tablet 1,300 mg  1,300 mg Oral BID Adriel Li MD        sodium chloride (PF) 0.9% PF flush 3 mL  3 mL Intracatheter Q8H Jeramy Egan PA-C   3 mL at 02/22/25 3297     Current Facility-Administered Medications   Medication  Dose Route Frequency Provider Last Rate Last Admin    [Held by provider] heparin 25,000 units in 0.45% NaCl 250 mL ANTICOAGULANT infusion  0-5,000 Units/hr Intravenous Continuous Jeramy Egan PA-C   Stopped at 25 1137    Patient is already receiving anticoagulation with heparin, enoxaparin (LOVENOX), warfarin (COUMADIN)  or other anticoagulant medication   Does not apply Continuous PRN Jeramy Egan PA-C        sodium chloride 0.45 % 1,000 mL with sodium bicarbonate 75 mEq/L infusion   Intravenous Continuous TRANG Kaye  mL/hr at 25 0045 New Bag at 25 0045     Current active medications and PTA medications reviewed, see medication list for details.            Physical Exam:   Vitals were reviewed  Patient Vitals for the past 24 hrs:   BP Temp Temp src Pulse Resp SpO2   25 0900 -- -- -- 74 17 100 %   25 0802 -- 97.8  F (36.6  C) Oral -- -- --   25 0800 114/76 -- -- 76 14 98 %   25 0600 (!) 120/107 -- -- 90 16 99 %   25 0400 111/76 -- -- 84 19 95 %   25 0200 97/58 -- -- 68 12 99 %   25 0000 100/57 -- -- 74 12 98 %   25 2303 -- 97.5  F (36.4  C) Oral -- 12 --   25 2200 128/81 -- -- 77 13 100 %   25 -- -- -- 70 13 100 %   25 -- 97.6  F (36.4  C) Oral -- -- --   25 115/72 -- -- 66 14 98 %   25 1800 97/76 -- -- 109 23 95 %   25 1700 -- -- -- 108 22 99 %   25 1600 103/66 -- -- 109 22 93 %   25 1539 -- 97.5  F (36.4  C) Oral -- -- --   25 1400 112/54 -- -- 74 26 100 %   25 1200 91/63 97.4  F (36.3  C) -- 75 23 100 %   25 1000 97/65 -- -- 98 28 96 %       Temp:  [97.4  F (36.3  C)-97.8  F (36.6  C)] 97.8  F (36.6  C)  Pulse:  [] 74  Resp:  [12-28] 17  BP: ()/() 114/76  SpO2:  [93 %-100 %] 100 %    Temperatures:  Current - Temp: 97.8  F (36.6  C); Max - Temp  Av.6  F (36.4  C)  Min: 97.4  F (36.3  C)  Max: 97.8  F (36.6   C)  Respiration range: Resp  Av.9  Min: 12  Max: 28  Pulse range: Pulse  Av.5  Min: 66  Max: 109  Blood pressure range: Systolic (24hrs), Av , Min:91 , Max:128   ; Diastolic (24hrs), Av, Min:54, Max:107    Pulse oximetry range: SpO2  Av %  Min: 93 %  Max: 100 %    I/O last 3 completed shifts:  In: 1602 [P.O.:785; I.V.:617; IV Piggyback:200]  Out: 1075 [Urine:1075]      Intake/Output Summary (Last 24 hours) at 2025 0955  Last data filed at 2025 0900  Gross per 24 hour   Intake 1627 ml   Output 1125 ml   Net 502 ml     Alert and responsive  No distress  Lungs with dlear ant BS  Cor no JVD, nl S1 S2 no M  LE LLE mild erythema and edema, gangrenous toes, RLE edematous , erythematous and tender.          Wt Readings from Last 4 Encounters:   25 72.6 kg (160 lb)   23 87.1 kg (192 lb)   22 85.5 kg (188 lb 9.6 oz)   22 83.9 kg (184 lb 14.4 oz)          Data:          Lab Results   Component Value Date     2025     2025     2025    Lab Results   Component Value Date    CHLORIDE 114 2025    CHLORIDE 116 2025    CHLORIDE 115 2025    CHLORIDE 116 10/11/2022    CHLORIDE 115 10/04/2022    CHLORIDE 116 2022    Lab Results   Component Value Date    BUN 73.5 2025    BUN 73.9 2025    BUN 72.9 2025    BUN 37 10/11/2022    BUN 40 10/04/2022    BUN 38 2022      Lab Results   Component Value Date    POTASSIUM 4.2 2025    POTASSIUM 4.4 2025    POTASSIUM 4.5 2025    POTASSIUM 5.4 10/11/2022    POTASSIUM 5.4 10/04/2022    POTASSIUM 5.2 2022    Lab Results   Component Value Date    CO2 13 2025    CO2 12 2025    CO2 12 2025    CO2 20 10/11/2022    CO2 17 10/04/2022    CO2 17 2022    Lab Results   Component Value Date    CR 4.31 2025    CR 4.81 2025    CR 5.42 2025        Recent Labs   Lab Test 25  0613 25  0509 25  0622    WBC 10.6 12.5* 17.0*   HGB 9.8* 9.7* 12.0*   HCT 29.5* 28.8* 35.2*   MCV 99 98 100   PLT 91* 83* 120*     Recent Labs   Lab Test 02/21/25  0509 02/20/25  0753 08/24/22  0750   AST 88* 25 12   ALT 22 11 16   ALKPHOS 62 90 75   BILITOTAL 0.3 0.3 0.4       Recent Labs   Lab Test 09/07/23  1150 03/23/23  0944 08/23/22  0902   MAG 2.4* 2.7* 2.4*     Recent Labs   Lab Test 09/14/23  0938 03/23/23  0944 03/02/23  0656   PHOS 4.9* 4.5 3.9     Recent Labs   Lab Test 02/22/25  0613 02/21/25  0509 02/20/25  0753   KATT 8.0* 7.9* 8.1*       Lab Results   Component Value Date    KATT 8.0 (L) 02/22/2025     Lab Results   Component Value Date    WBC 10.6 02/22/2025    HGB 9.8 (L) 02/22/2025    HCT 29.5 (L) 02/22/2025    MCV 99 02/22/2025    PLT 91 (L) 02/22/2025     Lab Results   Component Value Date     02/22/2025    POTASSIUM 4.2 02/22/2025    CHLORIDE 114 (H) 02/22/2025    CO2 13 (L) 02/22/2025    GLC 86 02/22/2025     Lab Results   Component Value Date    BUN 73.5 (H) 02/22/2025    CR 4.31 (H) 02/22/2025     Lab Results   Component Value Date    MAG 2.4 (H) 09/07/2023     Lab Results   Component Value Date    PHOS 4.9 (H) 09/14/2023       Creatinine   Date Value Ref Range Status   02/22/2025 4.31 (H) 0.67 - 1.17 mg/dL Final   02/21/2025 4.81 (H) 0.67 - 1.17 mg/dL Final   02/20/2025 5.42 (H) 0.67 - 1.17 mg/dL Final   02/13/2025 4.74 (H) 0.67 - 1.17 mg/dL Final   08/08/2024 2.97 (H) 0.67 - 1.17 mg/dL Final   05/16/2024 2.90 (H) 0.67 - 1.17 mg/dL Final       Attestation:  I have reviewed today's vital signs, notes, medications, labs and imaging.     Adriel Li MD

## 2025-02-22 NOTE — PROGRESS NOTES
Care Management Follow Up    Length of Stay (days): 2    Expected Discharge Date: 02/24/2025     Concerns to be Addressed:       Patient plan of care discussed at interdisciplinary rounds: Yes    Anticipated Discharge Disposition:                Anticipated Discharge Services:    Anticipated Discharge DME: None    Patient/family educated on Medicare website which has current facility and service quality ratings: yes  Education Provided on the Discharge Plan:    Patient/Family in Agreement with the Plan: unable to assess    Referrals Placed by CM/SW:    Private pay costs discussed: Not applicable    Discussed  Partnership in Safe Discharge Planning  document with patient/family: No     Handoff Completed: No, handoff not indicated or clinically appropriate    Additional Information:  SW reviewed notes and searched for TCU locations near the [facility], SW sent referrals to [facility].     Next Steps: Get more TCU options from pt and Family    REANNA LópezW

## 2025-02-22 NOTE — PLAN OF CARE
"    Vitals:   BP Readings from Last 1 Encounters:   02/22/25 121/78     Pulse Readings from Last 1 Encounters:   02/22/25 61     Wt Readings from Last 1 Encounters:   02/20/25 72.6 kg (160 lb)     Ht Readings from Last 1 Encounters:   02/20/25 1.753 m (5' 9\")     Estimated body mass index is 23.63 kg/m  as calculated from the following:    Height as of this encounter: 1.753 m (5' 9\").    Weight as of this encounter: 72.6 kg (160 lb).  Temp Readings from Last 1 Encounters:   02/22/25 98.2  F (36.8  C) (Oral)          Patient Name: Pola  MRN: [id number]  Date of Admission: 2/20/2025  Reason for Admission: Cellulitis of RLE  Level of Care: [facility]           Pain: Pain goal 0 Pain Rating 4-6 Effective pain medication/regimen Tylenol     CV Surgery Patient: No     Assessment     Resp: LSC - RA  Telemetry: Afib CVR  Neuro: A/Ox4  GI/:  2 BM during shift, BS audible, adequate UO via urinal   Skin/Wounds: PI to R heel, Redness to sacrum, excoriation to penis, ruddiness to BLLE, Bleeding in mouth  Lines/Drains: 2 PIV  Activity: Sera steady  Sleep: -  Abnormal Labs: Positive blood cultures     Aggression Stop Light: Green          Patient Care Plan: IV ABX, Pt transferred to [facility] spine at 1730     Goal Outcome Evaluation:                                "

## 2025-02-22 NOTE — PROGRESS NOTES
[facility]  Medicine Progress Note - Hospitalist Service  Date of Admission:  2/20/2025    Assessment & Plan   84 year old male with a past medical history of type II von willebrand disorder, hypertension, CKD stage 3-4 and aortic stenosis among other chronic conditions who was admitted on 2/20/2025 for treatment of sepsis associated with right lower extremity cellulitis and gram negative bacteremia.    Severe sepsis  Bacteremia, gram negative bacilli  Pseudomonas aeruginosa detected  RLE cellulitis as source suspected  Elevated WBC, lactate, heart rate.   -blood cultures from 2/20 positive.   -repeat blood cultures 2/21: NGTD  -continue zosyn Q 6 hours  -MRSA swab negative so discontinued vancomycin.  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   -TTE on 2/20: LVEF 55-60%, no wall motion abnormality, moderate to severe aortic stenosis  -cardiology consulted. No further cardiac workup at this time.  -continue daily aspirin 81 mg     New onset atrial fibrillation  Irregular heart rate noted by bedside nurse and 12 lead EKG confirmed atrial fibrillation (new) and right bundle branch block (not new). Was not present when TTE was obtained on 2/20.   -continue telemetry  -no rate control needed at this time given pulse 58-68 with occasional rates of 100-110.   -High risk for therapeutic anticoagulation complications given multiple other active problems (chronic anemia, acute anemia after dental procedure, von willebrand disorder). Reassess rhythm patterns daily.     Acute on chronic CKD stage 3-4  Anemia of chronic disease  -IV fluids with bicarbonate  -nephrology consulted  -avoid nephrotoxins    Type II von willebrand disorder  Chronic anemia, thrombocytopenia  Oral bleeding post dental procedure  -continue on aspirin 81 mg daily  -resume oral iron  replacement at discharge    Recent Labs   Lab 02/22/25  0613 02/21/25  0509 02/20/25  0622   WBC 10.6 12.5* 17.0*   HGB 9.8* 9.7* 12.0*   HCT 29.5* 28.8* 35.2*   MCV 99 98 100   PLT 91* 83* 120*       H/o Schatzki ring   S/p dilation in 2023  -SLP swallow eval. She recommended GI consult. Consult placed to [facility]    Depression, anxiety  -continue PTA celexa    Gout  -holding PTA allopurinol with GFR <15    Physical Deconditioning  Anticipate need for TCU at discharge. Previously living independently.  -PTA, OT, SW, CC consults          Diet: Combination Diet Full Liquid Diet; Thin Liquids (level 0) (up to chair for PO, single bites/sips, slow rate, alternate between bites/sips; smaller more frequent meals a day and rest breaks between bites.)  Snacks/Supplements Adult: Ensure Enlive; Between Meals    DVT Prophylaxis: Pneumatic Compression Devices and Ambulate every shift  Angulo Catheter: Not present  Lines: None     Cardiac Monitoring: ACTIVE order. Indication: AMI (NSTEMI/ STEMI) (48 hours)  Code Status: No CPR- Do NOT Intubate      Clinically Significant Risk Factors          # Hyperchloremia: Highest Cl = 116 mmol/L in last 2 days, will monitor as appropriate          # Hypoalbuminemia: Lowest albumin = 2.9 g/dL at 2/21/2025  5:09 AM, will monitor as appropriate   # Thrombocytopenia: Lowest platelets = 83 in last 2 days, will monitor for bleeding               # Severe Malnutrition: based on nutrition assessment, PRESENT ON ADMISSION          Social Drivers of Health    Tobacco Use: Medium Risk (9/8/2023)    Patient History     Smoking Tobacco Use: Former     Smokeless Tobacco Use: Never    Received from [facility] [email or web address] & [employer]    Financial Resource Strain    Received from [facility] [email or web address] & [employer]    Social Connections          Disposition Plan     Medically Ready for Discharge:  Anticipated in 2-4 Days. No longer needs IMC. Can transfer to medical unit with remote telemetry.        Luma Coe MD  Hospitalist Service  [facility]  Securely message with [employer] (more info)  Text page via [employer] Paging/Directory   ______________________________________________________________________    Interval History   Rhythm of afib continued overnight. No palpitations nor chest discomfort. Thinks his legs are less weak today.     Physical Exam   Vital Signs: Temp: 97.8  F (36.6  C) Temp src: Oral BP: (!) 120/107 Pulse: 90   Resp: 16 SpO2: 99 % O2 Device: None (Room air)    Weight: 160 lbs 0 oz    General Appearance: Reclined in bed, calm and cooperative  Respiratory: clear to auscultation bilaterally with good inspiratory effort, no wheezes or crackles  Cardiovascular: irregular, systolic murmur present  GI: Benign, soft, nontender  Skin: Fragile skin, multiple bruises on extremities in various stages of healing  Right lower leg with edema, erythema  Neuro: No evidence for delirium or confusion at the time of my visit, no tremors or myoclonus    Medical Decision Making       52 MINUTES SPENT BY ME on the date of service doing chart review, history, exam, documentation & further activities per the note.  MANAGEMENT DISCUSSED with the following over the past 24 hours: patient, bedside nurse   NOTE(S)/MEDICAL RECORDS REVIEWED over the past 24 hours: nephrology notes, GI notes, nursing notes, med admin record, telemetry  Tests ORDERED & REVIEWED in the past 24 hours:  - See lab/imaging results included in the data section of the note      Data     I have personally reviewed the following data over the past 24 hrs:    10.6  \   9.8 (L)   / 91 (L)     144 114 (H) 73.5 (H) /  86   4.2 13 (L) 4.31 (H) \

## 2025-02-23 LAB
ANION GAP SERPL CALCULATED.3IONS-SCNC: 13 MMOL/L (ref 7–15)
BACTERIA BLD CULT: ABNORMAL
BUN SERPL-MCNC: 69.4 MG/DL (ref 8–23)
CALCIUM SERPL-MCNC: 7.6 MG/DL (ref 8.8–10.4)
CHLORIDE SERPL-SCNC: 111 MMOL/L (ref 98–107)
CREAT SERPL-MCNC: 3.83 MG/DL (ref 0.67–1.17)
EGFRCR SERPLBLD CKD-EPI 2021: 15 ML/MIN/1.73M2
ERYTHROCYTE [DISTWIDTH] IN BLOOD BY AUTOMATED COUNT: 18 % (ref 10–15)
GLUCOSE SERPL-MCNC: 83 MG/DL (ref 70–99)
HCO3 SERPL-SCNC: 18 MMOL/L (ref 22–29)
HCT VFR BLD AUTO: 26.5 % (ref 40–53)
HGB BLD-MCNC: 9.1 G/DL (ref 13.3–17.7)
MAGNESIUM SERPL-MCNC: 1.6 MG/DL (ref 1.7–2.3)
MCH RBC QN AUTO: 33.5 PG (ref 26.5–33)
MCHC RBC AUTO-ENTMCNC: 34.3 G/DL (ref 31.5–36.5)
MCV RBC AUTO: 97 FL (ref 78–100)
PHOSPHATE SERPL-MCNC: 3.5 MG/DL (ref 2.5–4.5)
PLATELET # BLD AUTO: 80 10E3/UL (ref 150–450)
POTASSIUM SERPL-SCNC: 3.6 MMOL/L (ref 3.4–5.3)
RBC # BLD AUTO: 2.72 10E6/UL (ref 4.4–5.9)
SODIUM SERPL-SCNC: 142 MMOL/L (ref 135–145)
WBC # BLD AUTO: 8.4 10E3/UL (ref 4–11)

## 2025-02-23 PROCEDURE — 99222 1ST HOSP IP/OBS MODERATE 55: CPT | Performed by: INTERNAL MEDICINE

## 2025-02-23 PROCEDURE — 36415 COLL VENOUS BLD VENIPUNCTURE: CPT | Performed by: INTERNAL MEDICINE

## 2025-02-23 PROCEDURE — 99233 SBSQ HOSP IP/OBS HIGH 50: CPT | Performed by: INTERNAL MEDICINE

## 2025-02-23 PROCEDURE — 258N000002 HC RX IP 258 OP 250: Performed by: INTERNAL MEDICINE

## 2025-02-23 PROCEDURE — 85018 HEMOGLOBIN: CPT | Performed by: PHYSICIAN ASSISTANT

## 2025-02-23 PROCEDURE — 250N000011 HC RX IP 250 OP 636: Performed by: INTERNAL MEDICINE

## 2025-02-23 PROCEDURE — 120N000001 HC R&B MED SURG/OB

## 2025-02-23 PROCEDURE — 82435 ASSAY OF BLOOD CHLORIDE: CPT | Performed by: INTERNAL MEDICINE

## 2025-02-23 PROCEDURE — 83735 ASSAY OF MAGNESIUM: CPT | Performed by: INTERNAL MEDICINE

## 2025-02-23 PROCEDURE — 250N000013 HC RX MED GY IP 250 OP 250 PS 637: Performed by: INTERNAL MEDICINE

## 2025-02-23 PROCEDURE — 92526 ORAL FUNCTION THERAPY: CPT | Mod: GN

## 2025-02-23 PROCEDURE — 93010 ELECTROCARDIOGRAM REPORT: CPT | Performed by: INTERNAL MEDICINE

## 2025-02-23 PROCEDURE — 93005 ELECTROCARDIOGRAM TRACING: CPT

## 2025-02-23 PROCEDURE — 250N000011 HC RX IP 250 OP 636: Performed by: PHYSICIAN ASSISTANT

## 2025-02-23 PROCEDURE — 80048 BASIC METABOLIC PNL TOTAL CA: CPT | Performed by: INTERNAL MEDICINE

## 2025-02-23 PROCEDURE — 250N000009 HC RX 250: Performed by: INTERNAL MEDICINE

## 2025-02-23 PROCEDURE — 258N000003 HC RX IP 258 OP 636: Performed by: INTERNAL MEDICINE

## 2025-02-23 PROCEDURE — 84100 ASSAY OF PHOSPHORUS: CPT | Performed by: INTERNAL MEDICINE

## 2025-02-23 PROCEDURE — 250N000013 HC RX MED GY IP 250 OP 250 PS 637: Performed by: PHYSICIAN ASSISTANT

## 2025-02-23 RX ORDER — CEFEPIME HYDROCHLORIDE 2 G/1
2 INJECTION, POWDER, FOR SOLUTION INTRAVENOUS EVERY 24 HOURS
Status: DISCONTINUED | OUTPATIENT
Start: 2025-02-23 | End: 2025-02-24

## 2025-02-23 RX ADMIN — ACETAMINOPHEN 975 MG: 325 TABLET, FILM COATED ORAL at 17:11

## 2025-02-23 RX ADMIN — SODIUM BICARBONATE: 84 INJECTION, SOLUTION INTRAVENOUS at 03:00

## 2025-02-23 RX ADMIN — CEFEPIME 2 G: 2 INJECTION, POWDER, FOR SOLUTION INTRAVENOUS at 11:26

## 2025-02-23 RX ADMIN — SODIUM BICARBONATE: 84 INJECTION, SOLUTION INTRAVENOUS at 22:06

## 2025-02-23 RX ADMIN — Medication 1 SPRAY: at 17:11

## 2025-02-23 RX ADMIN — ACETAMINOPHEN 975 MG: 325 TABLET, FILM COATED ORAL at 08:25

## 2025-02-23 RX ADMIN — Medication 1 SPRAY: at 19:06

## 2025-02-23 RX ADMIN — Medication 1 TABLET: at 08:25

## 2025-02-23 RX ADMIN — ACETAMINOPHEN 975 MG: 325 TABLET, FILM COATED ORAL at 22:06

## 2025-02-23 RX ADMIN — SODIUM BICARBONATE: 84 INJECTION, SOLUTION INTRAVENOUS at 11:37

## 2025-02-23 RX ADMIN — SODIUM BICARBONATE 650 MG TABLET 1300 MG: at 22:06

## 2025-02-23 RX ADMIN — Medication 1 SPRAY: at 11:28

## 2025-02-23 RX ADMIN — CHOLESTYRAMINE 4 G: 4 POWDER, FOR SUSPENSION ORAL at 08:25

## 2025-02-23 RX ADMIN — SODIUM BICARBONATE 650 MG TABLET 1300 MG: at 08:25

## 2025-02-23 RX ADMIN — Medication 5 MG: at 08:35

## 2025-02-23 RX ADMIN — PIPERACILLIN AND TAZOBACTAM 2.25 G: 2; .25 INJECTION, POWDER, FOR SOLUTION INTRAVENOUS at 06:59

## 2025-02-23 RX ADMIN — IRON SUCROSE 200 MG: 20 INJECTION, SOLUTION INTRAVENOUS at 11:57

## 2025-02-23 RX ADMIN — Medication 1 SPRAY: at 08:36

## 2025-02-23 RX ADMIN — ASPIRIN 81 MG CHEWABLE TABLET 81 MG: 81 TABLET CHEWABLE at 08:25

## 2025-02-23 ASSESSMENT — ACTIVITIES OF DAILY LIVING (ADL)
ADLS_ACUITY_SCORE: 57
ADLS_ACUITY_SCORE: 55
ADLS_ACUITY_SCORE: 56
ADLS_ACUITY_SCORE: 56
ADLS_ACUITY_SCORE: 55
ADLS_ACUITY_SCORE: 56
ADLS_ACUITY_SCORE: 57
ADLS_ACUITY_SCORE: 62
ADLS_ACUITY_SCORE: 56
ADLS_ACUITY_SCORE: 55
ADLS_ACUITY_SCORE: 57
ADLS_ACUITY_SCORE: 55
ADLS_ACUITY_SCORE: 55
ADLS_ACUITY_SCORE: 67
ADLS_ACUITY_SCORE: 57
ADLS_ACUITY_SCORE: 57
ADLS_ACUITY_SCORE: 56
ADLS_ACUITY_SCORE: 57
ADLS_ACUITY_SCORE: 55
ADLS_ACUITY_SCORE: 57
ADLS_ACUITY_SCORE: 56

## 2025-02-23 NOTE — CONSULTS
[facility]    Electrophysiology Consultation     Date of Admission:  2/20/2025  Date of Consult: 02/23/25    Assessment & Plan   Mando Freedman is a 84 year old male who was admitted on 2/20/2025. We were asked to see the patient for atrial fibrillation.  I personally reviewed his EKGs from 2/20 and 2/21 which show frequent PACs and runs of atrial tachycardia.  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  I also reviewed his telemetry over the past few days which also shows frequent atrial ectopy and runs of atrial tachycardia.  I do not see any sustained atrial fibrillation.    Echocardiogram from 2/20 showed preserved LV function, EF 55 to 60%, moderate to severe aortic stenosis.  He has been asymptomatic from the atrial arrhythmias, which are likely exacerbated from his acute infection.  I would not recommend any additional treatment for the arrhythmia at this time.  We can consider starting a low dose beta blocker if he becomes symptomatic.  Continue telemetry monitoring while he is inpatient.       - Repeat EKG - Sinus rhythm with PACs, rate 81 bpm.  RBBB and LAFB,  ms  - Consider starting metoprolol if he becomes symptomatic   - 2 week Ziopatch monitor after discharge       Efraín Ospina MD        Code Status    No CPR- Do NOT Intubate    Reason for Consult   Reason for consult: Consult performed at the request of the attending physician, Luma Coe*, for evaluation of atrial fibrillation      Chief Complaint   Cellulitis and sepsis    History is obtained from the patient and EPIC chart.      History of Present Illness   Mando Freedman is a 84 year old male with type II von Willebrand disorder, chronic anemia and thrombocytopenia, hypertension, CKD stage 3-4, moderate to severe aortic stenosis, who was admitted on 2/20/2025 with leg weakness.  He was found to have sepsis due to right lower extremity cellulitis and bacteremia.  Blood cultures grew Pseudomonas aeruginosa and he is  currently on IV Zosyn.  His hospital course was complicated by acute renal failure and NSTEMI due to demand supply mismatch.  Troponin peaked at 235.  His echocardiogram showed preserved LV function with no wall motion abnormalities, moderate to severe aortic stenosis.  Cardiology was consulted last week and did not recommend further ischemic evaluation.  The patient was noted to have an irregular heart rhythm and EKG on 2/21/2025 reported atrial fibrillation.  This actually appears to be a sinus rhythm with runs of atrial tachycardia.  His telemetry also showed frequent atrial ectopy with runs of atrial tachycardia.    He has been asymptomatic from the atrial arrhythmias.  He reports no palpitations, chest pain, shortness of breath.  He is still very weak and has not been out of bed today.         Past Medical History   I have reviewed this patient's medical history and updated it with pertinent information if needed.   Past Medical History:   Diagnosis Date    Arthritis     Basal cell carcinoma     History of blood transfusion     Hypertension        Past Surgical History   I have reviewed this patient's surgical history and updated it with pertinent information if needed.  Past Surgical History:   Procedure Laterality Date    BONE MARROW BIOPSY, BONE SPECIMEN, NEEDLE/TROCAR N/A 8/23/2022    Procedure: BIOPSY, BONE MARROW;  Surgeon: Roly Kearnye MD;  Location:  GI    COLONOSCOPY N/A 8/5/2022    Procedure: COLONOSCOPY;  Surgeon: Kimani Loo MD;  Location:  GI    ESOPHAGOSCOPY, GASTROSCOPY, DUODENOSCOPY (EGD), COMBINED N/A 8/2/2022    Procedure: ESOPHAGOGASTRODUODENOSCOPY (EGD);  Surgeon: Kimani Loo MD;  Location:  GI       Prior to Admission Medications   Prior to Admission Medications   Prescriptions Last Dose Informant Patient Reported? Taking?   Vitamin D3 (CHOLECALCIFEROL) 125 MCG (5000 UT) tablet 2/19/2025 Morning Self No Yes   Sig: Take 1 tablet (125 mcg) by mouth daily    allopurinol (ZYLOPRIM) 100 MG tablet 2/19/2025 Morning Self Yes Yes   Sig: Take 100 mg by mouth daily   calcium carbonate (TUMS) 500 MG chewable tablet Past Month Self No Yes   Sig: Take 1 tablet (500 mg) by mouth 2 times daily as needed for heartburn   cholestyramine (QUESTRAN) 4 GM/DOSE powder 2/19/2025 Noon Self Yes Yes   Sig: Take 4 g by mouth daily.   citalopram (CELEXA) 10 MG tablet 2/19/2025 Morning Self Yes Yes   Sig: Take 5 mg by mouth daily   cloNIDine (CATAPRES-TTS1) 0.1 MG/24HR WK patch 2/17/2025 Noon Self Yes Yes   Sig: Place 1 patch onto the skin once a week.   ferrous fumarate 65 mg, [place]. FE,-Vitamin C 125 mg (VITRON C)  MG TABS tablet 2/19/2025 Morning Self Yes Yes   Sig: Take 1 tablet by mouth daily.   furosemide (LASIX) 40 MG tablet 2/19/2025 Morning Self Yes Yes   Sig: Take 40 mg by mouth daily.   multivitamin (CENTRUM SILVER) tablet 2/19/2025 Morning Self Yes Yes   Sig: Take 1 tablet by mouth daily      Facility-Administered Medications: None         Medications   Current Facility-Administered Medications   Medication Dose Route Frequency Provider Last Rate Last Admin    Patient is already receiving anticoagulation with heparin, enoxaparin (LOVENOX), warfarin (COUMADIN)  or other anticoagulant medication   Does not apply Continuous PRN Jeramy Egan PA-C        sodium chloride 0.45 % 1,000 mL with sodium bicarbonate 75 mEq/L infusion   Intravenous Continuous TRANG Kaye  mL/hr at 02/23/25 0300 New Bag at 02/23/25 0300     Current Facility-Administered Medications   Medication Dose Route Frequency Provider Last Rate Last Admin    acetaminophen (TYLENOL) tablet 975 mg  975 mg Oral Q8H Jeramy Egan PA-C   975 mg at 02/23/25 0825    artificial saliva (BIOTENE MT) solution 1 spray  1 spray Mouth/Throat 4x Daily Jeramy Egan PA-C   1 spray at 02/23/25 0836    aspirin (ASA) chewable tablet 81 mg  81 mg Oral Daily Jeramy Egan PA-C    81 mg at 02/23/25 0825    cholestyramine (QUESTRAN) Packet 4 g  4 g Oral Daily Jeramy Egan PA-C   4 g at 02/23/25 0825    citalopram (celeXA) half-tab 5 mg  5 mg Oral Daily Jeramy Egan PA-C   5 mg at 02/23/25 0835    iron sucrose (VENOFER) 200 mg in sodium chloride 0.9 % 120 mL intermittent infusion  200 mg Intravenous Q24H TRANG Kaye  mL/hr at 02/22/25 1241 200 mg at 02/22/25 1241    multivitamin w/minerals (THERA-VIT-M) tablet 1 tablet  1 tablet Oral Daily Luma Coe MD   1 tablet at 02/23/25 0825    piperacillin-tazobactam (ZOSYN) 2.25 g vial to attach to  ml bag  2.25 g Intravenous Q6H Jeramy Egan PA-C 200 mL/hr at 02/22/25 2206 2.25 g at 02/23/25 0659    sodium bicarbonate tablet 1,300 mg  1,300 mg Oral BID Adriel Li MD   1,300 mg at 02/23/25 0825    sodium chloride (PF) 0.9% PF flush 3 mL  3 mL Intracatheter Q8H Jeramy Egan PA-C   3 mL at 02/22/25 0644       Allergies   No Known Allergies    Social History   I have updated and reviewed the following Social History Narrative:   Social History     Social History Narrative    Not on file        Family History   I have reviewed this patient's family history and updated it with pertinent information if needed.   No family history on file.    Review of Systems   A complete 10-point review of systems was done and is negative with the exceptions noted in HPI.      Physical Exam   Temp: 98.1  F (36.7  C) Temp src: Oral BP: 121/82 Pulse: 57   Resp: 18 SpO2: 95 % O2 Device: None (Room air)    Vital Signs with Ranges  Temp:  [97.2  F (36.2  C)-98.2  F (36.8  C)] 98.1  F (36.7  C)  Pulse:  [57-87] 57  Resp:  [14-18] 18  BP: ()/(55-86) 121/82  SpO2:  [95 %-99 %] 95 %  160 lbs 0 oz    General: Pleasant, no acute distress, lying in bed   Resp: Breathing comfortably on RA  Card: Regular rate and rhythm   Neuro: Awake, alert, answers questions appropriately        Diagnostics:  I personally reviewed the patient's EKG, lab work, and pertinent imaging    Recent Labs   Lab Test 02/23/25  0527 02/22/25  0613 02/21/25  0509 08/20/22  0108 08/19/22  1517 08/02/22  1829 08/02/22  1548   WBC 8.4 10.6 12.5*   < >  --    < >  --    HGB 9.1* 9.8* 9.7*   < >  --    < >  --    HCT 26.5* 29.5* 28.8*   < >  --    < >  --    MCV 97 99 98   < >  --    < >  --    PLT 80* 91* 83*   < >  --    < >  --     144 143   < >  --    < >  --    CO2 18* 13* 12*   < >  --    < >  --    BUN 69.4* 73.5* 73.9*   < >  --    < >  --    CR 3.83* 4.31* 4.81*   < >  --    < >  --    PTT  --   --   --   --  38  --  41*    < > = values in this interval not displayed.       Last Comprehensive Metabolic Panel:  Lab Results   Component Value Date     02/23/2025    POTASSIUM 3.6 02/23/2025    CHLORIDE 111 (H) 02/23/2025    CO2 18 (L) 02/23/2025    ANIONGAP 13 02/23/2025    GLC 83 02/23/2025    BUN 69.4 (H) 02/23/2025    CR 3.83 (H) 02/23/2025    GFRESTIMATED 15 (L) 02/23/2025    KATT 7.6 (L) 02/23/2025         Telemetry:  Frequent atrial ectopy and runs of atrial tachycardia        Results for orders placed or performed during the hospital encounter of 02/20/25 (from the past 24 hours)   CBC with platelets   Result Value Ref Range    WBC Count 8.4 4.0 - 11.0 10e3/uL    RBC Count 2.72 (L) 4.40 - 5.90 10e6/uL    Hemoglobin 9.1 (L) 13.3 - 17.7 g/dL    Hematocrit 26.5 (L) 40.0 - 53.0 %    MCV 97 78 - 100 fL    MCH 33.5 (H) 26.5 - 33.0 pg    MCHC 34.3 31.5 - 36.5 g/dL    RDW 18.0 (H) 10.0 - 15.0 %    Platelet Count 80 (L) 150 - 450 10e3/uL   Basic metabolic panel   Result Value Ref Range    Sodium 142 135 - 145 mmol/L    Potassium 3.6 3.4 - 5.3 mmol/L    Chloride 111 (H) 98 - 107 mmol/L    Carbon Dioxide (CO2) 18 (L) 22 - 29 mmol/L    Anion Gap 13 7 - 15 mmol/L    Urea Nitrogen 69.4 (H) 8.0 - 23.0 mg/dL    Creatinine 3.83 (H) 0.67 - 1.17 mg/dL    GFR Estimate 15 (L) >60 mL/min/1.73m2    Calcium 7.6 (L) 8.8  - 10.4 mg/dL    Glucose 83 70 - 99 mg/dL   Magnesium   Result Value Ref Range    Magnesium 1.6 (L) 1.7 - 2.3 mg/dL   Phosphorus   Result Value Ref Range    Phosphorus 3.5 2.5 - 4.5 mg/dL         Clinically Significant Risk Factors          # Hyperchloremia: Highest Cl = 114 mmol/L in last 2 days, will monitor as appropriate        # Hypomagnesemia: Lowest Mg = 1.6 mg/dL in last 2 days, will replace as needed   # Hypoalbuminemia: Lowest albumin = 2.9 g/dL at 2/21/2025  5:09 AM, will monitor as appropriate   # Thrombocytopenia: Lowest platelets = 80 in last 2 days, will monitor for bleeding               # Severe Malnutrition: based on nutrition assessment, PRESENT ON ADMISSION         Cardiac Arrhythmia: Atrial premature depolarization (PAC)  Supraventricular tachycardia  Non-Rheumatic Valve Disease: Aortic valve stenosis

## 2025-02-23 NOTE — PROVIDER NOTIFICATION
MD Notification    Notified Person: MD    Notified Person Name: Dr. Coe    Notification Date/Time: 1600, 2/22    Notification Interaction: Face-to-face    Purpose of Notification: Pt found to have clonidine patch in place, which was charted in the Pharmacy-admission medication history - asked to have order placed in MAR for patch.      Orders Received:     Comments:

## 2025-02-23 NOTE — PLAN OF CARE
Summary: Admitted for right lower extremity cellulitis and gram negative bacteremia.   Orientation/Cognitive: A&Ox4  Mobility Level/Assist Equipment: A2/selene steady  Behavior Concerns: Green  Pain Management: C/O generalized pain but decline pain medication  Tele/VS/O2: VSS on RA  TELE: Afib/CVR with R BBB  Diet: Puree with thin liquids/ medication crushed in applesauce  Bowel/Bladder: Continent of bowel and bladder. Voided once during the shift using urinal. Does have some hesitancy and strains to void.  Skin Concerns: Blanchable redness to coccyx/sacrum. BLE edema, scattered bruising/scabbing and right toe blister  Drains/Devices: Left PIV infusing IV sodium chloride 1/2 with sodium bicarb @ 75mEq/L  Tests/Procedures for next shift: None  Anticipated DC date & active delays: TBD  Other important information: Patient was admitted with clonidine patch in place, stating that he usually use patch weekly and was applied on Monday.

## 2025-02-23 NOTE — PROGRESS NOTES
Pt arrived to this unit at ~ 1800. Dx: RLE cellulitis. A&O x4. Nuris steady x2 with transfer. VSS.RA. IV infusing. Denied pain.

## 2025-02-23 NOTE — PROGRESS NOTES
"Date & Time: 2/22/25 1900-2300  Summary: Admitted for right lower extremity cellulitis and gram negative bacteremia.   Orientation/Cognitive: A&Ox4  Mobility Level/Assist Equipment: A2/selene steady  Fall Risk (Y/N): Yes  Behavior Concerns: Green  Pain Management: C/O generalized pain but decline pain medication  Tele/VS/O2: VSS on RA  TELE: Afib/CVR/BBB  ABNL Lab/BG: Creat. 4.31, lorrie 8.0, Hgb 9.8, Plts 91, GFR 13  Diet: Puree with thin liquids/ medication crush in applesauce  Bowel/Bladder: Continent of bowel and bladder. Voided once during the shift using urinal.   Skin Concerns: Blanchable redness to coccyx/sacrum. BLE edema, scattered bruising/scabbing and right toe blister  Drains/Devices: Left PIV infusing IV fluids and right PIV SL  Tests/Procedures for next shift: None  Anticipated DC date & active delays: TBD  Other important information: Patient was admitted with clonidine patch in place, stating that he usually use patch weekly and was applied on Monday. Nephrology, PT, Speech and SW following. Bladder scan for 295 ml. Zosyn every 6 hours.    /78 (BP Location: Left arm, Cuff Size: Adult Regular)   Pulse 75   Temp 98.2  F (36.8  C) (Axillary)   Resp 16   Ht 1.753 m (5' 9\")   Wt 72.6 kg (160 lb)   SpO2 97%   BMI 23.63 kg/m        "

## 2025-02-23 NOTE — PROGRESS NOTES
[facility]  Medicine Progress Note - Hospitalist Service  Date of Admission:  2/20/2025    Assessment & Plan   84 year old male with a past medical history of type II von willebrand disorder, hypertension, CKD stage 3-4 and aortic stenosis among other chronic conditions who was admitted on 2/20/2025 for treatment of sepsis associated with right lower extremity cellulitis and gram negative bacteremia.    Severe sepsis  Bacteremia, pseudomonas aeruginosa  RLE cellulitis as source suspected  -blood cultures from 2/20 positive.   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   -TTE on 2/20: LVEF 55-60%, no wall motion abnormality, moderate to severe aortic stenosis  -cardiology consulted. No further cardiac workup at this time.  -continue daily aspirin 81 mg     Atrial fibrillation vs atrial tachycardia  Irregular heart rate noted by bedside nurse and 12 lead EKG confirmed atrial fibrillation (new) and right bundle branch block (not new). Was not present when TTE was obtained on 2/20.   -continue telemetry  -no rate control needed at this time given pulse 58-68 with occasional rates of 100-110.   -High risk for therapeutic anticoagulation complications given multiple other active problems (chronic anemia, acute anemia after dental procedure, von willebrand disorder). Reassess rhythm patterns daily.   -cardiology consulted    Acute on chronic CKD stage 4  Anemia of chronic disease  -IV fluids with bicarbonate  -nephrology consulted  -avoid nephrotoxins    Type II von willebrand disorder  Chronic anemia, thrombocytopenia  Oral bleeding post dental procedure  -continue on aspirin 81 mg daily  -resume oral iron replacement at discharge    Recent Labs   Lab  02/23/25  0527 02/22/25  0613 02/21/25  0509   WBC 8.4 10.6 12.5*   HGB 9.1* 9.8* 9.7*   HCT 26.5* 29.5* 28.8*   MCV 97 99 98   PLT 80* 91* 83*       H/o Schatzki ring   Presbyesophagus  S/p dilation in 2023  -SLP swallow eval. She recommended GI consult. Consult placed to [facility]. Suspect symptoms due to presbyesophagus. Recent NSTEMI is contraindication to elective endoscopy. Consider esophagram. Outpatient [facility] follow up in 6-8 weeks if symptoms persist.     Depression, anxiety  -continue PTA celexa    Gout  -holding PTA allopurinol with GFR <15    Physical Deconditioning  Anticipate need for TCU at discharge. Previously living independently.  -PTA, OT, SW, CC consults          Diet: Snacks/Supplements Adult: Ensure Enlive; Between Meals  Combination Diet Pureed Diet (level 4); Thin Liquids (level 0) (in chair ideally, single bites/sips, slow rate, alternate consistencies, 3-4 swallows per bite, smaller meals); Thin Liquids (level 0) (up to chair for PO, single bites/sips, slow rat...    DVT Prophylaxis: Ambulate every shift  Angulo Catheter: Not present  Lines: None     Cardiac Monitoring: None  Code Status: No CPR- Do NOT Intubate      Clinically Significant Risk Factors          # Hyperchloremia: Highest Cl = 114 mmol/L in last 2 days, will monitor as appropriate        # Hypomagnesemia: Lowest Mg = 1.6 mg/dL in last 2 days, will replace as needed   # Hypoalbuminemia: Lowest albumin = 2.9 g/dL at 2/21/2025  5:09 AM, will monitor as appropriate   # Thrombocytopenia: Lowest platelets = 80 in last 2 days, will monitor for bleeding               # Severe Malnutrition: based on nutrition assessment, PRESENT ON ADMISSION          Social Drivers of Health    Tobacco Use: Medium Risk (9/8/2023)    Patient History     Smoking Tobacco Use: Former     Smokeless Tobacco Use: Never    [facility]    Social Connections           Disposition Plan     Medically Ready for Discharge: Anticipated in 2-4 Days         Luma Coe MD  Hospitalist Service  [facility]  Securely message with [employer] (more info)  Text page via [email or web address] Paging/Directory   ______________________________________________________________________    Interval History   Still reports a lot of coughing when he eats, even with a modified diet.  We discussed getting an esophagram and he was hopeful for this to be done to see if there is any room for improvement in his functioning.  Still overall very weak.    Physical Exam   Vital Signs: Temp: 98.1  F (36.7  C) Temp src: Oral BP: 121/82 Pulse: 57   Resp: 18 SpO2: 95 % O2 Device: None (Room air)    Weight: 160 lbs 0 oz    General Appearance: Reclined in bed, calm and cooperative  Respiratory: clear to auscultation bilaterally with good inspiratory effort, no wheezes or crackles  Cardiovascular: irregular, systolic murmur present  GI: Benign, soft, nontender  Skin: Fragile skin, multiple bruises on extremities in various stages of healing  Right lower leg with edema, erythema  Neuro: No evidence for delirium or confusion at the time of my visit, no tremors or myoclonus    Medical Decision Making       52 MINUTES SPENT BY ME on the date of service doing chart review, history, exam, documentation & further activities per the note.  MANAGEMENT DISCUSSED with the following over the past 24 hours: Patient, bedside nurse   NOTE(S)/MEDICAL RECORDS REVIEWED over the past 24 hours: Cardiology consultation note, nephrology documentation, nursing notes, intake and output, GI recommendations       Data     I have personally reviewed the following data over the past 24 hrs:    8.4  \   9.1 (L)   / 80 (L)     142 111 (H) 69.4 (H) /  83   3.6 18 (L) 3.83 (H) \

## 2025-02-23 NOTE — PLAN OF CARE
Date & Time: 2/23 1205-8413  Diagnosis: Cellulitis RLE  Procedures: NA  Orientation/Cognitive: AOx4, forgetful  VS/O2: VSS, RA  Mobility: A2 + selene steady  Diet: Pureed with thin liquids  Pain Management: Scheduled tylenol  Neuro: Intact  Bowel & Bladder: Incontinent  Skin: RLE +3 edema, red, hot, LLE +1-2 edema, sores on toe, R heel wound - all wound care done, edema wear ordered and applied per WOC orders  Abnormal Labs: BUN 69.4, Cr 1.83, Hgb 9.1, plt 80, trops - 235->228->229  Tele: NA  IV Access/Drips/Fluids: R PIV SL, L PIV 1/2 NS + 75mEq sodium bicarb @ 100ml/hr  Drains: NA  Tests/Imaging:  US BLE - no DVT  Consults: Hospitalist, nephrology  Discharge Plan: Pending

## 2025-02-24 LAB
ANION GAP SERPL CALCULATED.3IONS-SCNC: 14 MMOL/L (ref 7–15)
ATRIAL RATE - MUSE: 81 BPM
BUN SERPL-MCNC: 66.2 MG/DL (ref 8–23)
CALCIUM SERPL-MCNC: 7.8 MG/DL (ref 8.8–10.4)
CHLORIDE SERPL-SCNC: 109 MMOL/L (ref 98–107)
CREAT SERPL-MCNC: 3.41 MG/DL (ref 0.67–1.17)
DIASTOLIC BLOOD PRESSURE - MUSE: NORMAL MMHG
EGFRCR SERPLBLD CKD-EPI 2021: 17 ML/MIN/1.73M2
GLUCOSE SERPL-MCNC: 84 MG/DL (ref 70–99)
HCO3 SERPL-SCNC: 23 MMOL/L (ref 22–29)
INTERPRETATION ECG - MUSE: NORMAL
MAGNESIUM SERPL-MCNC: 1.6 MG/DL (ref 1.7–2.3)
P AXIS - MUSE: 32 DEGREES
PHOSPHATE SERPL-MCNC: 2.9 MG/DL (ref 2.5–4.5)
POTASSIUM SERPL-SCNC: 4.3 MMOL/L (ref 3.4–5.3)
PR INTERVAL - MUSE: 188 MS
QRS DURATION - MUSE: 130 MS
QT - MUSE: 418 MS
QTC - MUSE: 485 MS
R AXIS - MUSE: -80 DEGREES
SODIUM SERPL-SCNC: 146 MMOL/L (ref 135–145)
SYSTOLIC BLOOD PRESSURE - MUSE: NORMAL MMHG
T AXIS - MUSE: 57 DEGREES
VENTRICULAR RATE- MUSE: 81 BPM

## 2025-02-24 PROCEDURE — 999N000128 HC STATISTIC PERIPHERAL IV START W/O US GUIDANCE

## 2025-02-24 PROCEDURE — 80048 BASIC METABOLIC PNL TOTAL CA: CPT | Performed by: INTERNAL MEDICINE

## 2025-02-24 PROCEDURE — 250N000013 HC RX MED GY IP 250 OP 250 PS 637: Performed by: PHYSICIAN ASSISTANT

## 2025-02-24 PROCEDURE — G0463 HOSPITAL OUTPT CLINIC VISIT: HCPCS

## 2025-02-24 PROCEDURE — 250N000011 HC RX IP 250 OP 636: Performed by: INTERNAL MEDICINE

## 2025-02-24 PROCEDURE — 120N000001 HC R&B MED SURG/OB

## 2025-02-24 PROCEDURE — 84295 ASSAY OF SERUM SODIUM: CPT | Performed by: INTERNAL MEDICINE

## 2025-02-24 PROCEDURE — 36415 COLL VENOUS BLD VENIPUNCTURE: CPT | Performed by: INTERNAL MEDICINE

## 2025-02-24 PROCEDURE — 92526 ORAL FUNCTION THERAPY: CPT | Mod: GN

## 2025-02-24 PROCEDURE — 99233 SBSQ HOSP IP/OBS HIGH 50: CPT | Performed by: INTERNAL MEDICINE

## 2025-02-24 PROCEDURE — 250N000013 HC RX MED GY IP 250 OP 250 PS 637: Performed by: INTERNAL MEDICINE

## 2025-02-24 PROCEDURE — 84100 ASSAY OF PHOSPHORUS: CPT | Performed by: INTERNAL MEDICINE

## 2025-02-24 PROCEDURE — 250N000009 HC RX 250: Performed by: INTERNAL MEDICINE

## 2025-02-24 PROCEDURE — 999N000040 HC STATISTIC CONSULT NO CHARGE VASC ACCESS

## 2025-02-24 PROCEDURE — 258N000002 HC RX IP 258 OP 250: Performed by: INTERNAL MEDICINE

## 2025-02-24 PROCEDURE — 83735 ASSAY OF MAGNESIUM: CPT | Performed by: INTERNAL MEDICINE

## 2025-02-24 PROCEDURE — 97530 THERAPEUTIC ACTIVITIES: CPT | Mod: GP | Performed by: PHYSICAL THERAPIST

## 2025-02-24 PROCEDURE — 82310 ASSAY OF CALCIUM: CPT | Performed by: INTERNAL MEDICINE

## 2025-02-24 RX ORDER — LEVOFLOXACIN 250 MG/1
500 TABLET, FILM COATED ORAL
Status: DISCONTINUED | OUTPATIENT
Start: 2025-02-24 | End: 2025-02-25

## 2025-02-24 RX ADMIN — Medication 5 MG: at 09:51

## 2025-02-24 RX ADMIN — Medication 1 SPRAY: at 12:00

## 2025-02-24 RX ADMIN — CHOLESTYRAMINE 4 G: 4 POWDER, FOR SUSPENSION ORAL at 09:29

## 2025-02-24 RX ADMIN — CEFEPIME 2 G: 2 INJECTION, POWDER, FOR SOLUTION INTRAVENOUS at 10:04

## 2025-02-24 RX ADMIN — SODIUM BICARBONATE: 84 INJECTION, SOLUTION INTRAVENOUS at 15:39

## 2025-02-24 RX ADMIN — SODIUM BICARBONATE: 84 INJECTION, SOLUTION INTRAVENOUS at 22:15

## 2025-02-24 RX ADMIN — LEVOFLOXACIN 500 MG: 250 TABLET, FILM COATED ORAL at 17:56

## 2025-02-24 RX ADMIN — Medication 1 SPRAY: at 10:22

## 2025-02-24 RX ADMIN — ACETAMINOPHEN 975 MG: 325 TABLET, FILM COATED ORAL at 09:50

## 2025-02-24 RX ADMIN — SODIUM BICARBONATE 650 MG TABLET 1300 MG: at 20:51

## 2025-02-24 RX ADMIN — SODIUM BICARBONATE 650 MG TABLET 1300 MG: at 09:28

## 2025-02-24 RX ADMIN — Medication 1 SPRAY: at 17:55

## 2025-02-24 RX ADMIN — Medication 1 SPRAY: at 20:50

## 2025-02-24 RX ADMIN — ACETAMINOPHEN 975 MG: 325 TABLET, FILM COATED ORAL at 14:50

## 2025-02-24 RX ADMIN — ASPIRIN 81 MG CHEWABLE TABLET 81 MG: 81 TABLET CHEWABLE at 09:28

## 2025-02-24 RX ADMIN — Medication 1 TABLET: at 09:28

## 2025-02-24 ASSESSMENT — ACTIVITIES OF DAILY LIVING (ADL)
ADLS_ACUITY_SCORE: 62
ADLS_ACUITY_SCORE: 61
ADLS_ACUITY_SCORE: 60
ADLS_ACUITY_SCORE: 62
ADLS_ACUITY_SCORE: 61
ADLS_ACUITY_SCORE: 61
ADLS_ACUITY_SCORE: 60
ADLS_ACUITY_SCORE: 61
ADLS_ACUITY_SCORE: 61
ADLS_ACUITY_SCORE: 62
ADLS_ACUITY_SCORE: 60
ADLS_ACUITY_SCORE: 62
ADLS_ACUITY_SCORE: 60
ADLS_ACUITY_SCORE: 62
ADLS_ACUITY_SCORE: 61
ADLS_ACUITY_SCORE: 60
ADLS_ACUITY_SCORE: 61
ADLS_ACUITY_SCORE: 62
ADLS_ACUITY_SCORE: 62

## 2025-02-24 NOTE — PROGRESS NOTES
[facility]  Medicine Progress Note - Hospitalist Service  Date of Admission:  2/20/2025    Assessment & Plan   84 year old male with a past medical history of type II von willebrand disorder, hypertension, CKD stage 3-4 and aortic stenosis among other chronic conditions who was admitted on 2/20/2025 for treatment of sepsis associated with right lower extremity cellulitis and gram negative bacteremia.    Severe sepsis  Bacteremia, pseudomonas aeruginosa  RLE cellulitis as source suspected  -blood cultures from 2/20 positive.   -repeat blood cultures 2/21: NGTD as of AM 2/24  -2/23: discontinue zosyn Q 6 hours and changed to cefepime with renal dosing  -2/24: change to po levofloxacin with renal dosing.    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   -TTE on 2/20: LVEF 55-60%, no wall motion abnormality, moderate to severe aortic stenosis  -cardiology consulted. No further cardiac workup at this time.  -continue daily aspirin 81 mg     Atrial fibrillation vs atrial tachycardia  Irregular heart rate noted by bedside nurse and 12 lead EKG confirmed atrial fibrillation (new) and right bundle branch block (not new). Was not present when TTE was obtained on 2/20.   -continue telemetry  -no rate control needed at this time given pulse 58-68 with occasional rates of 100-110.   -High risk for therapeutic anticoagulation complications given multiple other active problems (chronic anemia, acute anemia after dental procedure, von willebrand disorder). Reassess rhythm patterns daily.   -cardiology consulted    Acute on chronic CKD stage 4  Anemia of chronic disease  -IV fluids with bicarbonate  -nephrology consulted  -avoid nephrotoxins    Type II von willebrand disorder  Chronic anemia, thrombocytopenia  Oral bleeding post dental procedure  -continue on aspirin 81 mg daily  -resume oral  iron replacement at discharge    H/o Schatzki ring   Presbyesophagus  S/p dilation in 2023  -SLP swallow eval. She recommended GI consult. Consult placed to [facility]. Suspect symptoms due to presbyesophagus. Recent NSTEMI is contraindication to elective endoscopy.   -2/24: abnormal esophagram with 1.2 cm stricture at distal esophagus. (See report for details)  -will need outpatient GI follow up in 4-6 weeks for EGD    Depression, anxiety  -continue PTA celexa    Gout  -holding PTA allopurinol with GFR <15    Physical Deconditioning  Anticipate need for TCU at discharge. Previously living independently.  -PTA, OT, SW, CC consults          Diet: Snacks/Supplements Adult: Ensure Enlive; Between Meals  Combination Diet Pureed Diet (level 4); Thin Liquids (level 0) (in chair ideally, single bites/sips, slow rate, alternate consistencies, 3-4 swallows per bite, smaller meals); Thin Liquids (level 0) (up to chair for PO, single bites/sips, slow rat...  Snacks/Supplements Adult: Expedite Bottle; With Meals    DVT Prophylaxis: Pneumatic Compression Devices and Ambulate every shift  Angulo Catheter: Not present  Lines: None     Cardiac Monitoring: None  Code Status: No CPR- Do NOT Intubate      Clinically Significant Risk Factors         # Hypernatremia: Highest Na = 146 mmol/L in last 2 days, will monitor as appropriate  # Hyperchloremia: Highest Cl = 111 mmol/L in last 2 days, will monitor as appropriate        # Hypomagnesemia: Lowest Mg = 1.6 mg/dL in last 2 days, will replace as needed   # Hypoalbuminemia: Lowest albumin = 2.9 g/dL at 2/21/2025  5:09 AM, will monitor as appropriate   # Thrombocytopenia: Lowest platelets = 80 in last 2 days, will monitor for bleeding               # Severe Malnutrition: based on nutrition assessment           Social Drivers of Health    Tobacco Use: Medium Risk (9/8/2023)    Patient History     Smoking Tobacco Use: Former     Smokeless Tobacco Use: Never    Received from [facility]  & [facility], [facility] & [facility]    Social Connections          Disposition Plan     Medically Ready for Discharge: Anticipated Tomorrow if TCU bed available. [place], [facility] or [place] would be preferred.          Luma Coe MD  Hospitalist Service  [facility]  Securely message with [employer] (more info)  Text page via [employer] Paging/Directory   ______________________________________________________________________    Interval History   Stable overnight. No fevers or chills.     Mando would like to discuss TCU options with his son. However, this has been challenging due to the son's profession and job duties ( for morning news). Encouraged Mando to be open to idea that any of the facilities nearby will be adequate to receive therapies and regain strength.     Physical Exam   Vital Signs: Temp: 98.2  F (36.8  C) Temp src: Oral BP: 100/67 Pulse: 94   Resp: 18 SpO2: 98 % O2 Device: None (Room air)    Weight: 160 lbs 0 oz    General Appearance: Reclined in bed, calm and cooperative  Respiratory: clear to auscultation bilaterally with good inspiratory effort, no wheezes or crackles  Cardiovascular: irregular, systolic murmur present  GI: Benign, soft, nontender  Skin: Fragile skin, multiple bruises on extremities in various stages of healing  Right lower leg with edema, erythema  Neuro: No evidence for delirium or confusion at the time of my visit, no tremors or myoclonus    Medical Decision Making       52 MINUTES SPENT BY ME on the date of service doing chart review, history, exam, documentation & further activities per the note.  MANAGEMENT DISCUSSED with the following over the past 24 hours: patient, bedside nurse,    NOTE(S)/MEDICAL RECORDS REVIEWED over the past 24 hours: nursing notes, consultant notes, med admin record  Tests ORDERED & REVIEWED in the past 24 hours:  - See lab/imaging results included  in the data section of the note      Data     I have personally reviewed the following data over the past 24 hrs:    N/A  \   N/A   / N/A     146 (H) 109 (H) 66.2 (H) /  84   4.3 23 3.41 (H) \       Imaging results reviewed over the past 24 hrs:   Recent Results (from the past 24 hours)   XR Esophagram    Narrative    EXAM: XR ESOPHAGRAM SINGLE CONTRAST  LOCATION: [facility]  DATE: 2/24/2025    INDICATION: Presbyesophagus with dysphagia, coughing. H/o Schatzki's ring.  COMPARISON: None.  TECHNIQUE: Routine.    RADIATION DOSE: Total Air Kerma 11.9 mGy.    FINDINGS:   ESOPHAGUS: A 1.2 cm length stricture at the GE junction. There is some shouldering adjacent to the stricture. A 13 mm barium tablet does not pass through the stricture. Stasis in the esophagus with nonperistaltic tertiary waves.      Impression    IMPRESSION:   1.  A 1.2 cm in length stricture of the distal esophagus at the GE junction, not clearly benign. Suggest endoscopy for further evaluation.

## 2025-02-24 NOTE — PLAN OF CARE
Goal Outcome Evaluation:  Shift Summary [id number]    Admitting Diagnosis: Cellulitis of right leg [L03.115]  Acute kidney injury [N17.9]  Severe sepsis (H) [A41.9, R65.20]   Vitals. Stable   Pain 0/10. Taking PRN. Last dose   A&Ox 4  Voiding.Incontinent of B/B   Mobility. 2 assist lift   Tele.No   CMS. Intact , baseline scattered bruising upper and lower Extr   Lung Sounds clear  on  Ra via   GI. WDL loose bowel movement 3 times   Dressing. Right foot heel  dressing CDI ,Left  foot toes scattered wounds, BLE  discoloration and 3 plus edema.      Orders Placed This Encounter      Combination Diet Pureed Diet (level 4); Thin Liquids (level 0) (in chair ideally, single bites/sips, slow rate, alternate consistencies, 3-4 swallows per bite, smaller meals); Thin Liquids (level 0) (up to chair for PO, single bites/sips, slow rat...       Plan: Pt has  X ray  of Esophagram, he is pureed diet with thin liquid level 4, he was  up in chair, PT was unsuccessful with selene ridley.

## 2025-02-24 NOTE — PROGRESS NOTES
CLINICAL NUTRITION SERVICES - REASSESSMENT NOTE     Malnutrition Status:    % Intake: </=75% for >/= 1 month (severe)  % Weight Loss: Unable to assess  - suspect would meet, however there isn't enough data within the specified time frames  Subcutaneous Fat Loss: Orbital: Severe and Triceps: Severe  Muscle Loss: Wasting of the temples (temporalis muscle): Moderate, Clavicles (pectoralis and deltoids): Severe, and Shoulders (deltoids): Severe  Fluid Accumulation/Edema: Moderate, 2+  Malnutrition Diagnosis: Severe malnutrition in the context of acute illness or injury  Malnutrition Present on Admission: Yes    Registered Dietitian Interventions:  Continue Pureed diet and supplements as ordered   Add daily Expedite w/ lunch     SUBJECTIVE INFORMATION  Assessed patient in room.   - Pt reported his intakes to not be going very well. He attributed the poor intakes to swallowing difficulty and being on a pureed diet. He said he's drinking both Ensure's and wanted to keep receiving them. Given the wounds, he was agreeable to an Expedite with his lunch.    CURRENT NUTRITION ORDERS  Diet: Level 4: Pureed Dysphagia Diet   Nutrition Support: Ensure Enlive BID between meals.     CURRENT INTAKE/TOLERANCE  - Flowsheets show a fair appetite and 1 intakes per day of 25% vs 100%  - Quad/Graphics (meal ordering system), shows pt only ordering 1 meal per day (supplements sent BID).     NEW FINDINGS  Weight: 72.6 kg (160 lb) - admit wt, no new wt since   Skin/wounds: 2/24 WOCN, Summary: 1) Suspected evolving deep tissue pressure injury to right heel, as well as reabsorbing blister to right heel, both present on admission  2) Edema, erythema, and scattered injuries to bilateral legs and toes, present on admission  Recommend regular skin care to legs and off-loading to right heel.   3) Redness and excoriation circumferentially around penis, only visible when foreskin is retracted.  GI symptoms: BM 1-2x most days, one day with  x3  Nutrition-relevant labs: Na 146 (H), BUN 66.2 (H), Cr 3.41 (H), GFR 17 (L), Mg 1.6 (L),   Nutrition-relevant medications: multivitamin with minerals, IVF + Bicarb @ 100 ml/hr     EVALUATION OF THE PROGRESS TOWARD GOALS   Previous Goals  Patient to consume % of nutritionally adequate meal trays TID, or the equivalent with supplements/snacks.  Evaluation: Partially met, ongoing     Previous Nutrition Diagnosis  Inadequate oral intake related to difficulty swallowing as evidenced by patient endorses eating less due to work of eating and progressive weight loss over 1-3 years.   Evaluation: No change    NUTRITION DIAGNOSIS  Inadequate oral intake related to difficulty swallowing as evidenced by patient endorses eating less due to work of eating and progressive weight loss over 1-3 years.     INTERVENTIONS  Medical food supplement therapy    Goals  Patient to consume % of nutritionally adequate meal trays TID, or the equivalent with supplements/snacks.     Monitoring/Evaluation      Progress toward goals will be monitored and evaluated per policy.

## 2025-02-24 NOTE — PLAN OF CARE
Summary: [id number] 2/23/25 RLE cellulitis     Orientation: A/Ox4    Activity Level: Ax2 serasteady  Fall Risk: yes  Behavior & Aggression Tool Color: yes  Pain Management: scheduled Tylenol given for R foot pain   ABNL VS/O2: VSS on RA   ABNL Lab/BG: n/a  Diet: puree diet level 4, thin liquids   Bowel/Bladder: incontinent at times, small soft BM this shift    Drains/Devices: PIV infusing 1/2 NS + 75mEq sodium bicarb at 100 ml/hr  Skin: redness on bottom mepi in place, scattered bruising, +3 R foot edema, RLE is red and warm, wounds on bilateral toes   Tests/Procedures for next shift: n/a  Anticipated DC date: TBD  Other Important Info: takes pills whole in pudding, 1 at a time with sips of water. Pt also uses suction to clear secretions.

## 2025-02-24 NOTE — PROGRESS NOTES
Notes, labs, vitals reviewed.  Cr down to 3.4.  Na 146.  Decent uo.  No changes.  Case d/w Dr. Coe.

## 2025-02-24 NOTE — PLAN OF CARE
Goal Outcome Evaluation:  Admitting Diagnosis: R) LE Cellulitis  Vitals WNL  Pain:  PRN Dilaudid, robaxin and scheduled tylenol for pain  A&Ox4  Voiding: Incontinent of bowel and bladder  Mobility:  A2 with selene steady,  did not get OOB  CMS:  intact  GI: WNL  Skin: Redness on bottom mepi in place, scattered bruising, +3 R foot edema, RLE is red and warm, wounds on bilateral toes    Diet: Pureed level 4 thin liquids   Plan: TBD     Takes meds whole, one at a time in pudding. Prefers pudding to Apple sauce

## 2025-02-24 NOTE — PROGRESS NOTES
EP Chart Check only    Cardiology s/o 2/21/2025 with plans for OP follow-up 4/18/2025 (Prema Bolton)  Dr. Ospina saw 2/23/2025 for concern for AFib. Review of EKG showed 2/20 and 2/21 showed PACs and AT. No sustained AT seen. SVT thought likely d/t underling infection/sepsis    Atrial Tachycardia  Reviewed tele (Smart Disclosure, as Mando was off floor at XR and chart was not available) which continues to show SR/ectopy/AT. No AFib seen  Per Dr. Ospina, start low dose metoprolol tartrate if symptomatic with runs of AT  2 week ZioPatch placed on discharge. This can be mailed out to him once we know his discharge disposition.       Jocelyn Bauer, EP PA

## 2025-02-24 NOTE — CONSULTS
"[facility] Nurse Inpatient Assessment     Consulted for: Right lower extremity heel, 2nd consult for penis excoriations     Summary: 1) Suspected evolving deep tissue pressure injury to right heel, as well as reabsorbing blister to right heel, both present on admission  2) Edema, erythema, and scattered injuries to bilateral legs and toes, present on admission  Recommend regular skin care to legs and off-loading to right heel.   3) Redness and excoriation circumferentially around penis, only visible when foreskin is retracted.    [facility] nurse follow-up plan: weekly    Patient History (according to provider note(s):      \"Mando Freedman is a 84 year old male admitted on 2/20/2025 due to Sepsis secondary to right lower extremity cellulitis, NSTEMI, and JUAN R.       Past medical history significant for HTN, CKD stage III-IV, Aortic stenosis, Aortic root dilatation, Chronic lower extremity edema, Gout, OA, MDD with anxiety, Anemia of chronic disease, Iron def, MGUS, Type II Von Willebrand D/O.     Patient presented to the ED via EMS from home due to bilateral lower extremity weakness.  Patient reported that he noticed some weakness in his lower extremities the day prior to presentation.  He awoke the morning of presentation could not bear weight on either leg.  EMS reported that the right lower extremity was warm and red with wounds present on the toes and heel. Patient reported following with outpatient Podiatry with last visit occurring ~ 4 weeks ago.  EMS suspected patient was in atrial fib.\"    Assessment:      Areas visualized during today's visit: Focused: Penis (other wounds not assessed today)    Skin Injury Location: Penis      Last photo: 2/24  Skin injury due to: Unclear etiology  Skin history and plan of care:   Patient was not aware that he had this wound and states that it does not hurt or itch. Skin is red, but mostly intact with a few superficial scratches. Will use Triad paste " "to protect this area.  Affected area:      Skin assessment: Erythema and Excoriation     Measurements (length x width x depth, in cm) not measured      Color: normal and consistent with surrounding tissue     Temperature  normal      Drainage: none .      Color: none      Odor: none  Pain: denies  and no grimacing or signs of discomfort, none  Pain interventions prior to dressing change: patient tolerated well and slow and gentle cares   Treatment goal: Heal , Infection control/prevention, Protection, and Simplify plan of care  STATUS: initial assessment  Supplies ordered: ordered Triad paste, supplies stored on unit, discussed with RN, and discussed with patient       Pressure Injury Location: Right heel            Last photo: 2/20  Wound type: Pressure Injury     Pressure Injury Stage: Deep Tissue Pressure Injury (DTPI), present on admission      Wound history/plan of care: Patient reports he thinks this is a pressure injury. He has been covering wound with a bandage. He also sees a podiatrist every few months. He \"scraped\" his heel when he fell PTA. Appearance of posterior heel wound is consistent with reabsorbing blister r/t deep tissue injury. Medial wound is non-blanchable and could be evolving deep tissue injury.     Wound base: 50 % Maroon, Dry, Lifting , and devitalized epidermis , 50 % Non-blanchable, Maroon, Epidermis, Dry, and Smooth      Palpation of the wound bed: boggy      Drainage: none     Description of drainage: none     Measurements (length x width x depth, in cm) ~ 6  x 5  x  < 0.1 cm      Tunneling N/A     Undermining N/A  Periwound skin: Dry/scaly and Erythema- blanchable      Color: pink and red      Temperature: normal   Odor: none  Pain: mild, intermittent and tender  Pain intervention prior to dressing change: patient tolerated well and slow and gentle cares   Treatment goal: Protection  STATUS: initial assessment and evolving  Supplies ordered: gathered, supplies stored on unit, " "discussed with RN, and discussed with patient     My PI Risk Assessment     Sensory Perception: 3 - Slightly Limited     Moisture: 3 - Occasionally moist      Activity: 2 - Chairfast     Mobility: 2 - Very limited     Nutrition: 2 - Probably inadequate      Friction/Shear: 1 - Problem     TOTAL: 13      Skin Injury Location: Bilateral legs and toes      Right toes    Right leg    Left toes      Last photo: 2/20  Skin injury due to: Unclear etiology  Skin history and plan of care: Scattered maroon blisters to bilateral toes. Patient denies injuring his feet. Toes are cool to the touch and red. Encouraged patient to wear protective footwear at all times. Right leg is edematous with red-purple discoloration circumferentially. Posterior lower leg is hot to the touch. Skin to bilateral legs is dry and flaky. Patient reports that he is not able to reach his feet. Has worn compression in the past but states, \"They're too hard to put on\".   Affected area:      Skin assessment: Blistering, Dry/flaky, and Rash     Measurements (length x width x depth, in cm) No focal open area       Color: red     Temperature  hot (posterior right lower leg)     Drainage: none .      Color: none      Odor: none  Pain: moderate and during dressing change, intermittent and tender  Pain interventions prior to dressing change: slow and gentle cares  and distraction  Treatment goal: Heal , Infection control/prevention, and Protection  STATUS: initial assessment  Supplies ordered: supplies stored on unit and discussed with patient      Treatment Plan:     Penis (under foreskin): BID and after episodes of incontinence  Cleanse wounds with Vashe wound cleanser and pat dry with gauze   Apply Triad paste (#[id number])    Right heel wound(s): Daily   Cleanse foot and leg with Vashe and gauze. Pat dry.  Swab jazzy-wound skin with skin prep (Cavilon or other barrier film) and let dry.  Apply a 4x4 Mepilex bordered dressing over wound. Initial and date.  Follow " "PIP Plan.     Bilateral legs and toes: Daily   Cleanse legs with Bath wipes and pat dry.  Apply Sween cream to both legs.  Apply skin prep (Cavilon or other barrier film) to maroon blisters on toes.  Apply EdemaWear (small, navy stripe #[id number] or small Lite, purple stripe #[id number]) bilaterally.     Pressure Injury Prevention (PIP) Plan:  -If patient is declining pressure injury prevention interventions: Explore reason why and address patient's concerns, Educate on pressure injury risk and prevention intervention(s), If patient is still declining, document \"informed refusal\" , and Ensure Care team is aware ( provider, charge nurse, etc)  -Mattress: Add MIRANDA pump to mattress PRN moisture issues  -HOB: Maintain at or below 30 degrees, unless contraindicated  -Repositioning in bed: Every 1-2 hours , Left/right positioning; avoid supine, and Raise foot of bed prior to raising head of bed, to reduce patient sliding down (shear)  -Heels: Keep elevated off mattress  -Protective Dressing: None  -Positioning Equipment:  Pillows  -Chair positioning: Assist patient to reposition hourly and Do NOT use a donut for sitting (this increases pressure to smaller area and creates a higher potential for injury)    -Moisture Management: Perineal cleansing /protection: Follow Incontinence Protocol, Avoid brief in bed, Clean and dry skin folds with bathing , and Moisturize dry skin  -Under Devices: Inspect skin under all medical devices during skin inspection , Ensure tubes are stabilized without tension, and Ensure patient is not lying on medical devices or equipment when repositioned     Orders: Written    RECOMMEND PRIMARY TEAM ORDER:  Radha text to hospitalist PA with recommendation for US of RLE- concern for possible clot  Education provided: plan of care, wound progress, Infection prevention , Hygiene, and Off-loading pressure  Discussed plan of care with: Patient  Notify WOC if wound(s) deteriorate.  Nursing to notify the Provider(s) " and re-consult the [facility] Nurse if new skin concern.    DATA:     Current support surface: Standard  ED cart  Containment of urine/stool: Incontinence Protocol and Suction based external urinary catheter   BMI: Body mass index is 23.63 kg/m .   Active diet order: Orders Placed This Encounter      Combination Diet Pureed Diet (level 4); Thin Liquids (level 0) (in chair ideally, single bites/sips, slow rate, alternate consistencies, 3-4 swallows per bite, smaller meals); Thin Liquids (level 0) (up to chair for PO, single bites/sips, slow rat...     Output: I/O last 3 completed shifts:  In: 1352 [P.O.:1142; IV Piggyback:210]  Out: 1160 [Urine:1160]     Labs:   Recent Labs   Lab 02/23/25  0527 02/22/25  0613 02/21/25  0509   ALBUMIN  --   --  2.9*   HGB 9.1*   < > 9.7*   WBC 8.4   < > 12.5*    < > = values in this interval not displayed.     Pressure injury risk assessment:   Sensory Perception: 3-->slightly limited  Moisture: 3-->occasionally moist  Activity: 2-->chairfast  Mobility: 3-->slightly limited  Nutrition: 2-->probably inadequate  Friction and Shear: 2-->potential problem  Jono Score: 15    Chhaya Thompson RN CN  Pager no longer is use, please contact through [employer] group: [facility] Nurse Cristina

## 2025-02-25 LAB
ALBUMIN SERPL BCG-MCNC: 2.6 G/DL (ref 3.5–5.2)
ANION GAP SERPL CALCULATED.3IONS-SCNC: 13 MMOL/L (ref 7–15)
BUN SERPL-MCNC: 60.3 MG/DL (ref 8–23)
C DIFF TOX B STL QL: ABNORMAL
CALCIUM SERPL-MCNC: 7.6 MG/DL (ref 8.8–10.4)
CHLORIDE SERPL-SCNC: 106 MMOL/L (ref 98–107)
CREAT SERPL-MCNC: 3.01 MG/DL (ref 0.67–1.17)
EGFRCR SERPLBLD CKD-EPI 2021: 20 ML/MIN/1.73M2
ERYTHROCYTE [DISTWIDTH] IN BLOOD BY AUTOMATED COUNT: 17.8 % (ref 10–15)
GLUCOSE SERPL-MCNC: 83 MG/DL (ref 70–99)
HCO3 SERPL-SCNC: 23 MMOL/L (ref 22–29)
HCT VFR BLD AUTO: 27 % (ref 40–53)
HGB BLD-MCNC: 9.2 G/DL (ref 13.3–17.7)
MCH RBC QN AUTO: 33.7 PG (ref 26.5–33)
MCHC RBC AUTO-ENTMCNC: 34.1 G/DL (ref 31.5–36.5)
MCV RBC AUTO: 99 FL (ref 78–100)
PHOSPHATE SERPL-MCNC: 2.3 MG/DL (ref 2.5–4.5)
PLATELET # BLD AUTO: 97 10E3/UL (ref 150–450)
POTASSIUM SERPL-SCNC: 3.7 MMOL/L (ref 3.4–5.3)
RBC # BLD AUTO: 2.73 10E6/UL (ref 4.4–5.9)
SODIUM SERPL-SCNC: 142 MMOL/L (ref 135–145)
WBC # BLD AUTO: 8.7 10E3/UL (ref 4–11)

## 2025-02-25 PROCEDURE — 85018 HEMOGLOBIN: CPT | Performed by: INTERNAL MEDICINE

## 2025-02-25 PROCEDURE — 82435 ASSAY OF BLOOD CHLORIDE: CPT | Performed by: INTERNAL MEDICINE

## 2025-02-25 PROCEDURE — 99233 SBSQ HOSP IP/OBS HIGH 50: CPT | Performed by: INTERNAL MEDICINE

## 2025-02-25 PROCEDURE — 82947 ASSAY GLUCOSE BLOOD QUANT: CPT | Performed by: INTERNAL MEDICINE

## 2025-02-25 PROCEDURE — 36415 COLL VENOUS BLD VENIPUNCTURE: CPT | Performed by: INTERNAL MEDICINE

## 2025-02-25 PROCEDURE — 87493 C DIFF AMPLIFIED PROBE: CPT | Performed by: INTERNAL MEDICINE

## 2025-02-25 PROCEDURE — 250N000013 HC RX MED GY IP 250 OP 250 PS 637: Performed by: INTERNAL MEDICINE

## 2025-02-25 PROCEDURE — 82565 ASSAY OF CREATININE: CPT | Performed by: INTERNAL MEDICINE

## 2025-02-25 PROCEDURE — 250N000013 HC RX MED GY IP 250 OP 250 PS 637: Performed by: PHYSICIAN ASSISTANT

## 2025-02-25 PROCEDURE — 120N000001 HC R&B MED SURG/OB

## 2025-02-25 PROCEDURE — 82040 ASSAY OF SERUM ALBUMIN: CPT | Performed by: INTERNAL MEDICINE

## 2025-02-25 RX ORDER — OXYCODONE HCL 20 MG/ML
5 CONCENTRATE, ORAL ORAL
Status: DISCONTINUED | OUTPATIENT
Start: 2025-02-25 | End: 2025-03-06 | Stop reason: HOSPADM

## 2025-02-25 RX ORDER — MINERAL OIL/HYDROPHIL PETROLAT
OINTMENT (GRAM) TOPICAL
Status: DISCONTINUED | OUTPATIENT
Start: 2025-02-25 | End: 2025-03-06 | Stop reason: HOSPADM

## 2025-02-25 RX ORDER — LOPERAMIDE HYDROCHLORIDE 2 MG/1
2 CAPSULE ORAL 4 TIMES DAILY PRN
Status: DISCONTINUED | OUTPATIENT
Start: 2025-02-25 | End: 2025-03-06 | Stop reason: HOSPADM

## 2025-02-25 RX ORDER — SENNOSIDES 8.6 MG
1 TABLET ORAL 2 TIMES DAILY PRN
Status: DISCONTINUED | OUTPATIENT
Start: 2025-02-25 | End: 2025-02-25

## 2025-02-25 RX ORDER — NALOXONE HYDROCHLORIDE 0.4 MG/ML
0.2 INJECTION, SOLUTION INTRAMUSCULAR; INTRAVENOUS; SUBCUTANEOUS
Status: DISCONTINUED | OUTPATIENT
Start: 2025-02-25 | End: 2025-03-06 | Stop reason: HOSPADM

## 2025-02-25 RX ORDER — CIPROFLOXACIN 250 MG/1
500 TABLET, FILM COATED ORAL
Status: DISCONTINUED | OUTPATIENT
Start: 2025-02-25 | End: 2025-02-28

## 2025-02-25 RX ORDER — CARBOXYMETHYLCELLULOSE SODIUM 5 MG/ML
1-2 SOLUTION/ DROPS OPHTHALMIC
Status: DISCONTINUED | OUTPATIENT
Start: 2025-02-25 | End: 2025-03-06 | Stop reason: HOSPADM

## 2025-02-25 RX ORDER — LORAZEPAM 2 MG/ML
1 CONCENTRATE ORAL EVERY 4 HOURS PRN
Status: DISCONTINUED | OUTPATIENT
Start: 2025-02-25 | End: 2025-03-06 | Stop reason: HOSPADM

## 2025-02-25 RX ORDER — BISACODYL 10 MG
10 SUPPOSITORY, RECTAL RECTAL
Status: DISCONTINUED | OUTPATIENT
Start: 2025-02-28 | End: 2025-03-06 | Stop reason: HOSPADM

## 2025-02-25 RX ORDER — NALOXONE HYDROCHLORIDE 0.4 MG/ML
0.1 INJECTION, SOLUTION INTRAMUSCULAR; INTRAVENOUS; SUBCUTANEOUS
Status: DISCONTINUED | OUTPATIENT
Start: 2025-02-25 | End: 2025-03-06 | Stop reason: HOSPADM

## 2025-02-25 RX ORDER — ATROPINE SULFATE 10 MG/ML
2 SOLUTION/ DROPS OPHTHALMIC
Status: DISCONTINUED | OUTPATIENT
Start: 2025-02-25 | End: 2025-03-06 | Stop reason: HOSPADM

## 2025-02-25 RX ADMIN — Medication 1 SPRAY: at 16:18

## 2025-02-25 RX ADMIN — Medication 5 MG: at 09:29

## 2025-02-25 RX ADMIN — SODIUM BICARBONATE 650 MG TABLET 1300 MG: at 09:28

## 2025-02-25 RX ADMIN — ACETAMINOPHEN 975 MG: 325 TABLET, FILM COATED ORAL at 14:03

## 2025-02-25 RX ADMIN — Medication 1 SPRAY: at 09:29

## 2025-02-25 RX ADMIN — CHOLESTYRAMINE 4 G: 4 POWDER, FOR SUSPENSION ORAL at 09:28

## 2025-02-25 RX ADMIN — CIPROFLOXACIN HYDROCHLORIDE 500 MG: 250 TABLET, FILM COATED ORAL at 09:37

## 2025-02-25 RX ADMIN — Medication 1 SPRAY: at 19:52

## 2025-02-25 RX ADMIN — Medication 1 TABLET: at 09:28

## 2025-02-25 RX ADMIN — ASPIRIN 81 MG CHEWABLE TABLET 81 MG: 81 TABLET CHEWABLE at 09:28

## 2025-02-25 RX ADMIN — LOPERAMIDE HYDROCHLORIDE 2 MG: 2 CAPSULE ORAL at 14:03

## 2025-02-25 RX ADMIN — Medication 1 SPRAY: at 11:37

## 2025-02-25 ASSESSMENT — ACTIVITIES OF DAILY LIVING (ADL)
ADLS_ACUITY_SCORE: 64
ADLS_ACUITY_SCORE: 65
ADLS_ACUITY_SCORE: 61
ADLS_ACUITY_SCORE: 64
ADLS_ACUITY_SCORE: 60
ADLS_ACUITY_SCORE: 60
ADLS_ACUITY_SCORE: 65
ADLS_ACUITY_SCORE: 60
ADLS_ACUITY_SCORE: 65
ADLS_ACUITY_SCORE: 64
ADLS_ACUITY_SCORE: 60
ADLS_ACUITY_SCORE: 65
ADLS_ACUITY_SCORE: 64
ADLS_ACUITY_SCORE: 64
ADLS_ACUITY_SCORE: 60
ADLS_ACUITY_SCORE: 65
ADLS_ACUITY_SCORE: 65
ADLS_ACUITY_SCORE: 64
ADLS_ACUITY_SCORE: 65
ADLS_ACUITY_SCORE: 64
ADLS_ACUITY_SCORE: 65
ADLS_ACUITY_SCORE: 60
ADLS_ACUITY_SCORE: 65

## 2025-02-25 NOTE — PROGRESS NOTES
Care Management Follow Up    Length of Stay (days): 5    Expected Discharge Date: 02/26/2025     Concerns to be Addressed:       Patient plan of care discussed at interdisciplinary rounds: Yes    Anticipated Discharge Disposition: Skilled Nursing Facility      Anticipated Discharge Services:    Anticipated Discharge DME: None    Patient/family educated on Medicare website which has current facility and service quality ratings: yes  Education Provided on the Discharge Plan:    Patient/Family in Agreement with the Plan: yes    Referrals Placed by CM/SW:    Private pay costs discussed: private room/amenity fees    Discussed  Partnership in Safe Discharge Planning  document with patient/family: Yes:     Handoff Completed: Yes, non-[facility] PCP: External handoff communication completed    Additional Information:  Writer completed PAS in anticipation of discharge     PAS-RR    D: Per DHS regulation, SW completed and submitted PAS-RR to [place] [employer] Direct Connect via the Senior LinkAge Line.  PAS-RR confirmation # is : [id number]    P: Further questions may be directed to Senior LinkAge Line at #[phone], option #4 for PAS-RR staff.     Next Steps: Waiting for C diff results - if negative patient could go to [facility] today     ADD - went in to room to discuss discharge planning and patient stated he did not want rehab and is considering hospice care. Writer provided empathetic listening and support for this plan. Unsure if patient wants a LTC facility or to return to [facility]. Briefly discussed cost and that writer can support either way.     Patient appreciative and would like to speak to doctor and also his son.     Writer let [facility] know they can release the bed.     ADD - 1213 Patient asked provider for a hospice discharge   Writer printed off financial information of the various options, hospice agencies, hospice facilities and LTC facilities and wrote phone number down for patients son to call when he  arrives     ADD - family meeting with a decision to switch to comfort care, family would like to return to [facility] on hospice if possible     [facility] as preferred and they were fine with that referral = referral sent and will continue to discharge plan.       [facility] can accept patient and will meet with patient tomorrow Wed at 2:30     Rachael Santana RN hospice liaison   [facility] [phone]       [facility] may not be able to accept patient back at his assist level and family was concerned about being able to afford LTC     SABRINA Pearson

## 2025-02-25 NOTE — PLAN OF CARE
Goal Outcome Evaluation:    Vital signs stable on RA. Alert and oriented x 4. Lung sounds diminished. Bowel sounds hyperactive, flatus +.pt having diarrhea, stool sample sent to lab.

## 2025-02-25 NOTE — PROGRESS NOTES
[facility]  Medicine Progress Note - Hospitalist Service  Date of Admission:  2/20/2025    Assessment & Plan   84 year old male with a past medical history of type II von willebrand disorder, hypertension, CKD stage 3-4 and aortic stenosis among other chronic conditions who was admitted on 2/20/2025 for treatment of sepsis associated with right lower extremity cellulitis and gram negative bacteremia.    Met with patient x 2 and family care conference was held this afternoon on 2/25/2025.  Patient tells me he has been getting weaker and is mostly left dependent currently has not been able to walk for the last couple of weeks.  With his health issues and overall decline with his health he wants to be kept comfortable with comfort cares only.  He wants to continue course of antibiotics to treat the Pseudomonas bacteremia.  Discussed with patient and family that patient will not qualify for inpatient GIP hospice as that he is currently semistable.  He will most likely discharge to a care facility with hospice.  Social work consultation requested and are following for safe discharge planning to a care facility with hospice.    Comfort care orders initiated on 2/25/2025  On his chronic medications discontinued  Continued on oral Cipro to treat the bacteremia  Ordered Imodium as needed to help with the diarrhea    Severe sepsis  Bacteremia, pseudomonas aeruginosa  RLE cellulitis as source suspected  -blood cultures from 2/20 positive.   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   -TTE on 2/20: LVEF 55-60%, no wall motion abnormality, moderate to  severe aortic stenosis  -cardiology consulted. No further cardiac workup at this time.  -continue daily aspirin 81 mg     Atrial fibrillation vs atrial tachycardia  Irregular heart rate noted by bedside nurse and 12 lead EKG confirmed atrial fibrillation (new) and right bundle branch block (not new). Was not present when TTE was obtained on 2/20.   -continue telemetry  -no rate control needed at this time given pulse 58-68 with occasional rates of 100-110.   -High risk for therapeutic anticoagulation complications given multiple other active problems (chronic anemia, acute anemia after dental procedure, von willebrand disorder). Reassess rhythm patterns daily.   -cardiology consulted    Acute on chronic CKD stage 4  Anemia of chronic disease  -IV fluids with bicarbonate  -nephrology consulted  -avoid nephrotoxins    Type II von willebrand disorder  Chronic anemia, thrombocytopenia  Oral bleeding post dental procedure  -continue on aspirin 81 mg daily  -resume oral iron replacement at discharge  Comfort care only    H/o Schatzki ring   Presbyesophagus  S/p dilation in 2023  -SLP swallow eval. She recommended GI consult. Consult placed to [facility]. Suspect symptoms due to presbyesophagus. Recent NSTEMI is contraindication to elective endoscopy.   -2/24: abnormal esophagram with 1.2 cm stricture at distal esophagus. (See report for details)  -will need outpatient GI follow up in 4-6 weeks for EGD  On puréed diet per speech path    Depression, anxiety  Comfort care only    Gout  -holding PTA allopurinol with GFR <15  Comfort care only    Physical Deconditioning  Anticipate need for TCU at discharge. Previously living independently.  -PTA, OT, SW, CC consults  Patient is transition to comfort cares only.  Will discharge to care facility with comfort cares            Diet: Snacks/Supplements Adult: Ensure Enlive; Between Meals  Combination Diet Pureed Diet (level 4); Thin Liquids (level 0) (in chair ideally, single  "bites/sips, slow rate, alternate consistencies, 3-4 swallows per bite, smaller meals); Thin Liquids (level 0) (up to chair for PO, single bites/sips, slow rat...  Snacks/Supplements Adult: Expedite Bottle; With Meals    DVT Prophylaxis: Deferred as patient is comfort care only   Angulo Catheter: Not present  Lines: None     Cardiac Monitoring: None  Code Status: No CPR- Do NOT Intubate      Clinically Significant Risk Factors         # Hypernatremia: Highest Na = 146 mmol/L in last 2 days, will monitor as appropriate  # Hyperchloremia: Highest Cl = 109 mmol/L in last 2 days, will monitor as appropriate        # Hypomagnesemia: Lowest Mg = 1.6 mg/dL in last 2 days, will replace as needed   # Hypoalbuminemia: Lowest albumin = 2.6 g/dL at 2/25/2025 12:46 PM, will monitor as appropriate   # Thrombocytopenia: Lowest platelets = 97 in last 2 days, will monitor for bleeding              # Overweight: Estimated body mass index is 27.61 kg/m  as calculated from the following:    Height as of this encounter: 1.753 m (5' 9\").    Weight as of this encounter: 84.8 kg (186 lb 15.2 oz).   # Severe Malnutrition: based on nutrition assessment           Social Drivers of Health    Tobacco Use: Medium Risk (9/8/2023)    Patient History     Smoking Tobacco Use: Former     Smokeless Tobacco Use: Never    [facility]    Social Connections          Disposition Plan       Medically Ready for Discharge: Anticipated in 2-4 Days      Patient is transition to comfort cares on 2/15/2025.  Will discharge to a care facility with hospice care         Mary Dorantes MD  Hospitalist Service  [facility]  Securely message with Radha (more info)  Text page via [facility] Paging/Directory   ______________________________________________________________________    Interval History   Stable overnight.  Patient had several episodes of diarrhea " overnight.  Met with patient this morning and he told me that he has been weaker and was not able to walk he wants to be kept comfortable with comfort focused cares.  He does not want to prolong his life.  He wants to discharge to a care facility with hospice.  Patient's son and daughter-in-law arrived and met with them this afternoon again along with the patient and  at bedside.  Patient again told us that he wants to be kept comfortable with comfort cares only and wants to transition to hospice.  He wanted to transition to comfort care orders started currently.  Comfort care orders initiated.  He wants to continue Cipro to cover for the bacteremia even on hospice.  No other acute issues        Physical Exam   Vital Signs: Temp: 98.2  F (36.8  C) Temp src: Oral BP: 128/72 Pulse: 68   Resp: 18 SpO2: 99 % O2 Device: None (Room air)    Weight: 186 lbs 15.2 oz    General Appearance: Reclined in bed, calm and cooperative  Respiratory: clear to auscultation bilaterally with good inspiratory effort, no wheezes or crackles  Cardiovascular: irregular, systolic murmur present  GI: Benign, soft, nontender  Skin: Fragile skin, multiple bruises on extremities in various stages of healing  Right lower leg with edema, erythema  Neuro: Is alert oriented x 3 and is able to make his own decisions    Medical Decision Making       60 MINUTES SPENT BY ME on the date of service doing chart review, history, exam, documentation & further activities per the note.  MANAGEMENT DISCUSSED with the following over the past 24 hours: patient, bedside nurse,    NOTE(S)/MEDICAL RECORDS REVIEWED over the past 24 hours: nursing notes, consultant notes, med admin record  Tests ORDERED & REVIEWED in the past 24 hours:  - See lab/imaging results included in the data section of the note      Data     I have personally reviewed the following data over the past 24 hrs:    8.7  \   9.2 (L)   / 97 (L)     142 106 60.3 (H) /  83    3.7 23 3.01 (H) \     ALT: N/A AST: N/A AP: N/A TBILI: N/A   ALB: 2.6 (L) TOT PROTEIN: N/A LIPASE: N/A       Imaging results reviewed over the past 24 hrs:   No results found for this or any previous visit (from the past 24 hours).

## 2025-02-25 NOTE — PROGRESS NOTES
Care Management Follow Up    Length of Stay (days): 5    Expected Discharge Date: 02/26/2025     Concerns to be Addressed:       Patient plan of care discussed at interdisciplinary rounds: Yes    Anticipated Discharge Disposition:  TCU       Anticipated Discharge Services:    Anticipated Discharge DME: None    Patient/family educated on Medicare website which has current facility and service quality ratings: yes  Education Provided on the Discharge Plan:    Patient/Family in Agreement with the Plan: unable to assess    Referrals Placed by CM/SW:    Private pay costs discussed: private room/amenity fees and transportation costs    Discussed  Partnership in Safe Discharge Planning  document with patient/family: Yes:     Handoff Completed: Yes, non-[facility] PCP: External handoff communication completed    Additional Information:  Writer read through previous  notes regarding patient wanting [facility] and a private room, sent a referral to [facility] and they are able to accept patient     Sent a message to Dr Dorantes to see if medically ready and reported private room     Next Steps: Orders, PAS, check with family about transport     SABRINA Pearson

## 2025-02-25 NOTE — PROGRESS NOTES
It appears he may be  opting  for hospice.    If he changes his mind and nephrology is needed, please call us back.    For now we will sign off.      Esa Farr MD

## 2025-02-25 NOTE — PLAN OF CARE
Goal Outcome Evaluation:    Pt is alert and oriented x 4 , he is on comfort care. He is incontinent of bowel and bladder , take pills whole one at the time . Pt prefer brief on to avoid accident.PRN suction is at bedside

## 2025-02-25 NOTE — PROGRESS NOTES
Speech Language Therapy Discharge Summary    Reason for therapy discharge:    Change in medical status.    Progress towards therapy goal(s). See goals on Care Plan in [facility] electronic health record for goal details.  Goals not met.  Barriers to achieving goals:   transition to comfort cares.    Therapy recommendation(s):    Pt has transitioned to comfort cares. Pt was on puree diet and thin liquids with strategies of slow rate, small bites/sips, alternate food/drink, smaller more frequent meals, and upright in chair with PO. Pt underwent esophagram yesterday which revealed stricture, and was recommended EGD with GI as outpatient. Given transition in [facility], recommend MD liberalize diet to pt's preferences for comfort and pt continue to use strategies PRN. SLP will sign off.

## 2025-02-25 NOTE — CONSULTS
"SPIRITUAL HEALTH SERVICES  SPIRITUAL ASSESSMENT Consult Note  [facility]. Unit ortho spine     REFERRAL SOURCE: Staff consult for emotional support    ON this visit I met with daughter-in-law Ashley in patient room; Mando is busy on the phone with son Mauro.    I shared explanation of the role of spiritual care; Ashley states that she may be interested herself, but unsure for Mando as \"he has never felt that is important.\" Ashley requested follow up tomorrow stating the family is busy with many decisions and details this afternoon.    Plan: I have triaged this request for unit  follow up tomorrow.    Rosmery Mcdermott [facility]  Staff   Please place consult order for routine Spiritual Health Services referrals.  [facility] available 24/7 for emergent requests either by having the on-call  paged or by entering an ASAP/STAT consult in Epic (this will also page the on-call ).    "

## 2025-02-25 NOTE — PLAN OF CARE
Physical Therapy Discharge Summary    Reason for therapy discharge:    Change in medical status.    Progress towards therapy goal(s). See goals on Care Plan in [facility] electronic health record for goal details.  Goals not met.  Barriers to achieving goals:   limited tolerance for therapy and Changed to comfort cares. PT discontinued.    Therapy recommendation(s):    Please reorder if plans change to restorative.

## 2025-02-25 NOTE — PLAN OF CARE
"Goal Outcome Evaluation:    Assumed care at 1500. All pt need met at this time. Pt had the continuous NaCL 0/45% with sodium bicarb per order running in PIV in R arm (not found in flowsheets). It appears that this arm is possibly infiltrated. R arm PIV removed. RN called pharmacist due to MAR saying the continuous fluids were a \"vesicant\", the pharmacist researched it and verbalized that this med was \"not a vesicant and did not need any treatment/compresses\". Possible discharge to TCU.   "

## 2025-02-26 ENCOUNTER — LAB REQUISITION (OUTPATIENT)
Dept: LAB | Facility: CLINIC | Age: 85
End: 2025-02-26
Payer: COMMERCIAL

## 2025-02-26 DIAGNOSIS — D64.9 ANEMIA, UNSPECIFIED: ICD-10-CM

## 2025-02-26 LAB
BACTERIA BLD CULT: NO GROWTH
BACTERIA BLD CULT: NO GROWTH

## 2025-02-26 PROCEDURE — 250N000013 HC RX MED GY IP 250 OP 250 PS 637: Performed by: INTERNAL MEDICINE

## 2025-02-26 PROCEDURE — G0463 HOSPITAL OUTPT CLINIC VISIT: HCPCS

## 2025-02-26 PROCEDURE — 99232 SBSQ HOSP IP/OBS MODERATE 35: CPT | Performed by: INTERNAL MEDICINE

## 2025-02-26 PROCEDURE — 250N000013 HC RX MED GY IP 250 OP 250 PS 637: Performed by: PHYSICIAN ASSISTANT

## 2025-02-26 PROCEDURE — 250N000011 HC RX IP 250 OP 636: Mod: JZ | Performed by: PHYSICIAN ASSISTANT

## 2025-02-26 PROCEDURE — 120N000001 HC R&B MED SURG/OB

## 2025-02-26 RX ADMIN — Medication 1 SPRAY: at 08:54

## 2025-02-26 RX ADMIN — CIPROFLOXACIN HYDROCHLORIDE 500 MG: 250 TABLET, FILM COATED ORAL at 08:55

## 2025-02-26 RX ADMIN — LORAZEPAM 1 MG: 2 LIQUID ORAL at 08:55

## 2025-02-26 RX ADMIN — Medication 1 SPRAY: at 16:34

## 2025-02-26 RX ADMIN — Medication 1 SPRAY: at 21:15

## 2025-02-26 RX ADMIN — HYDROMORPHONE HYDROCHLORIDE 0.2 MG: 0.2 INJECTION, SOLUTION INTRAMUSCULAR; INTRAVENOUS; SUBCUTANEOUS at 06:30

## 2025-02-26 RX ADMIN — Medication 1 SPRAY: at 12:20

## 2025-02-26 ASSESSMENT — ACTIVITIES OF DAILY LIVING (ADL)
ADLS_ACUITY_SCORE: 61
ADLS_ACUITY_SCORE: 60
ADLS_ACUITY_SCORE: 60
ADLS_ACUITY_SCORE: 61
ADLS_ACUITY_SCORE: 60
ADLS_ACUITY_SCORE: 61
ADLS_ACUITY_SCORE: 60
ADLS_ACUITY_SCORE: 61
ADLS_ACUITY_SCORE: 60
ADLS_ACUITY_SCORE: 61
ADLS_ACUITY_SCORE: 65
ADLS_ACUITY_SCORE: 61
ADLS_ACUITY_SCORE: 60
ADLS_ACUITY_SCORE: 65
ADLS_ACUITY_SCORE: 60
ADLS_ACUITY_SCORE: 65
ADLS_ACUITY_SCORE: 61
ADLS_ACUITY_SCORE: 61
ADLS_ACUITY_SCORE: 65

## 2025-02-26 NOTE — CONSULTS
Duplicate consult entered for hospice. Care Management currently following for discharge planning.     SABRINA BLACKWELL  [facility]  INPATIENT CARE COORDINATION

## 2025-02-26 NOTE — PLAN OF CARE
Goal Outcome Evaluation:    Pt is alert and oriented x3, on comfort cares. Refused his schedule tylenol. Tolerating pureed diet. Patient voiced no other  concern after requesting for water. I.V dilaudid x 1 given.

## 2025-02-26 NOTE — PROGRESS NOTES
Care Management Follow Up    Length of Stay (days): 6    Expected Discharge Date: 02/27/2025     Concerns to be Addressed:       Patient plan of care discussed at interdisciplinary rounds: Yes    Anticipated Discharge Disposition: Skilled Nursing Facility              Anticipated Discharge Services:    Anticipated Discharge DME: None    Patient/family educated on Medicare website which has current facility and service quality ratings: yes  Education Provided on the Discharge Plan:    Patient/Family in Agreement with the Plan: yes    Referrals Placed by CM/SW:    Private pay costs discussed: Not applicable    Discussed  Partnership in Safe Discharge Planning  document with patient/family: No     Handoff Completed: No, handoff not indicated or clinically appropriate    Additional Information:  Writer received a call from Meaghan at [facility]. She reported that they would be able to take the patient back as long as he could do a selene steady for transfers as they are not able to use anuradha lifts. Meaghan asked for notes to be sent. Writer faxed over nursing notes, PT notes, and MAR.     Writer stated they would call her back this afternoon after the hospice meeting if they hear the outcome.    Next Steps: hospice meeting     SABRINA BLACKWELL  [facility]  INPATIENT CARE COORDINATION

## 2025-02-26 NOTE — PROGRESS NOTES
Date & Time: 0700-1930  sepsis associated with right lower extremity cellulitis and gram negative bacteremia.   Comfort Care patient     Behavior & Aggression: Green   Fall Risk: Yes   Orientation:A&Ox4, VSS on RA, CMS intact  ABNL Labs:  Pain Management: denies, ativan given once for restless in the morning.   Bowel/Bladder: incontinence with bowel not bladder, uses urinal.   IV/Drains: PIV   Skin/Wounds/incisions:  see flowsheet- dressing done   Diet: pureed  Activity Level:  reposition by himself. Calls appropriate using call light if needed help. Refused to set on the chair or OOB  Tests/Procedures: N/A.  Anticipated  DC Date: TBD.  Significant Information:  No acute event during shift.  Discharge to home [place] - maybe with hospice   Patient needs PT evaluation per hospice care. MD. Notified    PT ordered- Per PT they will see him tomorrow Am

## 2025-02-26 NOTE — PROGRESS NOTES
"SPIRITUAL HEALTH SERVICES - Progress Note  [facility] Ortho Spine    Referral Source: [facility] routine visit for emotional support    Mando has ongoing challenges with ambulation and has recently become unable to eat/ingest food.  He expressed concern about his quality of life going forward, noting his recent trouble with eating and sleeping comfortably. He noted that he \"has had a great life\" and is \"ready to die.\"  We engaged in reflective conversation surrounding end of life concerns and brief life review.  offered reflective listening.  Mando's children are an important source of support and will be part of his care conference later today.    Plan:  will follow up on 2/28 if [facility] at [facility] to offer emotional support post-care conference. [facility] support remains available per patient/family request.    Yumiko Barrett, PhD   Intern    [facility] available 24/7 for emergent requests/referrals, either by paging the on-call  or by entering an ASAP/STAT consult in [facility], which will also page the on-call .   "

## 2025-02-26 NOTE — PROGRESS NOTES
[facility]  Medicine Progress Note - Hospitalist Service  Date of Admission:  2/20/2025    Assessment & Plan   84 year old male with a past medical history of type II von willebrand disorder, hypertension, CKD stage 3-4 and aortic stenosis among other chronic conditions who was admitted on 2/20/2025 for treatment of sepsis associated with right lower extremity cellulitis and gram negative bacteremia.    Met with patient x 2 and family care conference was held this afternoon on 2/25/2025.  Patient tells me he has been getting weaker and is mostly left dependent currently has not been able to walk for the last couple of weeks.  With his health issues and overall decline with his health he wants to be kept comfortable with comfort cares only.  He wants to continue course of antibiotics to treat the Pseudomonas bacteremia.  Discussed with patient and family that patient will not qualify for inpatient GIP hospice as that he is currently semistable.  He will most likely discharge to a care facility with hospice.  Social work consultation requested and are following for safe discharge planning to a care facility with hospice.    Comfort care orders initiated on 2/25/2025  On his chronic medications discontinued  Continued on oral Cipro to treat the bacteremia  Ordered Imodium as needed to help with the diarrhea  Social work consulted to help with discharge planning and are following    Severe sepsis  Bacteremia, pseudomonas aeruginosa  RLE cellulitis as source suspected  -blood cultures from 2/20 positive.   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    -TTE on 2/20: LVEF 55-60%, no wall motion abnormality, moderate to severe aortic stenosis  -cardiology consulted. No further cardiac workup at this time.  -Aspirin discontinued as patient is comfort cares only now    Atrial fibrillation vs atrial tachycardia  Irregular heart rate noted by bedside nurse and 12 lead EKG confirmed atrial fibrillation (new) and right bundle branch block (not new). Was not present when TTE was obtained on 2/20.   -continue telemetry  -no rate control needed at this time given pulse 58-68 with occasional rates of 100-110.   -High risk for therapeutic anticoagulation complications given multiple other active problems (chronic anemia, acute anemia after dental procedure, von willebrand disorder). Reassess rhythm patterns daily.   -cardiology consulted  Patient is comfort care only    Acute on chronic CKD stage 4  Anemia of chronic disease  -IV fluids with bicarbonate  -nephrology consulted  -avoid nephrotoxins  Patient is comfort care only    Type II von willebrand disorder  Chronic anemia, thrombocytopenia  Oral bleeding post dental procedure  -continue on aspirin 81 mg daily  -resume oral iron replacement at discharge  Comfort care only    H/o Schatzki ring   Presbyesophagus  S/p dilation in 2023  -SLP swallow eval. She recommended GI consult. Consult placed to [facility]. Suspect symptoms due to presbyesophagus. Recent NSTEMI is contraindication to elective endoscopy.   -2/24: abnormal esophagram with 1.2 cm stricture at distal esophagus. (See report for details)  -will need outpatient GI follow up in 4-6 weeks for EGD  On puréed diet per speech path    Depression, anxiety  Comfort care only    Gout  -holding PTA allopurinol with GFR <15  Comfort care only    Physical Deconditioning  Anticipate need for TCU at discharge. Previously living independently.  -PTA, OT, SW, CC consults  Patient is transition to comfort cares only.  Will discharge to care facility with comfort cares           "  Diet: Snacks/Supplements Adult: Ensure Enlive; Between Meals  Combination Diet Pureed Diet (level 4); Thin Liquids (level 0) (in chair ideally, single bites/sips, slow rate, alternate consistencies, 3-4 swallows per bite, smaller meals); Thin Liquids (level 0) (up to chair for PO, single bites/sips, slow rat...  Snacks/Supplements Adult: Expedite Bottle; With Meals    DVT Prophylaxis: Deferred as patient is comfort care only   Angulo Catheter: Not present  Lines: None     Cardiac Monitoring: None  Code Status: No CPR- Do NOT Intubate      Clinically Significant Risk Factors               # Hypoalbuminemia: Lowest albumin = 2.6 g/dL at 2/25/2025 12:46 PM, will monitor as appropriate   # Thrombocytopenia: Lowest platelets = 97 in last 2 days, will monitor for bleeding              # Overweight: Estimated body mass index is 27.61 kg/m  as calculated from the following:    Height as of this encounter: 1.753 m (5' 9\").    Weight as of this encounter: 84.8 kg (186 lb 15.2 oz).   # Severe Malnutrition: based on nutrition assessment           Social Drivers of Health    Tobacco Use: Medium Risk (9/8/2023)    Patient History     Smoking Tobacco Use: Former     Smokeless Tobacco Use: Never    Received from [facility] [facility], [facility] [facility]    Social Connections          Disposition Plan       Medically Ready for Discharge: Anticipated in 2-4 Days        Will discharge back to assisted living facility or to a LTC with hospice care  Discussed with  Meaghan.  She is going to touch base with assisted living facility and will let us know       Mary Dorantes MD  Hospitalist Service  [facility]  Securely message with Radha (more info)  Text page via [facility] Paging/Directory   ______________________________________________________________________    Interval History   Resting comfortably in bed.  Diarrhea is improving.  Using intermittent " suctioning to help with secretions.  No other acute issues        Physical Exam   Vital Signs: Temp: 98.7  F (37.1  C) Temp src: Oral BP: (!) 143/95 Pulse: 72   Resp: 18 SpO2: 99 % O2 Device: None (Room air)    Weight: 186 lbs 15.2 oz    General Appearance: Reclined in bed, calm and cooperative  Respiratory: clear to auscultation bilaterally with good inspiratory effort, no wheezes or crackles  Cardiovascular: irregular, systolic murmur present  GI: Benign, soft, nontender  Skin: Fragile skin, multiple bruises on extremities in various stages of healing  Right lower leg with edema, erythema  Neuro: Is alert oriented x 3 and is able to make his own decisions    Medical Decision Making       45 MINUTES SPENT BY ME on the date of service doing chart review, history, exam, documentation & further activities per the note.  MANAGEMENT DISCUSSED with the following over the past 24 hours: patient, bedside nurse,    NOTE(S)/MEDICAL RECORDS REVIEWED over the past 24 hours: nursing notes, consultant notes, med admin record  Tests ORDERED & REVIEWED in the past 24 hours:  - See lab/imaging results included in the data section of the note      Data     I have personally reviewed the following data over the past 24 hrs:    8.7  \   9.2 (L)   / 97 (L)     142 106 60.3 (H) /  83   3.7 23 3.01 (H) \     ALT: N/A AST: N/A AP: N/A TBILI: N/A   ALB: 2.6 (L) TOT PROTEIN: N/A LIPASE: N/A       Imaging results reviewed over the past 24 hrs:   No results found for this or any previous visit (from the past 24 hours).

## 2025-02-26 NOTE — PROGRESS NOTES
"[facility]  WO Nurse Inpatient Assessment     Consulted for: Right lower extremity heel, 2nd consult for penis excoriations     Summary: Patient is now on comfort cares. WOC nurse will sign off. Recommend continuing with current POC for wounds. Please re-consult WO for new skin and wound concerns.    1) Suspected evolving deep tissue pressure injury to right heel, as well as reabsorbing blister to right heel, both present on admission  2) Edema, erythema, and scattered injuries to bilateral legs and toes, present on admission  Recommend regular skin care to legs and off-loading to right heel.   3) Redness and excoriation circumferentially around penis, only visible when foreskin is retracted.    WO nurse follow-up plan: signing off    Patient History (according to provider note(s):      \"Mando Freedman is a 84 year old male admitted on 2/20/2025 due to Sepsis secondary to right lower extremity cellulitis, NSTEMI, and JUAN R.       Past medical history significant for HTN, CKD stage III-IV, Aortic stenosis, Aortic root dilatation, Chronic lower extremity edema, Gout, OA, MDD with anxiety, Anemia of chronic disease, Iron def, MGUS, Type II Von Willebrand D/O.     Patient presented to the ED via EMS from home due to bilateral lower extremity weakness.  Patient reported that he noticed some weakness in his lower extremities the day prior to presentation.  He awoke the morning of presentation could not bear weight on either leg.  EMS reported that the right lower extremity was warm and red with wounds present on the toes and heel. Patient reported following with outpatient Podiatry with last visit occurring ~ 4 weeks ago.  EMS suspected patient was in atrial fib.\"    Assessment:      Areas visualized during today's visit: Focused: Right lower extremity    Not assessed 2/26  Skin Injury Location: Penis      Last photo: 2/24  Skin injury due to: Unclear etiology  Skin history and plan of care:   " Patient was not aware that he had this wound and states that it does not hurt or itch. Skin is red, but mostly intact with a few superficial scratches. Will use Triad paste to protect this area.  Affected area:      Skin assessment: Erythema and Excoriation     Measurements (length x width x depth, in cm) not measured      Color: normal and consistent with surrounding tissue     Temperature  normal      Drainage: none .      Color: none      Odor: none  Pain: denies  and no grimacing or signs of discomfort, none  Pain interventions prior to dressing change: patient tolerated well and slow and gentle cares   Treatment goal: Heal , Infection control/prevention, Protection, and Simplify plan of care  STATUS: initial assessment  Supplies ordered: ordered Triad paste, supplies stored on unit, discussed with RN, and discussed with patient       Pressure Injury Location: Right heel        Last photo: 2/26  Wound type: Pressure Injury     Pressure Injury Stage: Deep Tissue Pressure Injury (DTPI), present on admission      Wound history/plan of care: Patient has transitioned to comfort cares. He is too stable to be GIP but will discharge with hospice agency. Wound is dressed with Optifoam Gentle border. EdemaWear in place bilaterally. Wound appears stable.  Wound base: 50 % Maroon, Dry, Lifting , and devitalized epidermis , 50 % Non-blanchable, Maroon, Epidermis, Dry, and Smooth      Palpation of the wound bed: boggy      Drainage: scant     Description of drainage: bloody     Measurements (length x width x depth, in cm) ~ 6  x 5  x  < 0.1 cm      Tunneling N/A     Undermining N/A  Periwound skin: Dry/scaly and Erythema- blanchable      Color: pink and red      Temperature: normal   Odor: none  Pain: denies , none  Pain intervention prior to dressing change: patient tolerated well and slow and gentle cares   Treatment goal: Maintain (prevention of deterioration) and Protection  STATUS: stable  Supplies ordered: gathered,  "supplies stored on unit, and discussed with patient     My PI Risk Assessment     Sensory Perception: 3 - Slightly Limited     Moisture: 3 - Occasionally moist      Activity: 2 - Chairfast     Mobility: 2 - Very limited     Nutrition: 2 - Probably inadequate      Friction/Shear: 1 - Problem     TOTAL: 13      Not assessed 2/26  Skin Injury Location: Bilateral legs and toes      Right toes    Right leg    Left toes      Last photo: 2/20  Skin injury due to: Unclear etiology  Skin history and plan of care: Scattered maroon blisters to bilateral toes. Patient denies injuring his feet. Toes are cool to the touch and red. Encouraged patient to wear protective footwear at all times. Right leg is edematous with red-purple discoloration circumferentially. Posterior lower leg is hot to the touch. Skin to bilateral legs is dry and flaky. Patient reports that he is not able to reach his feet. Has worn compression in the past but states, \"They're too hard to put on\".   Affected area:      Skin assessment: Blistering, Dry/flaky, and Rash     Measurements (length x width x depth, in cm) No focal open area       Color: red     Temperature  hot (posterior right lower leg)     Drainage: none .      Color: none      Odor: none  Pain: moderate and during dressing change, intermittent and tender  Pain interventions prior to dressing change: slow and gentle cares  and distraction  Treatment goal: Heal , Infection control/prevention, and Protection  STATUS: initial assessment  Supplies ordered: supplies stored on unit and discussed with patient      Treatment Plan:     Penis (under foreskin): BID and after episodes of incontinence  Cleanse wounds with Vashe wound cleanser and pat dry with gauze   Apply Triad paste (#[id number])    Right heel wound(s): Daily   Cleanse foot and leg with Vashe and gauze. Pat dry.  Swab jazzy-wound skin with skin prep (Cavilon or other barrier film) and let dry.  Apply a 4x4 Mepilex bordered dressing over wound. " "Initial and date.  Follow PIP Plan.     Bilateral legs and toes: Daily   Cleanse legs with Bath wipes and pat dry.  Apply Sween cream to both legs.  Apply skin prep (Cavilon or other barrier film) to maroon blisters on toes.  Apply EdemaWear (small, navy stripe #[id number] or small Lite, purple stripe #[id number]) bilaterally.     Pressure Injury Prevention (PIP) Plan:  -If patient is declining pressure injury prevention interventions: Explore reason why and address patient's concerns, Educate on pressure injury risk and prevention intervention(s), If patient is still declining, document \"informed refusal\" , and Ensure Care team is aware ( provider, charge nurse, etc)  -Mattress: Add MIRANDA pump to mattress PRN moisture issues  -HOB: Maintain at or below 30 degrees, unless contraindicated  -Repositioning in bed: Every 1-2 hours , Left/right positioning; avoid supine, and Raise foot of bed prior to raising head of bed, to reduce patient sliding down (shear)  -Heels: Keep elevated off mattress  -Protective Dressing: None  -Positioning Equipment:  Pillows  -Chair positioning: Assist patient to reposition hourly and Do NOT use a donut for sitting (this increases pressure to smaller area and creates a higher potential for injury)    -Moisture Management: Perineal cleansing /protection: Follow Incontinence Protocol, Avoid brief in bed, Clean and dry skin folds with bathing , and Moisturize dry skin  -Under Devices: Inspect skin under all medical devices during skin inspection , Ensure tubes are stabilized without tension, and Ensure patient is not lying on medical devices or equipment when repositioned     Orders: Reviewed    RECOMMEND PRIMARY TEAM ORDER: None, at this time  Education provided: plan of care, wound progress, Infection prevention , Hygiene, and Off-loading pressure  Discussed plan of care with: Patient  Notify WO if wound(s) deteriorate.  Nursing to notify the Provider(s) and re-consult the WOC Nurse if new skin " "concern.    DATA:     Current support surface: Standard  Standard gel mattress (Isoflex)  Containment of urine/stool: Incontinence Protocol and Suction based external urinary catheter   BMI: Body mass index is 27.61 kg/m .   Active diet order: Orders Placed This Encounter      Combination Diet Pureed Diet (level 4); Thin Liquids (level 0) (in chair ideally, single bites/sips, slow rate, alternate consistencies, 3-4 swallows per bite, smaller meals); Thin Liquids (level 0) (up to chair for PO, single bites/sips, slow rat...     Output: I/O last 3 completed shifts:  In: 240 [P.O.:240]  Out: -      Labs:   Recent Labs   Lab 02/25/25  1246   ALBUMIN 2.6*   HGB 9.2*   WBC 8.7     Pressure injury risk assessment:   Sensory Perception: 3-->slightly limited  Moisture: 3-->occasionally moist  Activity: 2-->chairfast  Mobility: 3-->slightly limited  Nutrition: 2-->probably inadequate  Friction and Shear: 2-->potential problem  Jono Score: 15    Jolly Mcclain RN, CWOCN  Please contact via [facility] at group \"[facility] nurse\"- M-F 8A-4P  "

## 2025-02-26 NOTE — PLAN OF CARE
Goal Outcome Evaluation:       Summary : 02/25/25. [id number].  On comfort care. Patient is alert and oriented X4. Up with assist of 1-2 with gait belt and walker . Oral cares done and suction provided . On   Pureed Diet with fair appetite. Take medications whole with pudding 1 at a time.  PIV saline locked. Patient denied pain and refused schedule acetaminophen. Incontinent of B&B, brief wet and changed .  Turn and repo. Patient make all needs known and frequent safety rounds provided.  consulted for hospice.  Fall and safety precautions in place and last safety rounds @2250 .

## 2025-02-27 ENCOUNTER — DOCUMENTATION ONLY (OUTPATIENT)
Dept: CARE COORDINATION | Facility: CLINIC | Age: 85
End: 2025-02-27
Payer: COMMERCIAL

## 2025-02-27 VITALS
BODY MASS INDEX: 27.69 KG/M2 | HEIGHT: 69 IN | SYSTOLIC BLOOD PRESSURE: 143 MMHG | DIASTOLIC BLOOD PRESSURE: 95 MMHG | RESPIRATION RATE: 20 BRPM | OXYGEN SATURATION: 99 % | TEMPERATURE: 98.7 F | WEIGHT: 186.95 LBS | HEART RATE: 72 BPM

## 2025-02-27 PROCEDURE — 250N000013 HC RX MED GY IP 250 OP 250 PS 637: Performed by: INTERNAL MEDICINE

## 2025-02-27 PROCEDURE — 999N000147 HC STATISTIC PT IP EVAL DEFER: Performed by: PHYSICAL THERAPIST

## 2025-02-27 PROCEDURE — 120N000001 HC R&B MED SURG/OB

## 2025-02-27 PROCEDURE — 250N000011 HC RX IP 250 OP 636: Mod: JZ | Performed by: PHYSICIAN ASSISTANT

## 2025-02-27 PROCEDURE — 99232 SBSQ HOSP IP/OBS MODERATE 35: CPT | Performed by: INTERNAL MEDICINE

## 2025-02-27 PROCEDURE — 250N000013 HC RX MED GY IP 250 OP 250 PS 637: Performed by: PHYSICIAN ASSISTANT

## 2025-02-27 RX ORDER — OXYCODONE HCL 20 MG/ML
5 CONCENTRATE, ORAL ORAL EVERY 6 HOURS
Status: DISCONTINUED | OUTPATIENT
Start: 2025-02-27 | End: 2025-03-05

## 2025-02-27 RX ORDER — METHOCARBAMOL 500 MG/1
500 TABLET, FILM COATED ORAL 4 TIMES DAILY
Status: DISCONTINUED | OUTPATIENT
Start: 2025-02-27 | End: 2025-02-27

## 2025-02-27 RX ADMIN — OXYCODONE HYDROCHLORIDE 5 MG: 100 SOLUTION ORAL at 20:50

## 2025-02-27 RX ADMIN — CHOLESTYRAMINE 4 G: 4 POWDER, FOR SUSPENSION ORAL at 07:40

## 2025-02-27 RX ADMIN — Medication 1 SPRAY: at 20:56

## 2025-02-27 RX ADMIN — LORAZEPAM 1 MG: 2 LIQUID ORAL at 07:40

## 2025-02-27 RX ADMIN — LORAZEPAM 1 MG: 2 LIQUID ORAL at 01:04

## 2025-02-27 RX ADMIN — Medication 1 SPRAY: at 14:48

## 2025-02-27 RX ADMIN — OXYCODONE HYDROCHLORIDE 5 MG: 100 SOLUTION ORAL at 14:47

## 2025-02-27 RX ADMIN — HYDROMORPHONE HYDROCHLORIDE 0.4 MG: 0.2 INJECTION, SOLUTION INTRAMUSCULAR; INTRAVENOUS; SUBCUTANEOUS at 05:42

## 2025-02-27 RX ADMIN — Medication 1 SPRAY: at 07:40

## 2025-02-27 ASSESSMENT — ACTIVITIES OF DAILY LIVING (ADL)
ADLS_ACUITY_SCORE: 57

## 2025-02-27 NOTE — PROGRESS NOTES
Care Management Follow Up    Length of Stay (days): 7    Expected Discharge Date: 02/28/2025     Concerns to be Addressed:       Patient plan of care discussed at interdisciplinary rounds: Yes    Anticipated Discharge Disposition: Long Term Care, Hospice     Anticipated Discharge Services:    Anticipated Discharge DME: None    Patient/family educated on Medicare website which has current facility and service quality ratings: yes  Education Provided on the Discharge Plan: Yes  Patient/Family in Agreement with the Plan: yes    Referrals Placed by CM/SW:    Private pay costs discussed: private room/amenity fees    Discussed  Partnership in Safe Discharge Planning  document with patient/family: Yes:     Handoff Completed: No, handoff not indicated or clinically appropriate    Additional Information:  Writer helping unit SW     Writer communicate with [facility], read through PT notes and had a long conversation with hospice liaison Rachael Santana. Patient would like the most support possible in these next few weeks to months. Patient would get the most support at a hospice home such as [facility] or  [facility] in [place]. The cost difference is not much different in a hospice home versus LTC. Patient would like round the clock attention that he will not be able to get at his [facility].     Writer sending referrals to [facility] and to [facility] to check on bed availability     Next Steps: Find a LTC hospice bed, transport     [facility] - has patient on the wait list if able to discharge     SABRINA Pearson

## 2025-02-27 NOTE — PROGRESS NOTES
[facility]  Medicine Progress Note - Hospitalist Service  Date of Admission:  2/20/2025    Assessment & Plan   84 year old male with a past medical history of type II von willebrand disorder, hypertension, CKD stage 3-4 and aortic stenosis among other chronic conditions who was admitted on 2/20/2025 for treatment of sepsis associated with right lower extremity cellulitis and gram negative bacteremia.    Met with patient and family care conference was held on 2/25/2025.  Patient tells me he has been getting weaker and is mostly lift dependent currently has not been able to walk for the last couple of weeks.  With his health issues and overall decline with his health he wants to be kept comfortable with comfort cares only.  He wants to continue course of antibiotics to treat the Pseudomonas bacteremia he refused to take the ciprofloxacin on 2/27/25    Comfort care orders initiated on 2/25/2025   his chronic medications discontinued    Patient is resting in bed.  Tells me that he is ready to die having pain in his legs and also in his abdomen and doesn't want to live like this   Oxycodone 5 mg every 6 hours scheduled.  [facility] hospice consultation requested on 2/27/2025  Patient's son Mauro was called and updated and is agreeable with the [facility] hospice consultation on 2/27/2025        Severe sepsis  Bacteremia, pseudomonas aeruginosa  RLE cellulitis as source suspected  -blood cultures from 2/20 positive.   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   -TTE on 2/20: LVEF  55-60%, no wall motion abnormality, moderate to severe aortic stenosis  -cardiology consulted. No further cardiac workup at this time.  -Aspirin discontinued as patient is comfort cares only now    Atrial fibrillation vs atrial tachycardia  Irregular heart rate noted by bedside nurse and 12 lead EKG confirmed atrial fibrillation (new) and right bundle branch block (not new). Was not present when TTE was obtained on 2/20.   -continue telemetry  -no rate control needed at this time given pulse 58-68 with occasional rates of 100-110.   -High risk for therapeutic anticoagulation complications given multiple other active problems (chronic anemia, acute anemia after dental procedure, von willebrand disorder). Reassess rhythm patterns daily.   -cardiology consulted  Patient is comfort care only    Acute on chronic CKD stage 4  Anemia of chronic disease  -IV fluids with bicarbonate  -nephrology consulted  -avoid nephrotoxins  Patient is comfort care only    Type II von willebrand disorder  Chronic anemia, thrombocytopenia  Oral bleeding post dental procedure  -continue on aspirin 81 mg daily  -resume oral iron replacement at discharge  Comfort care only    H/o Schatzki ring   Presbyesophagus  S/p dilation in 2023  -SLP swallow eval. She recommended GI consult. Consult placed to [facility]. Suspect symptoms due to presbyesophagus. Recent NSTEMI is contraindication to elective endoscopy.   -2/24: abnormal esophagram with 1.2 cm stricture at distal esophagus. (See report for details)  -will need outpatient GI follow up in 4-6 weeks for EGD  On puréed diet per speech path    Depression, anxiety  Comfort care only    Gout  -holding PTA allopurinol with GFR <15  Comfort care only    Physical Deconditioning  Anticipate need for TCU at discharge. Previously living independently.  -PTA, OT, SW, CC consults  Patient is transition to comfort cares only.  Will discharge to care facility with comfort cares            Diet: Snacks/Supplements  "Adult: Ensure Enlive; Between Meals  Combination Diet Pureed Diet (level 4); Thin Liquids (level 0) (in chair ideally, single bites/sips, slow rate, alternate consistencies, 3-4 swallows per bite, smaller meals); Thin Liquids (level 0) (up to chair for PO, single bites/sips, slow rat...  Snacks/Supplements Adult: Expedite Bottle; With Meals    DVT Prophylaxis: Deferred as patient is comfort care only   Angulo Catheter: Not present  Lines: None     Cardiac Monitoring: None  Code Status: No CPR- Do NOT Intubate      Clinically Significant Risk Factors               # Hypoalbuminemia: Lowest albumin = 2.6 g/dL at 2/25/2025 12:46 PM, will monitor as appropriate                # Overweight: Estimated body mass index is 27.61 kg/m  as calculated from the following:    Height as of this encounter: 1.753 m (5' 9\").    Weight as of this encounter: 84.8 kg (186 lb 15.2 oz).   # Severe Malnutrition: based on nutrition assessment           Social Drivers of Health    Tobacco Use: Medium Risk (9/8/2023)    Patient History     Smoking Tobacco Use: Former     Smokeless Tobacco Use: Never    Received from [employer] [facility] [employer] [employer] [facility] [employer]    Social Connections          Disposition Plan       Medically Ready for Discharge: Anticipated Tomorrow          Will discharge back to assisted living facility or to a LTC with hospice care         Mary Dorantes MD  Hospitalist Service  [facility]  Securely message with [employer] (more info)  Text page via [employer] Paging/Directory   ______________________________________________________________________    Interval History   Resting comfortably in bed.  Received Dilaudid overnight and did not like how he felt with it.  Dilaudid discontinued.  using intermittent suctioning to help with secretions.  No other acute issues        Physical Exam   Vital Signs:           Resp: 20        Weight: 186 lbs 15.2 oz    General " Appearance: Reclined in bed, calm and cooperative  Respiratory: clear to auscultation bilaterally with good inspiratory effort, no wheezes or crackles  Cardiovascular: irregular, systolic murmur present  GI: Benign, soft, nontender  Skin: Fragile skin, multiple bruises on extremities in various stages of healing  Right lower leg with edema, erythema  Neuro: Is alert oriented x 3 and is able to make his own decisions    Medical Decision Making       45 MINUTES SPENT BY ME on the date of service doing chart review, history, exam, documentation & further activities per the note.  MANAGEMENT DISCUSSED with the following over the past 24 hours: patient, bedside nurse,    NOTE(S)/MEDICAL RECORDS REVIEWED over the past 24 hours: nursing notes, consultant notes, med admin record  Tests ORDERED & REVIEWED in the past 24 hours:  - See lab/imaging results included in the data section of the note      Data         Imaging results reviewed over the past 24 hrs:   No results found for this or any previous visit (from the past 24 hours).

## 2025-02-27 NOTE — PROGRESS NOTES
REFERRAL RECEIVED, THANK YOU. WE ARE WORKING ON SENDING A NURSE TO MEET WITH PATIENT/ FAMILY. WITH ANY FURTHER QUESTIONS OR CONCERNS, PLEASE CALL [phone].

## 2025-02-27 NOTE — PLAN OF CARE
"Physical Therapy: Order received, chart reviewed, noted pt was receiving inpatient PT until comfort cares initiated 2/25/25. PT re-ordered 2/26/25 to see if pt able to use selnee stedy (cannot [facility] to [facility] unless can stand and transfer with walker or selene stedy).    Spoke with RN and pt, pt reports \"I thought I was going to die last night, I am ready to go.\" Pt declined trying any mobility, states \"I am too weak.\" Reports understanding that he can't return to [facility] unless he can transfer. Currently getting up with the lift and pt is ok with that. Recommend discharge to LTC/hospice as pt isn't strong enough to return to [facility], he is in agreement with this plan. We will sign off and complete the PT order, please re-order if plans change or new needs arise.    "

## 2025-02-27 NOTE — PLAN OF CARE
Goal Outcome Evaluation:    Pt is alert and oriented x4, on comfort cares. Refused his schedule tylenol. Tolerating pureed diet. Pain controlled with I.V dilaudid x 1.

## 2025-02-27 NOTE — CONSULTS
[facility]    Consult Note - [facility] In[facility]  _________________________________________________________________    [facility] 24[facility] Contact Number: [phone]    - Providers: Please contact [facility] with changes in orders or clinical plan of care   - Nursing: Please contact [facility] with significant changes in patient condition    Hospice will notify the care team (including the hospitalist) to confirm date of inpatient hospice (GIP) admission.    New Epic encounter will not be created until hospice completes admission.   ______________________________________________________________________     Summary of Visit (includes assessment, medications and any new orders):     I spoke with Mando today about hospice with [facility]. Pt is in alignment with hospice goals and is a great candidate for our service.     Plan: Have RN follow up tomorrow to see if he is eligible for GIP hospice.     Anne-Marie Dominguez MDiv  She/Her/Hers    [facility]   Mobile: [phone]  [email or web address]  [email or web address]

## 2025-02-28 PROCEDURE — 99232 SBSQ HOSP IP/OBS MODERATE 35: CPT | Performed by: INTERNAL MEDICINE

## 2025-02-28 PROCEDURE — 250N000013 HC RX MED GY IP 250 OP 250 PS 637: Performed by: INTERNAL MEDICINE

## 2025-02-28 PROCEDURE — 120N000001 HC R&B MED SURG/OB

## 2025-02-28 PROCEDURE — 250N000013 HC RX MED GY IP 250 OP 250 PS 637: Performed by: PHYSICIAN ASSISTANT

## 2025-02-28 RX ORDER — CITALOPRAM HYDROBROMIDE 10 MG/1
10 TABLET ORAL DAILY
Status: DISCONTINUED | OUTPATIENT
Start: 2025-02-28 | End: 2025-03-06 | Stop reason: HOSPADM

## 2025-02-28 RX ADMIN — CHOLESTYRAMINE 4 G: 4 POWDER, FOR SUSPENSION ORAL at 07:39

## 2025-02-28 RX ADMIN — OXYCODONE HYDROCHLORIDE 5 MG: 100 SOLUTION ORAL at 13:17

## 2025-02-28 RX ADMIN — OXYCODONE HYDROCHLORIDE 5 MG: 100 SOLUTION ORAL at 07:38

## 2025-02-28 RX ADMIN — CITALOPRAM HYDROBROMIDE 10 MG: 10 TABLET ORAL at 15:21

## 2025-02-28 RX ADMIN — Medication 1 SPRAY: at 21:27

## 2025-02-28 RX ADMIN — Medication 1 SPRAY: at 13:17

## 2025-02-28 RX ADMIN — OXYCODONE HYDROCHLORIDE 5 MG: 100 SOLUTION ORAL at 21:26

## 2025-02-28 RX ADMIN — Medication 1 SPRAY: at 15:21

## 2025-02-28 RX ADMIN — Medication 1 SPRAY: at 07:38

## 2025-02-28 ASSESSMENT — ACTIVITIES OF DAILY LIVING (ADL)
ADLS_ACUITY_SCORE: 65
ADLS_ACUITY_SCORE: 57
ADLS_ACUITY_SCORE: 65
ADLS_ACUITY_SCORE: 57
ADLS_ACUITY_SCORE: 65
ADLS_ACUITY_SCORE: 57
ADLS_ACUITY_SCORE: 65
ADLS_ACUITY_SCORE: 65
ADLS_ACUITY_SCORE: 57
ADLS_ACUITY_SCORE: 65
ADLS_ACUITY_SCORE: 57
ADLS_ACUITY_SCORE: 57
ADLS_ACUITY_SCORE: 65
ADLS_ACUITY_SCORE: 57
ADLS_ACUITY_SCORE: 57

## 2025-02-28 NOTE — PLAN OF CARE
Goal Outcome Evaluation:       Pt is A&Ox4, on comfort care. On scheduled Pain medication with good effect. Able to void using the urinal. Reposition self with minimal assistance. No significant event during the night.

## 2025-02-28 NOTE — PROGRESS NOTES
Care Management Follow Up    Length of Stay (days): 8    Expected Discharge Date: 02/28/2025     Concerns to be Addressed:       Patient plan of care discussed at interdisciplinary rounds: Yes    Anticipated Discharge Disposition: Long Term Care, Hospice              Anticipated Discharge Services:    Anticipated Discharge DME: None    Patient/family educated on Medicare website which has current facility and service quality ratings: yes  Education Provided on the Discharge Plan: Yes  Patient/Family in Agreement with the Plan: yes    Referrals Placed by CM/SW:    Private pay costs discussed: Not applicable    Discussed  Partnership in Safe Discharge Planning  document with patient/family: No     Handoff Completed: No, handoff not indicated or clinically appropriate    Additional Information:  Writer was informed by GIP nurse patient would not be a good candidate for GIP and would best be placed in a community LTC with hospice.     Writer followed up with the patient. Patient confirmed they were planning for Hospice/comfort care. Writer informed the patient of the current referrals sent out to hospice homes. Writer clarified for the patient what the difference between LTC with a hospice agency vs a Hospice home. Patient stated they did not feel safe to return back to the [facility] as they did not have round the clock nursing staff. Patient consented to either moving forward with LTC with a hospice Agency or a Hospice home, as long as they have round the clock nursing cares. Patient stated they would like to remain as close to [place]. Writer stated he will send more referrals to LTC facilities.     Writer sent out additional referrals.     Writer contacted Sharon at [facility]  ([phone]), but left a VM.     Next Steps: needing placement.     SABRINA Hernandez  [facility]  Social Work

## 2025-02-28 NOTE — PROGRESS NOTES
[facility]    Consult Note - [facility] Inpatient Hospice  _________________________________________________________________    [facility] 24/7 Contact Number: [phone]    - Providers: Please contact [facility] with changes in orders or clinical plan of care   - Nursing: Please contact [facility] with significant changes in patient condition    Hospice will notify the care team (including the hospitalist) to confirm date of inpatient hospice (GIP) admission.    New Epic encounter will not be created until hospice completes admission.   ______________________________________________________________________        Summary of Informational Hospice Visit/Assessment (includes assessment, medications and any new orders):   This writer met with patient this morning and afternoon to complete assessment and discuss GIP eligibility.    Patient is doing ok on his oral medications and was in good spirits during evaluation.  Given the patient's recent decline, he may continue to decline further over the weekend- needing more interventions and symptom management.  However, at this time patient is not eligible for [facility] level of Hospice care.    This writer updated , Attending Physician and patient's Son, Mauro, over the phone.    Plan: [facility] Hospice RNs to follow peripherally and re-assess on Monday morning for eligibility.      Please reach out to [facility]'s 24/7 call line at [phone] anytime if patient's condition declines further and we can re-evaluate.          Mita Olsen, MSN, RN

## 2025-02-28 NOTE — PROGRESS NOTES
[facility]  Medicine Progress Note - Hospitalist Service  Date of Admission:  2/20/2025    Assessment & Plan   84 year old male with a past medical history of type II von willebrand disorder, hypertension, CKD stage 3-4 and aortic stenosis among other chronic conditions who was admitted on 2/20/2025 for treatment of sepsis associated with right lower extremity cellulitis and gram negative bacteremia.    Met with patient and family care conference was held on 2/25/2025.  Patient tells me he has been getting weaker and is mostly lift dependent currently has not been able to walk for the last couple of weeks.  With his health issues and overall decline with his health he wants to be kept comfortable with comfort cares only.  He wants to continue course of antibiotics to treat the Pseudomonas bacteremia he refused to take the ciprofloxacin on 2/27/25    Comfort care orders initiated on 2/25/2025   his chronic medications discontinued    Oxycodone 5 mg every 6 hours scheduled.  [facility] consultation requested on 2/27/2025    Patient's son Mauro was called and updated and is agreeable with the [facility] hospice consultation on 2/27/2025  Patient is not a candidate for [facility] hospice [facility]  Patient is refusing to take Cipro so this was discontinued on 2/20/2025 patient's PTA Celexa restarted due to his comments that he just wants to die and doesn't want to live any longer          Severe sepsis  Bacteremia, pseudomonas aeruginosa  RLE cellulitis as source suspected  -blood cultures from 2/20 positive.   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   -TTE on 2/20: LVEF 55-60%, no wall motion abnormality, moderate to severe aortic stenosis  -cardiology consulted. No further cardiac workup at this time.  -Aspirin discontinued as patient is comfort cares only now    Atrial fibrillation vs atrial tachycardia  Irregular heart rate noted by bedside nurse and 12 lead EKG confirmed atrial fibrillation (new) and right bundle branch block (not new). Was not present when TTE was obtained on 2/20.   -continue telemetry  -no rate control needed at this time given pulse 58-68 with occasional rates of 100-110.   -High risk for therapeutic anticoagulation complications given multiple other active problems (chronic anemia, acute anemia after dental procedure, von willebrand disorder). Reassess rhythm patterns daily.   -cardiology consulted  Patient is comfort care only    Acute on chronic CKD stage 4  Anemia of chronic disease  -IV fluids with bicarbonate  -nephrology consulted  -avoid nephrotoxins  Patient is comfort care only    Type II von willebrand disorder  Chronic anemia, thrombocytopenia  Oral bleeding post dental procedure  -continue on aspirin 81 mg daily  -resume oral iron replacement at discharge  Comfort care only    H/o Schatzki ring   Presbyesophagus  S/p dilation in 2023  -SLP swallow eval. She recommended GI consult. Consult placed to [facility]. Suspect symptoms due to presbyesophagus. Recent NSTEMI is contraindication to elective endoscopy.   -2/24: abnormal esophagram with 1.2 cm stricture at distal esophagus. (See report for details)  -will need outpatient GI follow up in 4-6 weeks for EGD  On puréed diet per speech path    Depression, anxiety  Comfort care only    Gout  -holding PTA allopurinol with GFR <15  Comfort care only    Physical Deconditioning  Anticipate need for TCU at discharge. Previously living independently.  -PTA, OT, SW, CC consults  Patient is transition  "to comfort cares only.  Will discharge to care facility with comfort cares            Diet: Snacks/Supplements Adult: Ensure Enlive; Between Meals  Combination Diet Pureed Diet (level 4); Thin Liquids (level 0) (in chair ideally, single bites/sips, slow rate, alternate consistencies, 3-4 swallows per bite, smaller meals); Thin Liquids (level 0) (up to chair for PO, single bites/sips, slow rat...  Snacks/Supplements Adult: Expedite Bottle; With Meals    DVT Prophylaxis: Deferred as patient is comfort care only   Angulo Catheter: Not present  Lines: None     Cardiac Monitoring: None  Code Status: No CPR- Do NOT Intubate      Clinically Significant Risk Factors               # Hypoalbuminemia: Lowest albumin = 2.6 g/dL at 2/25/2025 12:46 PM, will monitor as appropriate                # Overweight: Estimated body mass index is 27.61 kg/m  as calculated from the following:    Height as of this encounter: 1.753 m (5' 9\").    Weight as of this encounter: 84.8 kg (186 lb 15.2 oz).   # Severe Malnutrition: based on nutrition assessment           Social Drivers of Health    Tobacco Use: Medium Risk (9/8/2023)    Patient History     Smoking Tobacco Use: Former     Smokeless Tobacco Use: Never    [facility]    Social Connections          Disposition Plan       Medically Ready for Discharge: Anticipated Tomorrow          Will discharge back to assisted living facility or to a LTC with hospice care         Mary Dorantes MD  Hospitalist Service  [facility]  Securely message with [employer] (more info)  Text page via [employer] Paging/Directory   ______________________________________________________________________    Interval History   Resting comfortably in bed.  Received Dilaudid overnight and did not like how he felt with it.  Dilaudid discontinued.  using intermittent suctioning to help with secretions.  No other " acute issues        Physical Exam   Vital Signs:           Resp: 15        Weight: 186 lbs 15.2 oz    General Appearance: Reclined in bed, calm and cooperative  Respiratory: clear to auscultation bilaterally with good inspiratory effort, no wheezes or crackles  Cardiovascular: irregular, systolic murmur present  GI: Benign, soft, nontender  Skin: Fragile skin, multiple bruises on extremities in various stages of healing  Right lower leg with edema, erythema  Neuro: Is alert oriented x 3 and is able to make his own decisions    Medical Decision Making       45 MINUTES SPENT BY ME on the date of service doing chart review, history, exam, documentation & further activities per the note.  MANAGEMENT DISCUSSED with the following over the past 24 hours: patient, bedside nurse,    NOTE(S)/MEDICAL RECORDS REVIEWED over the past 24 hours: nursing notes, consultant notes, med admin record  Tests ORDERED & REVIEWED in the past 24 hours:  - See lab/imaging results included in the data section of the note      Data         Imaging results reviewed over the past 24 hrs:   No results found for this or any previous visit (from the past 24 hours).

## 2025-02-28 NOTE — PROGRESS NOTES
Addendum          Date & Time: 0700-1930  sepsis associated with right lower extremity cellulitis and gram negative bacteremia.   Comfort Care patient      Behavior & Aggression: Green   Fall Risk: Yes   Orientation:A&Ox4, VSS on RA, CMS intact  ABNL Labs:  Pain Management: ativan and sched. oxy  Bowel/Bladder: incontinence with bowel not bladder, uses urinal.   IV/Drains: PIV   Skin/Wounds/incisions:  see flowsheet. Scattered bruising  Diet: pureed. Good intake with fluid and output. Patient loves ice water, any cola with ice, and chicken noodle soup  Activity Level:  Calls appropriate using call light if needed help. Refused to set on the chair or OOB. Intermitent reposition provided. Sometimes patient would refused  Tests/Procedures: N/A.  Anticipated  DC Date: TBD.  Significant Information:  No acute event during shift.  Discharge to home intermediate - with hospice

## 2025-02-28 NOTE — PROGRESS NOTES
Addendum         Date & Time: 0700-1930  sepsis associated with right lower extremity cellulitis and gram negative bacteremia.   Comfort Care patient      Behavior & Aggression: Green   Fall Risk: Yes   Orientation:A&Ox4, VSS on RA, CMS intact  ABNL Labs:  Pain Management: ativan and sched. oxy  Bowel/Bladder: incontinence with bowel not bladder, uses urinal.   IV/Drains: PIV   Skin/Wounds/incisions:  see flowsheet- dressing done   Diet: pureed  Activity Level:  reposition by himself. Calls appropriate using call light if needed help. Refused to set on the chair or OOB. Intermitent reposition provided. Sometimes patient would refused  Tests/Procedures: N/A.  Anticipated  DC Date: TBD.  Significant Information:  No acute event during shift.  Discharge to home retirement - maybe with hospice               Revision History

## 2025-03-01 PROCEDURE — 250N000011 HC RX IP 250 OP 636: Performed by: PHYSICIAN ASSISTANT

## 2025-03-01 PROCEDURE — 250N000013 HC RX MED GY IP 250 OP 250 PS 637: Performed by: INTERNAL MEDICINE

## 2025-03-01 PROCEDURE — 120N000001 HC R&B MED SURG/OB

## 2025-03-01 PROCEDURE — 99232 SBSQ HOSP IP/OBS MODERATE 35: CPT | Performed by: INTERNAL MEDICINE

## 2025-03-01 RX ADMIN — OXYCODONE HYDROCHLORIDE 5 MG: 100 SOLUTION ORAL at 13:25

## 2025-03-01 RX ADMIN — Medication 1 SPRAY: at 17:42

## 2025-03-01 RX ADMIN — OXYCODONE HYDROCHLORIDE 5 MG: 100 SOLUTION ORAL at 02:10

## 2025-03-01 RX ADMIN — CITALOPRAM HYDROBROMIDE 10 MG: 10 TABLET ORAL at 09:37

## 2025-03-01 RX ADMIN — Medication 1 SPRAY: at 19:25

## 2025-03-01 RX ADMIN — Medication 1 SPRAY: at 09:38

## 2025-03-01 RX ADMIN — OXYCODONE HYDROCHLORIDE 5 MG: 100 SOLUTION ORAL at 19:25

## 2025-03-01 RX ADMIN — OXYCODONE HYDROCHLORIDE 5 MG: 100 SOLUTION ORAL at 09:36

## 2025-03-01 RX ADMIN — Medication 1 SPRAY: at 11:59

## 2025-03-01 RX ADMIN — PROCHLORPERAZINE EDISYLATE 5 MG: 5 INJECTION INTRAMUSCULAR; INTRAVENOUS at 17:50

## 2025-03-01 ASSESSMENT — ACTIVITIES OF DAILY LIVING (ADL)
ADLS_ACUITY_SCORE: 64
ADLS_ACUITY_SCORE: 65
ADLS_ACUITY_SCORE: 64
ADLS_ACUITY_SCORE: 64
ADLS_ACUITY_SCORE: 65
ADLS_ACUITY_SCORE: 64
ADLS_ACUITY_SCORE: 65
ADLS_ACUITY_SCORE: 64

## 2025-03-01 NOTE — PLAN OF CARE
Goal Outcome Evaluation:  Pt is A&ox4 , VSS on RA , on pureed diet order chicken broth soup for lunch with thin liquid. Pt  is incontinent of B&B , on intermitted Suctioning ,  he repo and turned . All scheduled med was given . Pt has scattered wound and scabs , compression stock in place . Pt make his need know appropriately . Son was at bedside .

## 2025-03-01 NOTE — PLAN OF CARE
Goal Outcome Evaluation:         Date & Time: 3/01/25 [id number]  sepsis associated with right lower extremity cellulitis and gram negative bacteremia.   Pt on comfort care patient, A&Ox4, VSS on RA, CMS intact,  Pain managed by scheduled oxy, incontinence of bowel, uses urinal, PIV SL. Scattered bruising, pureed diet. Patient loves ice water and chicken noodle soup, Calls appropriate using call light if needed help. D/C to home senior living - with hospice

## 2025-03-01 NOTE — PROGRESS NOTES
[facility]  Medicine Progress Note - Hospitalist Service  Date of Admission:  2/20/2025    Assessment & Plan   84 year old male with a past medical history of type II von willebrand disorder, hypertension, CKD stage 3-4 and aortic stenosis among other chronic conditions who was admitted on 2/20/2025 for treatment of sepsis associated with right lower extremity cellulitis and gram negative bacteremia.    Met with patient and family care conference was held on 2/25/2025.  Patient tells me he has been getting weaker and is mostly lift dependent currently has not been able to walk for the last couple of weeks.  With his health issues and overall decline with his health he wants to be kept comfortable with comfort cares only.      Comfort care orders initiated on 2/25/2025   his chronic medications discontinued    Oxycodone 5 mg every 6 hours scheduled.  GIP consultation requested on 2/27/2025    Patient's son Mauro was called and updated and is agreeable with the [facility] hospice consultation on 2/27/2025  Patient is not a candidate [facility]  Patient is refusing to take Cipro so this was discontinued on 2/28/2025 patient's PTA Celexa restarted due to his comments that he just wants to die and doesn't want to live any longer  Patient is in better spirits since  3/1/2025  He needs discharge to a care facility with hospice as his assisted living facility is not able to take him back on hospice  C/o worsening SOB and needing frequent suctioning on 3/2/25 . With his CKD restarted PTA lasix at lowed ose 20mg PO daily     Severe sepsis  Bacteremia, pseudomonas aeruginosa  RLE cellulitis as source suspected  -blood cultures from 2/20 positive.   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   -TTE on 2/20: LVEF 55-60%, no wall motion abnormality, moderate to severe aortic stenosis  -cardiology consulted. No further cardiac workup at this time.  -Aspirin discontinued as patient is comfort cares only now    Atrial fibrillation vs atrial tachycardia  Irregular heart rate noted by bedside nurse and 12 lead EKG confirmed atrial fibrillation (new) and right bundle branch block (not new). Was not present when TTE was obtained on 2/20.   -continue telemetry  -no rate control needed at this time given pulse 58-68 with occasional rates of 100-110.   -High risk for therapeutic anticoagulation complications given multiple other active problems (chronic anemia, acute anemia after dental procedure, von willebrand disorder). Reassess rhythm patterns daily.   -cardiology consulted  Patient is comfort care only    Acute on chronic CKD stage 4  Anemia of chronic disease  -IV fluids with bicarbonate  -nephrology consulted  -avoid nephrotoxins  Patient is comfort care only    Type II von willebrand disorder  Chronic anemia, thrombocytopenia  Oral bleeding post dental procedure  -continue on aspirin 81 mg daily  -resume oral iron replacement at discharge  Comfort care only    H/o Schatzki ring   Presbyesophagus  S/p dilation in 2023  -SLP swallow eval. She recommended GI consult. Consult placed to [facility]. Suspect symptoms due to presbyesophagus. Recent NSTEMI is contraindication to elective endoscopy.   -2/24: abnormal esophagram with 1.2 cm stricture at distal esophagus. (See report for details)  -will need outpatient GI follow up in 4-6 weeks for EGD  On puréed diet per speech path    Depression, anxiety  Comfort care only    Gout  -holding PTA allopurinol with GFR  "<15  Comfort care only    Physical Deconditioning  Anticipate need for TCU at discharge. Previously living independently.  -PTA, OT, SW, CC consults  Patient is transition to comfort cares only.  Will discharge to care facility with comfort cares            Diet: Snacks/Supplements Adult: Ensure Enlive; Between Meals  Combination Diet Pureed Diet (level 4); Thin Liquids (level 0) (in chair ideally, single bites/sips, slow rate, alternate consistencies, 3-4 swallows per bite, smaller meals); Thin Liquids (level 0) (up to chair for PO, single bites/sips, slow rat...  Snacks/Supplements Adult: Expedite Bottle; With Meals    DVT Prophylaxis: Deferred as patient is comfort care only   Angulo Catheter: Not present  Lines: None     Cardiac Monitoring: None  Code Status: No CPR- Do NOT Intubate      Clinically Significant Risk Factors               # Hypoalbuminemia: Lowest albumin = 2.6 g/dL at 2/25/2025 12:46 PM, will monitor as appropriate                # Overweight: Estimated body mass index is 27.61 kg/m  as calculated from the following:    Height as of this encounter: 1.753 m (5' 9\").    Weight as of this encounter: 84.8 kg (186 lb 15.2 oz).   # Severe Malnutrition: based on nutrition assessment           Social Drivers of Health    Tobacco Use: Medium Risk (9/8/2023)    Patient History     Smoking Tobacco Use: Former     Smokeless Tobacco Use: Never    Received from [employer] [facility], [employer] [facility]    Social Connections          Disposition Plan       Medically Ready for Discharge: Ready Now            Needs to discharge  to a LTC with hospice care  Social work/CC consulted and are following          Mary Dorantes MD  Hospitalist Service  [facility]  Securely message with [employer] (more info)  Text page via [facility] Paging/Directory   ______________________________________________________________________    Interval History   Resting " comfortably in bed.  Complains of SOB and needs suction himself more frequently. Wheeay on exam. Restarted PTA lasix at lower dose 20mg Po daily.Otherwise feels well.  He is in better spirits now .  No other acute issues        Physical Exam   Vital Signs: Temp: 98.7  F (37.1  C) Temp src: Oral BP: 117/80 Pulse: 73   Resp: 16 SpO2: 94 % O2 Device: None (Room air)    Weight: 186 lbs 15.2 oz    General Appearance: Calm and cooperative resting in bed, not in acute distress  Respiratory:b/l Occasional wheezing heard on auscultation   Cardiovascular: irregular, systolic murmur present  GI: Benign, soft, nontender  Skin: Fragile skin, multiple bruises on extremities in various stages of healing  Right lower leg with edema, erythema  Neuro: Is alert oriented x 3 and is able to make his own decisions    Medical Decision Making       45 MINUTES SPENT BY ME on the date of service doing chart review, history, exam, documentation & further activities per the note.  MANAGEMENT DISCUSSED with the following over the past 24 hours: patient, bedside nurse,    NOTE(S)/MEDICAL RECORDS REVIEWED over the past 24 hours: nursing notes, consultant notes, med admin record  Tests ORDERED & REVIEWED in the past 24 hours:  - See lab/imaging results included in the data section of the note      Data         Imaging results reviewed over the past 24 hrs:   No results found for this or any previous visit (from the past 24 hours).

## 2025-03-02 PROCEDURE — 250N000013 HC RX MED GY IP 250 OP 250 PS 637: Performed by: INTERNAL MEDICINE

## 2025-03-02 PROCEDURE — 99232 SBSQ HOSP IP/OBS MODERATE 35: CPT | Performed by: INTERNAL MEDICINE

## 2025-03-02 PROCEDURE — 120N000001 HC R&B MED SURG/OB

## 2025-03-02 RX ORDER — LORAZEPAM 2 MG/ML
1 CONCENTRATE ORAL EVERY 4 HOURS PRN
Qty: 10 ML | Refills: 0 | Status: SHIPPED | OUTPATIENT
Start: 2025-03-02

## 2025-03-02 RX ORDER — OXYCODONE HCL 20 MG/ML
5 CONCENTRATE, ORAL ORAL EVERY 6 HOURS
Qty: 5 ML | Refills: 0 | Status: SHIPPED | OUTPATIENT
Start: 2025-03-02

## 2025-03-02 RX ORDER — OXYCODONE HCL 20 MG/ML
5 CONCENTRATE, ORAL ORAL
Qty: 5 ML | Refills: 0 | Status: SHIPPED | OUTPATIENT
Start: 2025-03-02

## 2025-03-02 RX ORDER — PROCHLORPERAZINE MALEATE 5 MG/1
5 TABLET ORAL EVERY 6 HOURS PRN
Qty: 20 TABLET | Refills: 0 | Status: SHIPPED | OUTPATIENT
Start: 2025-03-02

## 2025-03-02 RX ORDER — FUROSEMIDE 20 MG/1
20 TABLET ORAL DAILY
Status: DISCONTINUED | OUTPATIENT
Start: 2025-03-02 | End: 2025-03-05

## 2025-03-02 RX ORDER — ACETAMINOPHEN 325 MG/1
650 TABLET ORAL EVERY 4 HOURS PRN
Qty: 20 TABLET | Refills: 0 | Status: SHIPPED | OUTPATIENT
Start: 2025-03-02

## 2025-03-02 RX ORDER — FUROSEMIDE 20 MG/1
20 TABLET ORAL DAILY
Qty: 20 TABLET | Refills: 0 | Status: SHIPPED | OUTPATIENT
Start: 2025-03-02

## 2025-03-02 RX ORDER — CITALOPRAM HYDROBROMIDE 10 MG/1
10 TABLET ORAL DAILY
Qty: 30 TABLET | Refills: 0 | Status: SHIPPED | OUTPATIENT
Start: 2025-03-03

## 2025-03-02 RX ADMIN — OXYCODONE HYDROCHLORIDE 5 MG: 100 SOLUTION ORAL at 02:22

## 2025-03-02 RX ADMIN — OXYCODONE HYDROCHLORIDE 5 MG: 100 SOLUTION ORAL at 09:37

## 2025-03-02 RX ADMIN — CITALOPRAM HYDROBROMIDE 10 MG: 10 TABLET ORAL at 09:37

## 2025-03-02 RX ADMIN — Medication 1 SPRAY: at 09:38

## 2025-03-02 RX ADMIN — FUROSEMIDE 20 MG: 20 TABLET ORAL at 12:13

## 2025-03-02 RX ADMIN — LORAZEPAM 1 MG: 2 LIQUID ORAL at 16:43

## 2025-03-02 RX ADMIN — Medication 1 SPRAY: at 12:13

## 2025-03-02 RX ADMIN — OXYCODONE HYDROCHLORIDE 5 MG: 100 SOLUTION ORAL at 19:21

## 2025-03-02 RX ADMIN — Medication 1 SPRAY: at 19:21

## 2025-03-02 RX ADMIN — OXYCODONE HYDROCHLORIDE 5 MG: 100 SOLUTION ORAL at 14:37

## 2025-03-02 RX ADMIN — Medication 1 SPRAY: at 16:35

## 2025-03-02 ASSESSMENT — ACTIVITIES OF DAILY LIVING (ADL)
ADLS_ACUITY_SCORE: 64

## 2025-03-02 NOTE — PLAN OF CARE
Goal Outcome Evaluation:  Pt is AXOX4, VSS on RA , incontinent of bowel , bedside urinal was utilized on this shift, oxycodone  scheduled was given , pt used intermittent suctioning to prevent aspiration.PV SL and BLE compression stock in placed.

## 2025-03-02 NOTE — PLAN OF CARE
Goal Outcome Evaluation:         Date & Time: 3/02/25 0548-2008  sepsis associated with right lower extremity cellulitis and gram negative bacteremia.   Pt on comfort care patient, A&Ox4, VSS on RA, CMS intact,  Pain managed by scheduled oxy, incontinence of bowel, uses urinal, Yankauer tube in place for intermittent suctioning, PIV SL. Scattered bruising, pureed diet. Patient loves ice water and chicken noodle soup, Calls appropriate using call light if needed help. D/C to home [facility] - with hospice

## 2025-03-02 NOTE — PROGRESS NOTES
[facility]  Medicine Progress Note - Hospitalist Service  Date of Admission:  2/20/2025    Assessment & Plan   84 year old male with a past medical history of type II von willebrand disorder, hypertension, CKD stage 3-4 and aortic stenosis among other chronic conditions who was admitted on 2/20/2025 for treatment of sepsis associated with right lower extremity cellulitis and gram negative bacteremia.    Met with patient and family care conference was held on 2/25/2025.  Patient tells me he has been getting weaker and is mostly lift dependent currently has not been able to walk for the last couple of weeks.  With his health issues and overall decline with his health he wants to be kept comfortable with comfort cares only.      Comfort care orders initiated on 2/25/2025   his chronic medications discontinued    Oxycodone 5 mg every 6 hours scheduled.  GIP consultation requested on 2/27/2025    Patient's son Mauro was called and updated and is agreeable with the [facility] hospice consultation on 2/27/2025  Patient [facility] a [facility]  Patient is refusing to take Cipro so this was discontinued on 2/28/2025 patient's PTA Celexa restarted due to his comments that he just wants to die and doesn't want to live any longer  Patient is in better spirits since  3/1/2025  He needs discharge to a care facility with hospice as his assisted living facility is not able to take him back on hospice  C/o worsening SOB and needing frequent suctioning on 3/2/25 . With his CKD restarted PTA lasix at lowed ose 20mg PO daily     Severe sepsis  Bacteremia, pseudomonas aeruginosa  RLE cellulitis as source suspected  -blood cultures from 2/20 positive.   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   -TTE on 2/20: LVEF 55-60%, no wall motion abnormality, moderate to severe aortic stenosis  -cardiology consulted. No further cardiac workup at this time.  -Aspirin discontinued as patient is comfort cares only now    Atrial fibrillation vs atrial tachycardia  Irregular heart rate noted by bedside nurse and 12 lead EKG confirmed atrial fibrillation (new) and right bundle branch block (not new). Was not present when TTE was obtained on 2/20.   -continue telemetry  -no rate control needed at this time given pulse 58-68 with occasional rates of 100-110.   -High risk for therapeutic anticoagulation complications given multiple other active problems (chronic anemia, acute anemia after dental procedure, von willebrand disorder). Reassess rhythm patterns daily.   -cardiology consulted  Patient is comfort care only    Acute on chronic CKD stage 4  Anemia of chronic disease  -IV fluids with bicarbonate  -nephrology consulted  -avoid nephrotoxins  Patient is comfort care only    Type II von willebrand disorder  Chronic anemia, thrombocytopenia  Oral bleeding post dental procedure  -continue on aspirin 81 mg daily  -resume oral iron replacement at discharge  Comfort care only    H/o Schatzki ring   Presbyesophagus  S/p dilation in 2023  -SLP swallow eval. She recommended GI consult. Consult placed to [facility]. Suspect symptoms due to presbyesophagus. Recent NSTEMI is contraindication to elective endoscopy.   -2/24: abnormal esophagram with 1.2 cm stricture at distal esophagus. (See report for details)  -will need outpatient GI follow up in 4-6 weeks for EGD  On puréed diet per speech path    Depression, anxiety  Comfort care only    Gout  -holding PTA allopurinol with GFR  "<15  Comfort care only    Physical Deconditioning  Anticipate need for TCU at discharge. Previously living independently.  -PTA, OT, SW, CC consults  His [facility] is not able to take him back   Patient is transitioned to comfort cares only.  Will discharge to care facility with comfort cares            Diet: Snacks/Supplements Adult: Ensure Enlive; Between Meals  Combination Diet Pureed Diet (level 4); Thin Liquids (level 0) (in chair ideally, single bites/sips, slow rate, alternate consistencies, 3-4 swallows per bite, smaller meals); Thin Liquids (level 0) (up to chair for PO, single bites/sips, slow rat...  Snacks/Supplements Adult: Expedite Bottle; With Meals  Diet    DVT Prophylaxis: Deferred as patient is comfort care only   Angulo Catheter: Not present  Lines: None     Cardiac Monitoring: None  Code Status: No CPR- Do NOT Intubate      Clinically Significant Risk Factors               # Hypoalbuminemia: Lowest albumin = 2.6 g/dL at 2/25/2025 12:46 PM, will monitor as appropriate                # Overweight: Estimated body mass index is 27.61 kg/m  as calculated from the following:    Height as of this encounter: 1.753 m (5' 9\").    Weight as of this encounter: 84.8 kg (186 lb 15.2 oz).   # Severe Malnutrition: based on nutrition assessment           Social Drivers of Health    Tobacco Use: Medium Risk (9/8/2023)    Patient History     Smoking Tobacco Use: Former     Smokeless Tobacco Use: Never    [facility] from [facility], [email or web address] [facility]    Social Connections          Disposition Plan       Medically Ready for Discharge: Ready Now            Needs to discharge  to a LTC with hospice care  Social work/CC consulted and are following          Mary Dorantes MD  Hospitalist Service  [facility]  Securely message with [employer] (more info)  Text page via [employer] Paging/Directory "   ______________________________________________________________________    Interval History   Resting comfortably in bed.  Complains of SOB and needs suction himself more frequently. Wheezy on exam. Restarted PTA lasix at lower dose 20mg Po daily.Otherwise feels well.  He is in better spirits now .  No other acute issues        Physical Exam   Vital Signs: Temp: 97.6  F (36.4  C) Temp src: Oral BP: 111/80 Pulse: 69   Resp: 17 SpO2: 99 % O2 Device: None (Room air)    Weight: 186 lbs 15.2 oz    General Appearance: Calm and cooperative resting in bed, not in acute distress  Respiratory:b/l Occasional wheezing heard on auscultation   Cardiovascular: irregular, systolic murmur present  GI: Benign, soft, nontender  Skin: Fragile skin, multiple bruises on extremities in various stages of healing  Right lower leg with edema, erythema  Neuro: Is alert oriented x 3 and is able to make his own decisions    Medical Decision Making       45 MINUTES SPENT BY ME on the date of service doing chart review, history, exam, documentation & further activities per the note.  MANAGEMENT DISCUSSED with the following over the past 24 hours: patient, bedside nurse,    NOTE(S)/MEDICAL RECORDS REVIEWED over the past 24 hours: nursing notes, consultant notes, med admin record  Tests ORDERED & REVIEWED in the past 24 hours:  - See lab/imaging results included in the data section of the note      Data         Imaging results reviewed over the past 24 hrs:   No results found for this or any previous visit (from the past 24 hours).     Family

## 2025-03-03 PROCEDURE — 120N000001 HC R&B MED SURG/OB

## 2025-03-03 PROCEDURE — 99233 SBSQ HOSP IP/OBS HIGH 50: CPT | Performed by: INTERNAL MEDICINE

## 2025-03-03 PROCEDURE — 250N000013 HC RX MED GY IP 250 OP 250 PS 637: Performed by: INTERNAL MEDICINE

## 2025-03-03 RX ADMIN — OXYCODONE HYDROCHLORIDE 5 MG: 100 SOLUTION ORAL at 14:55

## 2025-03-03 RX ADMIN — Medication 1 SPRAY: at 11:01

## 2025-03-03 RX ADMIN — Medication 1 SPRAY: at 08:53

## 2025-03-03 RX ADMIN — Medication 1 SPRAY: at 20:43

## 2025-03-03 RX ADMIN — OXYCODONE HYDROCHLORIDE 5 MG: 100 SOLUTION ORAL at 20:33

## 2025-03-03 RX ADMIN — OXYCODONE HYDROCHLORIDE 5 MG: 100 SOLUTION ORAL at 08:55

## 2025-03-03 RX ADMIN — OXYCODONE HYDROCHLORIDE 5 MG: 100 SOLUTION ORAL at 04:02

## 2025-03-03 RX ADMIN — CITALOPRAM HYDROBROMIDE 10 MG: 10 TABLET ORAL at 08:53

## 2025-03-03 RX ADMIN — FUROSEMIDE 20 MG: 20 TABLET ORAL at 08:53

## 2025-03-03 RX ADMIN — Medication 1 SPRAY: at 16:17

## 2025-03-03 ASSESSMENT — ACTIVITIES OF DAILY LIVING (ADL)
ADLS_ACUITY_SCORE: 65
ADLS_ACUITY_SCORE: 65
ADLS_ACUITY_SCORE: 64
ADLS_ACUITY_SCORE: 65
ADLS_ACUITY_SCORE: 64

## 2025-03-03 NOTE — PROGRESS NOTES
[facility]  Medicine Progress Note - Hospitalist Service  Date of Admission:  2/20/2025    Updates today: Remains on comfort care. Will ask GIP hospice to re-evaluate. Otherwise, continue to seek out LTC bed with hospice enrollment. Transfer to station [id number] requested.     Assessment & Plan   Mando Freedman is a very pleasant 84 year old male with a past medical history of type II von willebrand disorder, hypertension, CKD stage 3-4 and aortic stenosis among other chronic conditions who was admitted on 2/20/2025 for treatment of sepsis associated with right lower extremity cellulitis and gram negative bacteremia. Over the course of the hospitalization, patient requested consideration for hospice approach to care. After discussion with patient and family, decision made to transition for comfort care status as of 2/25/25.    End of Life Care  -comfort care orders in place  -oxycodone 5 mg po q 6 hours  -Lorazepam concentrate every 4 hours as needed  -SW, CC consults  -intermediate is not able to take him back   -Anticipate discharge to care facility with comfort cares    Summary of Hospital course:  Severe sepsis  Bacteremia, pseudomonas aeruginosa  RLE cellulitis as source suspected  -blood cultures from 2/20 positive.   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  Atrial fibrillation vs atrial tachycardia  Acute on chronic CKD stage 4  Anemia of chronic disease  Type II von willebrand disorder  Chronic anemia, thrombocytopenia  Oral bleeding post dental procedure  H/o Schatzki ring   Presbyesophagus  Depression, anxiety  Gout  Physical Deconditioning        Diet: Snacks/Supplements Adult: Ensure Enlive; Between Meals  Combination Diet Pureed Diet (level 4); Thin Liquids (level 0) (in chair  "ideally, single bites/sips, slow rate, alternate consistencies, 3-4 swallows per bite, smaller meals); Thin Liquids (level 0) (up to chair for PO, single bites/sips, slow rat...  Snacks/Supplements Adult: Expedite Bottle; With Meals  Diet    DVT Prophylaxis: comfort care  Angulo Catheter: Not present  Lines: None     Cardiac Monitoring: None  Code Status: No CPR- Do NOT Intubate      Clinically Significant Risk Factors               # Hypoalbuminemia: Lowest albumin = 2.6 g/dL at 2/25/2025 12:46 PM, will monitor as appropriate                # Overweight: Estimated body mass index is 27.61 kg/m  as calculated from the following:    Height as of this encounter: 1.753 m (5' 9\").    Weight as of this encounter: 84.8 kg (186 lb 15.2 oz).   # Severe Malnutrition: based on nutrition assessment           Social Drivers of Health    Tobacco Use: Medium Risk (9/8/2023)    Patient History     Smoking Tobacco Use: Former     Smokeless Tobacco Use: Never    Received from [employer], [employer]    Social Connections          Disposition Plan     Medically Ready for Discharge:              Luma Coe MD  Hospitalist Service  [facility]  Securely message with [employer] (more info)  Text page via [employer] Paging/Directory   ______________________________________________________________________    Interval History   Patient known to me from admission and earlier in hospitalization. This is hospital day 11. Transitioned to comfort care status and he is much more comfortable with this plan than attempting restorative path.     No family in the room at the time of my visit.     Physical Exam   Vital Signs: Temp: 97.9  F (36.6  C) Temp src: Oral BP: (!) 143/93 Pulse: 59   Resp: 18 SpO2: 99 % O2 Device: None (Room air)    Weight: 186 lbs 15.2 oz    General Appearance: Chronically ill appearing, reclined in bed, comfortable  HEENT: left eye " partially open, mouth and tongue   Respiratory: non-labored, no audible upper airway sounds  Cardiovascular: did not examine  GI: non-tender  Skin: dry  Other: No tremors, no myoclonus, answers simple questions     Medical Decision Making       51 MINUTES SPENT BY ME on the date of service doing chart review, history, exam, documentation & further activities per the note.  MANAGEMENT DISCUSSED with the following over the past 24 hours: patient, bedside nurse, social work, charge nurse   NOTE(S)/MEDICAL RECORDS REVIEWED over the past 24 hours: prior hospitalist notes, GIP hospice note, nursing notes, med admin record       Data   N/a

## 2025-03-03 NOTE — PLAN OF CARE
Goal Outcome Evaluation:   Summary : 03/02/25. [id number].  On comfort care. Patient is alert and oriented X4. Up with assist of 2 with lift . Oral cares done and n  Yankauer tube in place for intermittent suctioning,  On   Pureed Diet with  good appetite.  PIV saline locked.Pain managed well with schedule oxycodone . Patient reports nausea and vomiting, prn lorazepam  was given with effective results and patient was able to tolerated meals.  Incontinent of B&B, brief wet and changed .  Turn and repo. Patient make all needs known and frequent safety rounds provided.  Fall and safety precautions in place and last safety rounds @2300 .

## 2025-03-03 NOTE — PROVIDER NOTIFICATION
"MD Notification    Notified Person: MD    Notified Person Name: Luma Coe     Notification Date/Time: now    Notification Interaction: [place] messaging     Purpose of Notification: Rn stated, \"good morning. looks like pt got his scheduled 2am oxy late at 4am. is it ok to give next scheduled dose now or do you want me to wait the full 6 hours? pt is awake and alert\"    Orders Received: go ahead and give now     Comments:   "

## 2025-03-03 NOTE — PLAN OF CARE
Goal Outcome Evaluation:    A&Ox4. VSS on RA. Pain managed with scheduled oxy. Primo suction in place, pt using independently. Ax2, lift. Not oob this shift. T&Rq2hrs. BLE wound cares provided. Discharge to LTC pending.

## 2025-03-03 NOTE — PROGRESS NOTES
CLINICAL NUTRITION SERVICES - BRIEF NOTE    Chart reviewed. Patient is comfort cares.     Pureed Diet + thin liquids. Will continue to provide po intakes and supplements as patient desires. No further nutrition intervention.     Chhaya Kinsey RD, LD  Clinical Dietitian - [facility]

## 2025-03-03 NOTE — PROGRESS NOTES
[facility]    Consult Note - [facility]  _________________________________________________________________    [facility] 24/7 Contact Number: [phone]    - Providers: Please contact [facility] with changes in orders or clinical plan of care   - Nursing: Please contact [facility] with significant changes in patient condition    Hospice will notify the care team (including the hospitalist) to confirm date of inpatient hospice (GIP) admission.    New Epic encounter will not be created until hospice completes admission.   ______________________________________________________________________      GIP hospice nurse will be coming by tomorrow morning to re-evaluate pt's eligibility for GIP hospice level of care. Hospice nurse to update team following this assessment.         Meaghan Valdez ([place]), RN

## 2025-03-03 NOTE — PROGRESS NOTES
Care Management Follow Up    Length of Stay (days): 11    Expected Discharge Date: 03/04/2025     Concerns to be Addressed:       Patient plan of care discussed at interdisciplinary rounds: Yes    Anticipated Discharge Disposition: Long Term Care, Hospice              Anticipated Discharge Services:    Anticipated Discharge DME: None    Patient/family educated on Medicare website which has current facility and service quality ratings: yes  Education Provided on the Discharge Plan: Yes  Patient/Family in Agreement with the Plan: yes    Referrals Placed by CM/SW:    Private pay costs discussed: Not applicable    Discussed  Partnership in Safe Discharge Planning  document with patient/family: No     Handoff Completed: No, handoff not indicated or clinically appropriate    Additional Information:  Per DOD,      Writer received a VM from [facility] stating they have a bed available. Writer contacted them back, however, writer received contradicting information stating they do not have a bed anymore.     Writer contacted [facility], however, they had a waiting list. Lastly, Writer contacted [facility] who stated they would be able to admit the patient on 03/05. Writer was informed [facility] would need a 1800 down payment, which would cover for about 3 days, afterwards, patient would need to pay 4200 weekly ($600) daily.     Writer contacted Rachael with [facility] who stated they believed patient could qualify for [facility]. However, they will see if there are any other facilities that would be able to take the patient.     Writer followed up with the patient. Patient did not like the location for [facility] due to the distance for the son. Patient inquired if they would be able to return back to [facility]. Writer clarified patient did not want to return. Patient requested writer to look into it.     Writer contacted Meaghan at [facility] ( [phone]). Meaghan stated as long as the patient remaind bedbound, they  believed they would be able to meet their needs.     Writer updated the patient and informed them of the Bed bound request. Patient stated they would like the writer to contact his son, Mauro ( [phone]).    Writer contacted Mauro and spoke extensively. Mauro stated they would like to know if patient would need a anuradha lift or not. Writer stated he would clarify.     Writer contacted Meaghan at [facility] who confirmed they would not need the anuradha lift as the patient was bed bound. Meaghan stated they would be able to take the patient back.     Writer contacted Rachael with [facility] and update on the plan. Rachael informed the writer they were shocked [facility] was able to meet the patient's need as they do not have the appropriate staff during the evenings. Writer stated he will follow up tomorrow to clarify with [facility] and the family.     Next Steps: follow up with potential discharge planning returning back to [facility].    SABRINA Hernandez  [facility]  Social Work

## 2025-03-03 NOTE — PLAN OF CARE
Goal Outcome Evaluation:       Date & Time: 3/03/25 0930-5221     Pt on comfort care patient, A&Ox4, VSS on RA, CMS intact, Pain managed by scheduled oxy, incontinence of bowel, uses urinal, Yankauer tube in place for intermittent suctioning, PIV SL. Scattered bruising, pureed diet. Patient loves ice water and chicken noodle soup, Calls appropriate using call light if needed help. D/C to home assisted - with hospice

## 2025-03-04 PROCEDURE — 250N000013 HC RX MED GY IP 250 OP 250 PS 637: Performed by: INTERNAL MEDICINE

## 2025-03-04 PROCEDURE — 99232 SBSQ HOSP IP/OBS MODERATE 35: CPT | Performed by: INTERNAL MEDICINE

## 2025-03-04 PROCEDURE — 250N000009 HC RX 250: Performed by: INTERNAL MEDICINE

## 2025-03-04 PROCEDURE — G0463 HOSPITAL OUTPT CLINIC VISIT: HCPCS

## 2025-03-04 PROCEDURE — 250N000013 HC RX MED GY IP 250 OP 250 PS 637: Performed by: PHYSICIAN ASSISTANT

## 2025-03-04 PROCEDURE — 120N000001 HC R&B MED SURG/OB

## 2025-03-04 RX ORDER — SCOPOLAMINE 1 MG/3D
1 PATCH, EXTENDED RELEASE TRANSDERMAL
Status: DISCONTINUED | OUTPATIENT
Start: 2025-03-04 | End: 2025-03-06 | Stop reason: HOSPADM

## 2025-03-04 RX ADMIN — OXYCODONE HYDROCHLORIDE 5 MG: 100 SOLUTION ORAL at 08:27

## 2025-03-04 RX ADMIN — Medication 1 SPRAY: at 11:54

## 2025-03-04 RX ADMIN — OXYCODONE HYDROCHLORIDE 5 MG: 100 SOLUTION ORAL at 21:10

## 2025-03-04 RX ADMIN — Medication 1 SPRAY: at 15:46

## 2025-03-04 RX ADMIN — OXYCODONE HYDROCHLORIDE 5 MG: 100 SOLUTION ORAL at 02:50

## 2025-03-04 RX ADMIN — CITALOPRAM HYDROBROMIDE 10 MG: 10 TABLET ORAL at 08:27

## 2025-03-04 RX ADMIN — SCOLOPAMINE TRANSDERMAL SYSTEM 1 PATCH: 1 PATCH, EXTENDED RELEASE TRANSDERMAL at 11:53

## 2025-03-04 RX ADMIN — FUROSEMIDE 20 MG: 20 TABLET ORAL at 08:27

## 2025-03-04 RX ADMIN — OXYCODONE HYDROCHLORIDE 5 MG: 100 SOLUTION ORAL at 13:46

## 2025-03-04 RX ADMIN — Medication 1 SPRAY: at 21:11

## 2025-03-04 ASSESSMENT — ACTIVITIES OF DAILY LIVING (ADL)
ADLS_ACUITY_SCORE: 61
ADLS_ACUITY_SCORE: 63
ADLS_ACUITY_SCORE: 63
ADLS_ACUITY_SCORE: 61
ADLS_ACUITY_SCORE: 63
ADLS_ACUITY_SCORE: 61
ADLS_ACUITY_SCORE: 63
ADLS_ACUITY_SCORE: 63
ADLS_ACUITY_SCORE: 61
ADLS_ACUITY_SCORE: 61
ADLS_ACUITY_SCORE: 62
ADLS_ACUITY_SCORE: 63
ADLS_ACUITY_SCORE: 61
ADLS_ACUITY_SCORE: 63
ADLS_ACUITY_SCORE: 61
ADLS_ACUITY_SCORE: 63
ADLS_ACUITY_SCORE: 62
ADLS_ACUITY_SCORE: 63
ADLS_ACUITY_SCORE: 61
ADLS_ACUITY_SCORE: 61

## 2025-03-04 NOTE — PLAN OF CARE
Orientation: Alert and oriented x4, forgetful at times   Aggression Stop Light: green   Activity: A2 with lift, T/R q2 hours   Diet/BS Checks: Pureed/Thins. Pt coughs at times with drinking   Tele:  NA  IV Access/Drains: R PIV SL   Pain Management: scheduled oxycodone   Abnormal VS/Results: deferred; comfort cares   Bowel/Bladder: Incontinent of BM, bladder scan 379 and abdominal discomfort, MD notified. Angulo placed   Skin/Wounds: wounds to R heel, coccyx, and lower back. Daily wound cares completed this shift. Blanchable redness to L heel; Mepi applied.   Consults: social work, WOC   D/C Disposition: pending hospice placement     Other Info:  - pt uses yaunker at bedside, thick secretions. Scopolamine patch ordered.   - Frequent productive cough

## 2025-03-04 NOTE — PLAN OF CARE
Goal Outcome Evaluation:       [id number]  Orientation: A&O x4   Aggression Stop Light: green   Activity: A2 with lift; T/R q2h   Diet/BS Checks: pureed, thin liquids.   Tele:  n/a   IV Access/Drains: R PIV SL   Pain Management: denies- patient receiving scheduled oxycodone.   Abnormal VS/Results: deferred- comfort care  Bowel/Bladder: continent of urine- utilizing bedside urinal. Incontinent of bowel per report- no BM this shift   Skin/Wounds: x2 blisters to back/buttock. Blanchable redness and peeling to sacrum- mepilex CDI. R heel wound- dressing CDI. BLE edema   Consults: CRISTIAN   D/C Disposition: pending placement   Other Info: Patient utilizing yaunker suction independently. Transfer from ortho spine this shift

## 2025-03-04 NOTE — PROGRESS NOTES
Pt A&O x4 and on comfort cares. Pt just turned and repoed, and urinal utilized. Pain medication given, IV flushed and biotien administered. Pt denies any pain. Handoff report given to nurse. Staff to do a bed trade and pt to transfer to 0802.

## 2025-03-04 NOTE — PROGRESS NOTES
([id number])    Pt A&O x4, comfort care. T&R. Uses bedside suction indep. Schedule Oxy for pain. Discharge pending.

## 2025-03-04 NOTE — PROGRESS NOTES
Pt was transferred to Station [address] to room 0802. Son Mauro called and  left informing him of transfer. Ryan Vneegas also called but call was not able to go through as number no longer in service.

## 2025-03-04 NOTE — PROVIDER NOTIFICATION
MD Notification    Notified Person: MD    Notified Person Name: Dr. DAYNE Coe    Notification Date/Time: 03/04/25 @ 10:30    Notification Interaction: Vocera    Purpose of Notification: pt is on comfort cares and complaining of lower abdominal pain. Pt has been frequently asking to urinate with no success. Pts bladder scan was 379mL. Can we place a lee for end of life comfort? Pt is also having a lot of thick secretions, hospice assessed pt and recommended trying scopolamine patch.    Orders Received: order for lee and scopolamine patch received      Comments: lee placed, good urine output

## 2025-03-04 NOTE — CONSULTS
"[facility]  WO Nurse Inpatient Assessment     Consulted for: Wound buttocks Wound sacrum     Summary: patient with multiple woc consults this admission, previously signed off due to transition to comfort focused cares, now with new concerns to buttocks, with Hospital Acquired Pressure Injury to Left Posterior iliac crest Stage 2, initial wo assessment 3/4    WO nurse follow-up plan: twice weekly    Patient History (according to provider note(s):      \"84 year old male with a past medical history of type II von willebrand disorder, hypertension, CKD stage 3-4 and aortic stenosis among other chronic conditions who was admitted on 2/20/2025 for treatment of sepsis associated with right lower extremity cellulitis and gram negative bacteremia. Over the course of the hospitalization, patient requested consideration for hospice approach to care. After discussion with patient and family, decision made to transition for comfort care status as of 2/25/25. \"    Assessment:      Areas visualized during today's visit: Focused: and Sacrum/coccyx    Pressure Injury Location: left posterior iliac crest     Last photo: 3/4  Wound type: Pressure Injury, with friction component      Pressure Injury Stage: 2, hospital acquired       Wound history/plan of care:   found with mepilex in use     Wound base:  Dermis,      Palpation of the wound bed: normal      Drainage: small     Description of drainage: serosanguinous     Measurements (length x width x depth, in cm) 1  x 2  x  0.1 cm      Tunneling N/A     Undermining N/A  Periwound skin: Intact      Color: normal and consistent with surrounding tissue      Temperature: normal   Odor: none  Pain: denies , none  Pain intervention prior to dressing change: patient tolerated well  Treatment goal: Heal  and Protection  STATUS: initial assessment  Supplies ordered: supplies stored on unit    My PI Risk Assessment     Sensory Perception: 3 - Slightly Limited     " "Moisture: 3 - Occasionally moist      Activity: 1 - Bedfast      Mobility: 2 - Very limited     Nutrition: 3 - Adequate     Friction/Shear: 1 - Problem     TOTAL: 13       Treatment Plan:     03/04/25 1129  Wound care  EVERY SHIFT        Comments: Location: buttock, posterior iliac crests  Care: provided qshift by primary RN  1. Cleanse wounds as needed when changing mepilex (comfort care focus), cleanse as needed with normal saline or wound cleanser with gauze. Pat dry  2. Cover open areas and use for protection Silicone Foam Bandages (sacral or 4x4\" mepilex). Ok to use each mepilex up to 5 days. Ok to lift for routine assessments and reapply  3. Focus on keeping patient dry, incontinence cares every 2 hours at least  4. Use lifting equipment per policy for boosts and lifts, do not slide patient on wet chucks          Orders: Reviewed, Written, and Updated    RECOMMEND PRIMARY TEAM ORDER: None, at this time  Education provided: plan of care, Moisture management, and Off-loading pressure  Discussed plan of care with: Patient and Nurse  Notify WOC if wound(s) deteriorate.  Nursing to notify the Provider(s) and re-consult the WOC Nurse if new skin concern.    DATA:     Current support surface: Standard  Standard gel mattress (Isoflex)  Containment of urine/stool: Incontinence Protocol, Incontinent pad in bed, and Indwelling catheter  BMI: Body mass index is 27.61 kg/m .   Active diet order: Orders Placed This Encounter      Combination Diet Pureed Diet (level 4); Thin Liquids (level 0) (in chair ideally, single bites/sips, slow rate, alternate consistencies, 3-4 swallows per bite, smaller meals); Thin Liquids (level 0) (up to chair for PO, single bites/sips, slow rat...      Diet     Output: I/O last 3 completed shifts:  In: -   Out: 1000 [Urine:1000]     Labs:   Recent Labs   Lab 02/25/25  1246   ALBUMIN 2.6*   HGB 9.2*   WBC 8.7     Pressure injury risk assessment:   Sensory Perception: 3-->slightly " limited  Moisture: 3-->occasionally moist  Activity: 1-->bedfast  Mobility: 2-->very limited  Nutrition: 1-->very poor  Friction and Shear: 2-->potential problem  Jono Score: 12    Lorena CWOCN   1st choice: Securely message with [facility] ([facility])   (2nd option: [facility] Office Phone [phone], messages checked periodically Mon-Fri 8a-4p)

## 2025-03-04 NOTE — PROGRESS NOTES
[facility]  Medicine Progress Note - Hospitalist Service  Date of Admission:  2/20/2025    Update today 3/4: Reassessed by [facility] hospice and did not meet criteria. Add scopolamine patch for oral secretion management. Lee placed for comfort. Work on placement with hospice enrollment.     Assessment & Plan   Mando Freedman is a very pleasant 84 year old male with a past medical history of type II von willebrand disorder, hypertension, CKD stage 3-4 and aortic stenosis among other chronic conditions who was admitted on 2/20/2025 for treatment of sepsis associated with right lower extremity cellulitis and gram negative bacteremia. Over the course of the hospitalization, patient requested consideration for hospice approach to care. After discussion with patient and family, decision made to transition for comfort care status as of 2/25/25.    End of Life Care  -comfort care orders in place  -oxycodone 5 mg po q 6 hours  -Lorazepam concentrate every 4 hours as needed  -using suction to clear mouth of mucous, saliva  -SW, CC consults  -Still determining best fit for placement at ([facility] vs LTC vs residential hospice)  -3/4: place lee. Add scopolamine patch for excess secretions.     Pressure Ulcer, Stage 2, Left Posterior Iliac Crest, Hospital Acquired  Right Heel Deep Tissue Pressure Injury (DTPI), present on admission   -noted on skin exam after transfer to station 88  -appreciate [facility] nurse cares    Summary of Hospital course:  Severe sepsis  Bacteremia, pseudomonas aeruginosa  RLE cellulitis as source suspected  -blood cultures from 2/20 positive.   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  Snacks/Supplements Adult: Expedite Bottle; With Meals  Diet  Snacks/Supplements Adult: Ensure Enlive; With Meals    DVT Prophylaxis: VTE Prophylaxis contraindicated due to comfort care  Angulo Catheter: Not present  Lines: None     Cardiac Monitoring: None  Code Status: No CPR- Do NOT Intubate      Clinically Significant Risk Factors               # Hypoalbuminemia: Lowest albumin = 2.6 g/dL at 2/25/2025 12:46 PM, will monitor as appropriate                # Overweight: Estimated body mass index is 27.61 kg/m  as calculated from the following:    Height as of this encounter: 1.753 m (5' 9\").    Weight as of this encounter: 84.8 kg (186 lb 15.2 oz).   # Severe Malnutrition: based on nutrition assessment and treatment provided per dietitian's recommendations.           Social Drivers of Health    Tobacco Use: Medium Risk (9/8/2023)    Patient History     Smoking Tobacco Use: Former     Smokeless Tobacco Use: Never    [facility]    Social Connections          Disposition Plan     Medically Ready for Discharge: Ready Now             Luma Coe MD  Hospitalist Service  [facility]  Securely message with [employer] (more info)  Text page via [facility] Paging/Directory   ______________________________________________________________________    Interval History   Sitting up having some soup broth. No complaints. Reviewed ongoing investigation for " hospice location.     He expressed appreciation for nursing cares on station 88.     Physical Exam   Vital Signs: Temp: 97.8  F (36.6  C) Temp src: Oral BP: (!) 137/92 Pulse: 73   Resp: 18 SpO2: 94 % O2 Device: None (Room air)    Weight: 186 lbs 15.2 oz    General Appearance:  Chronically ill appearing, reclined in bed, comfortable  HEENT: left eye partially open, mouth and tongue   Respiratory: non-labored, no audible upper airway sounds  Cardiovascular: did not examine  GI: non-tender  Skin: dry  Other:  No tremors, no myoclonus, answers simple questions        Medical Decision Making       40 MINUTES SPENT BY ME on the date of service doing chart review, history, exam, documentation & further activities per the note.  MANAGEMENT DISCUSSED with the following over the past 24 hours: patient, bedside nurse via vocera, charge nurse, social work, [facility] hospice nurse   NOTE(S)/MEDICAL RECORDS REVIEWED over the past 24 hours: nursing notes, pain flowsheet, med admin record  SUPPLEMENTAL HISTORY, in addition to the patient's history, over the past 24 hours obtained from:   - n/a       Data   N/a

## 2025-03-04 NOTE — PROGRESS NOTES
Care Management Follow Up    Length of Stay (days): 12    Expected Discharge Date: 03/05/2025     Concerns to be Addressed:       Patient plan of care discussed at interdisciplinary rounds: Yes    Anticipated Discharge Disposition: Long Term Care, Hospice     Anticipated Discharge Services:    Anticipated Discharge DME: None    Patient/family educated on Medicare website which has current facility and service quality ratings: yes  Education Provided on the Discharge Plan: Yes  Patient/Family in Agreement with the Plan: yes    Referrals Placed by CM/SW:    Private pay costs discussed: Not applicable    Discussed  Partnership in Safe Discharge Planning  document with patient/family: No     Handoff Completed: No, handoff not indicated or clinically appropriate    Additional Information:  Writer met with patient to discuss discharge planning. Patient is very tired, kept drifting in and out of sleep. Patient states that he may want to go back to his Assisted Living but is not really sure. Patient states his son is planning on coming by tomorrow and would like to meet at that time to discuss options. He states that he does not know what would be best for him at this time. Patient talked about going to a hospice facility but appears to be afraid of the change. SW will follow up tomorrow to discuss with patient and son    Next Steps: continue to follow     SABRINA Baptiste

## 2025-03-04 NOTE — PROGRESS NOTES
[facility]    Consult Note - [facility]  _________________________________________________________________    [facility] 24/7 Contact Number: [phone]    - Providers: Please contact [facility] with changes in orders or clinical plan of care   - Nursing: Please contact [facility] with significant changes in patient condition    Hospice will notify the care team (including the hospitalist) to confirm date of inpatient hospice (GIP) admission.    New Epic encounter will not be created until hospice completes admission.   ______________________________________________________________________      Hospice nurse visited pt today to re-evaluate pt regarding GIP eligibility at this time.      Patient remains comfortable on current medication regimen and continues to take medications PO. Pt was working on eating soup and drinking water during this visit. Pt reports doing well considering, denies new or worsening pain, but states wondering how long he has to live. Pt reports that he doesn't like what is on the tv. Hospice nurse helps pt to find station that pt is more interested in watching.     Expected for pt to continue to decline, but hospice nurse not able identify timeline.      Hospice nurse communicated with SW team in person and Attending provider Dr. Coe via Vocera and in person.      Please reach out to [facility]'s 24/7 call line at [phone] anytime if patient's condition declines further and we can re-evaluate.    Meaghan Valdez RN ([place])

## 2025-03-05 VITALS
RESPIRATION RATE: 8 BRPM | OXYGEN SATURATION: 94 % | WEIGHT: 186.95 LBS | SYSTOLIC BLOOD PRESSURE: 137 MMHG | DIASTOLIC BLOOD PRESSURE: 92 MMHG | TEMPERATURE: 97.8 F | HEIGHT: 69 IN | HEART RATE: 73 BPM | BODY MASS INDEX: 27.69 KG/M2

## 2025-03-05 PROCEDURE — 120N000001 HC R&B MED SURG/OB

## 2025-03-05 PROCEDURE — 250N000011 HC RX IP 250 OP 636: Performed by: INTERNAL MEDICINE

## 2025-03-05 PROCEDURE — 250N000013 HC RX MED GY IP 250 OP 250 PS 637: Performed by: INTERNAL MEDICINE

## 2025-03-05 PROCEDURE — 99232 SBSQ HOSP IP/OBS MODERATE 35: CPT | Performed by: INTERNAL MEDICINE

## 2025-03-05 RX ORDER — FUROSEMIDE 40 MG/1
40 TABLET ORAL DAILY
Status: DISCONTINUED | OUTPATIENT
Start: 2025-03-06 | End: 2025-03-06 | Stop reason: HOSPADM

## 2025-03-05 RX ORDER — OXYCODONE HCL 20 MG/ML
10 CONCENTRATE, ORAL ORAL EVERY 6 HOURS
Status: DISCONTINUED | OUTPATIENT
Start: 2025-03-05 | End: 2025-03-06 | Stop reason: HOSPADM

## 2025-03-05 RX ORDER — GLYCOPYRROLATE 0.2 MG/ML
0.2 INJECTION, SOLUTION INTRAMUSCULAR; INTRAVENOUS 3 TIMES DAILY
Status: DISCONTINUED | OUTPATIENT
Start: 2025-03-05 | End: 2025-03-06 | Stop reason: HOSPADM

## 2025-03-05 RX ORDER — FUROSEMIDE 10 MG/ML
40 INJECTION INTRAMUSCULAR; INTRAVENOUS ONCE
Status: COMPLETED | OUTPATIENT
Start: 2025-03-05 | End: 2025-03-05

## 2025-03-05 RX ORDER — LORAZEPAM 2 MG/ML
0.5 INJECTION INTRAMUSCULAR EVERY 6 HOURS
Status: DISCONTINUED | OUTPATIENT
Start: 2025-03-05 | End: 2025-03-06 | Stop reason: HOSPADM

## 2025-03-05 RX ADMIN — FUROSEMIDE 40 MG: 10 INJECTION, SOLUTION INTRAMUSCULAR; INTRAVENOUS at 13:59

## 2025-03-05 RX ADMIN — GLYCOPYRROLATE 0.2 MG: 0.2 INJECTION, SOLUTION INTRAMUSCULAR; INTRAVENOUS at 14:10

## 2025-03-05 RX ADMIN — OXYCODONE HYDROCHLORIDE 10 MG: 100 SOLUTION ORAL at 14:07

## 2025-03-05 RX ADMIN — FUROSEMIDE 20 MG: 20 TABLET ORAL at 08:45

## 2025-03-05 RX ADMIN — LORAZEPAM 0.5 MG: 2 INJECTION INTRAMUSCULAR; INTRAVENOUS at 16:59

## 2025-03-05 RX ADMIN — OXYCODONE HYDROCHLORIDE 5 MG: 100 SOLUTION ORAL at 02:58

## 2025-03-05 RX ADMIN — CITALOPRAM HYDROBROMIDE 10 MG: 10 TABLET ORAL at 08:45

## 2025-03-05 RX ADMIN — Medication 1 SPRAY: at 13:59

## 2025-03-05 RX ADMIN — Medication 1 SPRAY: at 20:10

## 2025-03-05 RX ADMIN — GLYCOPYRROLATE 0.2 MG: 0.2 INJECTION, SOLUTION INTRAMUSCULAR; INTRAVENOUS at 22:36

## 2025-03-05 RX ADMIN — OXYCODONE HYDROCHLORIDE 10 MG: 100 SOLUTION ORAL at 20:10

## 2025-03-05 RX ADMIN — Medication 1 SPRAY: at 08:45

## 2025-03-05 RX ADMIN — OXYCODONE HYDROCHLORIDE 5 MG: 100 SOLUTION ORAL at 08:45

## 2025-03-05 RX ADMIN — Medication 1 SPRAY: at 16:59

## 2025-03-05 RX ADMIN — LORAZEPAM 0.5 MG: 2 INJECTION INTRAMUSCULAR; INTRAVENOUS at 22:36

## 2025-03-05 RX ADMIN — OXYCODONE HYDROCHLORIDE 5 MG: 100 SOLUTION ORAL at 10:51

## 2025-03-05 ASSESSMENT — ACTIVITIES OF DAILY LIVING (ADL)
ADLS_ACUITY_SCORE: 58
ADLS_ACUITY_SCORE: 62
ADLS_ACUITY_SCORE: 58
ADLS_ACUITY_SCORE: 62
ADLS_ACUITY_SCORE: 58
ADLS_ACUITY_SCORE: 62
ADLS_ACUITY_SCORE: 58
ADLS_ACUITY_SCORE: 62
ADLS_ACUITY_SCORE: 58

## 2025-03-05 NOTE — PROGRESS NOTES
[facility]  Medicine Progress Note - Hospitalist Service  Date of Admission:  2/20/2025    Update today 3/4: Reassessed by [facility] hospice and did not meet criteria. Add scopolamine patch for oral secretion management. Lee placed for comfort. Work on placement with hospice enrollment.     Assessment & Plan   Mando Freedman is a very pleasant 84 year old male with a past medical history of type II von willebrand disorder, hypertension, CKD stage 3-4 and aortic stenosis among other chronic conditions who was admitted on 2/20/2025 for treatment of sepsis associated with right lower extremity cellulitis and gram negative bacteremia. Over the course of the hospitalization, patient requested consideration for hospice approach to care. After discussion with patient and family, decision made to transition for comfort care status as of 2/25/25.    End of Life Care  -comfort care orders in place  -oxycodone 5 mg po q 6 hours  -Lorazepam concentrate every 4 hours as needed  -using suction to clear mouth of mucous, saliva  -SW, CC consults  -Still determining best fit for placement at ([facility] vs LTC vs residential hospice)  -3/4: place lee. Added scopolamine patch for excess secretions.     3/5; patient looks clinically declined since last saw him.  Son at bedside, updated  patient is still having increased secretions and having some shortness of breath because of secretions.  Increased oxycodone to 10 mg every 6 hours.  Added Robinul 0.2 mg IV IV 3 times daily.  Added Ativan 0.5 mg IV every 6 hours to help with secretions and shortness of breath    Monitor patient's condition closely.  If she continues to decline will request [facility]  consultation on 3/6/2025 to see if he will qualify for [facility]    Pressure Ulcer, Stage 2, Left Posterior Iliac Crest, Hospital Acquired  Right Heel Deep Tissue Pressure Injury (DTPI), present on admission   -noted on skin exam after transfer to station [id number]  -appreciate [facility] nurse  "cares    Summary of Hospital course:  Severe sepsis  Bacteremia, pseudomonas aeruginosa  RLE cellulitis as source suspected  -blood cultures from 2/20 positive.   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  Snacks/Supplements Adult: Expedite Bottle; With Meals  Diet  Snacks/Supplements Adult: Ensure Enlive; With Meals    DVT Prophylaxis: VTE Prophylaxis contraindicated due to comfort care  Angulo Catheter: PRESENT, indication: Acute retention or obstruction, End of life  Lines: None     Cardiac Monitoring: None  Code Status: No CPR- Do NOT Intubate      Clinically Significant Risk Factors               # Hypoalbuminemia: Lowest albumin = 2.6 g/dL at 2/25/2025 12:46 PM, will monitor as appropriate                # Overweight: Estimated body mass index is 27.61 kg/m  as calculated from the following:    Height as of this encounter: 1.753 m (5' 9\").    Weight as of this encounter: 84.8 kg (186 lb 15.2 oz).   # Severe Malnutrition: based on nutrition assessment and treatment provided per dietitian's recommendations.           Social Drivers of Health    Tobacco Use: Medium Risk (9/8/2023)    Patient History     Smoking Tobacco Use: Former     Smokeless Tobacco " Use: Never    Received [facility], [facility]    Social Connections          Disposition Plan       Medically Ready for Discharge: Anticipated Tomorrow      If secretions are well-controlled and respiratory status is stable if patient clinically declines in the next 24 to 48 hours we will consider [facility] hospice ReConsultation             Mary Dorantes MD  Hospitalist Service  [facility]  Securely message with [employer] (more info)  Text page via [employer] Paging/Directory   ______________________________________________________________________    Interval History   Patient is resting in bed.  Having increased secretions and some shortness of breath from the secretions.  Son at bedside updated.  Increased oxycodone dosage to help with secretions on Robinul added    Physical Exam   Vital Signs:           Resp: 18        Weight: 186 lbs 15.2 oz    General Appearance:  Chronically ill appearing, reclined in bed, comfortable  HEENT: Eyes appear with a glazed vision  Respiratory: non-labored, no audible upper airway sounds  Cardiovascular: did not examine  GI: non-tender  Skin: dry  Other:  No tremors, no myoclonus, answers simple questions        Medical Decision Making       40 MINUTES SPENT BY ME on the date of service doing chart review, history, exam, documentation & further activities per the note.  MANAGEMENT DISCUSSED with the following over the past 24 hours: patient, bedside nurse via [employer], charge nurse, social work, [facility] hospice nurse   NOTE(S)/MEDICAL RECORDS REVIEWED over the past 24 hours: nursing notes, pain flowsheet, med admin record  SUPPLEMENTAL HISTORY, in addition to the patient's history, over the past 24 hours obtained from:   - n/a       Data   N/a

## 2025-03-05 NOTE — PROGRESS NOTES
Care Management Follow Up    Length of Stay (days): 13    Expected Discharge Date: 03/06/2025     Concerns to be Addressed:       Patient plan of care discussed at interdisciplinary rounds: Yes    Anticipated Discharge Disposition: Long Term Care, Hospice              Anticipated Discharge Services:    Anticipated Discharge DME: None    Patient/family educated on Medicare website which has current facility and service quality ratings: yes  Education Provided on the Discharge Plan: Yes  Patient/Family in Agreement with the Plan: yes    Referrals Placed by CM/SW:    Private pay costs discussed: Not applicable    Discussed  Partnership in Safe Discharge Planning  document with patient/family: No     Handoff Completed: No, handoff not indicated or clinically appropriate    Additional Information:  CRISTIAN spoke with bedside RN who will update SW once son has arrived.    SW informed [facility] re consulted.    CRISTIAN called Meaghan at [facility] ( [phone]) and provided update on possible [facility] hospice. Meaghan requested an update pending outcome of GIP assessment.     Next Steps: [facility] consult    SABRINA Soriano    [facility]

## 2025-03-05 NOTE — PLAN OF CARE
Orientation: A/Ox4, forgetful at times   Aggression Stop Light: Green   Activity: A2 with lift, T/R q2 hours   Diet/BS Checks: Pureed/Thins. Pt coughs at times with drinking liquids  Tele:  NA  IV Access/Drains: R PIV SL   Pain Management: scheduled oxycodone   Abnormal VS/Results: deferred; comfort cares   Bowel/Bladder: Incontinent of BM, no BM this shift. Angulo in place.   Skin/Wounds: Wounds to R heel, coccyx, and lower back. Daily wound cares completed this shift. Blanchable redness to L heel; Mepi applied.   Consults: social work, WOC   D/C Disposition: pending hospice placement     Other Info:  - pt uses yaunker at bedside, thick secretions. Scopolamine patch behind L ear  - Frequent productive cough

## 2025-03-05 NOTE — PLAN OF CARE
3/4/25 -- 2000-0700    Orientation: A&O x4, forgetful at times   Aggression Stop Light: Green   Activity: A2 w/ lift, q2h T/R  Diet/BS Checks: Pureed & thin liquids. Pt coughs at times with drinking liquids  IV Access/Drains: R PIV SL   Pain Management: scheduled oxycodone   Abnormal VS/Results: deferred d/t comfort cares   Bowel/Bladder: incontinent of bowel, lee in place  Skin/Wounds: Coccyx and R heel wound, Blanchable redness to L heel   Consults: SW, WOC  D/C Disposition: pending hospice placement     Other Info:  - Suction and oral cares provided q2h and prn  - Scopolamine patch behind L ear  - Frequent productive cough

## 2025-03-06 PROCEDURE — 99207 PR APP CREDIT; MD BILLING SHARED VISIT: CPT | Performed by: NURSE PRACTITIONER

## 2025-03-06 PROCEDURE — 250N000011 HC RX IP 250 OP 636: Performed by: INTERNAL MEDICINE

## 2025-03-06 PROCEDURE — 99238 HOSP IP/OBS DSCHRG MGMT 30/<: CPT | Performed by: INTERNAL MEDICINE

## 2025-03-06 PROCEDURE — 250N000013 HC RX MED GY IP 250 OP 250 PS 637: Performed by: INTERNAL MEDICINE

## 2025-03-06 RX ORDER — LORAZEPAM 2 MG/ML
1 INJECTION INTRAMUSCULAR EVERY 4 HOURS PRN
Status: DISCONTINUED | OUTPATIENT
Start: 2025-03-06 | End: 2025-03-06 | Stop reason: HOSPADM

## 2025-03-06 RX ADMIN — LORAZEPAM 1 MG: 2 INJECTION INTRAMUSCULAR; INTRAVENOUS at 08:19

## 2025-03-06 RX ADMIN — Medication 1 SPRAY: at 08:19

## 2025-03-06 RX ADMIN — GLYCOPYRROLATE 0.2 MG: 0.2 INJECTION, SOLUTION INTRAMUSCULAR; INTRAVENOUS at 08:20

## 2025-03-06 RX ADMIN — OXYCODONE HYDROCHLORIDE 10 MG: 100 SOLUTION ORAL at 01:50

## 2025-03-06 RX ADMIN — LORAZEPAM 0.5 MG: 2 INJECTION INTRAMUSCULAR; INTRAVENOUS at 06:45

## 2025-03-06 ASSESSMENT — ACTIVITIES OF DAILY LIVING (ADL)
ADLS_ACUITY_SCORE: 62

## 2025-03-06 NOTE — PROGRESS NOTES
House Officer Death Pronouncement    Called by nursing staff to pronounce Mando Freedman dead.    Physical Exam: Spontaneous respirations absent, Breath sounds absent, Carotid pulse absent, Heart sounds absent, and Pupillary light reflex absent    Patient was pronounced dead at 0820am: , 2025.    No data filed       Active Problems:    Cellulitis of right leg    Acute kidney injury    Severe sepsis (H)  gram negative bacteremia    Pressure Ulcer, Stage 2, Left Posterior Iliac Crest, Hospital Acquired  Right Heel Deep Tissue Pressure Injury (DTPI), present on admission   Type 2 NSTEMI   AAA  afib  type II von willebrand disorder   Hypertension  JUAN R on CKD stage 3-4   aortic stenosis     Infectious disease present?: YES    Communicable disease present? (examples: HIV, chicken pox, TB, Ebola, CJD) :  NO    Multi-drug resistant organism present? (example: MRSA): NO    Please consider an autopsy if any of the following exist:  NO Unexpected or unexplained death during or following any dental, medical, or surgical diagnostic treatment procedures.   NO Death of mother at or up to seven days after delivery.     NO All  and pediatric deaths.     NO Death where the cause is sufficiently obscure to delay completion of the death certificate.   NO Deaths in which autopsy would confirm a suspected illness/condition that would affect surviving family members or recipients of transplanted organs.     The following deaths must be reported to the 's Office:  NO A death that may be due entirely or in part to any factors other than natural disease (recent surgery, recent trauma, suspected abuse/neglect).   NO A death that may be an accident, suicide, or homicide.     NO Any sudden, unexpected death in which there is no prior history of significant heart disease or any other condition associated with sudden death.   NO A death under suspicious, unusual, or unexpected circumstances.    NO Any death which is  apparently due to natural causes but in which the  does not have a personal physician familiar with the patient s medical history, social, or environmental situation or the circumstances of the terminal event.   NO Any death apparently due to Sudden Infant Death Syndrome.     NO Deaths that occur during, in association with, or as consequences of a diagnostic, therapeutic, or anesthetic procedure.   NO Any death in which a fracture of a major bone has occurred within the past (6) six months.   NO A death of persons note seen by their physician within 120 days of demise.     NO Any death in which the  was an inmate of a public institution or was in the custody of Law Enforcement personnel.   NO  All unexpected deaths of children   NO Solid organ donors   NO Unidentified bodies   YES Deaths of persons whose bodies are to be cremated or otherwise disposed of so that the bodies will later be unavailable for examination;   NO Deaths unattended by a physician outside of a licensed healthcare facility or licensed residential hospice program   NO Deaths occurring within 24 hours of arrival to a health care facility if death is unexpected.    NO Deaths associated with the decedent s employment.   NO Deaths attributed to acts of terrorism.   NO Any death in which there is uncertainty as to whether it is a medical examiner s care should be discussed with the medical investigator.      Death Certificate to be directed to Dr. Mary Dorantes  Attending physician, Dr. Mary Dorantes, notified of death.    Body disposition: Autopsy was discussed with family member:  Son by phone.  Permission for autopsy was declined.  [facility]    NICKO Guzman [facility]  Hospitalist Service  [facility]  Securely message with [employer] (more info)  Text page via [employer] Paging/Directory

## 2025-03-06 NOTE — PLAN OF CARE
Patient time of death was 0820. Family notified. Belongings sent picked up by son, Mauro. No Autopsy requested. Death record completed. Patient sent to [facility] at 1050..

## 2025-03-06 NOTE — PLAN OF CARE
Orientation: Pt with decline from yesterday. Will respond to some questions. Pt with some hallucinations and grabbing at things in the air.   Aggression Stop Light: Green   Activity: A2 w/ lift, q2h T/R  Diet/BS Checks: Pureed & thin liquids. Minimal intake throughout the day. A few sips of liquids each time you go in room.   IV Access/Drains: R PIV SL   Pain Management: scheduled oxycodone; dose increased. X1 PRN oxycodone dose given. Started on scheduled ativan & robinul.  Abnormal VS/Results: deferred d/t comfort cares   Bowel/Bladder: incontinent of bowel; no BM this shift. Angulo in place w/ low UOP, 40 mg IV lasix given this afternoon.   Skin/Wounds: Coccyx and R heel wound, Blanchable redness to L heel. Wounds cares completed per orders.  Consults: CRISTIAN, WOWYATT  D/C Disposition: likely consult [facility] tomorrow 3/6     Other Info:  - Suction and oral cares provided q2h and prn  - Scopolamine patch behind L ear  - Frequent productive cough

## 2025-03-06 NOTE — DISCHARGE SUMMARY
[facility]    Death Summary - Hospitalist Service     Date of Admission:  2/20/2025  Date of Death: 3/6/2025  Discharging Provider: Mary Dorantes MD    Discharge Diagnoses   Right lower extremity cellulitis  Severe sepsis secondary to right lower extremity cellulitis  Pseudomonas bacteremia  Lactic acidosis  JUAN R on CKD stage IV  Chronic anemia  Thrombocytopenia  Type II von Willebrand disease  Anion gap metabolic acidosis  Hypotension  Hyperlipidemia  Oropharyngeal dysphagia  Arctic stenosis   chronic lower extremity edema  Pressure ulcer stage II left posterior iliac crest, hospital-acquired  Right heel deep tissue pressure injury present on admission  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 Over the course of the hospitalization, patient requested consideration for hospice approach to care. After discussion with patient and family, decision made to transition for comfort care status as of 2/25/25.     End of Life Care  -comfort care orders in place  -oxycodone 5 mg po q 6 hours  -Lorazepam concentrate every 4 hours as needed  -using suction to clear mouth of mucous, saliva  -SW, CC consults  -Still determining best fit for placement at ([facility] vs LTC vs residential hospice)  -3/4: place lee. Added scopolamine patch for excess secretions.      3/5; patient looks clinically declined since last saw him.  Son at bedside, updated  patient is still having  increased secretions and having some shortness of breath because of secretions.  Increased oxycodone to 10 mg every 6 hours.  Added Robinul 0.2 mg IV IV 3 times daily.  Added Ativan 0.5 mg IV every 6 hours to help with secretions and shortness of breath  [facility]IP hospice consultation requested and felt that he is not a candidate for [facility] they are reassessed him on 3/4/2025  Patient passed away comfortably on 3/6/2025 morning  Son Mauro Freedman was called and updated by the hospitalist Dr. Dorantes on 3/6/2025  Spiritual and emotional support was given to the family       Pressure Ulcer, Stage 2, Left Posterior Iliac Crest, Hospital Acquired  Right Heel Deep Tissue Pressure Injury (DTPI), present on admission   -noted on skin exam after transfer to station [id number]  -appreciate [facility] nurse cares     Summary of Hospital course:  Severe sepsis  Bacteremia, pseudomonas aeruginosa  RLE cellulitis as source suspected  -blood cultures from 2/20 positive.   -Broad spectrum IV antibiotic coverage, then oral antibiotic given  -no longer wants antibiotic treatment      Other medical conditions treated during hospital course:  Type 2 NSTEMI  Aortic stenosis, moderate to severe  AAA 4.0 cm  Aortic root aneurysm 4.5 cm  Atrial fibrillation vs atrial tachycardia  Acute on chronic CKD stage 4  Anemia of chronic disease  Type II von willebrand disorder  Chronic anemia, thrombocytopenia  Oral bleeding post dental procedure  H/o Schatzki ring   Presbyesophagus  Depression, anxiety  Gout  Physical Deconditioning           Diet: Combination Diet Pureed Diet (level 4); Thin Liquids (level 0) (in chair ideally, single bites/sips, slow rate, alternate consistencies, 3-4 swallows per bite, smaller meals); Thin Liquids (level 0) (up to chair for PO, single bites/sips, slow rat...  Snacks/Supplements Adult: Expedite Bottle; With Meals  Diet  Snacks/Supplements Adult: Ensure Enlive; With Meals    DVT Prophylaxis: VTE Prophylaxis contraindicated due to comfort  "care  Angulo Catheter: PRESENT, indication: Acute retention or obstruction, End of life  Lines: None     Cardiac Monitoring: None  Code Status: No CPR- Do NOT Intubate          Clinically Significant Risk Factors               # Hypoalbuminemia: Lowest albumin = 2.6 g/dL at 2/25/2025 12:46 PM, will monitor as appropriate                 # Overweight: Estimated body mass index is 27.61 kg/m  as calculated from the following:    Height as of this encounter: 1.753 m (5' 9\").    Weight as of this encounter: 84.8 kg (186 lb 15.2 oz).   # Severe Malnutrition: based on nutrition assessment and treatment provided per dietitian's recommendations.                  Social Drivers of Health       Tobacco Use: Medium Risk (9/8/2023)     Patient History      Smoking Tobacco Use: Former      Smokeless Tobacco Use: Never     [facility] [employer] [facility]     Social Connections               Disposition Plan            Mary Dorantes MD  [facility]  ______________________________________________________________________      Significant Results and Procedures   Most Recent 3 CBC's:  Recent Labs   Lab Test 02/25/25  1246 02/23/25  0527 02/22/25  0613   WBC 8.7 8.4 10.6   HGB 9.2* 9.1* 9.8*   MCV 99 97 99   PLT 97* 80* 91*     Most Recent 3 BMP's:  Recent Labs   Lab Test 02/25/25  1246 02/24/25  0931 02/23/25  0527    146* 142   POTASSIUM 3.7 4.3 3.6   CHLORIDE 106 109* 111*   CO2 23 23 18*   BUN 60.3* 66.2* 69.4*   CR 3.01* 3.41* 3.83*   ANIONGAP 13 14 13   KATT 7.6* 7.8* 7.6*   GLC 83 84 83     Most Recent 2 LFT's:  Recent Labs   Lab Test 02/21/25  0509 02/20/25  0753   AST 88* 25   ALT 22 11   ALKPHOS 62 90   BILITOTAL 0.3 0.3     Most Recent 3 INR's:  Recent Labs   Lab Test 08/19/22  1517 08/01/22  1947   INR 1.13 1.13     Most Recent INR's and Anticoagulation Dosing History:  Anticoagulation Dose History          Latest Ref Rng " & Units 8/1/2022 8/19/2022   Recent Dosing and Labs   INR 0.85 - 1.15 1.13  1.13      Most Recent 3 Creatinines:  Recent Labs   Lab Test 02/25/25  1246 02/24/25  0931 02/23/25  0527   CR 3.01* 3.41* 3.83*     Most Recent 3 Hemoglobins:  Recent Labs   Lab Test 02/25/25  1246 02/23/25  0527 02/22/25  0613   HGB 9.2* 9.1* 9.8*     Most Recent 3 Troponin's:No lab results found.  Most Recent 3 BNP's:  Recent Labs   Lab Test 08/19/22  1513 08/01/22  1256   NTBNPI 942 822     Most Recent D-dimer:  Recent Labs   Lab Test 08/25/22  0743   DD 1.10*     Most Recent Cholesterol Panel:No lab results found.  7-Day Micro Results       No results found for the last 168 hours.          Most Recent TSH and T4:  Recent Labs   Lab Test 09/07/23  1150 05/12/22  1330   TSH 2.88 1.90   T4  --  0.85     Most Recent Hemoglobin A1c:  Recent Labs   Lab Test 02/13/25  1208   A1C 5.2     Most Recent 6 glucoses:  Recent Labs   Lab Test 02/25/25  1246 02/24/25  0931 02/23/25  0527 02/22/25  0613 02/21/25  1843 02/21/25  1445   GLC 83 84 83 86 122* 83     Most Recent Urinalysis:  Recent Labs   Lab Test 02/20/25  1854   COLOR Light Yellow   APPEARANCE Clear   URINEGLC Negative   URINEBILI Negative   URINEKETONE Negative   SG 1.011   UBLD Large*   URINEPH 5.0   PROTEIN 20*   NITRITE Negative   LEUKEST Trace*   RBCU 39*   WBCU 8*     Most Recent ABG:No lab results found.  Most Recent ESR & CRP:No lab results found.  Most Recent Anemia Panel:  Recent Labs   Lab Test 02/25/25  1246 02/21/25  0509 02/20/25  1132 02/20/25  0622 02/13/25  1208 09/21/23  0804 09/14/23  0938 08/23/22  1707 08/23/22  0902   WBC 8.7   < >  --    < > 7.4   < > 5.0   < > 4.1   HGB 9.2*   < >  --    < > 11.0*   < > 11.6*   < > 8.4*  8.4*   HCT 27.0*   < >  --    < > 33.5*   < > 36.0*   < > 27.9*   MCV 99   < >  --    < > 102*   < > 101*   < > 102*   PLT 97*   < >  --    < > 123*   < > 113*   < > 135*   IRON  --   --  13*  --   --    < >  --    < > 37   IRONSAT  --   --  8*  --    --    < >  --    < >  --    RETICABSCT  --   --   --   --   --   --   --   --  0.254*   RETP  --   --   --   --   --   --   --   --  9.3*   FEB  --   --  161*  --   --    < >  --    < >  --    DAVIDE  --   --  132  --   --    < >  --    < > 56   B12  --   --   --   --  2,026*   < > 1,114  --  360   FOLIC  --   --   --   --   --   --  19.5  --   --     < > = values in this interval not displayed.     Most Recent CPK:  Recent Labs   Lab Test 08/01/22  1256 06/09/22  1230    169   ,   Results for orders placed or performed during the hospital encounter of 02/20/25   US Lower Extremity Venous Duplex Right    Narrative    EXAM: US LOWER EXTREMITY VENOUS DUPLEX RIGHT  LOCATION: [facility]  DATE: 02/20/2025    INDICATION: Swelling and pain, right lower extremity.  COMPARISON: None.  TECHNIQUE: Venous Duplex ultrasound of the right lower extremity with and without compression, augmentation, and duplex. Color flow and spectral Doppler with waveform analysis performed.    FINDINGS: Exam includes the common femoral, femoral, popliteal, and contralateral common femoral veins as well as segmentally visualized deep calf veins and greater saphenous vein.     RIGHT: No deep vein thrombosis. No superficial thrombophlebitis. Chronic, calcified post-thrombotic change noted involving the lesser saphenous vein in the proximal calf. No popliteal cyst.      Impression    IMPRESSION:  1.  No deep venous thrombosis in the right lower extremity.   XR Esophagram    Narrative    EXAM: XR ESOPHAGRAM SINGLE CONTRAST  LOCATION: [facility]  DATE: 2/24/2025    INDICATION: Presbyesophagus with dysphagia, coughing. H/o Schatzki's ring.  COMPARISON: None.  TECHNIQUE: Routine.    RADIATION DOSE: Total Air Kerma 11.9 mGy.    FINDINGS:   ESOPHAGUS: A 1.2 cm length stricture at the GE junction. There is some shouldering adjacent to the stricture. A 13 mm barium tablet does not pass through the  stricture. Stasis in the esophagus with nonperistaltic tertiary waves.      Impression    IMPRESSION:   1.  A 1.2 cm in length stricture of the distal esophagus at the GE junction, not clearly benign. Suggest endoscopy for further evaluation.   Echocardiogram Complete     Value    LVEF  55-60%    Narrative    [id number]  [id number]  [id number]  [id number]^ANDRA^TRAVIS^VALERIA     [facility]  Echocardiography Laboratory  [facility] [address]     Name: IVANIA GOMEZ  MRN: [id number]  : 1940  Study Date: 2025 01:49 PM  Age: 84 yrs  Gender: Male  Patient Location: [facility]  Reason For Study: Tachycardia  Ordering Physician: TRAVIS SILVERMAN  Performed By: CHHAYA Renteria     BSA: 1.9 m2  Height: 69 in  Weight: 160 lb  HR: 75  BP: 111/77 mmHg  ______________________________________________________________________________  Procedure  Echocardiogram with two-dimensional, color and spectral Doppler. Definity (NDC  #13138-642) given intravenously. Contrast Definity. Technically difficult  study.Extremely difficult acoustic windows despite the use of contrast for  endcardial border definition. Technically difficult study.  ______________________________________________________________________________  Interpretation Summary     Left ventricular systolic function is normal.The visual ejection fraction is  55-60%.  The right ventricular systolic function is normal.  Moderate to severe aortic valve stenosis, peak aortic valve velocity 3.4 m/s,  mean gradients 26 mmHg, aortic valve area 0.95 cmÂ , DI 0.24  Aortic root aneurysm is present- 4.5 cm  Ascending aorta dilatation is present- 4 cm.  The inferior vena cava was normal in size with preserved respiratory  variability.     Echo dated  moderate aortic valve stenosis, aortic root 4.5 cm,  ascending aorta 3.9 cm  ______________________________________________________________________________  Left  Ventricle  Diastolic Doppler findings (E/E' ratio and/or other parameters) suggest left  ventricular filling pressures are indeterminate. Left ventricular systolic  function is normal. The visual ejection fraction is 55-60%. No regional wall  motion abnormalities noted.     Right Ventricle  The right ventricle is normal size. The right ventricular systolic function is  normal.     Atria  The left atrium is mildly dilated. Right atrial size is normal. There is no  color Doppler evidence of an atrial shunt.     Mitral Valve  There is trace mitral regurgitation.     Tricuspid Valve  There is trace tricuspid regurgitation. The right ventricular systolic  pressure is approximated at 26.8 mmHg plus the right atrial pressure.     Aortic Valve  There is trace aortic regurgitation. Moderate to severe aortic valve stenosis,  peak aortic valve velocity 3.4 m/s, mean gradients 26 mmHg, aortic valve area  0.95 cmÂ , DI 0.24.     Pulmonic Valve  There is mild (1+) pulmonic valvular regurgitation. There is no pulmonic  valvular stenosis.     Vessels  Aortic root aneurysm is present. 4.5 cm. Ascending aorta dilatation is  present. 4 cm. The inferior vena cava was normal in size with preserved  respiratory variability.     Pericardium  There is no pericardial effusion.     Rhythm  Sinus rhythm was noted.  ______________________________________________________________________________  MMode/2D Measurements & Calculations     Ao root diam: 4.5 cm  asc Aorta Diam: 4.0 cm  LVOT diam: 2.2 cm  LVOT area: 3.8 cm2  Ao root diam index Ht(cm/m): 2.6  Ao root diam index BSA (cm/m2): 2.4  Asc Ao diam index BSA (cm/m2): 2.1  Asc Ao diam index Ht(cm/m): 2.3  EF Biplane: 60.3 %  TAPSE: 2.1 cm     Doppler Measurements & Calculations  MV E max kody: 68.0 cm/sec  MV A max kody: 109.1 cm/sec  MV E/A: 0.62  MV dec slope: 162.4 cm/sec2  MV dec time: 0.42 sec  Ao V2 max: 337.6 cm/sec  Ao max P.6 mmHg  Ao V2 mean: 246.6 cm/sec  Ao mean P.5  mmHg  Ao V2 VTI: 66.9 cm  SUYAPA(I,D): 0.99 cm2  SUYAPA(V,D): 0.90 cm2  LV V1 max P.6 mmHg  LV V1 max: 79.9 cm/sec  LV V1 VTI: 17.4 cm  SV(LVOT): 66.4 ml  SI(LVOT): 35.3 ml/m2     PA acc time: 0.16 sec  TR max kevin: 258.7 cm/sec  TR max P.8 mmHg  AV Kevin Ratio (DI): 0.24  SUYAPA Index (cm2/m2): 0.53  E/E' av.6  Lateral E/e': 5.1  Medial E/e': 10.1  RV S Kevin: 13.9 cm/sec     ______________________________________________________________________________  Report approved by: Chon Granados MD on 2025 03:45 PM             Consultations This Hospital Stay   PHARMACY TO DOSE VANCO  PHARMACY IP CONSULT  PHARMACY TO DOSE VANCO  NEPHROLOGY IP CONSULT  CARDIOLOGY IP CONSULT  WOUND OSTOMY CONTINENCE NURSE  IP CONSULT  PHYSICAL THERAPY ADULT IP CONSULT  CARE MANAGEMENT / SOCIAL WORK IP CONSULT  CARE MANAGEMENT / SOCIAL WORK IP CONSULT  SPEECH LANGUAGE PATH ADULT IP CONSULT  GASTROENTEROLOGY IP CONSULT  WOUND OSTOMY CONTINENCE NURSE  IP CONSULT  CARDIOLOGY IP CONSULT  VASCULAR ACCESS ADULT IP CONSULT  SPIRITUAL HEALTH SERVICES IP CONSULT  SOCIAL WORK IP CONSULT  PHYSICAL THERAPY ADULT IP CONSULT  GIP INPATIENT HOSPICE ADULT CONSULT  GIP INPATIENT HOSPICE ADULT CONSULT  WOUND OSTOMY CONTINENCE NURSE  IP CONSULT  WOUND OSTOMY CONTINENCE NURSE  IP CONSULT    Primary Care Physician   Aye Queen    Time Spent on this Encounter   I, Mary Dorantes MD, personally saw the patient today and spent less than or equal to 30 minutes discharging this patient.

## 2025-03-06 NOTE — PLAN OF CARE
3/5/25 -- 0213-2913    Orientation: Somnolent  Aggression Stop Light: Green  Activity: A2 w/ lift, q2h T/R  Diet/BS Checks: NPO, unable to tolerate food and liquids  IV Access/Drains: R PIV SL  Pain Management: scheduled oxycodone,   Abnormal VS/Results: deferred d/t comfort cares  Bowel/Bladder: incontinent of b/b, lee in place  Skin/Wounds:  Coccyx and R heel wound, Blanchable redness to L heel  Consults: SW, WOC  D/C Disposition: pending    Other Info:  - Suction and oral cares provided q2h and prn  - Scopolamine patch behind L ear

## (undated) DEVICE — PEN MARKING SKIN

## (undated) DEVICE — SPECIMEN CONTAINER 60MLW/10% FORMALIN 59601